# Patient Record
Sex: FEMALE | Race: WHITE | NOT HISPANIC OR LATINO | Employment: OTHER | URBAN - NONMETROPOLITAN AREA
[De-identification: names, ages, dates, MRNs, and addresses within clinical notes are randomized per-mention and may not be internally consistent; named-entity substitution may affect disease eponyms.]

---

## 2017-02-22 ENCOUNTER — APPOINTMENT (OUTPATIENT)
Dept: GENERAL RADIOLOGY | Facility: HOSPITAL | Age: 72
End: 2017-02-22

## 2017-02-22 ENCOUNTER — HOSPITAL ENCOUNTER (EMERGENCY)
Facility: HOSPITAL | Age: 72
Discharge: HOME | End: 2017-02-22
Attending: EMERGENCY MEDICINE | Admitting: NURSE PRACTITIONER

## 2017-02-22 VITALS
HEART RATE: 64 BPM | RESPIRATION RATE: 18 BRPM | WEIGHT: 237 LBS | TEMPERATURE: 98.1 F | BODY MASS INDEX: 44.75 KG/M2 | HEIGHT: 61 IN | DIASTOLIC BLOOD PRESSURE: 91 MMHG | SYSTOLIC BLOOD PRESSURE: 150 MMHG | OXYGEN SATURATION: 97 %

## 2017-02-22 DIAGNOSIS — R07.81 RIB PAIN ON RIGHT SIDE: Primary | ICD-10-CM

## 2017-02-22 LAB
ALBUMIN SERPL-MCNC: 4.1 G/DL (ref 3.4–4.8)
ALBUMIN/GLOB SERPL: 1.2 G/DL (ref 1.1–1.8)
ALP SERPL-CCNC: 146 U/L (ref 38–126)
ALT SERPL W P-5'-P-CCNC: 37 U/L (ref 9–52)
ANION GAP SERPL CALCULATED.3IONS-SCNC: 8 MMOL/L (ref 5–15)
APTT PPP: 36 SECONDS (ref 20–40.3)
AST SERPL-CCNC: 43 U/L (ref 14–36)
BASOPHILS # BLD AUTO: 0.02 10*3/MM3 (ref 0–0.2)
BASOPHILS NFR BLD AUTO: 0.4 % (ref 0–2)
BILIRUB SERPL-MCNC: 0.6 MG/DL (ref 0.2–1.3)
BUN BLD-MCNC: 11 MG/DL (ref 7–21)
BUN/CREAT SERPL: 14.5 (ref 7–25)
CALCIUM SPEC-SCNC: 9.8 MG/DL (ref 8.4–10.2)
CHLORIDE SERPL-SCNC: 104 MMOL/L (ref 95–110)
CO2 SERPL-SCNC: 28 MMOL/L (ref 22–31)
CREAT BLD-MCNC: 0.76 MG/DL (ref 0.5–1)
D-DIMER, QUANTITATIVE (MAD,POW, STR): 470 NG/ML (FEU) (ref 0–470)
DEPRECATED RDW RBC AUTO: 47.7 FL (ref 36.4–46.3)
EOSINOPHIL # BLD AUTO: 0.06 10*3/MM3 (ref 0–0.7)
EOSINOPHIL NFR BLD AUTO: 1.3 % (ref 0–7)
ERYTHROCYTE [DISTWIDTH] IN BLOOD BY AUTOMATED COUNT: 14.5 % (ref 11.5–14.5)
GFR SERPL CREATININE-BSD FRML MDRD: 75 ML/MIN/1.73 (ref 60–104)
GLOBULIN UR ELPH-MCNC: 3.4 GM/DL (ref 2.3–3.5)
GLUCOSE BLD-MCNC: 86 MG/DL (ref 60–100)
HCT VFR BLD AUTO: 38.5 % (ref 35–45)
HGB BLD-MCNC: 13.2 G/DL (ref 12–15.5)
HOLD SPECIMEN: NORMAL
HOLD SPECIMEN: NORMAL
IMM GRANULOCYTES # BLD: 0 10*3/MM3 (ref 0–0.02)
IMM GRANULOCYTES NFR BLD: 0 % (ref 0–0.5)
INR PPP: 1.06 (ref 0.8–1.2)
LYMPHOCYTES # BLD AUTO: 1.53 10*3/MM3 (ref 0.6–4.2)
LYMPHOCYTES NFR BLD AUTO: 33 % (ref 10–50)
MCH RBC QN AUTO: 30.7 PG (ref 26.5–34)
MCHC RBC AUTO-ENTMCNC: 34.3 G/DL (ref 31.4–36)
MCV RBC AUTO: 89.5 FL (ref 80–98)
MONOCYTES # BLD AUTO: 0.43 10*3/MM3 (ref 0–0.9)
MONOCYTES NFR BLD AUTO: 9.3 % (ref 0–12)
NEUTROPHILS # BLD AUTO: 2.6 10*3/MM3 (ref 2–8.6)
NEUTROPHILS NFR BLD AUTO: 56 % (ref 37–80)
PLATELET # BLD AUTO: 323 10*3/MM3 (ref 150–450)
PMV BLD AUTO: 9.9 FL (ref 8–12)
POTASSIUM BLD-SCNC: 3.8 MMOL/L (ref 3.5–5.1)
PROT SERPL-MCNC: 7.5 G/DL (ref 6.3–8.6)
PROTHROMBIN TIME: 13.8 SECONDS (ref 11.1–15.3)
RBC # BLD AUTO: 4.3 10*6/MM3 (ref 3.77–5.16)
SODIUM BLD-SCNC: 140 MMOL/L (ref 137–145)
WBC NRBC COR # BLD: 4.64 10*3/MM3 (ref 3.2–9.8)
WHOLE BLOOD HOLD SPECIMEN: NORMAL
WHOLE BLOOD HOLD SPECIMEN: NORMAL

## 2017-02-22 PROCEDURE — 85025 COMPLETE CBC W/AUTO DIFF WBC: CPT | Performed by: NURSE PRACTITIONER

## 2017-02-22 PROCEDURE — 85610 PROTHROMBIN TIME: CPT | Performed by: NURSE PRACTITIONER

## 2017-02-22 PROCEDURE — 85730 THROMBOPLASTIN TIME PARTIAL: CPT | Performed by: NURSE PRACTITIONER

## 2017-02-22 PROCEDURE — 80053 COMPREHEN METABOLIC PANEL: CPT | Performed by: NURSE PRACTITIONER

## 2017-02-22 PROCEDURE — 99283 EMERGENCY DEPT VISIT LOW MDM: CPT

## 2017-02-22 PROCEDURE — 71101 X-RAY EXAM UNILAT RIBS/CHEST: CPT

## 2017-02-22 PROCEDURE — 93010 ELECTROCARDIOGRAM REPORT: CPT | Performed by: INTERNAL MEDICINE

## 2017-02-22 PROCEDURE — 93005 ELECTROCARDIOGRAM TRACING: CPT | Performed by: NURSE PRACTITIONER

## 2017-02-22 PROCEDURE — 85379 FIBRIN DEGRADATION QUANT: CPT | Performed by: NURSE PRACTITIONER

## 2017-02-22 RX ORDER — TRAMADOL HYDROCHLORIDE 50 MG/1
50 TABLET ORAL EVERY 8 HOURS PRN
Qty: 15 TABLET | Refills: 0 | Status: SHIPPED | OUTPATIENT
Start: 2017-02-22 | End: 2017-10-06

## 2017-02-22 RX ORDER — CYCLOBENZAPRINE HCL 10 MG
10 TABLET ORAL 3 TIMES DAILY PRN
Qty: 30 TABLET | Refills: 0 | Status: SHIPPED | OUTPATIENT
Start: 2017-02-22 | End: 2017-03-01

## 2017-02-22 NOTE — ED PROVIDER NOTES
Subjective   HPI Comments: Went to care center earlier today c/o right rib pain, but remembered she had similar pain last year and had PE in lungs.  recently had surgery and she has been having to pull on him a lot.    Patient is a 71 y.o. female presenting with chest pain.   History provided by:  Patient  Chest Pain   Chest pain location: right rib pain.  Pain quality: aching    Pain radiates to:  Does not radiate  Pain severity:  Moderate  Progression since onset: worse with palpation, worse with movement.  Ineffective treatments: tylenol.  Associated symptoms: no shortness of breath        Review of Systems   Constitutional: Negative.    Eyes: Negative.    Respiratory: Negative.  Negative for shortness of breath.    Cardiovascular: Positive for chest pain.   Gastrointestinal: Negative.    Genitourinary: Negative.    Musculoskeletal: Negative.    Skin: Negative.    Neurological: Negative.    Psychiatric/Behavioral: Negative.            Past Medical History   Diagnosis Date   • Chest pain      History of noncardiac chest pain   • Chronic depression    • Depressive disorder    • Diastolic dysfunction    • Dyspnea    • Dyspnea on exertion    • Edema    • Essential hypertension    • Exercise intolerance    • Fatigue    • GERD (gastroesophageal reflux disease)    • History of bone density study 2011     DEXA BONE DENSITY APPENDICULAR 42446 (1) - normal   • History of bone density study 06/24/2015     DXA BONE DENSITY AXIAL 39366 [facility] (1) - Ordered By: TOLU RAMIREZ ([facility])    • History of echocardiogram      Echocardiogram W/ color flow 39695 (2)   • History of mammogram 2013     Mammogram screen (1)   • History of mammogram 2015     MAMMOGRAM SCREENING 50840 - [facility] (1)   • History of screening mammography 06/25/2015     SCREENING MAMMOGRAPHY DIGITAL  (Medicare) (1) - Ordered By: ANDRADE BECERRIL ([facility])    • Hypercalcemia    • Hyperlipidemia    • Influenza vaccine administered 02/25/2016  "    INFLUENZA IMMUN ADMIN OR PREV RECV'D  (2) - Ordered By: ANDRADE BECERRIL ([facility])    • Long-term (current) use of anticoagulants      coumadin therapy      • Lower extremity edema      Intermittent lower extremity edema   • Obesity, Class III, BMI 40-49.9 (morbid obesity)      \"Class III obesity\"   • BEVERLY (obstructive sleep apnea)      Mild BEVERLY   • PE (pulmonary embolism) 10/2015     Bilateral PE diagnosed after a car ride to [place]   • Pneumococcal vaccination given 02/25/2016     PNEUMOC VAC/ADMIN/RCVD 4040F (2) - Ordered By: ANDRADE BECERRIL ([facility])    • Right basilic vein fibrosis 2015   • Sedentary lifestyle    • Shortness of breath    • Venous thrombosis 2015     right basilic venous thrombosis; This was noted in May 2015.   • Weight gain        Allergies   Allergen Reactions   • Sulfa Antibiotics Rash     Sulfa (Sulfonamide Antibiotics)       Past Surgical History   Procedure Laterality Date   • Cholecystectomy       Cholecystectomy (1)   • Endoscopy  09/15/2011     Colon endoscopy 83901 (2) - Hemorrhoids found.   • Hemorrhoidectomy     • Hysteroscopy       Hysteroscopy; ablation (1)   • Injection of medication  09/27/2012     Kenalog (1) - Ordered By: ANDRADE BECERRIL ([facility])    • Gastric sleeve laparoscopic         Family History   Problem Relation Age of Onset   • Ovarian cancer Mother    • Bleeding Disorder Father    • Other Father      Hematologic disorder   • Lung cancer Father    • Pancreatic cancer Sister    • Cancer Brother    • Lung cancer Brother    • Bleeding Disorder Other    • Hyperlipidemia Other    • Hypertension Other    • Osteoarthritis Other    • Other Other      Gastritis       Social History     Social History   • Marital status:      Spouse name: N/A   • Number of children: N/A   • Years of education: N/A     Social History Main Topics   • Smoking status: Never Smoker   • Smokeless tobacco: None   • Alcohol use No   • Drug use: None   • Sexual activity: Not Asked      " "Comment: Marital status:      Other Topics Concern   • None     Social History Narrative   • None           Objective   Physical Exam   Constitutional: She is oriented to person, place, and time. She appears well-developed and well-nourished.   HENT:   Head: Normocephalic.   Eyes: EOM are normal. Pupils are equal, round, and reactive to light.   Neck: Normal range of motion. Neck supple.   Cardiovascular: Normal rate and regular rhythm.    Pulmonary/Chest: Effort normal and breath sounds normal.   Tender with palpation on lower right ribs, skin is clear   Abdominal: Soft. Bowel sounds are normal.   Neurological: She is alert and oriented to person, place, and time.   Skin: Skin is warm and dry.   Nursing note and vitals reviewed.    Visit Vitals   • /91 (BP Location: Left arm, Patient Position: Lying)   • Pulse 64   • Temp 98.1 °F (36.7 °C) (Oral)   • Resp 18   • Ht 61\" (154.9 cm)   • Wt 237 lb (108 kg)   • SpO2 97%   • BMI 44.78 kg/m2         ECG 12 Lead    Date/Time: 2/22/2017 3:38 PM  Performed by: LUKE VILLASENOR  Authorized by: LUKE VILLASENOR   Interpreted by physician  Comparison: compared with previous ECG from 6/30/2016  Rhythm: sinus bradycardia  BPM: 59  Conduction: 1st degree               ED Course  ED Course      Results for orders placed or performed during the hospital encounter of 02/22/17   Comprehensive Metabolic Panel   Result Value Ref Range    Glucose 86 60 - 100 mg/dL    BUN 11 7 - 21 mg/dL    Creatinine 0.76 0.50 - 1.00 mg/dL    Sodium 140 137 - 145 mmol/L    Potassium 3.8 3.5 - 5.1 mmol/L    Chloride 104 95 - 110 mmol/L    CO2 28.0 22.0 - 31.0 mmol/L    Calcium 9.8 8.4 - 10.2 mg/dL    Total Protein 7.5 6.3 - 8.6 g/dL    Albumin 4.10 3.40 - 4.80 g/dL    ALT (SGPT) 37 9 - 52 U/L    AST (SGOT) 43 (H) 14 - 36 U/L    Alkaline Phosphatase 146 (H) 38 - 126 U/L    Total Bilirubin 0.6 0.2 - 1.3 mg/dL    eGFR Non African Amer 75 >60 mL/min/1.73    Globulin 3.4 2.3 - 3.5 gm/dL    A/G Ratio " 1.2 1.1 - 1.8 g/dL    BUN/Creatinine Ratio 14.5 7.0 - 25.0    Anion Gap 8.0 5.0 - 15.0 mmol/L   aPTT   Result Value Ref Range    PTT 36.0 20.0 - 40.3 seconds   D-dimer, Quantitative   Result Value Ref Range    D-Dimer, Quantitative 470 0 - 470 ng/mL (FEU)   Protime-INR   Result Value Ref Range    Protime 13.8 11.1 - 15.3 Seconds    INR 1.06 0.80 - 1.20   CBC Auto Differential   Result Value Ref Range    WBC 4.64 3.20 - 9.80 10*3/mm3    RBC 4.30 3.77 - 5.16 10*6/mm3    Hemoglobin 13.2 12.0 - 15.5 g/dL    Hematocrit 38.5 35.0 - 45.0 %    MCV 89.5 80.0 - 98.0 fL    MCH 30.7 26.5 - 34.0 pg    MCHC 34.3 31.4 - 36.0 g/dL    RDW 14.5 11.5 - 14.5 %    RDW-SD 47.7 (H) 36.4 - 46.3 fl    MPV 9.9 8.0 - 12.0 fL    Platelets 323 150 - 450 10*3/mm3    Neutrophil % 56.0 37.0 - 80.0 %    Lymphocyte % 33.0 10.0 - 50.0 %    Monocyte % 9.3 0.0 - 12.0 %    Eosinophil % 1.3 0.0 - 7.0 %    Basophil % 0.4 0.0 - 2.0 %    Immature Grans % 0.0 0.0 - 0.5 %    Neutrophils, Absolute 2.60 2.00 - 8.60 10*3/mm3    Lymphocytes, Absolute 1.53 0.60 - 4.20 10*3/mm3    Monocytes, Absolute 0.43 0.00 - 0.90 10*3/mm3    Eosinophils, Absolute 0.06 0.00 - 0.70 10*3/mm3    Basophils, Absolute 0.02 0.00 - 0.20 10*3/mm3    Immature Grans, Absolute 0.00 0.00 - 0.02 10*3/mm3     XR Ribs Right With PA Chest   Final Result   Impression: No radiographic evidence of fracture.      Electronically signed by:  Johnny Lester MD  2/22/2017 3:41 PM CST   Workstation: [id number]                    MDM  Number of Diagnoses or Management Options  Rib pain on right side:   Diagnosis management comments: ARLETH Request # : [id number]  Labs are negative, CXR neg. Will treat for pain, most likely muscle strain.      Final diagnoses:   Rib pain on right side            Dyana Oliveros, APRN  02/22/17 [id number]

## 2017-03-01 ENCOUNTER — OFFICE VISIT (OUTPATIENT)
Dept: INTERNAL MEDICINE | Facility: CLINIC | Age: 72
End: 2017-03-01

## 2017-03-01 VITALS
HEIGHT: 61 IN | BODY MASS INDEX: 44.22 KG/M2 | WEIGHT: 234.2 LBS | SYSTOLIC BLOOD PRESSURE: 120 MMHG | DIASTOLIC BLOOD PRESSURE: 80 MMHG

## 2017-03-01 DIAGNOSIS — I10 ESSENTIAL HYPERTENSION: Primary | ICD-10-CM

## 2017-03-01 DIAGNOSIS — E78.2 MIXED HYPERLIPIDEMIA: ICD-10-CM

## 2017-03-01 DIAGNOSIS — Z23 IMMUNIZATION DUE: ICD-10-CM

## 2017-03-01 DIAGNOSIS — F32.A DEPRESSIVE DISORDER: ICD-10-CM

## 2017-03-01 PROCEDURE — 90471 IMMUNIZATION ADMIN: CPT | Performed by: INTERNAL MEDICINE

## 2017-03-01 PROCEDURE — 99213 OFFICE O/P EST LOW 20 MIN: CPT | Performed by: INTERNAL MEDICINE

## 2017-03-01 PROCEDURE — 90715 TDAP VACCINE 7 YRS/> IM: CPT | Performed by: INTERNAL MEDICINE

## 2017-03-01 RX ORDER — SIMVASTATIN 40 MG
40 TABLET ORAL DAILY
Qty: 90 TABLET | Refills: 1 | Status: SHIPPED | OUTPATIENT
Start: 2017-03-01 | End: 2017-06-21

## 2017-03-01 RX ORDER — CITALOPRAM 20 MG/1
20 TABLET ORAL DAILY
Qty: 90 TABLET | Refills: 1 | Status: SHIPPED | OUTPATIENT
Start: 2017-03-01 | End: 2017-03-08 | Stop reason: SDUPTHER

## 2017-03-01 RX ORDER — LOSARTAN POTASSIUM 100 MG/1
100 TABLET ORAL DAILY
Qty: 90 TABLET | Refills: 1 | Status: SHIPPED | OUTPATIENT
Start: 2017-03-01 | End: 2017-06-21 | Stop reason: SDUPTHER

## 2017-03-01 RX ORDER — TRAZODONE HYDROCHLORIDE 50 MG/1
50 TABLET ORAL NIGHTLY
Qty: 30 TABLET | Refills: 2 | Status: SHIPPED | OUTPATIENT
Start: 2017-03-01 | End: 2017-06-17 | Stop reason: SDUPTHER

## 2017-03-01 NOTE — PROGRESS NOTES
Subjective   Veronica Wilkinson is a 71 y.o. female       Patient is seen for recheck on HTN, hyperlipidemia and depression.    Her BP is well controlled on Losartan. She denies any cardiovascular complaints.No chest pain, dyspnea, orthopnea or edema in lower extremities.  She underwent bariatric surgery in [place] in October 2016 and has lost about 60lbs.  She is not physically active and does not exercise on regular basis.    On Zocor for hyperlipidemia.  She tolerates Zocor well and denies any side effects to the medication.  No myalgia, myositis or fatigue.  No history of elevated LFTs.  Lipids. , TG 89, LDL 94, HDL 96. /02/25/2016/.    Depression is better on Celexa but still having problems with insomnia. Denies anxiety or panic attacks.    Past Medical History   Diagnosis Date   • Chest pain      History of noncardiac chest pain   • Chronic depression    • Depressive disorder    • Diastolic dysfunction    • Dyspnea    • Dyspnea on exertion    • Edema    • Essential hypertension    • Exercise intolerance    • Fatigue    • GERD (gastroesophageal reflux disease)    • History of bone density study 2011     DEXA BONE DENSITY APPENDICULAR 64198 (1) - normal   • History of bone density study 06/24/2015     DXA BONE DENSITY AXIAL 11562 [facility] (1) - Ordered By: TOLU RAMIREZ ([facility])    • History of echocardiogram      Echocardiogram W/ color flow 90971 (2)   • History of mammogram 2013     Mammogram screen (1)   • History of mammogram 2015     MAMMOGRAM SCREENING 06867 - [facility] (1)   • History of screening mammography 06/25/2015     SCREENING MAMMOGRAPHY DIGITAL  (Medicare) (1) - Ordered By: ANDRADE BECERRIL ([facility])    • Hypercalcemia    • Hyperlipidemia    • Influenza vaccine administered 02/25/2016     INFLUENZA IMMUN ADMIN OR PREV RECV'D  (2) - Ordered By: ANDRADE BECERRIL ([facility])    • Long-term (current) use of anticoagulants      coumadin therapy      • Lower extremity edema       "Intermittent lower extremity edema   • Obesity, Class III, BMI 40-49.9 (morbid obesity)      \"Class III obesity\"   • BEVERLY (obstructive sleep apnea)      Mild BEVERLY   • PE (pulmonary embolism) 10/2015     Bilateral PE diagnosed after a car ride to [place]   • Pneumococcal vaccination given 02/25/2016     PNEUMOC VAC/ADMIN/RCVD 4040F (2) - Ordered By: ANDRADE BECERRIL ([facility])    • Right basilic vein fibrosis 2015   • Sedentary lifestyle    • Shortness of breath    • Venous thrombosis 2015     right basilic venous thrombosis; This was noted in May 2015.   • Weight gain      Past Surgical History   Procedure Laterality Date   • Cholecystectomy       Cholecystectomy (1)   • Endoscopy  09/15/2011     Colon endoscopy 27475 (2) - Hemorrhoids found.   • Hemorrhoidectomy     • Hysteroscopy       Hysteroscopy; ablation (1)   • Injection of medication  09/27/2012     Kenalog (1) - Ordered By: ANDRADE BECERRIL ([facility])    • Gastric sleeve laparoscopic         Social History   Substance Use Topics   • Smoking status: Never Smoker   • Smokeless tobacco: Not on file   • Alcohol use No     Family History   Problem Relation Age of Onset   • Ovarian cancer Mother    • Bleeding Disorder Father    • Other Father      Hematologic disorder   • Lung cancer Father    • Pancreatic cancer Sister    • Cancer Brother    • Lung cancer Brother    • Bleeding Disorder Other    • Hyperlipidemia Other    • Hypertension Other    • Osteoarthritis Other    • Other Other      Gastritis     Allergies   Allergen Reactions   • Sulfa Antibiotics Rash     Sulfa (Sulfonamide Antibiotics)     Current Outpatient Prescriptions on File Prior to Visit   Medication Sig Dispense Refill   • citalopram (CeleXA) 20 MG tablet Take 1 tablet by mouth daily. 90 tablet 1   • Cobalamine Combinations (VITAMIN B12-FOLIC ACID PO) Take  by mouth. vit B12-intrinsic factor-folic acid cmb#2 oral     • diphenhydrAMINE-acetaminophen (TYLENOL PM)  MG tablet per tablet Take 1 tablet " by mouth at night as needed for sleep.     • losartan (COZAAR) 100 MG tablet Take 1 tablet by mouth daily. 90 tablet 1   • omeprazole OTC (PRILOSEC OTC) 20 MG EC tablet      • simvastatin (ZOCOR) 40 MG tablet Take 1 tablet by mouth daily. AT BEDTIME 90 tablet 1   • traMADol (ULTRAM) 50 MG tablet Take 1 tablet by mouth Every 8 (Eight) Hours As Needed for moderate pain (4-6). 15 tablet 0   • [DISCONTINUED] cyclobenzaprine (FLEXERIL) 10 MG tablet Take 1 tablet by mouth 3 (Three) Times a Day As Needed for muscle spasms. 30 tablet 0   • [DISCONTINUED] furosemide (LASIX) 20 MG tablet Take 1 tablet by mouth daily as needed (edema). 90 tablet 1   • [DISCONTINUED] warfarin (COUMADIN) 5 MG tablet Take  by mouth daily. TAKE 1.5 TABLETS (7.5MG) ON MONDAYS, WEDNESDAYS, AND SATURDAYS, AND 1 TABLET (5MG) ON ALL THE OTHER DAYS       No current facility-administered medications on file prior to visit.      Review of Systems   Constitutional: Negative.    HENT: Negative.    Eyes: Negative.    Respiratory: Negative for shortness of breath and wheezing.    Cardiovascular: Negative for chest pain, palpitations and leg swelling.   Gastrointestinal: Negative for abdominal pain, constipation and diarrhea.   Endocrine: Negative for cold intolerance, heat intolerance and polyuria.   Neurological: Negative for dizziness, light-headedness and headaches.   Psychiatric/Behavioral: Positive for sleep disturbance. The patient is not nervous/anxious.        Objective   Physical Exam   Constitutional: She appears well-developed and well-nourished.   HENT:   Head: Normocephalic and atraumatic.   Eyes: Conjunctivae are normal. Pupils are equal, round, and reactive to light. No scleral icterus.   Neck: Neck supple. No JVD present. No thyromegaly present.   Cardiovascular: Normal rate and regular rhythm.  Exam reveals no friction rub.    No murmur heard.  Pulmonary/Chest: Effort normal and breath sounds normal.   Abdominal: Soft. Bowel sounds are  normal.   Skin: Skin is warm and dry. No rash noted.   Psychiatric: She has a normal mood and affect. Her behavior is normal.   Nursing note and vitals reviewed.          Patient Active Problem List   Diagnosis   • Essential hypertension   • Hyperlipidemia   • History of pulmonary embolism   • Hyperparathyroidism   • Pulmonary embolism   • Long-term (current) use of anticoagulants           Results for orders placed or performed during the hospital encounter of 02/22/17   Comprehensive Metabolic Panel   Result Value Ref Range    Glucose 86 60 - 100 mg/dL    BUN 11 7 - 21 mg/dL    Creatinine 0.76 0.50 - 1.00 mg/dL    Sodium 140 137 - 145 mmol/L    Potassium 3.8 3.5 - 5.1 mmol/L    Chloride 104 95 - 110 mmol/L    CO2 28.0 22.0 - 31.0 mmol/L    Calcium 9.8 8.4 - 10.2 mg/dL    Total Protein 7.5 6.3 - 8.6 g/dL    Albumin 4.10 3.40 - 4.80 g/dL    ALT (SGPT) 37 9 - 52 U/L    AST (SGOT) 43 (H) 14 - 36 U/L    Alkaline Phosphatase 146 (H) 38 - 126 U/L    Total Bilirubin 0.6 0.2 - 1.3 mg/dL    eGFR Non African Amer 75 >60 mL/min/1.73    Globulin 3.4 2.3 - 3.5 gm/dL    A/G Ratio 1.2 1.1 - 1.8 g/dL    BUN/Creatinine Ratio 14.5 7.0 - 25.0    Anion Gap 8.0 5.0 - 15.0 mmol/L   aPTT   Result Value Ref Range    PTT 36.0 20.0 - 40.3 seconds   D-dimer, Quantitative   Result Value Ref Range    D-Dimer, Quantitative 470 0 - 470 ng/mL (FEU)   Protime-INR   Result Value Ref Range    Protime 13.8 11.1 - 15.3 Seconds    INR 1.06 0.80 - 1.20   CBC Auto Differential   Result Value Ref Range    WBC 4.64 3.20 - 9.80 10*3/mm3    RBC 4.30 3.77 - 5.16 10*6/mm3    Hemoglobin 13.2 12.0 - 15.5 g/dL    Hematocrit 38.5 35.0 - 45.0 %    MCV 89.5 80.0 - 98.0 fL    MCH 30.7 26.5 - 34.0 pg    MCHC 34.3 31.4 - 36.0 g/dL    RDW 14.5 11.5 - 14.5 %    RDW-SD 47.7 (H) 36.4 - 46.3 fl    MPV 9.9 8.0 - 12.0 fL    Platelets 323 150 - 450 10*3/mm3    Neutrophil % 56.0 37.0 - 80.0 %    Lymphocyte % 33.0 10.0 - 50.0 %    Monocyte % 9.3 0.0 - 12.0 %    Eosinophil % 1.3  0.0 - 7.0 %    Basophil % 0.4 0.0 - 2.0 %    Immature Grans % 0.0 0.0 - 0.5 %    Neutrophils, Absolute 2.60 2.00 - 8.60 10*3/mm3    Lymphocytes, Absolute 1.53 0.60 - 4.20 10*3/mm3    Monocytes, Absolute 0.43 0.00 - 0.90 10*3/mm3    Eosinophils, Absolute 0.06 0.00 - 0.70 10*3/mm3    Basophils, Absolute 0.02 0.00 - 0.20 10*3/mm3    Immature Grans, Absolute 0.00 0.00 - 0.02 10*3/mm3   Light Blue Top   Result Value Ref Range    Extra Tube hold for add-on    Green Top (Gel)   Result Value Ref Range    Extra Tube Hold for add-ons.    Lavender Top   Result Value Ref Range    Extra Tube hold for add-on    Gold Top - SST   Result Value Ref Range    Extra Tube Hold for add-ons.          Assessment    Diagnosis Plan   1. Essential hypertension     2. Mixed hyperlipidemia     3. Depressive disorder           Plan        1. Patient will continue Losartan.      DASH.      1.5 gr Sodium restriction.       Advised to increase physical activity and walk 30 minutes daily.  2. Lipids will be rechecked.      May consider stopping statin.  3. Patient will continue Celexa.      Will add Trazodone 50 mg qhs for insomnia.      Side effects of the medication discussed with the patient.      Follow up in 3 months.

## 2017-03-08 RX ORDER — CITALOPRAM 20 MG/1
20 TABLET ORAL DAILY
Qty: 90 TABLET | Refills: 1 | Status: SHIPPED | OUTPATIENT
Start: 2017-03-08 | End: 2017-06-21 | Stop reason: SDUPTHER

## 2017-06-19 RX ORDER — TRAZODONE HYDROCHLORIDE 50 MG/1
TABLET ORAL
Qty: 30 TABLET | Refills: 0 | Status: SHIPPED | OUTPATIENT
Start: 2017-06-19 | End: 2017-06-21 | Stop reason: SDUPTHER

## 2017-06-21 ENCOUNTER — OFFICE VISIT (OUTPATIENT)
Dept: INTERNAL MEDICINE | Facility: CLINIC | Age: 72
End: 2017-06-21

## 2017-06-21 ENCOUNTER — TELEPHONE (OUTPATIENT)
Dept: INTERNAL MEDICINE | Facility: CLINIC | Age: 72
End: 2017-06-21

## 2017-06-21 ENCOUNTER — APPOINTMENT (OUTPATIENT)
Dept: LAB | Facility: HOSPITAL | Age: 72
End: 2017-06-21

## 2017-06-21 VITALS
HEIGHT: 61 IN | SYSTOLIC BLOOD PRESSURE: 130 MMHG | DIASTOLIC BLOOD PRESSURE: 80 MMHG | BODY MASS INDEX: 40.4 KG/M2 | WEIGHT: 214 LBS

## 2017-06-21 DIAGNOSIS — F32.A DEPRESSIVE DISORDER: ICD-10-CM

## 2017-06-21 DIAGNOSIS — Z11.59 NEED FOR HEPATITIS C SCREENING TEST: ICD-10-CM

## 2017-06-21 DIAGNOSIS — E78.2 MIXED HYPERLIPIDEMIA: ICD-10-CM

## 2017-06-21 DIAGNOSIS — I10 ESSENTIAL HYPERTENSION: Primary | ICD-10-CM

## 2017-06-21 LAB
ARTICHOKE IGE QN: 136 MG/DL (ref 1–129)
CHOLEST SERPL-MCNC: 237 MG/DL (ref 0–199)
HCV AB SER DONR QL: NEGATIVE
HDLC SERPL-MCNC: 68 MG/DL (ref 60–200)
LDLC/HDLC SERPL: 2.23 {RATIO} (ref 0–3.22)
TRIGL SERPL-MCNC: 87 MG/DL (ref 20–199)

## 2017-06-21 PROCEDURE — 80061 LIPID PANEL: CPT | Performed by: INTERNAL MEDICINE

## 2017-06-21 PROCEDURE — 86803 HEPATITIS C AB TEST: CPT | Performed by: INTERNAL MEDICINE

## 2017-06-21 PROCEDURE — 36415 COLL VENOUS BLD VENIPUNCTURE: CPT | Performed by: INTERNAL MEDICINE

## 2017-06-21 PROCEDURE — 99213 OFFICE O/P EST LOW 20 MIN: CPT | Performed by: INTERNAL MEDICINE

## 2017-06-21 RX ORDER — TRAZODONE HYDROCHLORIDE 50 MG/1
50 TABLET ORAL NIGHTLY
Qty: 30 TABLET | Refills: 0 | Status: SHIPPED | OUTPATIENT
Start: 2017-06-21 | End: 2017-06-21 | Stop reason: SDUPTHER

## 2017-06-21 RX ORDER — TRAZODONE HYDROCHLORIDE 50 MG/1
50 TABLET ORAL NIGHTLY
Qty: 90 TABLET | Refills: 1 | Status: SHIPPED | OUTPATIENT
Start: 2017-06-21 | End: 2017-10-06

## 2017-06-21 RX ORDER — LOSARTAN POTASSIUM 100 MG/1
100 TABLET ORAL DAILY
Qty: 90 TABLET | Refills: 1 | Status: SHIPPED | OUTPATIENT
Start: 2017-06-21 | End: 2017-10-06 | Stop reason: SDUPTHER

## 2017-06-21 RX ORDER — CITALOPRAM 20 MG/1
20 TABLET ORAL DAILY
Qty: 90 TABLET | Refills: 1 | Status: SHIPPED | OUTPATIENT
Start: 2017-06-21 | End: 2017-10-06 | Stop reason: SDUPTHER

## 2017-06-21 RX ORDER — SCOLOPAMINE TRANSDERMAL SYSTEM 1 MG/1
1 PATCH, EXTENDED RELEASE TRANSDERMAL
Qty: 4 PATCH | Refills: 1 | Status: SHIPPED | OUTPATIENT
Start: 2017-06-21 | End: 2017-08-22 | Stop reason: SDUPTHER

## 2017-06-21 NOTE — TELEPHONE ENCOUNTER
Spoke with pt let her know lab shows cholesterol was elevated DR BECERRIL  Recommends she restart the  zocor and do it 3 times a week

## 2017-06-21 NOTE — PROGRESS NOTES
Subjective   Veronica Wilkinson is a 71 y.o. female       Patient is in for recheck on HTN, hyperlipidemia and depressive disorder.      Her BP is well controlled on Losartan. She denies any cardiovascular complaints.  Takes medication as prescribed and tolerates it well.  Patient underwent bariatric surgery in [place] in November 2016 and was able to lose 80lbs since then.    Hyperlipidemia.  Patient was on Simvastatin but discontinued medication several months ago.  She is abiding by low fat diet and has been eating healthy.    Depression is controlled on Celexa, and she is sleeping better since being started on Trazodone. She denies any problems with anxiety and denies panic attacks.  Denies any side effects related to the medications.    Past Medical History:   Diagnosis Date   • Chest pain     History of noncardiac chest pain   • Chronic depression    • Depressive disorder    • Diastolic dysfunction    • Dyspnea    • Dyspnea on exertion    • Edema    • Essential hypertension    • Exercise intolerance    • Fatigue    • GERD (gastroesophageal reflux disease)    • History of bone density study 2011    DEXA BONE DENSITY APPENDICULAR 10968 (1) - normal   • History of bone density study 06/24/2015    DXA BONE DENSITY AXIAL 62887 [facility] (1) - Ordered By: TOLU RAMIREZ ([facility])    • History of echocardiogram     Echocardiogram W/ color flow 55879 (2)   • History of mammogram 2013    Mammogram screen (1)   • History of mammogram 2015    MAMMOGRAM SCREENING 80446 - [facility] (1)   • History of screening mammography 06/25/2015    SCREENING MAMMOGRAPHY DIGITAL  (Medicare) (1) - Ordered By: ANDRADE BECERRIL ([facility])    • Hypercalcemia    • Hyperlipidemia    • Influenza vaccine administered 02/25/2016    INFLUENZA IMMUN ADMIN OR PREV RECV'D  (2) - Ordered By: ANDRADE BECERRIL ([facility])    • Long-term (current) use of anticoagulants     coumadin therapy      • Lower extremity edema     Intermittent lower  "extremity edema   • Obesity, Class III, BMI 40-49.9 (morbid obesity)     \"Class III obesity\"   • BEVERLY (obstructive sleep apnea)     Mild BEVERLY   • PE (pulmonary embolism) 10/2015    Bilateral PE diagnosed after a car ride to [place]   • Pneumococcal vaccination given 02/25/2016    PNEUMOC VAC/ADMIN/RCVD 4040F (2) - Ordered By: ANDRADE BECERRIL ([facility])    • Right basilic vein fibrosis 2015   • Sedentary lifestyle    • Shortness of breath    • Venous thrombosis 2015    right basilic venous thrombosis; This was noted in May 2015.   • Weight gain      Past Surgical History:   Procedure Laterality Date   • CHOLECYSTECTOMY      Cholecystectomy (1)   • ENDOSCOPY  09/15/2011    Colon endoscopy 18312 (2) - Hemorrhoids found.   • GASTRIC SLEEVE LAPAROSCOPIC     • HEMORRHOIDECTOMY     • HYSTEROSCOPY      Hysteroscopy; ablation (1)   • INJECTION OF MEDICATION  09/27/2012    Kenalog (1) - Ordered By: ANDRADE BECERRIL ([facility])        Social History   Substance Use Topics   • Smoking status: Never Smoker   • Smokeless tobacco: Not on file   • Alcohol use No     Family History   Problem Relation Age of Onset   • Ovarian cancer Mother    • Bleeding Disorder Father    • Other Father      Hematologic disorder   • Lung cancer Father    • Pancreatic cancer Sister    • Cancer Brother    • Lung cancer Brother    • Bleeding Disorder Other    • Hyperlipidemia Other    • Hypertension Other    • Osteoarthritis Other    • Other Other      Gastritis     Allergies   Allergen Reactions   • Sulfa Antibiotics Rash     Sulfa (Sulfonamide Antibiotics)     Current Outpatient Prescriptions on File Prior to Visit   Medication Sig Dispense Refill   • Cobalamine Combinations (VITAMIN B12-FOLIC ACID PO) Take  by mouth. vit B12-intrinsic factor-folic acid cmb#2 oral     • omeprazole OTC (PRILOSEC OTC) 20 MG EC tablet      • traMADol (ULTRAM) 50 MG tablet Take 1 tablet by mouth Every 8 (Eight) Hours As Needed for moderate pain (4-6). 15 tablet 0   • " [DISCONTINUED] citalopram (CeleXA) 20 MG tablet Take 1 tablet by mouth Daily. 90 tablet 1   • [DISCONTINUED] diphenhydrAMINE-acetaminophen (TYLENOL PM)  MG tablet per tablet Take 1 tablet by mouth at night as needed for sleep.     • [DISCONTINUED] losartan (COZAAR) 100 MG tablet Take 1 tablet by mouth Daily. 90 tablet 1   • [DISCONTINUED] simvastatin (ZOCOR) 40 MG tablet Take 1 tablet by mouth Daily. AT BEDTIME 90 tablet 1   • [DISCONTINUED] traZODone (DESYREL) 50 MG tablet TAKE ONE TABLET BY MOUTH EVERY NIGHT 30 tablet 0     No current facility-administered medications on file prior to visit.      Review of Systems   Constitutional: Negative for activity change and fatigue.   HENT: Negative for facial swelling and nosebleeds.    Respiratory: Negative for chest tightness and shortness of breath.    Cardiovascular: Negative for chest pain, palpitations and leg swelling.   Gastrointestinal: Negative for abdominal pain, constipation and diarrhea.   Endocrine: Negative for cold intolerance, heat intolerance, polydipsia and polyuria.   Musculoskeletal: Negative for back pain and myalgias.   Neurological: Negative for dizziness and facial asymmetry.   Psychiatric/Behavioral: Positive for sleep disturbance. The patient is not nervous/anxious.        Objective   Physical Exam   Constitutional: She appears well-developed and well-nourished.   HENT:   Head: Normocephalic and atraumatic.   Nose: Nose normal.   Mouth/Throat: Oropharynx is clear and moist.   Eyes: Conjunctivae are normal. No scleral icterus.   Neck: Neck supple. No JVD present. No thyromegaly present.   Cardiovascular: Normal rate and regular rhythm.    No murmur heard.  Pulmonary/Chest: Effort normal and breath sounds normal.   Abdominal: Soft. Bowel sounds are normal. She exhibits no mass.   Musculoskeletal: Normal range of motion. She exhibits no tenderness or deformity.   Neurological: She is alert. She has normal reflexes. No cranial nerve deficit.    Skin: Skin is warm and dry. No rash noted.   Psychiatric: She has a normal mood and affect. Her behavior is normal. Judgment and thought content normal.   Nursing note and vitals reviewed.          Patient Active Problem List   Diagnosis   • Essential hypertension   • Hyperlipidemia   • History of pulmonary embolism   • Hyperparathyroidism   • Pulmonary embolism   • Long-term (current) use of anticoagulants               Assessment    Diagnosis Plan   1. Essential hypertension     2. Mixed hyperlipidemia  Lipid Panel   3. Depressive disorder     4. Need for hepatitis C screening test  Hepatitis C Antibody         Plan      1. Patient will continue Losartan.      DASH.      1.5 gr Sodium restriction.  2. Lipids will be rechecked.      Counseled on nutrition and physical activity.  3. Patient will continue Celexa and Trazodone.      Prescriptions risks and benefits discussed with the patient.        Follow up in 6 months.

## 2017-08-18 ENCOUNTER — TELEPHONE (OUTPATIENT)
Dept: INTERNAL MEDICINE | Facility: CLINIC | Age: 72
End: 2017-08-18

## 2017-08-18 NOTE — TELEPHONE ENCOUNTER
Patient called to request a prescription for motion sickness patches that go behind the ear. She is going on a cruise and wanted to see if she could get them before. She uses [facility] in [place]

## 2017-08-22 RX ORDER — SCOLOPAMINE TRANSDERMAL SYSTEM 1 MG/1
1 PATCH, EXTENDED RELEASE TRANSDERMAL
Qty: 4 PATCH | Refills: 1 | Status: SHIPPED | OUTPATIENT
Start: 2017-08-22 | End: 2017-10-06

## 2017-10-03 ENCOUNTER — TRANSCRIBE ORDERS (OUTPATIENT)
Dept: LAB | Facility: HOSPITAL | Age: 72
End: 2017-10-03

## 2017-10-03 DIAGNOSIS — Z00.00 ROUTINE GENERAL MEDICAL EXAMINATION AT A HEALTH CARE FACILITY: ICD-10-CM

## 2017-10-03 DIAGNOSIS — Z98.84 BARIATRIC SURGERY STATUS: Primary | ICD-10-CM

## 2017-10-03 DIAGNOSIS — I10 ESSENTIAL HYPERTENSION, MALIGNANT: ICD-10-CM

## 2017-10-03 DIAGNOSIS — E67.3 HYPERVITAMINOSIS D: ICD-10-CM

## 2017-10-03 DIAGNOSIS — E78.5 HYPERLIPIDEMIA, UNSPECIFIED HYPERLIPIDEMIA TYPE: ICD-10-CM

## 2017-10-03 DIAGNOSIS — R79.9 ABNORMAL FINDING OF BLOOD CHEMISTRY: ICD-10-CM

## 2017-10-06 ENCOUNTER — OFFICE VISIT (OUTPATIENT)
Dept: INTERNAL MEDICINE | Facility: CLINIC | Age: 72
End: 2017-10-06

## 2017-10-06 VITALS
BODY MASS INDEX: 38.72 KG/M2 | DIASTOLIC BLOOD PRESSURE: 70 MMHG | WEIGHT: 205.1 LBS | HEIGHT: 61 IN | SYSTOLIC BLOOD PRESSURE: 120 MMHG

## 2017-10-06 DIAGNOSIS — M54.42 CHRONIC MIDLINE LOW BACK PAIN WITH LEFT-SIDED SCIATICA: ICD-10-CM

## 2017-10-06 DIAGNOSIS — Z23 IMMUNIZATION DUE: ICD-10-CM

## 2017-10-06 DIAGNOSIS — I10 ESSENTIAL HYPERTENSION: Primary | ICD-10-CM

## 2017-10-06 DIAGNOSIS — F32.A DEPRESSIVE DISORDER: ICD-10-CM

## 2017-10-06 DIAGNOSIS — G89.29 CHRONIC MIDLINE LOW BACK PAIN WITH LEFT-SIDED SCIATICA: ICD-10-CM

## 2017-10-06 DIAGNOSIS — E78.2 MIXED HYPERLIPIDEMIA: ICD-10-CM

## 2017-10-06 PROCEDURE — 99213 OFFICE O/P EST LOW 20 MIN: CPT | Performed by: INTERNAL MEDICINE

## 2017-10-06 PROCEDURE — G0008 ADMIN INFLUENZA VIRUS VAC: HCPCS | Performed by: INTERNAL MEDICINE

## 2017-10-06 PROCEDURE — 90662 IIV NO PRSV INCREASED AG IM: CPT | Performed by: INTERNAL MEDICINE

## 2017-10-06 RX ORDER — TRAMADOL HYDROCHLORIDE 50 MG/1
50 TABLET ORAL EVERY 8 HOURS PRN
Qty: 15 TABLET | Refills: 0 | Status: SHIPPED | OUTPATIENT
Start: 2017-10-06 | End: 2018-04-27

## 2017-10-06 RX ORDER — SIMVASTATIN 20 MG
20 TABLET ORAL EVERY OTHER DAY
Qty: 90 TABLET | Refills: 1 | Status: SHIPPED | OUTPATIENT
Start: 2017-10-06 | End: 2018-05-15 | Stop reason: SDUPTHER

## 2017-10-06 RX ORDER — LOSARTAN POTASSIUM 100 MG/1
100 TABLET ORAL DAILY
Qty: 90 TABLET | Refills: 1 | Status: SHIPPED | OUTPATIENT
Start: 2017-10-06 | End: 2018-04-04 | Stop reason: SDUPTHER

## 2017-10-06 RX ORDER — TRAZODONE HYDROCHLORIDE 100 MG/1
100 TABLET ORAL NIGHTLY
Qty: 90 TABLET | Refills: 1 | Status: SHIPPED | OUTPATIENT
Start: 2017-10-06 | End: 2018-05-15 | Stop reason: SDUPTHER

## 2017-10-06 RX ORDER — SIMVASTATIN 20 MG
20 TABLET ORAL NIGHTLY
COMMUNITY
End: 2017-10-06 | Stop reason: SDUPTHER

## 2017-10-06 RX ORDER — CITALOPRAM 20 MG/1
20 TABLET ORAL DAILY
Qty: 90 TABLET | Refills: 1 | Status: SHIPPED | OUTPATIENT
Start: 2017-10-06 | End: 2018-05-15

## 2017-10-06 NOTE — PROGRESS NOTES
Subjective   Veronica Wilkinson is a 72 y.o. female       Patient is in for recheck on HTN, depressive disorder and also complaining of low back pain.    Her BP is well controlled on Losartan. Patient denies any cardiovascular complaints.  She lost 80lbs since she underwent bariatric surgery about a year ago.  Patient is taking Simvastatin qod  for hyperlipidemia and tolerates it well.    Depression improved on Celexa. She is still having problems with insomnia and anxiety.  She denies any side effects related to the medications.      Complains of low back pain. Pain is in lower lumbar area and radiates down her left leg.  She had X-Ray in chiropractor's office which showed degenerative changes in L spine.  Not able to take NSAIDs and asking for pain medications for PRN use.    Past Medical History:   Diagnosis Date   • Chest pain     History of noncardiac chest pain   • Chronic depression    • Depressive disorder    • Diastolic dysfunction    • Dyspnea    • Dyspnea on exertion    • Edema    • Essential hypertension    • Exercise intolerance    • Fatigue    • GERD (gastroesophageal reflux disease)    • History of bone density study 2011    DEXA BONE DENSITY APPENDICULAR 40157 (1) - normal   • History of bone density study 06/24/2015    DXA BONE DENSITY AXIAL 18290 [facility] (1) - Ordered By: TOLU RAMIREZ ([facility])    • History of echocardiogram     Echocardiogram W/ color flow 80236 (2)   • History of mammogram 2013    Mammogram screen (1)   • History of mammogram 2015    MAMMOGRAM SCREENING 93423 - [facility] (1)   • History of screening mammography 06/25/2015    SCREENING MAMMOGRAPHY DIGITAL  (Medicare) (1) - Ordered By: ANDRADE BECERRIL ([facility])    • Hypercalcemia    • Hyperlipidemia    • Influenza vaccine administered 02/25/2016    INFLUENZA IMMUN ADMIN OR PREV RECV'D  (2) - Ordered By: ANDRADE BECERRIL ([facility])    • Long-term (current) use of anticoagulants     coumadin therapy      • Lower extremity  "edema     Intermittent lower extremity edema   • Obesity, Class III, BMI 40-49.9 (morbid obesity)     \"Class III obesity\"   • BEVERLY (obstructive sleep apnea)     Mild BEVERLY   • PE (pulmonary embolism) 10/2015    Bilateral PE diagnosed after a car ride to [place]   • Pneumococcal vaccination given 02/25/2016    PNEUMOC VAC/ADMIN/RCVD 4040F (2) - Ordered By: ANDRADE BECERRIL ([facility])    • Right basilic vein fibrosis 2015   • Sedentary lifestyle    • Shortness of breath    • Venous thrombosis 2015    right basilic venous thrombosis; This was noted in May 2015.   • Weight gain      Past Surgical History:   Procedure Laterality Date   • CHOLECYSTECTOMY      Cholecystectomy (1)   • ENDOSCOPY  09/15/2011    Colon endoscopy 05148 (2) - Hemorrhoids found.   • GASTRIC SLEEVE LAPAROSCOPIC     • HEMORRHOIDECTOMY     • HYSTEROSCOPY      Hysteroscopy; ablation (1)   • INJECTION OF MEDICATION  09/27/2012    Kenalog (1) - Ordered By: ANDRADE BECERRIL ([facility])        Social History   Substance Use Topics   • Smoking status: Never Smoker   • Smokeless tobacco: Never Used   • Alcohol use No     Family History   Problem Relation Age of Onset   • Ovarian cancer Mother    • Bleeding Disorder Father    • Other Father      Hematologic disorder   • Lung cancer Father    • Pancreatic cancer Sister    • Cancer Brother    • Lung cancer Brother    • Bleeding Disorder Other    • Hyperlipidemia Other    • Hypertension Other    • Osteoarthritis Other    • Other Other      Gastritis     Allergies   Allergen Reactions   • Sulfa Antibiotics Rash     Sulfa (Sulfonamide Antibiotics)     Current Outpatient Prescriptions on File Prior to Visit   Medication Sig Dispense Refill   • citalopram (CeleXA) 20 MG tablet Take 1 tablet by mouth Daily. 90 tablet 1   • Cobalamine Combinations (VITAMIN B12-FOLIC ACID PO) Take  by mouth. vit B12-intrinsic factor-folic acid cmb#2 oral     • losartan (COZAAR) 100 MG tablet Take 1 tablet by mouth Daily. 90 tablet 1   • " omeprazole OTC (PRILOSEC OTC) 20 MG EC tablet      • traZODone (DESYREL) 50 MG tablet Take 1 tablet by mouth Every Night. 90 tablet 1   • [DISCONTINUED] traMADol (ULTRAM) 50 MG tablet Take 1 tablet by mouth Every 8 (Eight) Hours As Needed for moderate pain (4-6). 15 tablet 0   • [DISCONTINUED] Scopolamine (TRANSDERM-SCOP, 1.5 MG,) 1.5 MG/3DAYS patch Place 1 patch on the skin Every 72 (Seventy-Two) Hours. 4 patch 1     No current facility-administered medications on file prior to visit.      Review of Systems   Constitutional: Negative for activity change and fatigue.   HENT: Negative.    Respiratory: Negative for chest tightness and shortness of breath.    Cardiovascular: Negative for chest pain, palpitations and leg swelling.   Gastrointestinal: Negative for abdominal pain, constipation and diarrhea.   Genitourinary: Negative for flank pain and frequency.   Musculoskeletal: Positive for back pain. Negative for gait problem and neck pain.   Neurological: Negative for dizziness.   Psychiatric/Behavioral: Positive for sleep disturbance. The patient is not nervous/anxious.        Objective   Physical Exam   Constitutional: She is oriented to person, place, and time. She appears well-developed and well-nourished.   HENT:   Head: Normocephalic and atraumatic.   Eyes: Conjunctivae are normal. No scleral icterus.   Neck: Neck supple. No JVD present.   Cardiovascular: Normal rate and regular rhythm.    No murmur heard.  Pulmonary/Chest: Effort normal and breath sounds normal.   Abdominal: Soft. Bowel sounds are normal. She exhibits no mass.   Musculoskeletal:   Tenderness on palpation in lower lumbar area   Neurological: She is alert and oriented to person, place, and time. She has normal reflexes.   Skin: Skin is warm and dry.   Psychiatric: She has a normal mood and affect. Her behavior is normal.   Nursing note and vitals reviewed.    Office Visit on 06/21/2017   Component Date Value Ref Range Status   • Total  Cholesterol 06/21/2017 237* 0 - 199 mg/dL Final   • Triglycerides 06/21/2017 87  20 - 199 mg/dL Final   • HDL Cholesterol 06/21/2017 68  60 - 200 mg/dL Final   • LDL Cholesterol  06/21/2017 136* 1 - 129 mg/dL Final   • LDL/HDL Ratio 06/21/2017 2.23  0.00 - 3.22 Final   • Hepatitis C Ab 06/21/2017 Negative  Negative Final   Admission on 02/22/2017, Discharged on 02/22/2017   Component Date Value Ref Range Status   • Glucose 02/22/2017 86  60 - 100 mg/dL Final   • BUN 02/22/2017 11  7 - 21 mg/dL Final   • Creatinine 02/22/2017 0.76  0.50 - 1.00 mg/dL Final   • Sodium 02/22/2017 140  137 - 145 mmol/L Final   • Potassium 02/22/2017 3.8  3.5 - 5.1 mmol/L Final   • Chloride 02/22/2017 104  95 - 110 mmol/L Final   • CO2 02/22/2017 28.0  22.0 - 31.0 mmol/L Final   • Calcium 02/22/2017 9.8  8.4 - 10.2 mg/dL Final   • Total Protein 02/22/2017 7.5  6.3 - 8.6 g/dL Final   • Albumin 02/22/2017 4.10  3.40 - 4.80 g/dL Final   • ALT (SGPT) 02/22/2017 37  9 - 52 U/L Final   • AST (SGOT) 02/22/2017 43* 14 - 36 U/L Final   • Alkaline Phosphatase 02/22/2017 146* 38 - 126 U/L Final   • Total Bilirubin 02/22/2017 0.6  0.2 - 1.3 mg/dL Final   • eGFR Non African Amer 02/22/2017 75  >60 mL/min/1.73 Final   • Globulin 02/22/2017 3.4  2.3 - 3.5 gm/dL Final   • A/G Ratio 02/22/2017 1.2  1.1 - 1.8 g/dL Final   • BUN/Creatinine Ratio 02/22/2017 14.5  7.0 - 25.0 Final   • Anion Gap 02/22/2017 8.0  5.0 - 15.0 mmol/L Final   • PTT 02/22/2017 36.0  20.0 - 40.3 seconds Final   • D-Dimer, Quantitative 02/22/2017 470  0 - 470 ng/mL (FEU) Final   • Protime 02/22/2017 13.8  11.1 - 15.3 Seconds Final   • INR 02/22/2017 1.06  0.80 - 1.20 Final   • WBC 02/22/2017 4.64  3.20 - 9.80 10*3/mm3 Final   • RBC 02/22/2017 4.30  3.77 - 5.16 10*6/mm3 Final   • Hemoglobin 02/22/2017 13.2  12.0 - 15.5 g/dL Final   • Hematocrit 02/22/2017 38.5  35.0 - 45.0 % Final   • MCV 02/22/2017 89.5  80.0 - 98.0 fL Final   • MCH 02/22/2017 30.7  26.5 - 34.0 pg Final   • MCHC  02/22/2017 34.3  31.4 - 36.0 g/dL Final   • RDW 02/22/2017 14.5  11.5 - 14.5 % Final   • RDW-SD 02/22/2017 47.7* 36.4 - 46.3 fl Final   • MPV 02/22/2017 9.9  8.0 - 12.0 fL Final   • Platelets 02/22/2017 323  150 - 450 10*3/mm3 Final   • Neutrophil % 02/22/2017 56.0  37.0 - 80.0 % Final   • Lymphocyte % 02/22/2017 33.0  10.0 - 50.0 % Final   • Monocyte % 02/22/2017 9.3  0.0 - 12.0 % Final   • Eosinophil % 02/22/2017 1.3  0.0 - 7.0 % Final   • Basophil % 02/22/2017 0.4  0.0 - 2.0 % Final   • Immature Grans % 02/22/2017 0.0  0.0 - 0.5 % Final   • Neutrophils, Absolute 02/22/2017 2.60  2.00 - 8.60 10*3/mm3 Final   • Lymphocytes, Absolute 02/22/2017 1.53  0.60 - 4.20 10*3/mm3 Final   • Monocytes, Absolute 02/22/2017 0.43  0.00 - 0.90 10*3/mm3 Final   • Eosinophils, Absolute 02/22/2017 0.06  0.00 - 0.70 10*3/mm3 Final   • Basophils, Absolute 02/22/2017 0.02  0.00 - 0.20 10*3/mm3 Final   • Immature Grans, Absolute 02/22/2017 0.00  0.00 - 0.02 10*3/mm3 Final   • Extra Tube 02/22/2017 hold for add-on   Final   • Extra Tube 02/22/2017 Hold for add-ons.   Final   • Extra Tube 02/22/2017 hold for add-on   Final   • Extra Tube 02/22/2017 Hold for add-ons.   Final   Anticoagulation Visit on 12/02/2016   Component Date Value Ref Range Status   • INR 12/02/2016 2.90* 0.9 - 1.1 Final   Anticoagulation Visit on 12/01/2016   Component Date Value Ref Range Status   • INR 12/01/2016 4.50* 0.9 - 1.1 Final   Anticoagulation Visit on 11/30/2016   Component Date Value Ref Range Status   • INR 11/30/2016 7.50* 0.9 - 1.1 Final   Anticoagulation Visit on 11/22/2016   Component Date Value Ref Range Status   • INR 11/22/2016 2.80* 0.9 - 1.1 Final   Anticoagulation Visit on 11/15/2016   Component Date Value Ref Range Status   • INR 11/15/2016 1.80* 0.9 - 1.1 Final   Anticoagulation Visit on 11/04/2016   Component Date Value Ref Range Status   • INR 11/04/2016 1.20* 0.9 - 1.1 Final   Anticoagulation Visit on 10/31/2016   Component Date Value Ref  Range Status   • INR 10/31/2016 1.20* 0.9 - 1.1 Final   Anticoagulation Visit on 10/14/2016   Component Date Value Ref Range Status   • INR 10/14/2016 2.90* 0.9 - 1.1 Final   There may be more visits with results that are not included.          Patient Active Problem List   Diagnosis   • Essential hypertension   • Hyperlipidemia   • History of pulmonary embolism   • Hyperparathyroidism   • Pulmonary embolism   • Long-term (current) use of anticoagulants             Assessment   Diagnosis Plan   1. Essential hypertension     2. Mixed hyperlipidemia     3. Depressive disorder     4. Chronic midline low back pain with left-sided sciatica         Plan        1. Patient will continue Losartan.      DASH.      1.5 gr Sodium restriction.  2. Simvastatin will be continued.      Lipids will be rechecked.      Counseled on nutrition.  3. Patient will continue Celexa.      Trazodone will be increased to 100 mg daily.  4. She is advised about PT but is not interested.      Patient is planning to continue with chiropractor's treatments.      Tramadol 50 mg q 8hrs PRN.       If symptoms persist, she may need MRI of L spine.          Follow up in 3 months.          This document has been electronically signed by Josephine Kauffman MD on October 6, 2017 11:55 AM

## 2017-10-17 ENCOUNTER — TRANSCRIBE ORDERS (OUTPATIENT)
Dept: LAB | Facility: HOSPITAL | Age: 72
End: 2017-10-17

## 2017-10-17 ENCOUNTER — LAB (OUTPATIENT)
Dept: LAB | Facility: HOSPITAL | Age: 72
End: 2017-10-17

## 2017-10-17 DIAGNOSIS — I10 ESSENTIAL HYPERTENSION, MALIGNANT: ICD-10-CM

## 2017-10-17 DIAGNOSIS — E67.3 HYPERVITAMINOSIS D: ICD-10-CM

## 2017-10-17 DIAGNOSIS — E78.5 HYPERLIPIDEMIA, UNSPECIFIED HYPERLIPIDEMIA TYPE: ICD-10-CM

## 2017-10-17 DIAGNOSIS — Z98.84 BARIATRIC SURGERY STATUS: Primary | ICD-10-CM

## 2017-10-17 DIAGNOSIS — Z98.84 BARIATRIC SURGERY STATUS: ICD-10-CM

## 2017-10-17 DIAGNOSIS — Z00.00 ROUTINE GENERAL MEDICAL EXAMINATION AT A HEALTH CARE FACILITY: ICD-10-CM

## 2017-10-17 DIAGNOSIS — I10 HYPERTENSION, UNSPECIFIED TYPE: ICD-10-CM

## 2017-10-17 DIAGNOSIS — R79.9 ABNORMAL FINDING OF BLOOD CHEMISTRY: ICD-10-CM

## 2017-10-17 LAB
25(OH)D3 SERPL-MCNC: 37.8 NG/ML (ref 30–100)
ALBUMIN SERPL-MCNC: 3.8 G/DL (ref 3.4–4.8)
ALBUMIN/GLOB SERPL: 1.2 G/DL (ref 1.1–1.8)
ALP SERPL-CCNC: 113 U/L (ref 38–126)
ALT SERPL W P-5'-P-CCNC: 61 U/L (ref 9–52)
ANION GAP SERPL CALCULATED.3IONS-SCNC: 10 MMOL/L (ref 5–15)
ARTICHOKE IGE QN: 74 MG/DL (ref 1–129)
AST SERPL-CCNC: 59 U/L (ref 14–36)
BASOPHILS # BLD AUTO: 0.02 10*3/MM3 (ref 0–0.2)
BASOPHILS NFR BLD AUTO: 0.5 % (ref 0–2)
BILIRUB SERPL-MCNC: 0.7 MG/DL (ref 0.2–1.3)
BUN BLD-MCNC: 13 MG/DL (ref 7–21)
BUN/CREAT SERPL: 15.3 (ref 7–25)
CALCIUM SPEC-SCNC: 9.8 MG/DL (ref 8.4–10.2)
CHLORIDE SERPL-SCNC: 104 MMOL/L (ref 95–110)
CHOLEST SERPL-MCNC: 178 MG/DL (ref 0–199)
CO2 SERPL-SCNC: 28 MMOL/L (ref 22–31)
CREAT BLD-MCNC: 0.85 MG/DL (ref 0.5–1)
DEPRECATED RDW RBC AUTO: 43.6 FL (ref 36.4–46.3)
EOSINOPHIL # BLD AUTO: 0.08 10*3/MM3 (ref 0–0.7)
EOSINOPHIL NFR BLD AUTO: 2 % (ref 0–7)
ERYTHROCYTE [DISTWIDTH] IN BLOOD BY AUTOMATED COUNT: 13 % (ref 11.5–14.5)
FERRITIN SERPL-MCNC: 145 NG/ML (ref 11.1–264)
GFR SERPL CREATININE-BSD FRML MDRD: 66 ML/MIN/1.73 (ref 39–90)
GLOBULIN UR ELPH-MCNC: 3.1 GM/DL (ref 2.3–3.5)
GLUCOSE BLD-MCNC: 77 MG/DL (ref 60–100)
HCT VFR BLD AUTO: 36 % (ref 35–45)
HDLC SERPL-MCNC: 73 MG/DL (ref 60–200)
HGB BLD-MCNC: 12.3 G/DL (ref 12–15.5)
IMM GRANULOCYTES # BLD: 0 10*3/MM3 (ref 0–0.02)
IMM GRANULOCYTES NFR BLD: 0 % (ref 0–0.5)
IRON 24H UR-MRATE: 87 MCG/DL (ref 37–170)
IRON SATN MFR SERPL: 32 % (ref 15–50)
LDLC/HDLC SERPL: 1.19 {RATIO} (ref 0–3.22)
LYMPHOCYTES # BLD AUTO: 1.39 10*3/MM3 (ref 0.6–4.2)
LYMPHOCYTES NFR BLD AUTO: 34 % (ref 10–50)
MAGNESIUM SERPL-MCNC: 1.8 MG/DL (ref 1.6–2.3)
MCH RBC QN AUTO: 31.5 PG (ref 26.5–34)
MCHC RBC AUTO-ENTMCNC: 34.2 G/DL (ref 31.4–36)
MCV RBC AUTO: 92.1 FL (ref 80–98)
MONOCYTES # BLD AUTO: 0.41 10*3/MM3 (ref 0–0.9)
MONOCYTES NFR BLD AUTO: 10 % (ref 0–12)
NEUTROPHILS # BLD AUTO: 2.19 10*3/MM3 (ref 2–8.6)
NEUTROPHILS NFR BLD AUTO: 53.5 % (ref 37–80)
PHOSPHATE SERPL-MCNC: 3.7 MG/DL (ref 2.4–4.4)
PLATELET # BLD AUTO: 236 10*3/MM3 (ref 150–450)
PMV BLD AUTO: 9.5 FL (ref 8–12)
POTASSIUM BLD-SCNC: 4 MMOL/L (ref 3.5–5.1)
PROT SERPL-MCNC: 6.9 G/DL (ref 6.3–8.6)
PTH-INTACT SERPL-MCNC: 83.3 PG/ML (ref 10–65)
RBC # BLD AUTO: 3.91 10*6/MM3 (ref 3.77–5.16)
SODIUM BLD-SCNC: 142 MMOL/L (ref 137–145)
TIBC SERPL-MCNC: 268 MCG/DL (ref 265–497)
TRIGL SERPL-MCNC: 89 MG/DL (ref 20–199)
URATE SERPL-MCNC: 6.4 MG/DL (ref 2.5–8.5)
VIT B12 BLD-MCNC: 843 PG/ML (ref 239–931)
WBC NRBC COR # BLD: 4.09 10*3/MM3 (ref 3.2–9.8)

## 2017-10-17 PROCEDURE — 83735 ASSAY OF MAGNESIUM: CPT | Performed by: INTERNAL MEDICINE

## 2017-10-17 PROCEDURE — 36415 COLL VENOUS BLD VENIPUNCTURE: CPT | Performed by: FAMILY MEDICINE

## 2017-10-17 PROCEDURE — 84550 ASSAY OF BLOOD/URIC ACID: CPT | Performed by: INTERNAL MEDICINE

## 2017-10-17 PROCEDURE — 82747 ASSAY OF FOLIC ACID RBC: CPT | Performed by: FAMILY MEDICINE

## 2017-10-17 PROCEDURE — 82306 VITAMIN D 25 HYDROXY: CPT | Performed by: FAMILY MEDICINE

## 2017-10-17 PROCEDURE — 82728 ASSAY OF FERRITIN: CPT | Performed by: FAMILY MEDICINE

## 2017-10-17 PROCEDURE — 84100 ASSAY OF PHOSPHORUS: CPT | Performed by: INTERNAL MEDICINE

## 2017-10-17 PROCEDURE — 83540 ASSAY OF IRON: CPT | Performed by: FAMILY MEDICINE

## 2017-10-17 PROCEDURE — 80061 LIPID PANEL: CPT | Performed by: INTERNAL MEDICINE

## 2017-10-17 PROCEDURE — 83550 IRON BINDING TEST: CPT | Performed by: FAMILY MEDICINE

## 2017-10-17 PROCEDURE — 80053 COMPREHEN METABOLIC PANEL: CPT | Performed by: INTERNAL MEDICINE

## 2017-10-17 PROCEDURE — 85014 HEMATOCRIT: CPT | Performed by: FAMILY MEDICINE

## 2017-10-17 PROCEDURE — 83970 ASSAY OF PARATHORMONE: CPT | Performed by: FAMILY MEDICINE

## 2017-10-17 PROCEDURE — 84425 ASSAY OF VITAMIN B-1: CPT | Performed by: FAMILY MEDICINE

## 2017-10-17 PROCEDURE — 85025 COMPLETE CBC W/AUTO DIFF WBC: CPT | Performed by: FAMILY MEDICINE

## 2017-10-17 PROCEDURE — 82607 VITAMIN B-12: CPT | Performed by: FAMILY MEDICINE

## 2017-10-18 LAB
FOLATE BLD-MCNC: 484.4 NG/ML
FOLATE RBC-MCNC: 1400 NG/ML
HCT VFR BLD AUTO: 34.6 % (ref 34–46.6)

## 2017-10-20 LAB — VIT B1 BLD-SCNC: 151.6 NMOL/L (ref 66.5–200)

## 2017-12-03 ENCOUNTER — APPOINTMENT (OUTPATIENT)
Dept: LAB | Facility: HOSPITAL | Age: 72
End: 2017-12-03

## 2017-12-03 PROCEDURE — 85379 FIBRIN DEGRADATION QUANT: CPT | Performed by: NURSE PRACTITIONER

## 2018-04-04 ENCOUNTER — TELEPHONE (OUTPATIENT)
Dept: INTERNAL MEDICINE | Facility: CLINIC | Age: 73
End: 2018-04-04

## 2018-04-04 RX ORDER — LOSARTAN POTASSIUM 100 MG/1
100 TABLET ORAL DAILY
Qty: 30 TABLET | Refills: 0 | Status: SHIPPED | OUTPATIENT
Start: 2018-04-04 | End: 2018-05-15 | Stop reason: SDUPTHER

## 2018-04-27 ENCOUNTER — OFFICE VISIT (OUTPATIENT)
Dept: INTERNAL MEDICINE | Facility: CLINIC | Age: 73
End: 2018-04-27

## 2018-04-27 ENCOUNTER — LAB (OUTPATIENT)
Dept: LAB | Facility: HOSPITAL | Age: 73
End: 2018-04-27

## 2018-04-27 VITALS
BODY MASS INDEX: 35.3 KG/M2 | SYSTOLIC BLOOD PRESSURE: 140 MMHG | DIASTOLIC BLOOD PRESSURE: 80 MMHG | HEIGHT: 61 IN | WEIGHT: 187 LBS

## 2018-04-27 DIAGNOSIS — E21.3 HYPERPARATHYROIDISM (HCC): Primary | ICD-10-CM

## 2018-04-27 DIAGNOSIS — I10 ESSENTIAL HYPERTENSION: Primary | ICD-10-CM

## 2018-04-27 DIAGNOSIS — F32.A ANXIETY AND DEPRESSION: ICD-10-CM

## 2018-04-27 DIAGNOSIS — F41.9 ANXIETY AND DEPRESSION: ICD-10-CM

## 2018-04-27 DIAGNOSIS — E21.3 HYPERPARATHYROIDISM (HCC): ICD-10-CM

## 2018-04-27 LAB
ALBUMIN SERPL-MCNC: 3.7 G/DL (ref 3.4–4.8)
ANION GAP SERPL CALCULATED.3IONS-SCNC: 10 MMOL/L (ref 5–15)
BUN BLD-MCNC: 13 MG/DL (ref 7–21)
BUN/CREAT SERPL: 15.9 (ref 7–25)
CALCIUM SPEC-SCNC: 10.1 MG/DL (ref 8.4–10.2)
CALCIUM SPEC-SCNC: 9.4 MG/DL (ref 8.4–10.2)
CHLORIDE SERPL-SCNC: 102 MMOL/L (ref 95–110)
CO2 SERPL-SCNC: 29 MMOL/L (ref 22–31)
CREAT BLD-MCNC: 0.82 MG/DL (ref 0.5–1)
GFR SERPL CREATININE-BSD FRML MDRD: 69 ML/MIN/1.73 (ref 60–90)
GLUCOSE BLD-MCNC: 82 MG/DL (ref 60–100)
PHOSPHATE SERPL-MCNC: 3 MG/DL (ref 2.4–4.4)
POTASSIUM BLD-SCNC: 4.1 MMOL/L (ref 3.5–5.1)
PTH-INTACT SERPL-MCNC: 83.3 PG/ML (ref 10–65)
SODIUM BLD-SCNC: 141 MMOL/L (ref 137–145)
TSH SERPL DL<=0.05 MIU/L-ACNC: 1.15 MIU/ML (ref 0.46–4.68)

## 2018-04-27 PROCEDURE — 84443 ASSAY THYROID STIM HORMONE: CPT

## 2018-04-27 PROCEDURE — 99214 OFFICE O/P EST MOD 30 MIN: CPT | Performed by: INTERNAL MEDICINE

## 2018-04-27 PROCEDURE — 36415 COLL VENOUS BLD VENIPUNCTURE: CPT

## 2018-04-27 PROCEDURE — 83970 ASSAY OF PARATHORMONE: CPT

## 2018-04-27 PROCEDURE — 80069 RENAL FUNCTION PANEL: CPT

## 2018-04-27 PROCEDURE — 82310 ASSAY OF CALCIUM: CPT

## 2018-05-15 ENCOUNTER — TELEPHONE (OUTPATIENT)
Dept: INTERNAL MEDICINE | Facility: CLINIC | Age: 73
End: 2018-05-15

## 2018-05-15 RX ORDER — LOSARTAN POTASSIUM 100 MG/1
100 TABLET ORAL DAILY
Qty: 90 TABLET | Refills: 1 | Status: SHIPPED | OUTPATIENT
Start: 2018-05-15 | End: 2018-08-09

## 2018-05-15 RX ORDER — TRAZODONE HYDROCHLORIDE 100 MG/1
TABLET ORAL
Qty: 90 TABLET | Refills: 1 | Status: CANCELLED | OUTPATIENT
Start: 2018-05-15

## 2018-05-15 RX ORDER — TRAZODONE HYDROCHLORIDE 100 MG/1
100 TABLET ORAL NIGHTLY
Qty: 90 TABLET | Refills: 1 | Status: SHIPPED | OUTPATIENT
Start: 2018-05-15 | End: 2018-09-24 | Stop reason: SDUPTHER

## 2018-05-15 RX ORDER — SIMVASTATIN 20 MG
20 TABLET ORAL EVERY OTHER DAY
Qty: 90 TABLET | Refills: 1 | Status: SHIPPED | OUTPATIENT
Start: 2018-05-15 | End: 2018-10-25 | Stop reason: SDUPTHER

## 2018-05-15 RX ORDER — ESCITALOPRAM OXALATE 10 MG/1
10 TABLET ORAL DAILY
Qty: 90 TABLET | Refills: 1 | Status: SHIPPED | OUTPATIENT
Start: 2018-05-15 | End: 2018-08-21 | Stop reason: SDUPTHER

## 2018-05-15 NOTE — TELEPHONE ENCOUNTER
Pt states that all her meds were supposed to be refilled at her last visit but they were not.  Can you send in a year's supply on losartan simvastatin, citalopram?  She also needs trazodone.  Send them to [facility].

## 2018-05-25 ENCOUNTER — TELEPHONE (OUTPATIENT)
Dept: INTERNAL MEDICINE | Facility: CLINIC | Age: 73
End: 2018-05-25

## 2018-05-25 NOTE — TELEPHONE ENCOUNTER
Spoke with pt let her know DR BECERRIL  Changed her celexa to lexapro due to interaction with one of her other meds

## 2018-08-08 ENCOUNTER — TELEPHONE (OUTPATIENT)
Dept: INTERNAL MEDICINE | Facility: CLINIC | Age: 73
End: 2018-08-08

## 2018-08-09 ENCOUNTER — LAB (OUTPATIENT)
Dept: LAB | Facility: HOSPITAL | Age: 73
End: 2018-08-09

## 2018-08-09 ENCOUNTER — OFFICE VISIT (OUTPATIENT)
Dept: INTERNAL MEDICINE | Facility: CLINIC | Age: 73
End: 2018-08-09

## 2018-08-09 VITALS
BODY MASS INDEX: 34.83 KG/M2 | DIASTOLIC BLOOD PRESSURE: 78 MMHG | WEIGHT: 184.5 LBS | HEIGHT: 61 IN | SYSTOLIC BLOOD PRESSURE: 120 MMHG

## 2018-08-09 DIAGNOSIS — Z98.84 HX OF BARIATRIC SURGERY: ICD-10-CM

## 2018-08-09 DIAGNOSIS — F32.A DEPRESSIVE DISORDER: ICD-10-CM

## 2018-08-09 DIAGNOSIS — T14.8XXA BRUISING: ICD-10-CM

## 2018-08-09 DIAGNOSIS — E21.3 HYPERPARATHYROIDISM (HCC): ICD-10-CM

## 2018-08-09 DIAGNOSIS — Z98.84 HX OF BARIATRIC SURGERY: Primary | ICD-10-CM

## 2018-08-09 DIAGNOSIS — S80.12XD TRAUMATIC HEMATOMA OF LEFT LOWER LEG, SUBSEQUENT ENCOUNTER: Primary | ICD-10-CM

## 2018-08-09 DIAGNOSIS — I10 ESSENTIAL HYPERTENSION: ICD-10-CM

## 2018-08-09 DIAGNOSIS — Z12.31 ENCOUNTER FOR SCREENING MAMMOGRAM FOR MALIGNANT NEOPLASM OF BREAST: ICD-10-CM

## 2018-08-09 LAB
25(OH)D3 SERPL-MCNC: 44.1 NG/ML (ref 30–100)
ALBUMIN SERPL-MCNC: 4.1 G/DL (ref 3.4–4.8)
ALBUMIN/GLOB SERPL: 1.3 G/DL (ref 1.1–1.8)
ALP SERPL-CCNC: 95 U/L (ref 38–126)
ALT SERPL W P-5'-P-CCNC: 38 U/L (ref 9–52)
ANION GAP SERPL CALCULATED.3IONS-SCNC: 8 MMOL/L (ref 5–15)
APTT PPP: 41.7 SECONDS (ref 20–40.3)
AST SERPL-CCNC: 55 U/L (ref 14–36)
BASOPHILS # BLD AUTO: 0.03 10*3/MM3 (ref 0–0.2)
BASOPHILS NFR BLD AUTO: 0.8 % (ref 0–2)
BILIRUB SERPL-MCNC: 0.5 MG/DL (ref 0.2–1.3)
BUN BLD-MCNC: 10 MG/DL (ref 7–21)
BUN/CREAT SERPL: 13.7 (ref 7–25)
CALCIUM SPEC-SCNC: 8.9 MG/DL (ref 8.4–10.2)
CHLORIDE SERPL-SCNC: 101 MMOL/L (ref 95–110)
CO2 SERPL-SCNC: 28 MMOL/L (ref 22–31)
CREAT BLD-MCNC: 0.73 MG/DL (ref 0.5–1)
DEPRECATED RDW RBC AUTO: 43.1 FL (ref 36.4–46.3)
EOSINOPHIL # BLD AUTO: 0.05 10*3/MM3 (ref 0–0.7)
EOSINOPHIL NFR BLD AUTO: 1.3 % (ref 0–7)
ERYTHROCYTE [DISTWIDTH] IN BLOOD BY AUTOMATED COUNT: 12.8 % (ref 11.5–14.5)
ERYTHROCYTE [SEDIMENTATION RATE] IN BLOOD: 28 MM/HR (ref 0–20)
FOLATE SERPL-MCNC: >20 NG/ML (ref 2.76–21)
GFR SERPL CREATININE-BSD FRML MDRD: 79 ML/MIN/1.73 (ref 39–90)
GLOBULIN UR ELPH-MCNC: 3.2 GM/DL (ref 2.3–3.5)
GLUCOSE BLD-MCNC: 77 MG/DL (ref 60–100)
HCT VFR BLD AUTO: 34.9 % (ref 35–45)
HGB BLD-MCNC: 11.9 G/DL (ref 12–15.5)
IMM GRANULOCYTES # BLD: 0 10*3/MM3 (ref 0–0.02)
IMM GRANULOCYTES NFR BLD: 0 % (ref 0–0.5)
INR PPP: 1.09 (ref 0.8–1.2)
IRON 24H UR-MRATE: 104 MCG/DL (ref 37–170)
IRON SATN MFR SERPL: 37 % (ref 15–50)
LYMPHOCYTES # BLD AUTO: 1.72 10*3/MM3 (ref 0.6–4.2)
LYMPHOCYTES NFR BLD AUTO: 43.8 % (ref 10–50)
MCH RBC QN AUTO: 31.2 PG (ref 26.5–34)
MCHC RBC AUTO-ENTMCNC: 34.1 G/DL (ref 31.4–36)
MCV RBC AUTO: 91.6 FL (ref 80–98)
MONOCYTES # BLD AUTO: 0.32 10*3/MM3 (ref 0–0.9)
MONOCYTES NFR BLD AUTO: 8.1 % (ref 0–12)
NEUTROPHILS # BLD AUTO: 1.81 10*3/MM3 (ref 2–8.6)
NEUTROPHILS NFR BLD AUTO: 46 % (ref 37–80)
PLATELET # BLD AUTO: 277 10*3/MM3 (ref 150–450)
PMV BLD AUTO: 9.9 FL (ref 8–12)
POTASSIUM BLD-SCNC: 4.2 MMOL/L (ref 3.5–5.1)
PROT SERPL-MCNC: 7.3 G/DL (ref 6.3–8.6)
PROTHROMBIN TIME: 13.9 SECONDS (ref 11.1–15.3)
RBC # BLD AUTO: 3.81 10*6/MM3 (ref 3.77–5.16)
SODIUM BLD-SCNC: 137 MMOL/L (ref 137–145)
TIBC SERPL-MCNC: 281 MCG/DL (ref 265–497)
TSH SERPL DL<=0.05 MIU/L-ACNC: 1.58 MIU/ML (ref 0.46–4.68)
VIT B12 BLD-MCNC: 934 PG/ML (ref 239–931)
WBC NRBC COR # BLD: 3.93 10*3/MM3 (ref 3.2–9.8)

## 2018-08-09 PROCEDURE — 83550 IRON BINDING TEST: CPT

## 2018-08-09 PROCEDURE — 99214 OFFICE O/P EST MOD 30 MIN: CPT | Performed by: INTERNAL MEDICINE

## 2018-08-09 PROCEDURE — 82607 VITAMIN B-12: CPT | Performed by: INTERNAL MEDICINE

## 2018-08-09 PROCEDURE — 86038 ANTINUCLEAR ANTIBODIES: CPT | Performed by: INTERNAL MEDICINE

## 2018-08-09 PROCEDURE — 80053 COMPREHEN METABOLIC PANEL: CPT | Performed by: INTERNAL MEDICINE

## 2018-08-09 PROCEDURE — 83540 ASSAY OF IRON: CPT

## 2018-08-09 PROCEDURE — 85651 RBC SED RATE NONAUTOMATED: CPT | Performed by: INTERNAL MEDICINE

## 2018-08-09 PROCEDURE — 85610 PROTHROMBIN TIME: CPT | Performed by: INTERNAL MEDICINE

## 2018-08-09 PROCEDURE — 85730 THROMBOPLASTIN TIME PARTIAL: CPT | Performed by: INTERNAL MEDICINE

## 2018-08-09 PROCEDURE — 82746 ASSAY OF FOLIC ACID SERUM: CPT | Performed by: INTERNAL MEDICINE

## 2018-08-09 PROCEDURE — 86430 RHEUMATOID FACTOR TEST QUAL: CPT | Performed by: INTERNAL MEDICINE

## 2018-08-09 PROCEDURE — 82306 VITAMIN D 25 HYDROXY: CPT | Performed by: INTERNAL MEDICINE

## 2018-08-09 PROCEDURE — 84443 ASSAY THYROID STIM HORMONE: CPT | Performed by: INTERNAL MEDICINE

## 2018-08-09 PROCEDURE — 36415 COLL VENOUS BLD VENIPUNCTURE: CPT | Performed by: INTERNAL MEDICINE

## 2018-08-09 PROCEDURE — 85025 COMPLETE CBC W/AUTO DIFF WBC: CPT | Performed by: INTERNAL MEDICINE

## 2018-08-09 NOTE — PROGRESS NOTES
Subjective   Veronica Wilkinson is a 73 y.o. female with HTN,       Patient is in for recheck on left leg hematoma. He accidentally hit her leg while doing some house chores and developed large calf hematoma. [facility], was reassured and advised to follow up with PCP.  She is doing some better, swelling has decreased , and her leg  is also less painful.    Patient is concerned because she has been having more problems with bruising which has worsened over the last few months.  She denies any jose d bleeding, no gum bleeding or rectal bleeding either.  She just gets bruises without any obvious injuries.    Regarding her BP, it is controlled. She denies any chest pain or difficulty breathing.  Her ankles have been more swollen lately.    She is status post bariatric surgery in 2016, having lost 80 lbs since an operation.  [facility].    She has not had bone density repeated yet.  Patient has history of hyperparathyroidism with indication for surgical treatment - kidney stones and osteopenia/osteoporosis.  Imaging studies were non-localizing, and she deferred  any further eval or treatment at that time.  Her Calcium levels were in normal range.    Past Medical History:   Diagnosis Date   • Chest pain     History of noncardiac chest pain   • Chronic depression    • Depressive disorder    • Diastolic dysfunction    • Dyspnea    • Dyspnea on exertion    • Edema    • Essential hypertension    • Exercise intolerance    • Fatigue    • GERD (gastroesophageal reflux disease)    • History of bone density study 2011    DEXA BONE DENSITY APPENDICULAR 89369 (1) - normal   • History of bone density study 06/24/2015    DXA BONE DENSITY AXIAL 67504 [facility] (1) - Ordered By: TOLU RAMIREZ ([facility])    • History of echocardiogram     Echocardiogram W/ color flow 41833 (2)   • History of mammogram 2013    Mammogram screen (1)   • History of mammogram 2015    MAMMOGRAM SCREENING 65087 - WOMEN  "CTR (1)   • History of screening mammography 06/25/2015    SCREENING MAMMOGRAPHY DIGITAL  (Medicare) (1) - Ordered By: ANDRADE BECERRIL ([facility])    • Hypercalcemia    • Hyperlipidemia    • Influenza vaccine administered 02/25/2016    INFLUENZA IMMUN ADMIN OR PREV RECV'D  (2) - Ordered By: ANDRADE BECERRIL ([facility])    • Long-term (current) use of anticoagulants     coumadin therapy      • Lower extremity edema     Intermittent lower extremity edema   • Obesity, Class III, BMI 40-49.9 (morbid obesity) (CMS/[facility])     \"Class III obesity\"   • BEVERLY (obstructive sleep apnea)     Mild BEVERLY   • PE (pulmonary embolism) 10/2015    Bilateral PE diagnosed after a car ride to [place]   • Pneumococcal vaccination given 02/25/2016    PNEUMOC VAC/ADMIN/RCVD 4040F (2) - Ordered By: ANDRADE BECERRIL ([facility])    • Right basilic vein fibrosis 2015   • Sedentary lifestyle    • Shortness of breath    • Venous thrombosis 2015    right basilic venous thrombosis; This was noted in May 2015.   • Weight gain      Past Surgical History:   Procedure Laterality Date   • CHOLECYSTECTOMY      Cholecystectomy (1)   • ENDOSCOPY  09/15/2011    Colon endoscopy 10156 (2) - Hemorrhoids found.   • GASTRIC SLEEVE LAPAROSCOPIC     • HEMORRHOIDECTOMY     • HYSTEROSCOPY      Hysteroscopy; ablation (1)   • INJECTION OF MEDICATION  09/27/2012    Kenalog (1) - Ordered By: ANDRADE BECERRIL ([facility])        Social History   Substance Use Topics   • Smoking status: Never Smoker   • Smokeless tobacco: Never Used   • Alcohol use No     Family History   Problem Relation Age of Onset   • Ovarian cancer Mother    • Bleeding Disorder Father    • Other Father         Hematologic disorder   • Lung cancer Father    • Pancreatic cancer Sister    • Cancer Brother    • Lung cancer Brother    • Bleeding Disorder Other    • Hyperlipidemia Other    • Hypertension Other    • Osteoarthritis Other    • Other Other         Gastritis     Allergies   Allergen Reactions   • Sulfa " Antibiotics Rash and Hives     Sulfa (Sulfonamide Antibiotics)     Current Outpatient Prescriptions on File Prior to Visit   Medication Sig Dispense Refill   • Cyanocobalamin (B-12 PO) Take  by mouth.     • cyclobenzaprine (FLEXERIL) 10 MG tablet Half or one tablet every 8 hours as needed for pain 30 tablet 0   • escitalopram (LEXAPRO) 10 MG tablet Take 1 tablet by mouth Daily. 90 tablet 1   • omeprazole OTC (PRILOSEC OTC) 20 MG EC tablet      • simvastatin (ZOCOR) 20 MG tablet Take 1 tablet by mouth Every Other Day. 90 tablet 1   • traZODone (DESYREL) 100 MG tablet Take 1 tablet by mouth Every Night. 90 tablet 1     No current facility-administered medications on file prior to visit.      Review of Systems   Constitutional: Negative for fatigue.   HENT: Negative.    Eyes: Negative.    Respiratory: Negative for chest tightness and shortness of breath.    Cardiovascular: Negative for chest pain, palpitations and leg swelling.   Gastrointestinal: Negative for abdominal pain, blood in stool, constipation and diarrhea.   Endocrine: Negative for cold intolerance and heat intolerance.   Genitourinary: Negative for difficulty urinating, dysuria and hematuria.   Musculoskeletal: Negative for arthralgias and gait problem.   Skin: Negative for color change and rash.   Neurological: Negative for dizziness, syncope and light-headedness.   Hematological: Negative for adenopathy. Bruises/bleeds easily.   Psychiatric/Behavioral: Negative for sleep disturbance. The patient is not nervous/anxious.        Objective   Physical Exam   Constitutional: She is oriented to person, place, and time. She appears well-developed and well-nourished.   HENT:   Head: Normocephalic and atraumatic.   Nose: Nose normal.   Mouth/Throat: Oropharynx is clear and moist.   Eyes: Conjunctivae are normal. No scleral icterus.   Neck: No JVD present. No thyromegaly present.   Cardiovascular: Normal rate and regular rhythm.    Pulmonary/Chest: Effort normal  and breath sounds normal.   Abdominal: Soft. Bowel sounds are normal. She exhibits no mass.   Musculoskeletal: Normal range of motion. She exhibits no deformity.   Neurological: She is alert and oriented to person, place, and time. No cranial nerve deficit.   Skin: Skin is warm and dry.   Hematoma of left lower extremity  Bruises on her upper and lower extrenities   Psychiatric: She has a normal mood and affect. Her behavior is normal. Judgment and thought content normal.   Nursing note and vitals reviewed.          Patient Active Problem List   Diagnosis   • Essential hypertension   • Hyperlipidemia   • History of pulmonary embolism   • Hyperparathyroidism (CMS/HCC)   • Pulmonary embolism (CMS/HCC)   • Long-term (current) use of anticoagulants         NM parathyroid scan [[id number]] (Order [id number])   Order   Status: Final result   Appointment Information     PACS Images     Radiology Images   Study Result     Patient Name-  CHAVEZ MCLEOD  Patient ID-  [id number]   Ordering-  ARABELLA STAHL MD  Attending-  ARABELLA STAHL MD  Referring-  ARABELLA STAHL MD  ------------------------------------------------     Nuclear medicine parathyroid imaging study.     History- Secondary hyperparathyroidism. Hypercalcemia.     Findings, conclusion- Following the intravenous infusion of 21.2 mCi  of technetium 99 M sestamibi, images of the neck and upper chest are  obtained both immediately and following delay.      There is normal physiological uptake in the thyroid bed and salivary  glands. There is normal washout on delayed images. No scintigraphic  findings suggesting parathyroid adenoma.     Electronically Signed By- Tank Mclaughlin MD On: 2015-11-19 16:33:39   Imaging     NM parathyroid scan (Order #[id number]) on 11/19/2015 - Imaging Information     US head neck soft tissue [[id number]] (Order [id number])   Order   Status: Final result   Appointment Information     PACS Images     Radiology Images   Study Result         [facility]     Patient Name- CHAVEZ MCLEOD     ORDERING- ARABELLA STAHL     ATTENDING- ARABELLA STAHL MD      REFERRING- ARABELLA STAHL  -----------------------     PROCEDURE- Ultrasound thyroid on 11/19/2015     CLINICAL INDICATION-  Secondary hyperparathyroidism     COMPARISON- None      FINDINGS- Multiple sonographic images are obtained throughout the  thyroid, both transverse and sagittal images are obtained. The right  thyroid lobe measures approximately 4.1 x 2.5 x 1.6 cm. The left  thyroid lobe measures approximately 3.3 x 2.4 x 1.2 cm. Thyroid  isthmus measures 4 mm. Thyroid is homogeneous without thyroid nodule  or mass. No definite parathyroid adenoma is visualized.     CONCLUSION- Unremarkable exam.     Electronically Signed By- Tico Zhou MD On: 2015-11-20 00:26:11     Appointment Information     PACS Images     Radiology Images   Study Result     [facility]     Patient Name- CHAVEZ MCLEOD     ORDERING- MARCELLE RAMIREZ     ATTENDING- MARCELLE RAMIREZ      REFERRING- MARCELLE RAMIREZ     EXAM- DEXA scan     HISTORY- Osteoporosis screening     Scans were obtained at the L1-L4 levels and of each hip.     Average bone mineral density for the lumbar spine is   1.002 g/sq cm.  The T score of -0.4  corresponds to a WHO of classification of normal  bone density. Improved bone density since T score of -0.7 on study of  January 13, 2011.      The bone mineral density for the left femoral neck is  0.620 g/sq cm.  The T score of  -2.1  corresponds to a WHO classification of  osteopenia.     The bone mineral density for the right femoral neck is 0.583 g/sq cm.  The T score of  -2.4  corresponds to a WHO classification of  osteopenia.     Mean total hip bone mineral density 1.025 grams per square centimeter  . T score 0.7. Improvement from T score of      -0.2 on study of  January 13, 2001.     Ten-year probability of fracture  based on femoral neck bone mineral  density is as follows-   Major osteoporotic fracture 10%. Hip fracture 1.9%.     CONCLUSION-  Lumbar spine normal bone density.  Femoral necks osteopenia. Fracture risk increased.     52204  Electronically Signed By- Zora Cadena: 2015-06-24 20:13:27       Lab Results   Component Value Date    CHOL 178 10/17/2017    CHOL 237 (H) 06/21/2017     Lab Results   Component Value Date    TRIG 89 10/17/2017    TRIG 87 06/21/2017    TRIG 89 02/25/2016     Lab Results   Component Value Date    HDL 73 10/17/2017    HDL 68 06/21/2017    HDL 73 06/08/2015     No components found for: LDLDIRECTC  Lab Results   Component Value Date    LDL 74 10/17/2017     (H) 06/21/2017    LDL 94 02/25/2016     Lab Results   Component Value Date    LDLHDL 1.19 10/17/2017    LDLHDL 2.23 06/21/2017    LDLHDL 0.98 02/25/2016         Assessment/Plan   Veronica was seen today for med refill.    Diagnoses and all orders for this visit:    Traumatic hematoma of left lower leg, subsequent encounter    Bruising  -     CBC & Differential  -     Cancel: Iron & TIBC (Direct)  -     Protime-INR  -     APTT  -     TSH  -     KIERA  -     Rheumatoid Factor, Quant  -     Sedimentation rate, automated  -     CBC Auto Differential  -     Ambulatory Referral to Hematology / Oncology    Hx of bariatric surgery  -     Comprehensive metabolic panel  -     Vitamin B12  -     Folate  -     Cancel: Iron & TIBC (Direct)  -     Zinc    BMI 34.0-34.9,adult    Essential hypertension  -     TSH    Depressive disorder    Hyperparathyroidism (CMS/HCC)  -     DEXA Bone Density Axial  -     DEXA Bone Density Appendicular  -     TSH  -     Vitamin D 25 Hydroxy    Encounter for screening mammogram for malignant neoplasm of breast  -     Mammo Screening Digital Tomosynthesis Bilateral With CAD           Plan      1. Patient is improving.  Conservative care.  Discussed nature of condition with the patient.  Will monitor.  2.Labs as  above.  Hematology referral.  Advised to avoid NSAIDs.  3.Patient is following with [facility].  Weight loss discussed.  Activity as tolerated.  Diet: Heart healthy foods - more fresh fruits and vegetables and whole grains; less red meat; more fish and poultry that is baked or grilled - not fried; less salt not to exceed 2000 mg daily - less processed food; No trans or saturated fat  Blood pressure management  Weight management: Patient's Body mass index is 34.3 kg/m². BMI is above normal parameters. Recommendations include: educational material, exercise counseling and nutrition counseling.  4.Losartan/HCTZ.  DASH.  5.She has an indication for parathyroidectomy but previous imaging was nonlocalizing.  Will repeat bone density.  Patient would like to discuss alternatives with .  Referral placed.  6.Patient is doing well on Lexapro and Trazodone.  Medication benefits and side effects discussed in detail.      Follow up in 1 month or earlier if clinically indicated.                  This document has been electronically signed by Josephine Kauffman MD on August 11, 2018 8:35 PM

## 2018-08-10 LAB — ANA SER QL: NEGATIVE

## 2018-08-11 LAB — RHEUMATOID FACT SERPL-ACNC: NEGATIVE [IU]/ML

## 2018-08-11 RX ORDER — LOSARTAN POTASSIUM AND HYDROCHLOROTHIAZIDE 12.5; 5 MG/1; MG/1
1 TABLET ORAL DAILY
Qty: 90 TABLET | Refills: 1 | Status: SHIPPED | OUTPATIENT
Start: 2018-08-11 | End: 2019-04-24 | Stop reason: SDUPTHER

## 2018-08-13 ENCOUNTER — TELEPHONE (OUTPATIENT)
Dept: FAMILY MEDICINE CLINIC | Facility: CLINIC | Age: 73
End: 2018-08-13

## 2018-08-13 NOTE — PROGRESS NOTES
Per Dr. Kauffman, Ms. Wilkinson has been called with her recent lab results & recommendations.  Continue her current medications and follow-up as planned or sooner if any problems.

## 2018-08-13 NOTE — TELEPHONE ENCOUNTER
Per Dr. Kauffman, Ms. Wilkinson has been called with her recent lab results & recommendations.  Continue her current medications and follow-up as planned or sooner if any problems.        ----- Message from Josephine Kauffman MD sent at 8/10/2018  8:34 PM CDT -----  Mild anemia, iron is ok  Lupus test is negative  RF is not back  I referred to hematology  for bruising  ----- Message -----  From: Lab, Background User  Sent: 8/9/2018   2:07 PM  To: Josephine Kauffman MD

## 2018-08-15 ENCOUNTER — TELEPHONE (OUTPATIENT)
Dept: FAMILY MEDICINE CLINIC | Facility: CLINIC | Age: 73
End: 2018-08-15

## 2018-08-15 DIAGNOSIS — E21.3 HYPERPARATHYROIDISM (HCC): Primary | ICD-10-CM

## 2018-08-15 NOTE — PROGRESS NOTES
Per Dr. Kauffman, Ms. Wilkinson has been called with her recent DEXA Scan  results & recommendations.  Continue her current medications and follow-up as planned or sooner if any problems.    I have told her to call us back in a week if she hasn't heard anything from the Referral to [facility].

## 2018-08-15 NOTE — TELEPHONE ENCOUNTER
Per Dr. Kauffman, Ms. Wilkinson has been called with her recent DEXA Scan  results & recommendations.  Continue her current medications and follow-up as planned or sooner if any problems.    I have told her to call us back in a week if she hasn't heard anything from the Referral to [facility].       ----- Message from Josephine Kauffman MD sent at 8/14/2018  5:39 PM CDT -----  Please call the patient regarding her abnormal result.  Osteoporosis in hips  Re: refer to  in [facility] for hyperparathyroidism, referral placed, since her bones are more osteoporotic and they could not localize parathyroid adenoma on imaging study or endocrinology follow up to discuss option /this was already done/

## 2018-08-20 ENCOUNTER — APPOINTMENT (OUTPATIENT)
Dept: ONCOLOGY | Facility: CLINIC | Age: 73
End: 2018-08-20

## 2018-08-20 ENCOUNTER — APPOINTMENT (OUTPATIENT)
Dept: ONCOLOGY | Facility: HOSPITAL | Age: 73
End: 2018-08-20

## 2018-08-20 RX ORDER — TRAZODONE HYDROCHLORIDE 100 MG/1
TABLET ORAL
Qty: 90 TABLET | Refills: 1 | OUTPATIENT
Start: 2018-08-20

## 2018-08-21 ENCOUNTER — TELEPHONE (OUTPATIENT)
Dept: INTERNAL MEDICINE | Facility: CLINIC | Age: 73
End: 2018-08-21

## 2018-08-21 RX ORDER — ESCITALOPRAM OXALATE 10 MG/1
10 TABLET ORAL DAILY
Qty: 90 TABLET | Refills: 0 | Status: SHIPPED | OUTPATIENT
Start: 2018-08-21 | End: 2018-10-25 | Stop reason: SDUPTHER

## 2018-08-23 ENCOUNTER — APPOINTMENT (OUTPATIENT)
Dept: ONCOLOGY | Facility: HOSPITAL | Age: 73
End: 2018-08-23

## 2018-08-23 ENCOUNTER — CONSULT (OUTPATIENT)
Dept: ONCOLOGY | Facility: CLINIC | Age: 73
End: 2018-08-23

## 2018-08-23 VITALS
HEIGHT: 61 IN | RESPIRATION RATE: 18 BRPM | BODY MASS INDEX: 34.06 KG/M2 | OXYGEN SATURATION: 98 % | TEMPERATURE: 97.2 F | WEIGHT: 180.4 LBS | HEART RATE: 55 BPM | SYSTOLIC BLOOD PRESSURE: 104 MMHG | DIASTOLIC BLOOD PRESSURE: 52 MMHG

## 2018-08-23 DIAGNOSIS — D64.9 ANEMIA, UNSPECIFIED TYPE: ICD-10-CM

## 2018-08-23 DIAGNOSIS — R94.5 ABNORMAL RESULTS OF LIVER FUNCTION STUDIES: ICD-10-CM

## 2018-08-23 DIAGNOSIS — R23.3 EASY BRUISING: Primary | ICD-10-CM

## 2018-08-23 DIAGNOSIS — R79.1 ABNORMAL COAGULATION PROFILE: ICD-10-CM

## 2018-08-23 DIAGNOSIS — I26.99 OTHER PULMONARY EMBOLISM WITHOUT ACUTE COR PULMONALE, UNSPECIFIED CHRONICITY (HCC): ICD-10-CM

## 2018-08-23 DIAGNOSIS — R23.3 EASY BRUISING: ICD-10-CM

## 2018-08-23 LAB
APTT PPP: 38.9 SECONDS (ref 20–40.3)
BASOPHILS # BLD AUTO: 0.01 10*3/MM3 (ref 0–0.2)
BASOPHILS NFR BLD AUTO: 0.2 % (ref 0–2)
DEPRECATED RDW RBC AUTO: 41 FL (ref 36.4–46.3)
EOSINOPHIL # BLD AUTO: 0.05 10*3/MM3 (ref 0–0.7)
EOSINOPHIL NFR BLD AUTO: 1.2 % (ref 0–7)
ERYTHROCYTE [DISTWIDTH] IN BLOOD BY AUTOMATED COUNT: 12.5 % (ref 11.5–14.5)
HCT VFR BLD AUTO: 33.5 % (ref 35–45)
HGB BLD-MCNC: 11.6 G/DL (ref 12–15.5)
IMM GRANULOCYTES # BLD: 0.01 10*3/MM3 (ref 0–0.02)
IMM GRANULOCYTES NFR BLD: 0.2 % (ref 0–0.5)
INR PPP: 1.06 (ref 0.8–1.2)
LYMPHOCYTES # BLD AUTO: 1.7 10*3/MM3 (ref 0.6–4.2)
LYMPHOCYTES NFR BLD AUTO: 39.9 % (ref 10–50)
MCH RBC QN AUTO: 31.1 PG (ref 26.5–34)
MCHC RBC AUTO-ENTMCNC: 34.6 G/DL (ref 31.4–36)
MCV RBC AUTO: 89.8 FL (ref 80–98)
MONOCYTES # BLD AUTO: 0.44 10*3/MM3 (ref 0–0.9)
MONOCYTES NFR BLD AUTO: 10.3 % (ref 0–12)
NEUTROPHILS # BLD AUTO: 2.05 10*3/MM3 (ref 2–8.6)
NEUTROPHILS NFR BLD AUTO: 48.2 % (ref 37–80)
PLATELET # BLD AUTO: 226 10*3/MM3 (ref 150–450)
PMV BLD AUTO: 9.3 FL (ref 8–12)
PROTHROMBIN TIME: 13.6 SECONDS (ref 11.1–15.3)
RBC # BLD AUTO: 3.73 10*6/MM3 (ref 3.77–5.16)
WBC NRBC COR # BLD: 4.26 10*3/MM3 (ref 3.2–9.8)

## 2018-08-23 PROCEDURE — 85025 COMPLETE CBC W/AUTO DIFF WBC: CPT | Performed by: INTERNAL MEDICINE

## 2018-08-23 PROCEDURE — 85730 THROMBOPLASTIN TIME PARTIAL: CPT | Performed by: INTERNAL MEDICINE

## 2018-08-23 PROCEDURE — 99204 OFFICE O/P NEW MOD 45 MIN: CPT | Performed by: INTERNAL MEDICINE

## 2018-08-23 PROCEDURE — 85240 CLOT FACTOR VIII AHG 1 STAGE: CPT | Performed by: INTERNAL MEDICINE

## 2018-08-23 PROCEDURE — G0463 HOSPITAL OUTPT CLINIC VISIT: HCPCS | Performed by: INTERNAL MEDICINE

## 2018-08-23 PROCEDURE — 85610 PROTHROMBIN TIME: CPT | Performed by: INTERNAL MEDICINE

## 2018-08-23 PROCEDURE — 85245 CLOT FACTOR VIII VW RISTOCTN: CPT | Performed by: INTERNAL MEDICINE

## 2018-08-23 PROCEDURE — 85246 CLOT FACTOR VIII VW ANTIGEN: CPT | Performed by: INTERNAL MEDICINE

## 2018-08-23 NOTE — PROGRESS NOTES
DATE OF CONSULT: 8/23/2018    REQUESTING SOURCE:  Dr Becerril      REASON FOR CONSULTATION:  Prolong PTT ,anemia    HISTORY OF PRESENT ILLNESS:    73-year-old female with a past medical history consisting of history of pulmonary embolism diagnosed in 2016 status post anticoagulation with Coumadin for one year, history of osteoporosis, history of depression, history of gastric sleeve surgery done in 2016 after that she has lost 120 pounds has been referred to [facility] for further evaluation and recommendation regarding easy bruising.Patient denies any abnormal bleeding after her surgery or teeth extraction.  States his father used to bleed excessively and easily.  Denies any recent use of nonsteroidal anti-inflammatory drug.  Denies any recent use of any aspirin or Plavix or any blood thinners.  Denies any new chest pain or any new shortness of breath.      PAST MEDICAL HISTORY:    Past Medical History:   Diagnosis Date   • Chest pain     History of noncardiac chest pain   • Chronic depression    • Depressive disorder    • Diastolic dysfunction    • Dyspnea    • Dyspnea on exertion    • Edema    • Essential hypertension    • Exercise intolerance    • Fatigue    • GERD (gastroesophageal reflux disease)    • History of bone density study 2011    DEXA BONE DENSITY APPENDICULAR 63135 (1) - normal   • History of bone density study 06/24/2015    DXA BONE DENSITY AXIAL 63967 [facility] (1) - Ordered By: TOLU RAMIREZ ([facility])    • History of echocardiogram     Echocardiogram W/ color flow 37089 (2)   • History of mammogram 2013    Mammogram screen (1)   • History of mammogram 2015    MAMMOGRAM SCREENING 94772 - [facility] (1)   • History of screening mammography 06/25/2015    SCREENING MAMMOGRAPHY DIGITAL  (Medicare) (1) - Ordered By: ANDRADE BECERRIL ([facility])    • Hypercalcemia    • Hyperlipidemia    • Influenza vaccine administered 02/25/2016    INFLUENZA IMMUN ADMIN OR PREV RECV'D  (2) - Ordered By:  "ANDRADE BECERRIL ([facility] [address] [facility])    • Long-term (current) use of anticoagulants     coumadin therapy      • Lower extremity edema     Intermittent lower extremity edema   • Obesity, Class III, BMI 40-49.9 (morbid obesity) (CMS/[facility])     \"Class III obesity\"   • BEVERLY (obstructive sleep apnea)     Mild BEVERLY   • PE (pulmonary embolism) 10/2015    Bilateral PE diagnosed after a car ride to [place]   • Pneumococcal vaccination given 02/25/2016    PNEUMOC VAC/ADMIN/RCVD 4040F (2) - Ordered By: ANDRADE BECERRIL ([facility] [address] [facility])    • Right basilic vein fibrosis 2015   • Sedentary lifestyle    • Shortness of breath    • Venous thrombosis 2015    right basilic venous thrombosis; This was noted in May 2015.   • Weight gain        PAST SURGICAL HISTORY:  Past Surgical History:   Procedure Laterality Date   • CHOLECYSTECTOMY      Cholecystectomy (1)   • ENDOSCOPY  09/15/2011    Colon endoscopy 54246 (2) - Hemorrhoids found.   • GASTRIC SLEEVE LAPAROSCOPIC     • HEMORRHOIDECTOMY     • HYSTEROSCOPY      Hysteroscopy; ablation (1)   • INJECTION OF MEDICATION  09/27/2012    Kenalog (1) - Ordered By: ANDRADE BECERRIL ([facility])        ALLERGIES:    Allergies   Allergen Reactions   • Sulfa Antibiotics Rash and Hives     Sulfa (Sulfonamide Antibiotics)       SOCIAL HISTORY:   Social History   Substance Use Topics   • Smoking status: Never Smoker   • Smokeless tobacco: Never Used   • Alcohol use No       CURRENT MEDICATIONS:    Current Outpatient Prescriptions   Medication Sig Dispense Refill   • Cyanocobalamin (B-12 PO) Take  by mouth.     • cyclobenzaprine (FLEXERIL) 10 MG tablet Half or one tablet every 8 hours as needed for pain 30 tablet 0   • escitalopram (LEXAPRO) 10 MG tablet Take 1 tablet by mouth Daily. 90 tablet 0   • losartan-hydrochlorothiazide (HYZAAR) 50-12.5 MG per tablet Take 1 tablet by mouth Daily. 90 tablet 1   • omeprazole OTC (PRILOSEC OTC) 20 MG EC tablet      • simvastatin (ZOCOR) 20 MG tablet Take 1 tablet by mouth Every Other Day. " 90 tablet 1   • traZODone (DESYREL) 100 MG tablet Take 1 tablet by mouth Every Night. 90 tablet 1     No current facility-administered medications for this visit.         HOME MEDICATIONS:   Current Outpatient Prescriptions on File Prior to Visit   Medication Sig Dispense Refill   • Cyanocobalamin (B-12 PO) Take  by mouth.     • cyclobenzaprine (FLEXERIL) 10 MG tablet Half or one tablet every 8 hours as needed for pain 30 tablet 0   • escitalopram (LEXAPRO) 10 MG tablet Take 1 tablet by mouth Daily. 90 tablet 0   • losartan-hydrochlorothiazide (HYZAAR) 50-12.5 MG per tablet Take 1 tablet by mouth Daily. 90 tablet 1   • omeprazole OTC (PRILOSEC OTC) 20 MG EC tablet      • simvastatin (ZOCOR) 20 MG tablet Take 1 tablet by mouth Every Other Day. 90 tablet 1   • traZODone (DESYREL) 100 MG tablet Take 1 tablet by mouth Every Night. 90 tablet 1     No current facility-administered medications on file prior to visit.        FAMILY HISTORY:    Family History   Problem Relation Age of Onset   • Ovarian cancer Mother    • Bleeding Disorder Father    • Other Father         Hematologic disorder   • Lung cancer Father    • Pancreatic cancer Sister    • Cancer Brother    • Lung cancer Brother    • Bleeding Disorder Other    • Hyperlipidemia Other    • Hypertension Other    • Osteoarthritis Other    • Other Other         Gastritis       REVIEW OF SYSTEMS:      CONSTITUTIONAL:  Complains of fatigue.  Lost close to 100 pound a plastic sleeve surgery done in 2016.  Denies any fever, chills .     EYES: No visual disturbances. No discharge. No new lesions    ENMT:  No epistaxis, mouth sores or difficulty swallowing.    RESPIRATORY:  No new shortness of breath. No new cough or hemoptysis.    CARDIOVASCULAR:  No chest pain or palpitations.    GASTROINTESTINAL:  No abdominal pain nausea, vomiting or blood in the stool.    GENITOURINARY: No Dysuria or Hematuria.    MUSCULOSKELETAL:  Positive for joint pains.    LYMPHATICS:  Denies any  "abnormal swollen glands anywhere in the body.    NEUROLOGICAL : Complains of intermittent tingling and numbness affecting bilateral hands. No headache or dizziness. No seizures or balance problems.    SKIN: Complains of easy bruising.            PHYSICAL EXAMINATION:      VITAL SIGNS:  /52   Pulse 55   Temp 97.2 °F (36.2 °C) (Temporal Artery )   Resp 18   Ht 156.2 cm (61.5\")   Wt 81.8 kg (180 lb 6.4 oz)   LMP  (LMP Unknown)   SpO2 98%   BMI 33.54 kg/m²   1    08/23/18  1313   Weight: 81.8 kg (180 lb 6.4 oz)       ECOG performance status: 2    CONSTITUTIONAL:  Not in any distress.    EYES: Mild conjunctival Pallor. No Icterus. No Pterygium. Extraocular Movements intact.No ptosis.    ENMT:  Normocephalic, Atraumatic.No Facial Asymmetry noted.    NECK:  No adenopathy.Trachea midline. NO JVD.    RESPIRATORY:  Fair air entry bilateral. No rhonchi or wheezing.Fair respiratory effort.    CARDIOVASCULAR:  S1, S2. Regular rate and rhythm. No murmur or gallop appreciated.    ABDOMEN:  Soft, obese, nontender. Bowel sounds present in all four quadrants.  No Hepatosplenomegaly appreciated.    MUSCULOSKELETAL:  No edema.No Calf Tenderness.Decreased range of motion.    NEUROLOGIC:    No  Motor  deficit appreciated. Cranial Nerves 2-12 grossly intact.    SKIN : Bruising present on bilateral forearms, bilateral lower extremity and right side of face.    LYMPHATICS: No new enlarged lymph nodes in neck or supraclavicular area.    PSYCHIATRY: Alert, awake and oriented ×3.          DIAGNOSTIC DATA:    WBC   Date Value Ref Range Status   08/09/2018 3.93 3.20 - 9.80 10*3/mm3 Final     RBC   Date Value Ref Range Status   08/09/2018 3.81 3.77 - 5.16 10*6/mm3 Final     Hemoglobin   Date Value Ref Range Status   08/09/2018 11.9 (L) 12.0 - 15.5 g/dL Final     Hematocrit   Date Value Ref Range Status   08/09/2018 34.9 (L) 35.0 - 45.0 % Final     MCV   Date Value Ref Range Status   08/09/2018 91.6 80.0 - 98.0 fL Final     MCH "   Date Value Ref Range Status   08/09/2018 31.2 26.5 - 34.0 pg Final     MCHC   Date Value Ref Range Status   08/09/2018 34.1 31.4 - 36.0 g/dL Final     RDW   Date Value Ref Range Status   08/09/2018 12.8 11.5 - 14.5 % Final     RDW-SD   Date Value Ref Range Status   08/09/2018 43.1 36.4 - 46.3 fl Final     MPV   Date Value Ref Range Status   08/09/2018 9.9 8.0 - 12.0 fL Final     Platelets   Date Value Ref Range Status   08/09/2018 277 150 - 450 10*3/mm3 Final     Neutrophil %   Date Value Ref Range Status   08/09/2018 46.0 37.0 - 80.0 % Final     Lymphocyte %   Date Value Ref Range Status   08/09/2018 43.8 10.0 - 50.0 % Final     Monocyte %   Date Value Ref Range Status   08/09/2018 8.1 0.0 - 12.0 % Final     Eosinophil %   Date Value Ref Range Status   08/09/2018 1.3 0.0 - 7.0 % Final     Basophil %   Date Value Ref Range Status   08/09/2018 0.8 0.0 - 2.0 % Final     Immature Grans %   Date Value Ref Range Status   08/09/2018 0.0 0.0 - 0.5 % Final     Neutrophils, Absolute   Date Value Ref Range Status   08/09/2018 1.81 (L) 2.00 - 8.60 10*3/mm3 Final     Lymphocytes, Absolute   Date Value Ref Range Status   08/09/2018 1.72 0.60 - 4.20 10*3/mm3 Final     Monocytes, Absolute   Date Value Ref Range Status   08/09/2018 0.32 0.00 - 0.90 10*3/mm3 Final     Eosinophils, Absolute   Date Value Ref Range Status   08/09/2018 0.05 0.00 - 0.70 10*3/mm3 Final     Basophils, Absolute   Date Value Ref Range Status   08/09/2018 0.03 0.00 - 0.20 10*3/mm3 Final     Immature Grans, Absolute   Date Value Ref Range Status   08/09/2018 0.00 0.00 - 0.02 10*3/mm3 Final     nRBC   Date Value Ref Range Status   06/08/2015 0.0 0.0 - 0.2 % Final   06/08/2015 0.000 x1000/uL Final     Glucose   Date Value Ref Range Status   08/09/2018 77 60 - 100 mg/dL Final     Sodium   Date Value Ref Range Status   08/09/2018 137 137 - 145 mmol/L Final     Potassium   Date Value Ref Range Status   08/09/2018 4.2 3.5 - 5.1 mmol/L Final     CO2   Date Value  Ref Range Status   08/09/2018 28.0 22.0 - 31.0 mmol/L Final     Chloride   Date Value Ref Range Status   08/09/2018 101 95 - 110 mmol/L Final     Anion Gap   Date Value Ref Range Status   08/09/2018 8.0 5.0 - 15.0 mmol/L Final     Creatinine   Date Value Ref Range Status   08/09/2018 0.73 0.50 - 1.00 mg/dL Final     BUN   Date Value Ref Range Status   08/09/2018 10 7 - 21 mg/dL Final     BUN/Creatinine Ratio   Date Value Ref Range Status   08/09/2018 13.7 7.0 - 25.0 Final     Calcium   Date Value Ref Range Status   08/09/2018 8.9 8.4 - 10.2 mg/dL Final     eGFR Non  Amer   Date Value Ref Range Status   08/09/2018 79 39 - 90 mL/min/1.73 Final     Alkaline Phosphatase   Date Value Ref Range Status   08/09/2018 95 38 - 126 U/L Final     Total Protein   Date Value Ref Range Status   08/09/2018 7.3 6.3 - 8.6 g/dL Final     ALT (SGPT)   Date Value Ref Range Status   08/09/2018 38 9 - 52 U/L Final     AST (SGOT)   Date Value Ref Range Status   08/09/2018 55 (H) 14 - 36 U/L Final     Total Bilirubin   Date Value Ref Range Status   08/09/2018 0.5 0.2 - 1.3 mg/dL Final     Albumin   Date Value Ref Range Status   08/09/2018 4.10 3.40 - 4.80 g/dL Final     Globulin   Date Value Ref Range Status   08/09/2018 3.2 2.3 - 3.5 gm/dL Final     Lab Results   Component Value Date    IRON 104 08/09/2018    TIBC 281 08/09/2018    LABIRON 37 08/09/2018    FERRITIN 145.00 10/17/2017    FGHWHVUH24 934 (H) 08/09/2018    FOLATE >20.00 08/09/2018     No results found for: , LABCA2, AFPTM, HCGQUANT, , CHROMGRNA, [id number], CEA, REFLABREPO]      Component aPTT   Latest Ref Rng & Units 20.0 - 40.3 seconds   6/8/2015 34.3   2/22/2017 36.0   8/9/2018 41.7 (H)       Component      Latest Ref Rng & Units 12/2/2016 2/22/2017 8/9/2018   Protime      11.1 - 15.3 Seconds  13.8 13.9   INR      0.80 - 1.20 2.90 (A) 1.06 1.09           ASSESSMENT AND PLAN:      1. Prolong PTT:  -Patient was found to have mild prolongation of PTT with value  being 41.7 on August 9, 2018.  -Patient denies any recent use of any blood thinner on any hospitalization with heparin or Lovenox injection.  -We will repeat blood work today with PT, PTT, INR.  If PTT still prolonged we will ask patient to return to clinic and get PTT mixing study, factor 8,9 level as well as lupus anticoagulant.  - We will also do testing for Von willebrand disease panel.  - we will ask patient to return to clinic in 1 week to go over results of blood work    2.  Mild anemia: Most likely anemia of chronic disease.  -Most recent hemoglobin is 11.8.  Vitamin B12 and folate level drawn recently is normal.      3.  Mild neutropenia: Patient is found to have absolute neutrophil count of 1850 with normal white blood cell count.  -We will repeat CBC with differential today.    4. H/o Pulmonary embolism  -Patient had unprovoked pulmonary embolism around 2015 for which patient took Coumadin for one year.  Denies any new shortness of breath or chest pain.    5.Health  maintainence:  -Patient does not smoke.  Had a colonoscopy around 2011.  She is up-to-date with mammogram.        Thank you for this consultation.          Mihai Hunt MD  8/23/2018  1:50 PM          EMR Dragon/Transcription disclaimer:   Much of this encounter note is an electronic transcription/translation of spoken language to printed text. The electronic translation of spoken language may permit erroneous, or at times, nonsensical words or phrases to be inadvertently transcribed; Although I have reviewed the note for such errors, some may still exist.

## 2018-08-24 LAB
FACT VIII ACT/NOR PPP: 231 % (ref 57–163)
VW INTERPRETATION: NORMAL
VWF AG ACT/NOR PPP IA: 244 % (ref 50–200)
VWF CP PPP QL: 195 % (ref 50–200)

## 2018-08-27 ENCOUNTER — TELEPHONE (OUTPATIENT)
Dept: INTERNAL MEDICINE | Facility: CLINIC | Age: 73
End: 2018-08-27

## 2018-08-31 ENCOUNTER — OFFICE VISIT (OUTPATIENT)
Dept: ONCOLOGY | Facility: CLINIC | Age: 73
End: 2018-08-31

## 2018-08-31 VITALS
RESPIRATION RATE: 18 BRPM | HEIGHT: 62 IN | HEART RATE: 57 BPM | DIASTOLIC BLOOD PRESSURE: 68 MMHG | TEMPERATURE: 96.5 F | BODY MASS INDEX: 32.87 KG/M2 | SYSTOLIC BLOOD PRESSURE: 131 MMHG | WEIGHT: 178.6 LBS

## 2018-08-31 DIAGNOSIS — R23.3 EASY BRUISING: ICD-10-CM

## 2018-08-31 DIAGNOSIS — Z86.711 HISTORY OF PULMONARY EMBOLISM: Primary | ICD-10-CM

## 2018-08-31 DIAGNOSIS — D64.9 ANEMIA, UNSPECIFIED TYPE: ICD-10-CM

## 2018-08-31 DIAGNOSIS — I26.99 OTHER PULMONARY EMBOLISM WITHOUT ACUTE COR PULMONALE, UNSPECIFIED CHRONICITY (HCC): ICD-10-CM

## 2018-08-31 PROCEDURE — 99214 OFFICE O/P EST MOD 30 MIN: CPT | Performed by: INTERNAL MEDICINE

## 2018-08-31 PROCEDURE — G0463 HOSPITAL OUTPT CLINIC VISIT: HCPCS | Performed by: INTERNAL MEDICINE

## 2018-09-06 ENCOUNTER — APPOINTMENT (OUTPATIENT)
Dept: GENERAL RADIOLOGY | Facility: HOSPITAL | Age: 73
End: 2018-09-06

## 2018-09-06 ENCOUNTER — HOSPITAL ENCOUNTER (EMERGENCY)
Facility: HOSPITAL | Age: 73
Discharge: HOME | End: 2018-09-06
Attending: EMERGENCY MEDICINE | Admitting: EMERGENCY MEDICINE

## 2018-09-06 VITALS
RESPIRATION RATE: 18 BRPM | SYSTOLIC BLOOD PRESSURE: 143 MMHG | BODY MASS INDEX: 32.66 KG/M2 | DIASTOLIC BLOOD PRESSURE: 70 MMHG | HEART RATE: 64 BPM | OXYGEN SATURATION: 100 % | TEMPERATURE: 97.3 F | WEIGHT: 173 LBS | HEIGHT: 61 IN

## 2018-09-06 DIAGNOSIS — A49.9 UTI (URINARY TRACT INFECTION), BACTERIAL: Primary | ICD-10-CM

## 2018-09-06 DIAGNOSIS — N39.0 UTI (URINARY TRACT INFECTION), BACTERIAL: Primary | ICD-10-CM

## 2018-09-06 LAB
ALBUMIN SERPL-MCNC: 3.7 G/DL (ref 3.4–4.8)
ALBUMIN/GLOB SERPL: 1.2 G/DL (ref 1.1–1.8)
ALP SERPL-CCNC: 109 U/L (ref 38–126)
ALT SERPL W P-5'-P-CCNC: 43 U/L (ref 9–52)
ANION GAP SERPL CALCULATED.3IONS-SCNC: 6 MMOL/L (ref 5–15)
APTT PPP: 34.5 SECONDS (ref 20–40.3)
AST SERPL-CCNC: 47 U/L (ref 14–36)
BACTERIA UR QL AUTO: ABNORMAL /HPF
BASOPHILS # BLD AUTO: 0.01 10*3/MM3 (ref 0–0.2)
BASOPHILS NFR BLD AUTO: 0.3 % (ref 0–2)
BILIRUB SERPL-MCNC: 0.4 MG/DL (ref 0.2–1.3)
BILIRUB UR QL STRIP: ABNORMAL
BUN BLD-MCNC: 13 MG/DL (ref 7–21)
BUN/CREAT SERPL: 15.9 (ref 7–25)
CALCIUM SPEC-SCNC: 9.5 MG/DL (ref 8.4–10.2)
CHLORIDE SERPL-SCNC: 102 MMOL/L (ref 95–110)
CK MB SERPL-CCNC: 1.35 NG/ML (ref 0–5)
CK SERPL-CCNC: 63 U/L (ref 30–135)
CLARITY UR: ABNORMAL
CO2 SERPL-SCNC: 30 MMOL/L (ref 22–31)
COLOR UR: YELLOW
CREAT BLD-MCNC: 0.82 MG/DL (ref 0.5–1)
D-DIMER, QUANTITATIVE (MAD,POW, STR): 644 NG/ML (FEU) (ref 0–470)
DEPRECATED RDW RBC AUTO: 41.2 FL (ref 36.4–46.3)
EOSINOPHIL # BLD AUTO: 0.02 10*3/MM3 (ref 0–0.7)
EOSINOPHIL NFR BLD AUTO: 0.5 % (ref 0–7)
ERYTHROCYTE [DISTWIDTH] IN BLOOD BY AUTOMATED COUNT: 12.7 % (ref 11.5–14.5)
GFR SERPL CREATININE-BSD FRML MDRD: 69 ML/MIN/1.73 (ref 39–90)
GLOBULIN UR ELPH-MCNC: 3.2 GM/DL (ref 2.3–3.5)
GLUCOSE BLD-MCNC: 87 MG/DL (ref 60–100)
GLUCOSE UR STRIP-MCNC: NEGATIVE MG/DL
HCT VFR BLD AUTO: 34.9 % (ref 35–45)
HGB BLD-MCNC: 12.1 G/DL (ref 12–15.5)
HGB UR QL STRIP.AUTO: NEGATIVE
HOLD SPECIMEN: NORMAL
HYALINE CASTS UR QL AUTO: ABNORMAL /LPF
IMM GRANULOCYTES # BLD: 0 10*3/MM3 (ref 0–0.02)
IMM GRANULOCYTES NFR BLD: 0 % (ref 0–0.5)
INR PPP: 1.14 (ref 0.8–1.2)
KETONES UR QL STRIP: NEGATIVE
LEUKOCYTE ESTERASE UR QL STRIP.AUTO: ABNORMAL
LYMPHOCYTES # BLD AUTO: 1.71 10*3/MM3 (ref 0.6–4.2)
LYMPHOCYTES NFR BLD AUTO: 42.8 % (ref 10–50)
MCH RBC QN AUTO: 31 PG (ref 26.5–34)
MCHC RBC AUTO-ENTMCNC: 34.7 G/DL (ref 31.4–36)
MCV RBC AUTO: 89.5 FL (ref 80–98)
MONOCYTES # BLD AUTO: 0.48 10*3/MM3 (ref 0–0.9)
MONOCYTES NFR BLD AUTO: 12 % (ref 0–12)
NEUTROPHILS # BLD AUTO: 1.78 10*3/MM3 (ref 2–8.6)
NEUTROPHILS NFR BLD AUTO: 44.4 % (ref 37–80)
NITRITE UR QL STRIP: NEGATIVE
PH UR STRIP.AUTO: <=5 [PH] (ref 5–9)
PLATELET # BLD AUTO: 265 10*3/MM3 (ref 150–450)
PMV BLD AUTO: 9.2 FL (ref 8–12)
POTASSIUM BLD-SCNC: 3.9 MMOL/L (ref 3.5–5.1)
PROT SERPL-MCNC: 6.9 G/DL (ref 6.3–8.6)
PROT UR QL STRIP: NEGATIVE
PROTHROMBIN TIME: 14.3 SECONDS (ref 11.1–15.3)
RBC # BLD AUTO: 3.9 10*6/MM3 (ref 3.77–5.16)
RBC # UR: ABNORMAL /HPF
REF LAB TEST METHOD: ABNORMAL
SODIUM BLD-SCNC: 138 MMOL/L (ref 137–145)
SP GR UR STRIP: 1.02 (ref 1–1.03)
SQUAMOUS #/AREA URNS HPF: ABNORMAL /HPF
TROPONIN I SERPL-MCNC: <0.012 NG/ML
UROBILINOGEN UR QL STRIP: ABNORMAL
WBC NRBC COR # BLD: 4 10*3/MM3 (ref 3.2–9.8)
WBC UR QL AUTO: ABNORMAL /HPF

## 2018-09-06 PROCEDURE — 81001 URINALYSIS AUTO W/SCOPE: CPT | Performed by: EMERGENCY MEDICINE

## 2018-09-06 PROCEDURE — 99284 EMERGENCY DEPT VISIT MOD MDM: CPT

## 2018-09-06 PROCEDURE — 85610 PROTHROMBIN TIME: CPT | Performed by: EMERGENCY MEDICINE

## 2018-09-06 PROCEDURE — 82553 CREATINE MB FRACTION: CPT | Performed by: EMERGENCY MEDICINE

## 2018-09-06 PROCEDURE — 93005 ELECTROCARDIOGRAM TRACING: CPT | Performed by: EMERGENCY MEDICINE

## 2018-09-06 PROCEDURE — 85730 THROMBOPLASTIN TIME PARTIAL: CPT | Performed by: EMERGENCY MEDICINE

## 2018-09-06 PROCEDURE — 84484 ASSAY OF TROPONIN QUANT: CPT | Performed by: EMERGENCY MEDICINE

## 2018-09-06 PROCEDURE — 71046 X-RAY EXAM CHEST 2 VIEWS: CPT

## 2018-09-06 PROCEDURE — 85025 COMPLETE CBC W/AUTO DIFF WBC: CPT | Performed by: EMERGENCY MEDICINE

## 2018-09-06 PROCEDURE — 93010 ELECTROCARDIOGRAM REPORT: CPT | Performed by: INTERNAL MEDICINE

## 2018-09-06 PROCEDURE — 80053 COMPREHEN METABOLIC PANEL: CPT | Performed by: EMERGENCY MEDICINE

## 2018-09-06 PROCEDURE — 85379 FIBRIN DEGRADATION QUANT: CPT | Performed by: EMERGENCY MEDICINE

## 2018-09-06 PROCEDURE — 82550 ASSAY OF CK (CPK): CPT | Performed by: EMERGENCY MEDICINE

## 2018-09-06 RX ORDER — PHENAZOPYRIDINE HYDROCHLORIDE 100 MG/1
100 TABLET, FILM COATED ORAL 3 TIMES DAILY PRN
Qty: 6 TABLET | Refills: 0 | Status: SHIPPED | OUTPATIENT
Start: 2018-09-06 | End: 2018-10-25

## 2018-09-06 RX ORDER — DIPHENHYDRAMINE HCL 50 MG
50 CAPSULE ORAL NIGHTLY
COMMUNITY
End: 2019-01-24

## 2018-09-06 RX ORDER — MAGNESIUM OXIDE 400 MG/1
400 TABLET ORAL DAILY
COMMUNITY
End: 2022-06-30

## 2018-09-06 RX ORDER — ACETAMINOPHEN 325 MG/1
650 TABLET ORAL NIGHTLY
COMMUNITY
End: 2022-06-19

## 2018-09-06 RX ORDER — SODIUM CHLORIDE 0.9 % (FLUSH) 0.9 %
10 SYRINGE (ML) INJECTION AS NEEDED
Status: DISCONTINUED | OUTPATIENT
Start: 2018-09-06 | End: 2018-09-06 | Stop reason: HOSPADM

## 2018-09-06 RX ORDER — LEVOFLOXACIN 500 MG/1
500 TABLET, FILM COATED ORAL DAILY
Qty: 3 TABLET | Refills: 0 | Status: SHIPPED | OUTPATIENT
Start: 2018-09-06 | End: 2018-09-09

## 2018-09-06 RX ADMIN — Medication 10 ML: at 15:29

## 2018-09-06 NOTE — ED TRIAGE NOTES
Patient presents to the ED with complaints of generalized weakness. Patient states that yesterday she was eating out around noon and all of sudden a cold sweat came over her. Patient states that she felt like she was going to faint, she leaned over to try to get some blood to her head, this did not help. Patient also states that she started to have some numbness in both hands as well. Patient states she has not had an episode like this since then and has just felt weak.

## 2018-09-06 NOTE — ED PROVIDER NOTES
Subjective   72yo female pmh significant htn/hyperlipidemia/mdd/pe presents ED c/o 2d hx generalized fatigue with additional 30 minute episode yesterday of diaphoresis/BUE paresethesias, resolved.  ROS neg headache/chest pain/soa/hemoptysis/cough/ syncope/dizziness/abd pain/motor weakness/n/v/d/dysuria.        History provided by:  Patient  Illness   Severity:  Moderate  Onset quality:  Sudden  Duration:  2 days  Timing:  Constant  Chronicity:  New  Associated symptoms: fatigue    Associated symptoms: no headaches        Review of Systems   Constitutional: Positive for diaphoresis and fatigue.   HENT: Negative.    Eyes: Negative.    Respiratory: Negative.    Cardiovascular: Negative.    Gastrointestinal: Negative.    Genitourinary: Negative.    Musculoskeletal: Negative.    Skin: Negative.    Neurological: Positive for numbness. Negative for dizziness, syncope and headaches.       Past Medical History:   Diagnosis Date   • Chest pain     History of noncardiac chest pain   • Chronic depression    • Depressive disorder    • Diastolic dysfunction    • Dyspnea    • Dyspnea on exertion    • Edema    • Essential hypertension    • Exercise intolerance    • Fatigue    • GERD (gastroesophageal reflux disease)    • History of bone density study 2011    DEXA BONE DENSITY APPENDICULAR 17939 (1) - normal   • History of bone density study 06/24/2015    DXA BONE DENSITY AXIAL 57525 [facility] (1) - Ordered By: TOLU RAMIREZ ([facility])    • History of echocardiogram     Echocardiogram W/ color flow 24526 (2)   • History of mammogram 2013    Mammogram screen (1)   • History of mammogram 2015    MAMMOGRAM SCREENING 07453 - [facility] (1)   • History of screening mammography 06/25/2015    SCREENING MAMMOGRAPHY DIGITAL  (Medicare) (1) - Ordered By: ANDRADE BECERRIL ([facility])    • Hypercalcemia    • Hyperlipidemia    • Influenza vaccine administered 02/25/2016    INFLUENZA IMMUN ADMIN OR PREV RECV'D  (2) - Ordered By: ANDRADE  "JERRICA ([facility])    • Long-term (current) use of anticoagulants     coumadin therapy      • Lower extremity edema     Intermittent lower extremity edema   • Obesity, Class III, BMI 40-49.9 (morbid obesity) (CMS/[facility])     \"Class III obesity\"   • BEVERLY (obstructive sleep apnea)     Mild BEVERLY   • PE (pulmonary embolism) 10/2015    Bilateral PE diagnosed after a car ride to [place]   • Pneumococcal vaccination given 02/25/2016    PNEUMOC VAC/ADMIN/RCVD 4040F (2) - Ordered By: ANDRADE BECERRIL ([facility])    • Right basilic vein fibrosis 2015   • Sedentary lifestyle    • Shortness of breath    • Venous thrombosis 2015    right basilic venous thrombosis; This was noted in May 2015.   • Weight gain        Allergies   Allergen Reactions   • Sulfa Antibiotics Rash and Hives     Sulfa (Sulfonamide Antibiotics)       Past Surgical History:   Procedure Laterality Date   • CHOLECYSTECTOMY      Cholecystectomy (1)   • ENDOSCOPY  09/15/2011    Colon endoscopy 13265 (2) - Hemorrhoids found.   • GASTRIC SLEEVE LAPAROSCOPIC     • GASTRIC SLEEVE LAPAROSCOPIC     • HEMORRHOIDECTOMY     • HYSTEROSCOPY      Hysteroscopy; ablation (1)   • INJECTION OF MEDICATION  09/27/2012    Kenalog (1) - Ordered By: ANDRADE BECERRIL ([facility])        Family History   Problem Relation Age of Onset   • Ovarian cancer Mother    • Bleeding Disorder Father    • Other Father         Hematologic disorder   • Lung cancer Father    • Pancreatic cancer Sister    • Cancer Brother    • Lung cancer Brother    • Bleeding Disorder Other    • Hyperlipidemia Other    • Hypertension Other    • Osteoarthritis Other    • Other Other         Gastritis       Social History     Social History   • Marital status:      Social History Main Topics   • Smoking status: Never Smoker   • Smokeless tobacco: Never Used   • Alcohol use No   • Drug use: No     Other Topics Concern   • Not on file           Objective   Physical Exam   Constitutional: She is oriented to person, place, " and time. She appears well-developed and well-nourished.   HENT:   Head: Normocephalic and atraumatic.   Mouth/Throat: Oropharynx is clear and moist.   Eyes: Pupils are equal, round, and reactive to light.   Neck: Normal range of motion. Neck supple. No JVD present. No tracheal deviation present.   Cardiovascular: Normal rate, regular rhythm, normal heart sounds and intact distal pulses.  Exam reveals no gallop and no friction rub.    No murmur heard.  Pulmonary/Chest: Effort normal and breath sounds normal. She has no wheezes. She has no rales.   Abdominal: Soft. Bowel sounds are normal. She exhibits no mass. There is no tenderness. There is no rebound and no guarding.   Musculoskeletal: Normal range of motion.   Lymphadenopathy:     She has no cervical adenopathy.   Neurological: She is alert and oriented to person, place, and time. She has normal strength. No sensory deficit. Coordination normal. GCS eye subscore is 4. GCS verbal subscore is 5. GCS motor subscore is 6.   Skin: Skin is warm and dry.   Nursing note and vitals reviewed.      ECG 12 Lead    Date/Time: 9/6/2018 3:14 PM  Performed by: ALESIA MASON  Authorized by: ALESIA MASON   Interpreted by physician  Rhythm: sinus bradycardia  Rate: bradycardic  BPM: 59  QRS axis: normal  Conduction: conduction normal  ST Segments: ST segments normal  T Waves: T waves normal  Other findings: PRWP  Clinical impression: non-specific ECG                 ED Course  ED Course as of Sep 06 1753   Thu Sep 06, 2018   1751 Pt resting comfortably w/o physical complaints. Stable discharge.  [SD]      ED Course User Index  [SD] Alesia Mason MD      Labs Reviewed   COMPREHENSIVE METABOLIC PANEL - Abnormal; Notable for the following:        Result Value    AST (SGOT) 47 (*)     All other components within normal limits    Narrative:     The MDRD GFR formula is only valid for adults with stable renal function between ages 18 and 70.   URINALYSIS W/ MICROSCOPIC IF INDICATED  (NO CULTURE) - Abnormal; Notable for the following:     Appearance, UA Cloudy (*)     Bilirubin, UA Small (1+) (*)     Leuk Esterase, UA Moderate (2+) (*)     All other components within normal limits   CBC WITH AUTO DIFFERENTIAL - Abnormal; Notable for the following:     Hematocrit 34.9 (*)     Neutrophils, Absolute 1.78 (*)     All other components within normal limits   URINALYSIS, MICROSCOPIC ONLY - Abnormal; Notable for the following:     RBC, UA 0-2 (*)     WBC, UA 13-20 (*)     Squamous Epithelial Cells, UA 3-5 (*)     All other components within normal limits   D-DIMER, QUANTITATIVE - Abnormal; Notable for the following:     D-Dimer, Quantitative 644 (*)     All other components within normal limits    Narrative:     Dimer values <500 ng/ml FEU are FDA approved as aid in diagnosis of deep venous thrombosis and pulmonary embolism.  This test should not be used in an exclusion strategy with pretest probability alone.    A recent guideline regarding diagnosis for pulmonary thromboembolism recommends an adjusted exclusion criterion of age x 10 ng/ml FEU for patients >50 years of age ([place] Intern Med 2015; 163: 701-711).   TROPONIN (IN-HOUSE) - Normal   CK - Normal   CK MB - Normal   PROTIME-INR - Normal    Narrative:     Therapeutic range for most indications is 2.0-3.0 INR,  or 2.5-3.5 for mechanical heart valves.   APTT - Normal    Narrative:     The recommended Heparin therapeutic range is 68-97 seconds.   TROPONIN (IN-HOUSE)   CBC AND DIFFERENTIAL    Narrative:     The following orders were created for panel order CBC & Differential.  Procedure                               Abnormality         Status                     ---------                               -----------         ------                     CBC Auto Differential[[id number]]        Abnormal            Final result                 Please view results for these tests on the individual orders.   EXTRA TUBES    Narrative:     The following orders  were created for panel order Extra Tubes.  Procedure                               Abnormality         Status                     ---------                               -----------         ------                     [facility] - [facility][[id number]]                                   Final result                 Please view results for these tests on the individual orders.   GOLD \[facility] [place] [facility]\"" - [facility]     Xr Chest 2 View    Result Date: 9/6/2018  Narrative: Chest x-ray PA and lateral CLINICAL INDICATION: Shortness of breath. Weakness COMPARISON: December 3 , 2017. FINDINGS: Cardiac silhouette is normal in size. Pulmonary vascularity is unremarkable. No focal infiltrate or consolidation.  No pleural effusion.  No pneumothorax. Focal eventration medial aspect left diaphragm, unchanged since prior exam.     Impression: CONCLUSION: No evidence of active disease. No change since prior exam.  Electronically signed by:  Tank Mclaughlin MD  9/6/2018 2:45 PM CDT Workstation: [id number]    Dexa Bone Density Axial    Result Date: 8/14/2018  Narrative: PROCEDURE: DXA BONE DENSITY EXTREMITY, DXA BONE DENSITY EXTREMITY HISTORY: hyperparathyroidism, E21.3 Hyperparathyroidism, unspecified BMD: Bone mineral density. COMPARISON: 6/24/2015. Please note that an accurate comparison with the prior exam may not be possible due to dissimilar scan types or analysis methods. T-score: Represents the number of standard deviations (SD) that an individual is above or below the reference value for young normals. Z-score: Represents the amount of bone an individual has compared with other people in the same age group and gender. AP SPINE BMD (g/cm2): 1.019 T-score (SD vs young adult): -0.3 Z-score (SD vs age-matched): 1.9 Change versus previous: +1.7% MEAN FEMORAL NECKS BMD (g/cm2): 0.559 T-score (SD vs young adult): -2.6 Z-score (SD vs age-matched): -0.8 Change versus previous: -3.3% 1/3 left forearm BMD (g/cm2): 0.609 T-score (SD vs young adult): -1.4 Z-score (SD vs  age-matched): 0.7  Comment: World Health Organization (WHO) classifications: Normal: T-score at or above -1 SD Osteopenia: T-score between -1 and -2.5 SD Osteoporosis: T-score at or below -2.5 SD     Impression: CONCLUSION: The patient is considered osteoporotic according to World Health Organization criteria. Electronically signed by:  Johnny Lester MD  8/14/2018 4:25 PM CDT Workstation: [id number]    Dexa Bone Density Appendicular    Result Date: 8/14/2018  Narrative: PROCEDURE: DXA BONE DENSITY EXTREMITY, DXA BONE DENSITY EXTREMITY HISTORY: hyperparathyroidism, E21.3 Hyperparathyroidism, unspecified BMD: Bone mineral density. COMPARISON: 6/24/2015. Please note that an accurate comparison with the prior exam may not be possible due to dissimilar scan types or analysis methods. T-score: Represents the number of standard deviations (SD) that an individual is above or below the reference value for young normals. Z-score: Represents the amount of bone an individual has compared with other people in the same age group and gender. AP SPINE BMD (g/cm2): 1.019 T-score (SD vs young adult): -0.3 Z-score (SD vs age-matched): 1.9 Change versus previous: +1.7% MEAN FEMORAL NECKS BMD (g/cm2): 0.559 T-score (SD vs young adult): -2.6 Z-score (SD vs age-matched): -0.8 Change versus previous: -3.3% 1/3 left forearm BMD (g/cm2): 0.609 T-score (SD vs young adult): -1.4 Z-score (SD vs age-matched): 0.7  Comment: World Health Organization (WHO) classifications: Normal: T-score at or above -1 SD Osteopenia: T-score between -1 and -2.5 SD Osteoporosis: T-score at or below -2.5 SD     Impression: CONCLUSION: The patient is considered osteoporotic according to World Health Organization criteria. Electronically signed by:  Johnny Lester MD  8/14/2018 4:25 PM CDT Workstation: [id number]    Mammo Screening Digital Tomosynthesis Bilateral With Cad    Result Date: 8/15/2018  Narrative: MAMMOGRAM: Screening. HISTORY: Screening mammogram.   COMPARISON:   June 24, 2015.   FINDINGS: Bilateral low dose digital mammography was performed.  This study was interpreted with the assistance of CAD (computer aided diagnosis. 3-D Mammotomosynthesis was obtained. Parenchymal pattern: Scattered areas of fibronodular density.   There is no evidence of a newly appearing discrete mass lesion, area of architectural distortion, or abnormal microcalcifications to suspect the presence of malignancy. Stable benign mammographic exam.       Impression: CONCLUSION: 1.  No mammographic evidence of malignancy - no interval change. Follow-up recommendations:  annual mammographic surveillance. BIRADS: 2, Benign finding(s)  Electronically signed by:  Tank Mclaughlin MD  8/15/2018 8:15 AM CDT Workstation: [id number]                [facility]      Final diagnoses:   UTI (urinary tract infection), bacterial            Dewayne Loera MD  09/06/18 1751

## 2018-09-06 NOTE — DISCHARGE INSTRUCTIONS
Return ED fever, vomiting, flank pain, dehydration, chest pain, shortness of air, worse condition, other concerns

## 2018-09-07 ENCOUNTER — EPISODE CHANGES (OUTPATIENT)
Dept: CASE MANAGEMENT | Facility: OTHER | Age: 73
End: 2018-09-07

## 2018-09-10 ENCOUNTER — PATIENT OUTREACH (OUTPATIENT)
Dept: CASE MANAGEMENT | Facility: OTHER | Age: 73
End: 2018-09-10

## 2018-09-10 NOTE — OUTREACH NOTE
Care Management Plan 9/10/2018   Lifestyle Goals Medication management   Lifestyle Goals Patient stated she has finished her anti biotics given at the Emergency Department visit.   Self Management Other (See Comment)   Annual Wellness Visit:  Patient Has Completed   Annual Wellness Visit:  Patient stated she has completed.   Does patient have depression diagnosis? No   Advanced Directives: (No Data)   Advanced Directives: Patient stated she has information to complete   Ed Visits past 12 months: 1   Medication Adherence Medications understood   Goal Progress Goal(s) Met   Readiness Scale 9   Confidence Scale 9   Health Literacy Good     The main concerns and/or symptoms the patient would like to address are: patient voices no complaints at time of call, did state the Levaquin she has completed gave her stomach upset symptoms..    Education/instruction provided by Care Coordinator: Advanced care plan discussion, patient stated she has the information to complete.    Follow Up Outreach Due: none at this time    Zaida Rodgers RN

## 2018-09-24 RX ORDER — TRAZODONE HYDROCHLORIDE 100 MG/1
TABLET ORAL
Qty: 90 TABLET | Refills: 1 | Status: SHIPPED | OUTPATIENT
Start: 2018-09-24 | End: 2019-02-13 | Stop reason: SDUPTHER

## 2018-10-09 ENCOUNTER — EPISODE CHANGES (OUTPATIENT)
Dept: CASE MANAGEMENT | Facility: OTHER | Age: 73
End: 2018-10-09

## 2018-10-25 ENCOUNTER — APPOINTMENT (OUTPATIENT)
Dept: LAB | Facility: HOSPITAL | Age: 73
End: 2018-10-25

## 2018-10-25 ENCOUNTER — OFFICE VISIT (OUTPATIENT)
Dept: FAMILY MEDICINE CLINIC | Facility: CLINIC | Age: 73
End: 2018-10-25

## 2018-10-25 VITALS
BODY MASS INDEX: 33.14 KG/M2 | DIASTOLIC BLOOD PRESSURE: 60 MMHG | TEMPERATURE: 97.4 F | WEIGHT: 175.5 LBS | OXYGEN SATURATION: 99 % | HEIGHT: 61 IN | HEART RATE: 59 BPM | SYSTOLIC BLOOD PRESSURE: 100 MMHG | RESPIRATION RATE: 20 BRPM

## 2018-10-25 DIAGNOSIS — Z76.89 ENCOUNTER TO ESTABLISH CARE: Primary | ICD-10-CM

## 2018-10-25 DIAGNOSIS — Z00.00 ENCOUNTER FOR SCREENING AND PREVENTATIVE CARE: ICD-10-CM

## 2018-10-25 DIAGNOSIS — E53.8 VITAMIN B12 DEFICIENCY: ICD-10-CM

## 2018-10-25 DIAGNOSIS — E21.3 HYPERPARATHYROIDISM (HCC): ICD-10-CM

## 2018-10-25 DIAGNOSIS — F32.A DEPRESSION, UNSPECIFIED DEPRESSION TYPE: ICD-10-CM

## 2018-10-25 DIAGNOSIS — Z23 NEED FOR INFLUENZA VACCINATION: ICD-10-CM

## 2018-10-25 DIAGNOSIS — I10 ESSENTIAL HYPERTENSION: ICD-10-CM

## 2018-10-25 DIAGNOSIS — Z80.0 FAMILY HISTORY OF PANCREATIC CANCER: ICD-10-CM

## 2018-10-25 DIAGNOSIS — E78.5 HYPERLIPIDEMIA, UNSPECIFIED HYPERLIPIDEMIA TYPE: ICD-10-CM

## 2018-10-25 DIAGNOSIS — E55.9 VITAMIN D DEFICIENCY: ICD-10-CM

## 2018-10-25 DIAGNOSIS — Z98.84 HISTORY OF BARIATRIC SURGERY: ICD-10-CM

## 2018-10-25 DIAGNOSIS — E79.0 ELEVATED URIC ACID IN BLOOD: ICD-10-CM

## 2018-10-25 PROBLEM — N20.0 NEPHROLITHIASIS: Status: ACTIVE | Noted: 2018-09-04

## 2018-10-25 PROBLEM — M81.0 OSTEOPOROSIS WITHOUT CURRENT PATHOLOGICAL FRACTURE: Status: ACTIVE | Noted: 2018-09-04

## 2018-10-25 LAB
25(OH)D3 SERPL-MCNC: 48.3 NG/ML (ref 30–100)
ALBUMIN SERPL-MCNC: 3.9 G/DL (ref 3.4–4.8)
ALBUMIN/GLOB SERPL: 1.2 G/DL (ref 1.1–1.8)
ALP SERPL-CCNC: 124 U/L (ref 38–126)
ALT SERPL W P-5'-P-CCNC: 57 U/L (ref 9–52)
AMYLASE SERPL-CCNC: 101 U/L (ref 50–130)
ANION GAP SERPL CALCULATED.3IONS-SCNC: 4 MMOL/L (ref 5–15)
AST SERPL-CCNC: 73 U/L (ref 14–36)
BASOPHILS # BLD AUTO: 0.02 10*3/MM3 (ref 0–0.2)
BASOPHILS NFR BLD AUTO: 0.6 % (ref 0–2)
BILIRUB SERPL-MCNC: 0.4 MG/DL (ref 0.2–1.3)
BUN BLD-MCNC: 14 MG/DL (ref 7–21)
BUN/CREAT SERPL: 16.1 (ref 7–25)
CALCIUM SPEC-SCNC: 9.7 MG/DL (ref 8.4–10.2)
CHLORIDE SERPL-SCNC: 100 MMOL/L (ref 95–110)
CO2 SERPL-SCNC: 31 MMOL/L (ref 22–31)
CREAT BLD-MCNC: 0.87 MG/DL (ref 0.5–1)
DEPRECATED RDW RBC AUTO: 42.4 FL (ref 36.4–46.3)
EOSINOPHIL # BLD AUTO: 0.04 10*3/MM3 (ref 0–0.7)
EOSINOPHIL NFR BLD AUTO: 1.2 % (ref 0–7)
ERYTHROCYTE [DISTWIDTH] IN BLOOD BY AUTOMATED COUNT: 12.7 % (ref 11.5–14.5)
GFR SERPL CREATININE-BSD FRML MDRD: 64 ML/MIN/1.73 (ref 39–90)
GLOBULIN UR ELPH-MCNC: 3.2 GM/DL (ref 2.3–3.5)
GLUCOSE BLD-MCNC: 76 MG/DL (ref 60–100)
HCT VFR BLD AUTO: 35.5 % (ref 35–45)
HGB BLD-MCNC: 11.9 G/DL (ref 12–15.5)
IMM GRANULOCYTES # BLD: 0.01 10*3/MM3 (ref 0–0.02)
IMM GRANULOCYTES NFR BLD: 0.3 % (ref 0–0.5)
LIPASE SERPL-CCNC: 175 U/L (ref 23–300)
LYMPHOCYTES # BLD AUTO: 1.39 10*3/MM3 (ref 0.6–4.2)
LYMPHOCYTES NFR BLD AUTO: 40.6 % (ref 10–50)
MCH RBC QN AUTO: 30.3 PG (ref 26.5–34)
MCHC RBC AUTO-ENTMCNC: 33.5 G/DL (ref 31.4–36)
MCV RBC AUTO: 90.3 FL (ref 80–98)
MONOCYTES # BLD AUTO: 0.36 10*3/MM3 (ref 0–0.9)
MONOCYTES NFR BLD AUTO: 10.5 % (ref 0–12)
NEUTROPHILS # BLD AUTO: 1.6 10*3/MM3 (ref 2–8.6)
NEUTROPHILS NFR BLD AUTO: 46.8 % (ref 37–80)
PLATELET # BLD AUTO: 277 10*3/MM3 (ref 150–450)
PMV BLD AUTO: 9.7 FL (ref 8–12)
POTASSIUM BLD-SCNC: 3.9 MMOL/L (ref 3.5–5.1)
PROT SERPL-MCNC: 7.1 G/DL (ref 6.3–8.6)
RBC # BLD AUTO: 3.93 10*6/MM3 (ref 3.77–5.16)
SODIUM BLD-SCNC: 135 MMOL/L (ref 137–145)
TSH SERPL DL<=0.05 MIU/L-ACNC: 1.94 MIU/ML (ref 0.46–4.68)
URATE SERPL-MCNC: 6.1 MG/DL (ref 2.5–8.5)
VIT B12 BLD-MCNC: 897 PG/ML (ref 239–931)
WBC NRBC COR # BLD: 3.42 10*3/MM3 (ref 3.2–9.8)

## 2018-10-25 PROCEDURE — 80053 COMPREHEN METABOLIC PANEL: CPT | Performed by: NURSE PRACTITIONER

## 2018-10-25 PROCEDURE — 82150 ASSAY OF AMYLASE: CPT | Performed by: NURSE PRACTITIONER

## 2018-10-25 PROCEDURE — G0008 ADMIN INFLUENZA VIRUS VAC: HCPCS | Performed by: NURSE PRACTITIONER

## 2018-10-25 PROCEDURE — 85025 COMPLETE CBC W/AUTO DIFF WBC: CPT | Performed by: NURSE PRACTITIONER

## 2018-10-25 PROCEDURE — 82306 VITAMIN D 25 HYDROXY: CPT | Performed by: NURSE PRACTITIONER

## 2018-10-25 PROCEDURE — 84443 ASSAY THYROID STIM HORMONE: CPT | Performed by: NURSE PRACTITIONER

## 2018-10-25 PROCEDURE — 36415 COLL VENOUS BLD VENIPUNCTURE: CPT | Performed by: NURSE PRACTITIONER

## 2018-10-25 PROCEDURE — 83690 ASSAY OF LIPASE: CPT | Performed by: NURSE PRACTITIONER

## 2018-10-25 PROCEDURE — 99214 OFFICE O/P EST MOD 30 MIN: CPT | Performed by: NURSE PRACTITIONER

## 2018-10-25 PROCEDURE — 90662 IIV NO PRSV INCREASED AG IM: CPT | Performed by: NURSE PRACTITIONER

## 2018-10-25 PROCEDURE — 84550 ASSAY OF BLOOD/URIC ACID: CPT | Performed by: NURSE PRACTITIONER

## 2018-10-25 PROCEDURE — 82607 VITAMIN B-12: CPT | Performed by: NURSE PRACTITIONER

## 2018-10-25 RX ORDER — SIMVASTATIN 20 MG
20 TABLET ORAL EVERY OTHER DAY
Qty: 90 TABLET | Refills: 3 | Status: SHIPPED | OUTPATIENT
Start: 2018-10-25 | End: 2019-01-24 | Stop reason: HOSPADM

## 2018-10-25 RX ORDER — ESCITALOPRAM OXALATE 10 MG/1
10 TABLET ORAL DAILY
Qty: 90 TABLET | Refills: 3 | Status: SHIPPED | OUTPATIENT
Start: 2018-10-25 | End: 2019-01-24 | Stop reason: HOSPADM

## 2018-10-25 NOTE — PROGRESS NOTES
Subjective   Veronica Wilkinson is a 73 y.o. female.     Ms. Wilkinson is a 73 year old female who presents today to establish care for the management of HTN, hyperlipidemia, vitamin d and b12 deficiency, depression, insomnia, GERD. Patient sees endocrinologist for the management of hyperparathyroidism.         The following portions of the patient's history were reviewed and updated as appropriate: allergies, current medications, past family history, past medical history, past social history, past surgical history and problem list.    Review of Systems   Constitutional: Negative for activity change, appetite change, chills, diaphoresis, fatigue, fever, unexpected weight gain and unexpected weight loss.   HENT: Negative for congestion, sore throat, trouble swallowing and voice change.    Eyes: Negative for blurred vision, double vision, photophobia, pain and visual disturbance.   Respiratory: Negative for apnea, cough, shortness of breath and wheezing.    Cardiovascular: Negative for chest pain, palpitations and leg swelling.   Gastrointestinal: Negative for abdominal distention, abdominal pain, anal bleeding, blood in stool, constipation, diarrhea, nausea, vomiting, GERD and indigestion.   Endocrine: Negative for cold intolerance, heat intolerance, polydipsia, polyphagia and polyuria.   Genitourinary: Negative for dysuria, frequency, hematuria and urgency.   Musculoskeletal: Negative for arthralgias and myalgias.   Skin: Negative for rash.   Allergic/Immunologic: Negative.    Neurological: Negative for dizziness, syncope, weakness, light-headedness and headache.   Hematological: Negative.    Psychiatric/Behavioral: The patient is not nervous/anxious.        Objective   Physical Exam   Constitutional: She is oriented to person, place, and time. She appears well-developed and well-nourished. No distress.   HENT:   Head: Normocephalic and atraumatic.   Right Ear: External ear normal.   Left Ear: External ear normal.   Nose:  Nose normal.   Mouth/Throat: Oropharynx is clear and moist.   Eyes: Pupils are equal, round, and reactive to light. Conjunctivae and EOM are normal.   Neck: Normal range of motion. Neck supple. No tracheal deviation present. No thyromegaly present.   Cardiovascular: Normal rate, regular rhythm, normal heart sounds and intact distal pulses.  Exam reveals no gallop and no friction rub.    No murmur heard.  Pulmonary/Chest: Effort normal and breath sounds normal. No stridor. No respiratory distress. She has no wheezes. She has no rales.   Abdominal: Soft. Bowel sounds are normal. She exhibits no distension and no mass. There is no tenderness. There is no rebound and no guarding. No hernia.   Musculoskeletal: Normal range of motion. She exhibits no edema or tenderness.   Lymphadenopathy:     She has no cervical adenopathy.   Neurological: She is alert and oriented to person, place, and time. No cranial nerve deficit. Coordination normal.   Skin: Skin is warm and dry. No rash noted. She is not diaphoretic. No erythema. No pallor.   Psychiatric: She has a normal mood and affect. Her behavior is normal. Judgment and thought content normal.   Nursing note and vitals reviewed.        Assessment/Plan   Veronica was seen today for establish care.    Diagnoses and all orders for this visit:    Encounter to establish care    Encounter for screening and preventative care  -     Vitamin D 25 Hydroxy  -     Vitamin B12  -     TSH  -     CBC & Differential  -     Comprehensive Metabolic Panel  -     Lipase  -     Amylase  -     CBC Auto Differential    Hyperlipidemia, unspecified hyperlipidemia type    Essential hypertension    Vitamin D deficiency    Hyperparathyroidism (CMS/HCC)  -     Vitamin D 25 Hydroxy    Vitamin B12 deficiency    History of bariatric surgery    Hyperparathyroidism (CMS/HCC)   -     Vitamin D 25 Hydroxy    Elevated uric acid in blood  -     Uric Acid    Need for influenza vaccination  -     Flu Vaccine High  Dose PF 65YR+ ([id number])    Depression, unspecified depression type    Family history of pancreatic cancer    Other orders  -     simvastatin (ZOCOR) 20 MG tablet; Take 1 tablet by mouth Every Other Day for 90 days.  -     escitalopram (LEXAPRO) 10 MG tablet; Take 1 tablet by mouth Daily for 90 days.    1. HTN- controlled. No change in therapy at this time.    2. Hyperlipidemia-controlled. Refill zocor 20 mg po daily.     3. Hyperparathyroidism-continue follow up with endocrinology.    4. Vitamin B12 deficiency-vitamin b12 level. Will call with results.     5. Vitamin d deficiency-vitamin d level. Will call with results.     6. Depression-stable. Refill lexapro 10 mg po daily.     7. Elevated uric acid-uric acid level. Will call with results.     8. Family history of pancreatic cancer- lipase and amylase. Will call with results.     9. Follow up in 3 months or sooner if needed.             This document has been electronically signed by TABBY Mejia on October 25, 2018 8:18 PM

## 2018-10-26 ENCOUNTER — TELEPHONE (OUTPATIENT)
Dept: FAMILY MEDICINE CLINIC | Facility: CLINIC | Age: 73
End: 2018-10-26

## 2018-10-26 NOTE — TELEPHONE ENCOUNTER
Per TABBY Aquino, Veronica Wilkinson was called regarding recent lab results. No answer; A message was left to return call.  A result card mailed.          ----- Message from TABBY Mejia sent at 10/25/2018  9:45 PM CDT -----  Liver enzymes elevated. I will refer to GI unless she is already established with a GI.

## 2018-10-29 DIAGNOSIS — R74.8 ELEVATED LIVER ENZYMES: Primary | ICD-10-CM

## 2018-11-16 ENCOUNTER — OFFICE VISIT (OUTPATIENT)
Dept: GASTROENTEROLOGY | Facility: CLINIC | Age: 73
End: 2018-11-16

## 2018-11-16 VITALS
HEIGHT: 62 IN | WEIGHT: 177.2 LBS | HEART RATE: 44 BPM | BODY MASS INDEX: 32.61 KG/M2 | SYSTOLIC BLOOD PRESSURE: 110 MMHG | DIASTOLIC BLOOD PRESSURE: 60 MMHG

## 2018-11-16 DIAGNOSIS — R74.8 ELEVATED LIVER ENZYMES: Primary | ICD-10-CM

## 2018-11-16 DIAGNOSIS — Z11.59 ENCOUNTER FOR SCREENING FOR OTHER VIRAL DISEASES: ICD-10-CM

## 2018-11-16 PROCEDURE — 99203 OFFICE O/P NEW LOW 30 MIN: CPT | Performed by: INTERNAL MEDICINE

## 2018-11-16 RX ORDER — CHOLECALCIFEROL (VITAMIN D3) 125 MCG
1 CAPSULE ORAL DAILY
COMMUNITY
End: 2022-06-30

## 2018-11-27 ENCOUNTER — LAB (OUTPATIENT)
Dept: ONCOLOGY | Facility: HOSPITAL | Age: 73
End: 2018-11-27

## 2018-11-27 ENCOUNTER — OFFICE VISIT (OUTPATIENT)
Dept: ONCOLOGY | Facility: CLINIC | Age: 73
End: 2018-11-27

## 2018-11-27 VITALS
WEIGHT: 178.2 LBS | HEIGHT: 61 IN | OXYGEN SATURATION: 98 % | DIASTOLIC BLOOD PRESSURE: 61 MMHG | BODY MASS INDEX: 33.64 KG/M2 | HEART RATE: 48 BPM | RESPIRATION RATE: 18 BRPM | SYSTOLIC BLOOD PRESSURE: 106 MMHG

## 2018-11-27 DIAGNOSIS — R23.3 EASY BRUISING: ICD-10-CM

## 2018-11-27 DIAGNOSIS — Z86.711 HISTORY OF PULMONARY EMBOLISM: ICD-10-CM

## 2018-11-27 DIAGNOSIS — D64.9 ANEMIA, UNSPECIFIED TYPE: ICD-10-CM

## 2018-11-27 DIAGNOSIS — D64.9 ANEMIA, UNSPECIFIED TYPE: Primary | ICD-10-CM

## 2018-11-27 DIAGNOSIS — D70.8 OTHER NEUTROPENIA (HCC): ICD-10-CM

## 2018-11-27 LAB
BASOPHILS # BLD AUTO: 0.02 10*3/MM3 (ref 0–0.2)
BASOPHILS NFR BLD AUTO: 0.6 % (ref 0–2)
DEPRECATED RDW RBC AUTO: 42 FL (ref 36.4–46.3)
EOSINOPHIL # BLD AUTO: 0.04 10*3/MM3 (ref 0–0.7)
EOSINOPHIL NFR BLD AUTO: 1.1 % (ref 0–7)
ERYTHROCYTE [DISTWIDTH] IN BLOOD BY AUTOMATED COUNT: 12.8 % (ref 11.5–14.5)
FERRITIN SERPL-MCNC: 96 NG/ML (ref 11.1–264)
FOLATE SERPL-MCNC: >20 NG/ML (ref 2.76–21)
HCT VFR BLD AUTO: 32.7 % (ref 35–45)
HGB BLD-MCNC: 11.4 G/DL (ref 12–15.5)
IMM GRANULOCYTES # BLD: 0.01 10*3/MM3 (ref 0–0.02)
IMM GRANULOCYTES NFR BLD: 0.3 % (ref 0–0.5)
IRON 24H UR-MRATE: 93 MCG/DL (ref 37–170)
IRON SATN MFR SERPL: 35 % (ref 15–50)
LYMPHOCYTES # BLD AUTO: 1.34 10*3/MM3 (ref 0.6–4.2)
LYMPHOCYTES NFR BLD AUTO: 38.3 % (ref 10–50)
MCH RBC QN AUTO: 31.3 PG (ref 26.5–34)
MCHC RBC AUTO-ENTMCNC: 34.9 G/DL (ref 31.4–36)
MCV RBC AUTO: 89.8 FL (ref 80–98)
MONOCYTES # BLD AUTO: 0.47 10*3/MM3 (ref 0–0.9)
MONOCYTES NFR BLD AUTO: 13.4 % (ref 0–12)
NEUTROPHILS # BLD AUTO: 1.62 10*3/MM3 (ref 2–8.6)
NEUTROPHILS NFR BLD AUTO: 46.3 % (ref 37–80)
PLATELET # BLD AUTO: 222 10*3/MM3 (ref 150–450)
PMV BLD AUTO: 9.5 FL (ref 8–12)
RBC # BLD AUTO: 3.64 10*6/MM3 (ref 3.77–5.16)
TIBC SERPL-MCNC: 268 MCG/DL (ref 265–497)
VIT B12 BLD-MCNC: 810 PG/ML (ref 239–931)
WBC NRBC COR # BLD: 3.5 10*3/MM3 (ref 3.2–9.8)

## 2018-11-27 PROCEDURE — 82607 VITAMIN B-12: CPT

## 2018-11-27 PROCEDURE — 85025 COMPLETE CBC W/AUTO DIFF WBC: CPT

## 2018-11-27 PROCEDURE — 99214 OFFICE O/P EST MOD 30 MIN: CPT | Performed by: INTERNAL MEDICINE

## 2018-11-27 PROCEDURE — 83540 ASSAY OF IRON: CPT

## 2018-11-27 PROCEDURE — G0463 HOSPITAL OUTPT CLINIC VISIT: HCPCS | Performed by: INTERNAL MEDICINE

## 2018-11-27 PROCEDURE — 83550 IRON BINDING TEST: CPT

## 2018-11-27 PROCEDURE — 82746 ASSAY OF FOLIC ACID SERUM: CPT

## 2018-11-27 PROCEDURE — 82728 ASSAY OF FERRITIN: CPT

## 2018-11-27 NOTE — PROGRESS NOTES
DATE OF VISIT: 11/27/2018      REASON FOR VISIT:   h/o pulmonary embolism ,anemia ,neutropenia      HISTORY OF PRESENT ILLNESS:   73-year-old female with a past medical history consisting of history of pulmonary embolism diagnosed in 2016 status post anticoagulation with Coumadin for one year, history of osteoporosis, history of depression, history of gastric sleeve surgery done in 2016 after that she has lost 120 pounds was initially seen in consultation on August 23, 2018 for evaluation of easy bruising.  Patient is here for 3 month follow-up appointment today.  States she was found to have elevated liver enzymes recently and has been following up with gastroenterology clinic for same.  Denies any bleeding denies any repeated infection.  Denies any new shortness of breath or chest pain.  Denies any new swelling of lower extremity.    PAST MEDICAL HISTORY:    Past Medical History:   Diagnosis Date   • Bleeding tendency (CMS/HCC)    • Chest pain     History of noncardiac chest pain   • Chronic depression    • Depressive disorder    • Diastolic dysfunction    • Dyspnea    • Dyspnea on exertion    • Edema    • Essential hypertension    • Exercise intolerance    • Fatigue    • Gallstones    • GERD (gastroesophageal reflux disease)    • History of bone density study 2011    DEXA BONE DENSITY APPENDICULAR 38480 (1) - normal   • History of bone density study 06/24/2015    DXA BONE DENSITY AXIAL 23063 [facility] (1) - Ordered By: TOLU RAMIREZ ([facility])    • History of echocardiogram     Echocardiogram W/ color flow 58497 (2)   • History of mammogram 2013    Mammogram screen (1)   • History of mammogram 2015    MAMMOGRAM SCREENING 53307 - [facility] (1)   • History of screening mammography 06/25/2015    SCREENING MAMMOGRAPHY DIGITAL  (Medicare) (1) - Ordered By: ANDRADE BECERRIL ([facility])    • Hypercalcemia    • Hyperlipidemia    • Influenza vaccine administered 02/25/2016    INFLUENZA IMMUN ADMIN OR PREV RECV'D  " (2) - Ordered By: ANDRADE BECERRIL ([facility])    • Long-term (current) use of anticoagulants     coumadin therapy      • Lower extremity edema     Intermittent lower extremity edema   • Obesity, Class III, BMI 40-49.9 (morbid obesity) (CMS/[facility])     \"Class III obesity\"   • BEVERLY (obstructive sleep apnea)     Mild BEVERLY   • PE (pulmonary embolism) 10/2015    Bilateral PE diagnosed after a car ride to [place]   • Pneumococcal vaccination given 02/25/2016    PNEUMOC VAC/ADMIN/RCVD 4040F (2) - Ordered By: ANDRADE BECERRIL ([facility])    • Right basilic vein fibrosis 2015   • Sedentary lifestyle    • Shortness of breath    • Venous thrombosis 2015    right basilic venous thrombosis; This was noted in May 2015.   • Weight gain        SOCIAL HISTORY:    Social History     Tobacco Use   • Smoking status: Never Smoker   • Smokeless tobacco: Never Used   Substance Use Topics   • Alcohol use: No   • Drug use: No       Surgical History :  Past Surgical History:   Procedure Laterality Date   • CHOLECYSTECTOMY      Cholecystectomy (1)   • COLONOSCOPY      Approximately 5 years prior as stated per patient   • CRYOABLATION     • ENDOSCOPY  09/15/2011    Colon endoscopy 15066 (2) - Hemorrhoids found.   • GASTRIC SLEEVE LAPAROSCOPIC     • HEMORRHOIDECTOMY     • HYSTEROSCOPY      Hysteroscopy; ablation (1)   • INJECTION OF MEDICATION  09/27/2012    Kenalog (1) - Ordered By: ANDRADE BECERRIL ([facility])        ALLERGIES:    Allergies   Allergen Reactions   • Sulfa Antibiotics Rash and Hives     Sulfa (Sulfonamide Antibiotics)         FAMILY HISTORY:  Family History   Problem Relation Age of Onset   • Ovarian cancer Mother    • Bleeding Disorder Father    • Other Father         Hematologic disorder   • Lung cancer Father    • Pancreatic cancer Sister    • Cancer Brother    • Lung cancer Brother    • Bleeding Disorder Other    • Hyperlipidemia Other    • Hypertension Other    • Osteoarthritis Other    • Other Other         Gastritis   • Adrenal " "disorder Daughter    • Seizures Daughter    • Thyroid disease Daughter    • No Known Problems Daughter    • Diabetes Other    • Heart disease Other    • Other Other         Ulcers; Bleeding Tendencies; Allergy   • Cancer Other    • Cholelithiasis Other    • Hypertension Other    • Allergy (severe) Other            REVIEW OF SYSTEMS:      CONSTITUTIONAL:  Complains of fatigue.  Lost close to 100 pound a plastic sleeve surgery done in 2016.  Denies any fever, chills .      EYES: No visual disturbances. No discharge. No new lesions     ENMT:  No epistaxis, mouth sores or difficulty swallowing.     RESPIRATORY:  No new shortness of breath. No new cough or hemoptysis.     CARDIOVASCULAR:  No chest pain or palpitations.     GASTROINTESTINAL:  No abdominal pain nausea, vomiting or blood in the stool.     GENITOURINARY: No Dysuria or Hematuria.     MUSCULOSKELETAL:  Positive for joint pains due to arthritis.     LYMPHATICS:  Denies any abnormal swollen glands anywhere in the body.     NEUROLOGICAL : Complains of intermittent tingling and numbness affecting bilateral hands. No headache or dizziness. No seizures or balance problems.     SKIN: Complains of easy bruising.          PHYSICAL EXAMINATION:      VITAL SIGNS:  /61   Pulse (!) 48   Resp 18   Ht 156.2 cm (61.5\")   Wt 80.8 kg (178 lb 3.2 oz)   LMP  (LMP Unknown)   SpO2 98%   BMI 33.13 kg/m²       11/27/18  1138   Weight: 80.8 kg (178 lb 3.2 oz)     Manual pulse rate is 60    ECOG performance status: 2     CONSTITUTIONAL:  Not in any distress.     EYES: Mild conjunctival Pallor. No Icterus. No Pterygium. Extraocular Movements intact.No ptosis.     ENMT:  Normocephalic, Atraumatic.No Facial Asymmetry noted.     NECK:  No adenopathy.Trachea midline. NO JVD.     RESPIRATORY:  Fair air entry bilateral. No rhonchi or wheezing.Fair respiratory effort.     CARDIOVASCULAR:  S1, S2. Regular rate and rhythm. No murmur or gallop appreciated.     ABDOMEN:  Soft, obese, " nontender. Bowel sounds present in all four quadrants.  No Hepatosplenomegaly appreciated.     MUSCULOSKELETAL:  No edema.No Calf Tenderness.Decreased range of motion.     NEUROLOGIC:    No  Motor  deficit appreciated. Cranial Nerves 2-12 grossly intact.     SKIN : Bruising present on bilateral forearms, bilateral lower extremity and right side of face.     LYMPHATICS: No new enlarged lymph nodes in neck or supraclavicular area.     PSYCHIATRY: Alert, awake and oriented ×3.          DIAGNOSTIC DATA:    Glucose   Date Value Ref Range Status   10/25/2018 76 60 - 100 mg/dL Final     Sodium   Date Value Ref Range Status   10/25/2018 135 (L) 137 - 145 mmol/L Final     Potassium   Date Value Ref Range Status   10/25/2018 3.9 3.5 - 5.1 mmol/L Final     CO2   Date Value Ref Range Status   10/25/2018 31.0 22.0 - 31.0 mmol/L Final     Chloride   Date Value Ref Range Status   10/25/2018 100 95 - 110 mmol/L Final     Anion Gap   Date Value Ref Range Status   10/25/2018 4.0 (L) 5.0 - 15.0 mmol/L Final     Creatinine   Date Value Ref Range Status   10/25/2018 0.87 0.50 - 1.00 mg/dL Final     BUN   Date Value Ref Range Status   10/25/2018 14 7 - 21 mg/dL Final     BUN/Creatinine Ratio   Date Value Ref Range Status   10/25/2018 16.1 7.0 - 25.0 Final     Calcium   Date Value Ref Range Status   10/25/2018 9.7 8.4 - 10.2 mg/dL Final     eGFR Non  Amer   Date Value Ref Range Status   10/25/2018 64 39 - 90 mL/min/1.73 Final     Alkaline Phosphatase   Date Value Ref Range Status   10/25/2018 124 38 - 126 U/L Final     Total Protein   Date Value Ref Range Status   10/25/2018 7.1 6.3 - 8.6 g/dL Final     ALT (SGPT)   Date Value Ref Range Status   10/25/2018 57 (H) 9 - 52 U/L Final     AST (SGOT)   Date Value Ref Range Status   10/25/2018 73 (H) 14 - 36 U/L Final     Total Bilirubin   Date Value Ref Range Status   10/25/2018 0.4 0.2 - 1.3 mg/dL Final     Albumin   Date Value Ref Range Status   10/25/2018 3.90 3.40 - 4.80 g/dL Final      Globulin   Date Value Ref Range Status   10/25/2018 3.2 2.3 - 3.5 gm/dL Final     Lab Results   Component Value Date    WBC 3.50 11/27/2018    HGB 11.4 (L) 11/27/2018    HCT 32.7 (L) 11/27/2018    MCV 89.8 11/27/2018     11/27/2018     Lab Results   Component Value Date    NEUTROABS 1.62 (L) 11/27/2018    IRON 93 11/27/2018    TIBC 268 11/27/2018    LABIRON 35 11/27/2018    FERRITIN 96.00 11/27/2018    [id number] 810 11/27/2018    FOLATE >20.00 11/27/2018     No results found for: , LABCA2, AFPTM, HCGQUANT, , CHROMGRNA, [id number], CEA, REFLABREPO      Component aPTT   Latest Ref Rng & Units 20.0 - 40.3 seconds   6/8/2015 34.3   2/22/2017 36.0   8/9/2018 41.7 (H)   8/23/2018 38.9       Component      Latest Ref Rng & Units 12/2/2016 2/22/2017 8/9/2018 8/23/2018   Protime      11.1 - 15.3 Seconds  13.8 13.9 13.6   INR      0.80 - 1.20 2.90 (A) 1.06 1.09 1.06           Von Willebrand Panel    Ref Range & Units 8d ago   Factor VIII Activity 57 - 163 % 231     Comment: FVIII activity can increase in a variety of clinical situations   including normal pregnancy, in samples drawn from patients   (particularly children) who are visibly stressed at the time of   phlebotomy, as acute phase reactants, or in response to certain drug   therapies such as DDAVP.  Persistently elevated FVIII activity is a   risk factor for venous thrombosis as well as recurrence of venous   thrombosis.  Risk is graded and increases with the degree of   elevation.  Although elevated FVIII activity has been identified to   cluster within families, a genetic basis for the elevation has not   yet been elucidated (Br J Haematol. 2012; 157:653-663).   Von Willebrand Ag 50 - 200 % 244     Comment: This test was developed and its performance characteristics   determined by [facility]. It has not been cleared or approved   by the Food and Drug Administration.   VWF may elevate in normal pregnancy, in samples drawn from patients    (particularly children) who are visibly stressed at the time of   phlebotomy, as acute phase reactants, or in response to certain drug   therapies such as DDAVP.  These and other situations may increase   levels compared to baseline values.  For these reasons, it may be   necessary to repeat testing to make a diagnosis of VWD.   VWF Activity 50 - 200 % 195    Resulting Agency  [facility]   Narrative     Performed at:  01 - [facility] [address]  [id number]  : Johnny Urbina MD, Phone:  [phone]      Specimen Collected: 08/23/18 14:14 Last Resulted: 08/24/18 16:11                       Coag Studies Interp Report   Order: [id number] - Reflex for Order [id number]   Status:  Final result   Visible to patient:  Yes (MyChart) Dx:  Easy bruising   Component 8d ago   von Willebrand Interp Note    Comment: -------------------------------   COAGULATION:   VON WILLEBRAND FACTOR ASSESSMENT CURRENT RESULTS ASSESSMENT   The VWF:Ag is elevated. The VWF:RCo is normal. The FVIII is   elevated.   VON WILLEBRAND FACTOR ASSESSMENT CURRENT RESULTS   INTERPRETATION   -   These results are not consistent with a diagnosis of VWD   according to the current NHLBI guideline.                 ASSESSMENT AND PLAN:       1.  Mild neutropenia:  -Patient's white blood cell count is normal at 3.4 today with absolute neutrophil count of 1640.  -Patient has chronic intermittent neutropenia with neutrophil count ranging between 1500 and normal since 2014.  -Anemia workup done earlier today does not show any evidence of nutritional deficiency.  -We will ask patient to return to clinic in 3 months, if she has persistent neutropenia she will need a bone marrow biopsy to rule out any underlying bone marrow pathology like myelodysplasia which was discussed with patient.    2.  Easy bruising:  -Patient is complaining of easy bruising.  Workup consisting of PT, PTT, INR, von Willebrand profile done on August 23, 2018  is normal.  -Patient is on medication like Lexapro, trazodone and hydrochlorothiazide that can contribute to qualitative platelet dysfunction and easy bruising.  -Patient was offered to be referred to coagulation clinic at [place] for further evaluation regarding possible qualitative platelet dysfunction.  Patient is not interested in going to [place] clinic for further evaluation at this point.     3.  Mild anemia: Most likely anemia of chronic disease.  -Most recent hemoglobin is 11.4.  Iron studies , Vitamin B12 and folate level drawn today is normal.   -Due to neutropenia and anemia, option of bone marrow biopsy was discussed.  At this point we will continue with clinical monitoring.  Will ask patient to return to clinic in 3 months, if she is any worsening anemia or neutropenia she will need bone marrow biopsy which was discussed with patient.      4. H/o Pulmonary embolism  -Patient had provoked pulmonary embolism around 2015 for which patient took Coumadin for one year.  Denies any new shortness of breath or chest pain.  Patient had car ride to [place] right prior to episode of pulmonary embolism.     5.Health  maintainence:  -Patient does not smoke.  Had a colonoscopy around 2011.           Mihai Hunt MD  11/27/2018  4:18 PM        EMR Dragon/Transcription disclaimer:   Much of this encounter note is an electronic transcription/translation of spoken language to printed text. The electronic translation of spoken language may permit erroneous, or at times, nonsensical words or phrases to be inadvertently transcribed; Although I have reviewed the note for such errors, some may still exist.

## 2018-12-03 NOTE — PROGRESS NOTES
[facility]      Chief Complaint:   Chief Complaint   Patient presents with   • Elevated Hepatic Enzymes       Subjective     HPI:   Elevated liver enzyme.  ALT 57. AST 73  Patient denies past history of liver disease. No family history of liver disease. Patient says the elevated liver enzyme is due to Simvastatin that she takes for hyperlipidemia and that the dose of the Simvastatin has been decreased.    Weight loss.  Weight loss over 2 years from 298 pounds to 177 pounds because of Bariatric surgery for weight loss, S/P Gastric Sleeve surgery 2 years ago by Dr. Finney at [facility].    Anemia.  History of Vitamin b 12 deficiency.  He reports h/o anemia 5 - 6 years ago due to blood loss from hemorrhoids, and also anemia 2 years ago due to blod loss post op Bariatric surgery.    Patient reports last colonoscopy 5 - 6 years ago was normal.    PLAN:  Ultrasound abdomen.  Blood test to evaluate elevated liver enzymes and rule out other possible etiologies.  HAV IgM, HAV Antibody Total, HBV IgM, HBV total antibody, HBV surface Ag, HBV surface Ab, HCV antibody serologies, KIERA, AMA, Anti SMA, A1AT, ceruloplasmin, iron studies, ferritin.    Past History:   Past Medical History:   Diagnosis Date   • Bleeding tendency (CMS/HCC)    • Chest pain     History of noncardiac chest pain   • Chronic depression    • Depressive disorder    • Diastolic dysfunction    • Dyspnea    • Dyspnea on exertion    • Edema    • Essential hypertension    • Exercise intolerance    • Fatigue    • Gallstones    • GERD (gastroesophageal reflux disease)    • History of bone density study 2011    DEXA BONE DENSITY APPENDICULAR 66949 (1) - normal   • History of bone density study 06/24/2015    DXA BONE DENSITY AXIAL 86473 [facility] (1) - Ordered By: TOLU RAMIREZ ([facility])    • History of echocardiogram     Echocardiogram W/ color flow 88837 (2)   • History of mammogram 2013    Mammogram screen (1)   • History of mammogram  "2015    MAMMOGRAM SCREENING 77174 - [facility] (1)   • History of screening mammography 06/25/2015    SCREENING MAMMOGRAPHY DIGITAL  (Medicare) (1) - Ordered By: ANDRADE BECERRIL ([facility])    • Hypercalcemia    • Hyperlipidemia    • Influenza vaccine administered 02/25/2016    INFLUENZA IMMUN ADMIN OR PREV RECV'D  (2) - Ordered By: ANDRADE BECERRIL ([facility])    • Long-term (current) use of anticoagulants     coumadin therapy      • Lower extremity edema     Intermittent lower extremity edema   • Obesity, Class III, BMI 40-49.9 (morbid obesity) (CMS/[facility])     \"Class III obesity\"   • BEVERLY (obstructive sleep apnea)     Mild BEVERLY   • PE (pulmonary embolism) 10/2015    Bilateral PE diagnosed after a car ride to [place]   • Pneumococcal vaccination given 02/25/2016    PNEUMOC VAC/ADMIN/RCVD 4040F (2) - Ordered By: ANDRADE BECERRIL ([facility])    • Right basilic vein fibrosis 2015   • Sedentary lifestyle    • Shortness of breath    • Venous thrombosis 2015    right basilic venous thrombosis; This was noted in May 2015.   • Weight gain      Past Surgical History:   Procedure Laterality Date   • CHOLECYSTECTOMY      Cholecystectomy (1)   • COLONOSCOPY      Approximately 5 years prior as stated per patient   • CRYOABLATION     • ENDOSCOPY  09/15/2011    Colon endoscopy 11628 (2) - Hemorrhoids found.   • GASTRIC SLEEVE LAPAROSCOPIC     • HEMORRHOIDECTOMY     • HYSTEROSCOPY      Hysteroscopy; ablation (1)   • INJECTION OF MEDICATION  09/27/2012    Kenalog (1) - Ordered By: ANDRADE BECERRIL ([facility])        Family History:  Family History   Problem Relation Age of Onset   • Ovarian cancer Mother    • Bleeding Disorder Father    • Other Father         Hematologic disorder   • Lung cancer Father    • Pancreatic cancer Sister    • Cancer Brother    • Lung cancer Brother    • Bleeding Disorder Other    • Hyperlipidemia Other    • Hypertension Other    • Osteoarthritis Other    • Other Other         Gastritis   • Adrenal disorder " Daughter    • Seizures Daughter    • Thyroid disease Daughter    • No Known Problems Daughter    • Diabetes Other    • Heart disease Other    • Other Other         Ulcers; Bleeding Tendencies; Allergy   • Cancer Other    • Cholelithiasis Other    • Hypertension Other    • Allergy (severe) Other        Social History:   reports that  has never smoked. she has never used smokeless tobacco. She reports that she does not drink alcohol or use drugs.    Medications:   Prior to Admission medications    Medication Sig Start Date End Date Taking? Authorizing Provider   acetaminophen (TYLENOL) 325 MG tablet Take 650 mg by mouth Every Night.   Yes Annemarie Vega MD   Cholecalciferol (VITAMIN D3) 2000 units tablet Take 1 tablet by mouth Daily.   Yes Annemarie Vega MD   Cyanocobalamin (B-12 PO) Take  by mouth.   Yes Emergency, Nurse Tiffany, RN   diphenhydrAMINE (BENADRYL) 50 MG capsule Take 50 mg by mouth Every Night.   Yes Annemarie Vega MD   escitalopram (LEXAPRO) 10 MG tablet Take 1 tablet by mouth Daily for 90 days. 10/25/18 1/23/19 Yes Rich Urbina APRN   losartan-hydrochlorothiazide (HYZAAR) 50-12.5 MG per tablet Take 1 tablet by mouth Daily. 8/11/18  Yes Josephine Kauffman MD   magnesium oxide (MAG-OX) 400 MG tablet Take 400 mg by mouth Daily.   Yes Annemarie Vega MD   omeprazole OTC (PRILOSEC OTC) 20 MG EC tablet    Yes Annemarie Vega MD   simvastatin (ZOCOR) 20 MG tablet Take 1 tablet by mouth Every Other Day for 90 days. 10/25/18 1/23/19 Yes Rich Urbina APRN   traZODone (DESYREL) 100 MG tablet TAKE 1 TABLET EVERY NIGHT 9/24/18  Yes Josephine Kauffman MD       Allergies:  Sulfa antibiotics    ROS:    Review of Systems   Constitutional: Negative for chills, diaphoresis and fever.   HENT: Negative for ear discharge, ear pain and tinnitus.    Eyes: Negative for pain and discharge.   Respiratory: Negative for apnea and choking.    Cardiovascular: Negative for chest pain,  "palpitations and leg swelling.   Gastrointestinal: Negative for abdominal distention, abdominal pain, anal bleeding, blood in stool, constipation, diarrhea, nausea, rectal pain and vomiting.   Endocrine: Negative for heat intolerance, polydipsia and polyuria.   Genitourinary: Negative for dysuria and hematuria.   Musculoskeletal: Negative for joint swelling and neck stiffness.   Skin: Negative for color change and pallor.   Neurological: Negative for seizures and headaches.   Hematological: Negative for adenopathy. Does not bruise/bleed easily.   Psychiatric/Behavioral: Negative for hallucinations and suicidal ideas.     Objective     Blood pressure 110/60, pulse (!) 44, height 156.2 cm (61.5\"), weight 80.4 kg (177 lb 3.2 oz).    Physical Exam   Constitutional: She is oriented to person, place, and time. She appears well-developed.   HENT:   Head: Normocephalic.   Right Ear: External ear normal.   Left Ear: External ear normal.   Eyes: Conjunctivae are normal. Right eye exhibits no discharge. Left eye exhibits no discharge. No scleral icterus.   Neck: Neck supple. No thyromegaly present.   Cardiovascular: Normal rate and regular rhythm.   Pulmonary/Chest: Effort normal. She has no wheezes.   Abdominal: Soft. She exhibits no distension and no mass. There is no tenderness. There is no rebound and no guarding.   Musculoskeletal: She exhibits no edema or deformity.   Neurological: She is alert and oriented to person, place, and time.   Skin: Skin is warm.   Psychiatric: Her behavior is normal.        Laboratory Data:   Lab Results - Last 18 Months   Lab Units  10/25/18   1115  09/06/18   1454  08/09/18   1148  04/27/18   1145  10/17/17   1057   GLUCOSE mg/dL  76  87  77  82  77   BUN mg/dL  14  13  10  13  13   CREATININE mg/dL  0.87  0.82  0.73  0.82  0.85   SODIUM mmol/L  135*  138  137  141  142   POTASSIUM mmol/L  3.9  3.9  4.2  4.1  4.0   CHLORIDE mmol/L  100  102  101  102  104   CO2 mmol/L  31.0  30.0  28.0  29.0 "  28.0   CALCIUM mg/dL  9.7  9.5  8.9  9.4  10.1  9.8   TOTAL PROTEIN g/dL  7.1  6.9  7.3   --   6.9   ALBUMIN g/dL  3.90  3.70  4.10  3.70  3.80   ALT (SGPT) U/L  57*  43  38   --   61*   AST (SGOT) U/L  73*  47*  55*   --   59*   ALK PHOS U/L  124  109  95   --   113   BILIRUBIN mg/dL  0.4  0.4  0.5   --   0.7   EGFR IF NONAFRICN AM mL/min/1.73  64  69  79  69  66   GLOBULIN gm/dL  3.2  3.2  3.2   --   3.1   A/G RATIO g/dL  1.2  1.2  1.3   --   1.2   BUN / CREAT RATIO   16.1  15.9  13.7  15.9  15.3   ANION GAP mmol/L  4.0*  6.0  8.0  10.0  10.0     Lab Results - Last 18 Months   Lab Units  11/27/18   1118  10/25/18   1115  09/06/18   1454  08/23/18   1414  08/09/18   1148   WBC 10*3/mm3  3.50  3.42  4.00  4.26  3.93   RBC 10*6/mm3  3.64*  3.93  3.90  3.73*  3.81   HEMOGLOBIN g/dL  11.4*  11.9*  12.1  11.6*  11.9*   HEMATOCRIT %  32.7*  35.5  34.9*  33.5*  34.9*   MCV fL  89.8  90.3  89.5  89.8  91.6   MCH pg  31.3  30.3  31.0  31.1  31.2   MCHC g/dL  34.9  33.5  34.7  34.6  34.1   RDW %  12.8  12.7  12.7  12.5  12.8   RDW-SD fl  42.0  42.4  41.2  41.0  43.1   MPV fL  9.5  9.7  9.2  9.3  9.9   PLATELETS 10*3/mm3  222  277  265  226  277     Lab Results - Last 18 Months   Lab Units  09/06/18   1454  08/23/18   1414  08/09/18   1148   INR   1.14  1.06  1.09     No results for input(s): CRP in the last 68856 hours.  Lab Results - Last 18 Months   Lab Units  10/25/18   1115   LIPASE U/L  175     Lab Results - Last 18 Months   Lab Units  10/25/18   1115   AMYLASE U/L  101       No radiology results for the last 30 days.    Assessment/Plan   Veronica was seen today for elevated hepatic enzymes.    Diagnoses and all orders for this visit:    Elevated liver enzymes  -     US Abdomen Complete; Future  -     KIERA  -     Mitochondrial Antibodies, M2  -     Alpha - 1 - Antitrypsin Phenotype  -     Ceruloplasmin  -     Anti-Smooth Muscle Antibody Titer  -     Hepatitis A Antibody, Total  -     Hepatitis C Antibody  -      Hepatitis B Surface Antigen  -     Hepatitis B Surface Antibody  -     Hepatitis B Core Antibody, IgM  -     Hepatitis B Core Antibody, Total  -     Hepatitis A Core IGM - Miscellaneous Test    Encounter for screening for other viral diseases   -     Hepatitis B Surface Antigen  -     Hepatitis B Surface Antibody  -     Hepatitis B Core Antibody, IgM  -     Hepatitis B Core Antibody, Total  -     Hepatitis A Core IGM - Miscellaneous Test        * Surgery not found *     Diagnosis Plan   1. Elevated liver enzymes  US Abdomen Complete    KIERA    Mitochondrial Antibodies, M2    Alpha - 1 - Antitrypsin Phenotype    Ceruloplasmin    Anti-Smooth Muscle Antibody Titer    Hepatitis A Antibody, Total    Hepatitis C Antibody    Hepatitis B Surface Antigen    Hepatitis B Surface Antibody    Hepatitis B Core Antibody, IgM    Hepatitis B Core Antibody, Total    Hepatitis A Core IGM - Miscellaneous Test   2. Encounter for screening for other viral diseases   Hepatitis B Surface Antigen    Hepatitis B Surface Antibody    Hepatitis B Core Antibody, IgM    Hepatitis B Core Antibody, Total    Hepatitis A Core IGM - Miscellaneous Test       Anticipated Surgical Procedure:  Orders Placed This Encounter   Procedures   • US Abdomen Complete     Standing Status:   Future     Standing Expiration Date:   11/16/2019     Order Specific Question:   Reason for Exam:     Answer:   elevated liver enzymes   • KIERA   • Mitochondrial Antibodies, M2   • Alpha - 1 - Antitrypsin Phenotype   • Ceruloplasmin   • Anti-Smooth Muscle Antibody Titer   • Hepatitis A Antibody, Total   • Hepatitis C Antibody   • Hepatitis B Surface Antigen   • Hepatitis B Surface Antibody   • Hepatitis B Core Antibody, IgM   • Hepatitis B Core Antibody, Total   • Hepatitis A Core IGM - Miscellaneous Test     Order Specific Question:   What is the name of the test you wish to perform?     Answer:   Hepatitis A Core IGM           I discussed the assessment and recommendations with  the patient. She verbalized understanding. All questions were answered. The patient denies any further question.    Aderemi Jaiyeola, MD  12/03/18  3:43 PM    EMR Dragon/Transcription disclaimer:   Some of this note may be an electronic transcription/translation of spoken language to printed text. The electronic translation of spoken language may permit erroneous, or at times, nonsensical words or phrases to be inadvertently transcribed; Although I have reviewed the note for such errors, some may still exist.

## 2018-12-05 ENCOUNTER — HOSPITAL ENCOUNTER (OUTPATIENT)
Dept: ULTRASOUND IMAGING | Facility: HOSPITAL | Age: 73
Discharge: HOME | End: 2018-12-05
Admitting: INTERNAL MEDICINE

## 2018-12-05 ENCOUNTER — APPOINTMENT (OUTPATIENT)
Dept: LAB | Facility: HOSPITAL | Age: 73
End: 2018-12-05

## 2018-12-05 DIAGNOSIS — R74.8 ELEVATED LIVER ENZYMES: ICD-10-CM

## 2018-12-05 LAB
HBV CORE IGM SERPL QL IA: NEGATIVE
HBV SURFACE AB SER QL: <5 (ref 0–4.99)
HBV SURFACE AB SER RIA-ACNC: ABNORMAL
HBV SURFACE AG SERPL QL IA: NEGATIVE
HCV AB SER DONR QL: NEGATIVE

## 2018-12-05 PROCEDURE — 83516 IMMUNOASSAY NONANTIBODY: CPT | Performed by: INTERNAL MEDICINE

## 2018-12-05 PROCEDURE — 86705 HEP B CORE ANTIBODY IGM: CPT | Performed by: INTERNAL MEDICINE

## 2018-12-05 PROCEDURE — 86704 HEP B CORE ANTIBODY TOTAL: CPT | Performed by: INTERNAL MEDICINE

## 2018-12-05 PROCEDURE — 82390 ASSAY OF CERULOPLASMIN: CPT | Performed by: INTERNAL MEDICINE

## 2018-12-05 PROCEDURE — 87340 HEPATITIS B SURFACE AG IA: CPT | Performed by: INTERNAL MEDICINE

## 2018-12-05 PROCEDURE — 86706 HEP B SURFACE ANTIBODY: CPT | Performed by: INTERNAL MEDICINE

## 2018-12-05 PROCEDURE — 76700 US EXAM ABDOM COMPLETE: CPT

## 2018-12-05 PROCEDURE — 82104 ALPHA-1-ANTITRYPSIN PHENO: CPT | Performed by: INTERNAL MEDICINE

## 2018-12-05 PROCEDURE — 86038 ANTINUCLEAR ANTIBODIES: CPT | Performed by: INTERNAL MEDICINE

## 2018-12-05 PROCEDURE — 86803 HEPATITIS C AB TEST: CPT | Performed by: INTERNAL MEDICINE

## 2018-12-05 PROCEDURE — 86708 HEPATITIS A ANTIBODY: CPT | Performed by: INTERNAL MEDICINE

## 2018-12-05 PROCEDURE — 36415 COLL VENOUS BLD VENIPUNCTURE: CPT | Performed by: INTERNAL MEDICINE

## 2018-12-05 PROCEDURE — 82103 ALPHA-1-ANTITRYPSIN TOTAL: CPT | Performed by: INTERNAL MEDICINE

## 2018-12-06 LAB
ACTIN IGG SERPL-ACNC: 7 UNITS (ref 0–19)
ANA SER QL: NEGATIVE
CERULOPLASMIN SERPL-MCNC: 25 MG/DL (ref 19–39)
DEPRECATED MITOCHONDRIA M2 IGG SER-ACNC: <20 UNITS (ref 0–20)
HAV AB SER QL IA: POSITIVE
HBV CORE AB SER DONR QL IA: NEGATIVE

## 2018-12-10 LAB
A1AT SERPL-MCNC: 142 MG/DL (ref 90–200)
PHENOTYPE: NORMAL

## 2018-12-11 ENCOUNTER — TELEPHONE (OUTPATIENT)
Dept: GASTROENTEROLOGY | Facility: CLINIC | Age: 73
End: 2018-12-11

## 2018-12-11 ENCOUNTER — TELEPHONE (OUTPATIENT)
Dept: OTOLARYNGOLOGY | Facility: CLINIC | Age: 73
End: 2018-12-11

## 2018-12-11 NOTE — TELEPHONE ENCOUNTER
12/11/2018, 1416 - Patient telephoned per this staff member [phone] with notification of laboratory results performed 12/05/2018 and recommendations per Dr. Aderemi Jaiyeola, M.D.  HAV Antibody Total positive laboratory result indicates prior vaccination or prior exposure.  HBV Surface Antibody negative laboratory result indicates patient is non-immune to Hepatitis B.  It is recommended patient follow through with clinical appointment scheduled with M.D. Jaiyeola on Friday, December 14, 2018 at 10:00 a.m.  Patient verbalized understanding.

## 2018-12-11 NOTE — TELEPHONE ENCOUNTER
12/11/2018, 1710 - Patient telephone call returned per this staff member [phone].  Patient stated she would like this staff member to review previously discussed laboratory results performed 12/05/2018 once again.  This staff member reiterated HAV Antibody Total positive laboratory result indicates prior vaccination or prior exposure, HBV Surface Antibody negative laboratory result indicates patient is non-immune to Hepatitis B, and it is recommended patient follow through with clinical appointment scheduled with Dr. Aderemi Jaiyeola, M.D. on Friday, December 14, 2018 at 10:00 a.m.  Patient made aware she does not have Hepatitis B, she is simply non-immune and may wish to discuss vaccination of Hepatitis B with M.D. Jaiyeola.  Patient verbalized relief and understanding.

## 2018-12-11 NOTE — TELEPHONE ENCOUNTER
----- Message from Fernanda Gama MA sent at 12/11/2018  9:17 AM CST -----  I would have taken care of this, however, I am not sure how to order labs.       ----- Message -----  From: Deidre Martinez APRN  Sent: 12/11/2018   7:53 AM  To: Fernanda Gama MA    Please call the patient regarding her result and recommendations per Dr. Jaiyeola

## 2018-12-14 ENCOUNTER — OFFICE VISIT (OUTPATIENT)
Dept: GASTROENTEROLOGY | Facility: CLINIC | Age: 73
End: 2018-12-14

## 2018-12-14 VITALS
WEIGHT: 175.8 LBS | HEIGHT: 62 IN | DIASTOLIC BLOOD PRESSURE: 62 MMHG | SYSTOLIC BLOOD PRESSURE: 110 MMHG | OXYGEN SATURATION: 98 % | HEART RATE: 53 BPM | BODY MASS INDEX: 32.35 KG/M2

## 2018-12-14 DIAGNOSIS — R74.8 ELEVATED LIVER ENZYMES: Primary | ICD-10-CM

## 2018-12-14 PROCEDURE — 99213 OFFICE O/P EST LOW 20 MIN: CPT | Performed by: INTERNAL MEDICINE

## 2018-12-14 NOTE — PATIENT INSTRUCTIONS

## 2018-12-19 ENCOUNTER — TELEPHONE (OUTPATIENT)
Dept: GASTROENTEROLOGY | Facility: CLINIC | Age: 73
End: 2018-12-19

## 2018-12-19 NOTE — PROGRESS NOTES
[facility]      Chief Complaint:   Chief Complaint   Patient presents with   • Elevated Hepatic Enzymes       Subjective   Patient here for follow up.  HPI:   Elevated liver enzymes.   Patient denies any complaint.  All the laboratory tests and imaging reviewed and discussed with the patient.    Hepatitis B core Antibody total, , hepatitis B surface antigen, Hepatitis B core IgM, Hepatitis C  Antibody, Anti SMA, ceruloplasmin, KIERA, AMA are negative.   Alpha 1 Antitrypsin phenotype MM.  Iron studies, iron saturation and ferritin  are normal.  Vitamin B 12 and folate are normal.    Hepatitis A Antibody total positive  Suggesting either patient has been in contact with hepatitis A in the past or had vaccination to Hepatitis A.    Hepatitis B surface antibody < 5 suggesting patient is non-immune. ( patient will need vaccination to hepatitis B).      Ultrasound 12/5/2018  IMPRESSION:   Unremarkable ultrasound of the abdomen     PLAN:  All the laboratory tests and imaging results reviewed and discussed with the patient.  Vaccination B vaccination.  Repeat hepatic function in 1 month.    Past History:   Past Medical History:   Diagnosis Date   • Bleeding tendency (CMS/HCC)    • Chest pain     History of noncardiac chest pain   • Chronic depression    • Depressive disorder    • Diastolic dysfunction    • Dyspnea    • Dyspnea on exertion    • Edema    • Essential hypertension    • Exercise intolerance    • Fatigue    • Gallstones    • GERD (gastroesophageal reflux disease)    • History of bone density study 2011    DEXA BONE DENSITY APPENDICULAR 19244 (1) - normal   • History of bone density study 06/24/2015    DXA BONE DENSITY AXIAL 32974 [facility] (1) - Ordered By: TOLU RAMIREZ ([facility])    • History of echocardiogram     Echocardiogram W/ color flow 80374 (2)   • History of mammogram 2013    Mammogram screen (1)   • History of mammogram 2015    MAMMOGRAM SCREENING 16314 - [facility] (1)   •  "History of screening mammography 06/25/2015    SCREENING MAMMOGRAPHY DIGITAL  (Medicare) (1) - Ordered By: ANDRADE BECERRIL ([facility])    • Hypercalcemia    • Hyperlipidemia    • Influenza vaccine administered 02/25/2016    INFLUENZA IMMUN ADMIN OR PREV RECV'D  (2) - Ordered By: ANDRADE BECERRIL ([facility])    • Long-term (current) use of anticoagulants     coumadin therapy      • Lower extremity edema     Intermittent lower extremity edema   • Obesity, Class III, BMI 40-49.9 (morbid obesity) (CMS/HCC)     \"Class III obesity\"   • BEVERLY (obstructive sleep apnea)     Mild BEVERLY   • PE (pulmonary embolism) 10/2015    Bilateral PE diagnosed after a car ride to [place]   • Pneumococcal vaccination given 02/25/2016    PNEUMOC VAC/ADMIN/RCVD 4040F (2) - Ordered By: ANDRADE BECERRIL ([facility])    • Right basilic vein fibrosis 2015   • Sedentary lifestyle    • Shortness of breath    • Venous thrombosis 2015    right basilic venous thrombosis; This was noted in May 2015.   • Weight gain      Past Surgical History:   Procedure Laterality Date   • CHOLECYSTECTOMY      Cholecystectomy (1)   • COLONOSCOPY      Approximately 5 years prior as stated per patient   • CRYOABLATION     • ENDOSCOPY  09/15/2011    Colon endoscopy 39476 (2) - Hemorrhoids found.   • GASTRIC SLEEVE LAPAROSCOPIC     • HEMORRHOIDECTOMY     • HYSTEROSCOPY      Hysteroscopy; ablation (1)   • INJECTION OF MEDICATION  09/27/2012    Kenalog (1) - Ordered By: ANDRADE BECERRIL ([facility])        Family History:  Family History   Problem Relation Age of Onset   • Ovarian cancer Mother    • Bleeding Disorder Father    • Other Father         Hematologic disorder   • Lung cancer Father    • Pancreatic cancer Sister    • Cancer Brother    • Lung cancer Brother    • Bleeding Disorder Other    • Hyperlipidemia Other    • Hypertension Other    • Osteoarthritis Other    • Other Other         Gastritis   • Adrenal disorder Daughter    • Seizures Daughter    • Thyroid disease " Daughter    • No Known Problems Daughter    • Diabetes Other    • Heart disease Other    • Other Other         Ulcers; Bleeding Tendencies; Allergy   • Cancer Other    • Cholelithiasis Other    • Hypertension Other    • Allergy (severe) Other        Social History:   reports that  has never smoked. she has never used smokeless tobacco. She reports that she does not drink alcohol or use drugs.    Medications:   Prior to Admission medications    Medication Sig Start Date End Date Taking? Authorizing Provider   acetaminophen (TYLENOL) 325 MG tablet Take 650 mg by mouth Every Night.   Yes Annemarie Vega MD   Cholecalciferol (VITAMIN D3) 2000 units tablet Take 1 tablet by mouth Daily.   Yes Annemarie Vega MD   Cyanocobalamin (B-12 PO) Take  by mouth.   Yes Emergency, Nurse Tiffany, RN   diphenhydrAMINE (BENADRYL) 50 MG capsule Take 50 mg by mouth Every Night.   Yes Annemarie Vega MD   escitalopram (LEXAPRO) 10 MG tablet Take 1 tablet by mouth Daily for 90 days. 10/25/18 1/23/19 Yes Rich Urbina APRN   losartan-hydrochlorothiazide (HYZAAR) 50-12.5 MG per tablet Take 1 tablet by mouth Daily. 8/11/18  Yes Josephine Kauffman MD   magnesium oxide (MAG-OX) 400 MG tablet Take 400 mg by mouth Daily.   Yes ProviderAnenmarie MD   omeprazole OTC (PRILOSEC OTC) 20 MG EC tablet    Yes Annemarie Vega MD   simvastatin (ZOCOR) 20 MG tablet Take 1 tablet by mouth Every Other Day for 90 days. 10/25/18 1/23/19 Yes Rich Urbina APRN   traZODone (DESYREL) 100 MG tablet TAKE 1 TABLET EVERY NIGHT 9/24/18  Yes Josephine Kauffman MD       Allergies:  Sulfa antibiotics    ROS:    Review of Systems   Constitutional: Negative for chills, diaphoresis and fever.   HENT: Negative for ear discharge, ear pain and tinnitus.    Eyes: Negative for pain and discharge.   Respiratory: Negative for apnea and choking.    Cardiovascular: Negative for chest pain, palpitations and leg swelling.   Gastrointestinal: Negative for  "abdominal distention, abdominal pain, anal bleeding, blood in stool, constipation, diarrhea, nausea, rectal pain and vomiting.   Endocrine: Negative for heat intolerance, polydipsia and polyuria.   Genitourinary: Negative for dysuria and hematuria.   Musculoskeletal: Negative for joint swelling and neck stiffness.   Skin: Negative for color change and pallor.   Neurological: Negative for seizures and headaches.   Hematological: Negative for adenopathy. Does not bruise/bleed easily.   Psychiatric/Behavioral: Negative for hallucinations and suicidal ideas.     Objective     Blood pressure 110/62, pulse 53, height 156.2 cm (61.5\"), weight 79.7 kg (175 lb 12.8 oz), SpO2 98 %.    Physical Exam   Constitutional: She is oriented to person, place, and time. She appears well-developed.   HENT:   Head: Normocephalic.   Right Ear: External ear normal.   Left Ear: External ear normal.   Eyes: Conjunctivae are normal. Right eye exhibits no discharge. Left eye exhibits no discharge. No scleral icterus.   Neck: Neck supple. No thyromegaly present.   Cardiovascular: Normal rate and regular rhythm.   Pulmonary/Chest: Effort normal. She has no wheezes.   Abdominal: Soft. She exhibits no distension and no mass. There is no tenderness. There is no rebound and no guarding.   Musculoskeletal: She exhibits no edema or deformity.   Neurological: She is alert and oriented to person, place, and time.   Skin: Skin is warm.   Psychiatric: Her behavior is normal.        Laboratory Data:   Lab Results - Last 18 Months   Lab Units  10/25/18   1115  09/06/18   1454  08/09/18   1148  04/27/18   1145  10/17/17   1057   GLUCOSE mg/dL  76  87  77  82  77   BUN mg/dL  14  13  10  13  13   CREATININE mg/dL  0.87  0.82  0.73  0.82  0.85   SODIUM mmol/L  135*  138  137  141  142   POTASSIUM mmol/L  3.9  3.9  4.2  4.1  4.0   CHLORIDE mmol/L  100  102  101  102  104   CO2 mmol/L  31.0  30.0  28.0  29.0  28.0   CALCIUM mg/dL  9.7  9.5  8.9  9.4  10.1  9.8 "   TOTAL PROTEIN g/dL  7.1  6.9  7.3   --   6.9   ALBUMIN g/dL  3.90  3.70  4.10  3.70  3.80   ALT (SGPT) U/L  57*  43  38   --   61*   AST (SGOT) U/L  73*  47*  55*   --   59*   ALK PHOS U/L  124  109  95   --   113   BILIRUBIN mg/dL  0.4  0.4  0.5   --   0.7   EGFR IF NONAFRICN AM mL/min/1.73  64  69  79  69  66   GLOBULIN gm/dL  3.2  3.2  3.2   --   3.1   A/G RATIO g/dL  1.2  1.2  1.3   --   1.2   BUN / CREAT RATIO   16.1  15.9  13.7  15.9  15.3   ANION GAP mmol/L  4.0*  6.0  8.0  10.0  10.0     Lab Results - Last 18 Months   Lab Units  11/27/18   1118  10/25/18   1115  09/06/18   1454  08/23/18   1414  08/09/18   1148   WBC 10*3/mm3  3.50  3.42  4.00  4.26  3.93   RBC 10*6/mm3  3.64*  3.93  3.90  3.73*  3.81   HEMOGLOBIN g/dL  11.4*  11.9*  12.1  11.6*  11.9*   HEMATOCRIT %  32.7*  35.5  34.9*  33.5*  34.9*   MCV fL  89.8  90.3  89.5  89.8  91.6   MCH pg  31.3  30.3  31.0  31.1  31.2   MCHC g/dL  34.9  33.5  34.7  34.6  34.1   RDW %  12.8  12.7  12.7  12.5  12.8   RDW-SD fl  42.0  42.4  41.2  41.0  43.1   MPV fL  9.5  9.7  9.2  9.3  9.9   PLATELETS 10*3/mm3  222  277  265  226  277     Lab Results - Last 18 Months   Lab Units  09/06/18   1454  08/23/18   1414  08/09/18   1148   INR   1.14  1.06  1.09     No results for input(s): CRP in the last 56197 hours.  Lab Results - Last 18 Months   Lab Units  10/25/18   1115   LIPASE U/L  175     Lab Results - Last 18 Months   Lab Units  10/25/18   1115   AMYLASE U/L  101       Us Abdomen Complete    Result Date: 12/5/2018  Impression:  Unremarkable ultrasound of the abdomen Location of Interpretation: Teleradiology Electronically signed by:  Yohan Ritter MD  12/5/2018 4:58 PM CST Workstation: [id number]      Assessment/Plan   Veronica was seen today for elevated hepatic enzymes.    Diagnoses and all orders for this visit:    Elevated liver enzymes  -     Hepatic Function Panel        * Surgery not found *     Diagnosis Plan   1. Elevated liver enzymes  Hepatic  Function Panel       Anticipated Surgical Procedure:  Orders Placed This Encounter   Procedures   • Hepatic Function Panel     Collect in January 2019.           I discussed the assessment and recommendations with the the patient. She verbalized understanding. All questions were answered. The patient denies any further question.    Aderemi Jaiyeola, MD  12/18/18  7:20 PM    EMR Dragon/Transcription disclaimer:   Some of this note may be an electronic transcription/translation of spoken language to printed text. The electronic translation of spoken language may permit erroneous, or at times, nonsensical words or phrases to be inadvertently transcribed; Although I have reviewed the note for such errors, some may still exist.

## 2018-12-19 NOTE — TELEPHONE ENCOUNTER
12/19/2018, 1627 - Patient telephoned per this staff member [phone].  Zero answer.  Voice message submitted with date, time, office contact information, and request to contact office of Dr. Aderemi Jaiyeola, M.D. at earliest convenience.    Rationale for speaking with patient - Inquire if patient has began vaccination for Hepatitis B Virus as recommended per Dr. Aderemi Jaiyeola, M.D.

## 2018-12-26 ENCOUNTER — DOCUMENTATION (OUTPATIENT)
Dept: GASTROENTEROLOGY | Facility: CLINIC | Age: 73
End: 2018-12-26

## 2018-12-26 NOTE — PROGRESS NOTES
Pauly Roland, Brianne Shaw, MAJO             Patient called back 12/20/18 at 8:12 and read telephone message in epic and told patient that she would need to get with primary care doctor to get vaccinated for hep b virus. Patient states that she will call primary care and take care of it.

## 2019-01-24 ENCOUNTER — OFFICE VISIT (OUTPATIENT)
Dept: FAMILY MEDICINE CLINIC | Facility: CLINIC | Age: 74
End: 2019-01-24

## 2019-01-24 ENCOUNTER — TELEPHONE (OUTPATIENT)
Dept: FAMILY MEDICINE CLINIC | Facility: CLINIC | Age: 74
End: 2019-01-24

## 2019-01-24 ENCOUNTER — LAB (OUTPATIENT)
Dept: LAB | Facility: HOSPITAL | Age: 74
End: 2019-01-24

## 2019-01-24 VITALS
WEIGHT: 174.7 LBS | DIASTOLIC BLOOD PRESSURE: 72 MMHG | RESPIRATION RATE: 18 BRPM | SYSTOLIC BLOOD PRESSURE: 112 MMHG | BODY MASS INDEX: 32.98 KG/M2 | OXYGEN SATURATION: 99 % | HEART RATE: 52 BPM | HEIGHT: 61 IN

## 2019-01-24 DIAGNOSIS — F32.A DEPRESSION, UNSPECIFIED DEPRESSION TYPE: ICD-10-CM

## 2019-01-24 DIAGNOSIS — J30.9 ALLERGIC RHINITIS, UNSPECIFIED SEASONALITY, UNSPECIFIED TRIGGER: ICD-10-CM

## 2019-01-24 DIAGNOSIS — E78.5 HYPERLIPIDEMIA, UNSPECIFIED HYPERLIPIDEMIA TYPE: ICD-10-CM

## 2019-01-24 DIAGNOSIS — K21.9 GASTROESOPHAGEAL REFLUX DISEASE, ESOPHAGITIS PRESENCE NOT SPECIFIED: Primary | ICD-10-CM

## 2019-01-24 LAB
ARTICHOKE IGE QN: 126 MG/DL (ref 1–129)
CHOLEST SERPL-MCNC: 256 MG/DL (ref 0–199)
HDLC SERPL-MCNC: 83 MG/DL (ref 60–200)
LDLC/HDLC SERPL: 1.92 {RATIO} (ref 0–3.22)
TRIGL SERPL-MCNC: 69 MG/DL (ref 20–199)

## 2019-01-24 PROCEDURE — 99213 OFFICE O/P EST LOW 20 MIN: CPT | Performed by: NURSE PRACTITIONER

## 2019-01-24 PROCEDURE — 80061 LIPID PANEL: CPT

## 2019-01-24 PROCEDURE — 36415 COLL VENOUS BLD VENIPUNCTURE: CPT

## 2019-01-24 RX ORDER — PANTOPRAZOLE SODIUM 40 MG/1
40 TABLET, DELAYED RELEASE ORAL DAILY
Qty: 30 TABLET | Refills: 3 | Status: SHIPPED | OUTPATIENT
Start: 2019-01-24 | End: 2019-12-27

## 2019-01-24 RX ORDER — FLUTICASONE PROPIONATE 50 MCG
2 SPRAY, SUSPENSION (ML) NASAL DAILY
Qty: 18.2 ML | Refills: 1 | Status: SHIPPED | OUTPATIENT
Start: 2019-01-24 | End: 2019-07-11 | Stop reason: SDUPTHER

## 2019-01-24 NOTE — PROGRESS NOTES
"Subjective   Veronica Wilkinson is a 73 y.o. female.     Miss Wilkinson is a 73-year-old female persists today for follow-up related to hyperlipidemia and depression. Since last visit, patient has stopped both her Lexapro and Zocor. She had concerns over her elevated liver enzymes and wanted to decrease any medication that she could. She tapered off her Lexapro with no compensation. Denies any resurgence of anxiety or depression. Her last lipid panel was collected in October 2018. She had a slight elevation in LDL and total cholesterol at that time. Patient states she is focused on diet and exercise since stopping her medication.    Acutely Miss Wilkinson reports clear sinus drainage. She is taking Claritin 10 mg by mouth daily with little improvement. She also reports increased cough with no production. She also notes she feels like her reflux is beginning to \"flareup\" as well. Currently takes Prilosec 20 mg by mouth daily. Denies nausea, vomiting, diarrhea.         The following portions of the patient's history were reviewed and updated as appropriate: allergies, current medications, past family history, past medical history, past social history, past surgical history and problem list.    Review of Systems   Constitutional: Negative for activity change, appetite change, fatigue, unexpected weight gain and unexpected weight loss.   HENT: Positive for congestion and rhinorrhea. Negative for sore throat, trouble swallowing and voice change.    Eyes: Negative.    Respiratory: Positive for cough. Negative for chest tightness, shortness of breath and wheezing.    Cardiovascular: Negative for chest pain, palpitations and leg swelling.   Gastrointestinal: Positive for GERD. Negative for abdominal pain, constipation, diarrhea, nausea and vomiting.   Endocrine: Negative.    Genitourinary: Negative for dysuria.   Musculoskeletal: Negative for arthralgias and myalgias.   Skin: Negative for rash.   Allergic/Immunologic: Negative.  "   Neurological: Negative.    Hematological: Negative.    Psychiatric/Behavioral: Negative.  Negative for suicidal ideas, depressed mood and stress. The patient is not nervous/anxious.        Objective   Physical Exam   Constitutional: She is oriented to person, place, and time. She appears well-developed and well-nourished. No distress.   HENT:   Head: Normocephalic and atraumatic.   Right Ear: External ear normal.   Left Ear: External ear normal.   Nose: Mucosal edema and rhinorrhea present.   Mouth/Throat: Oropharynx is clear and moist.   Eyes: Conjunctivae are normal.   Neck: Normal range of motion.   Cardiovascular: Normal rate, regular rhythm and normal heart sounds.   Pulmonary/Chest: Effort normal and breath sounds normal. No respiratory distress. She has no wheezes. She has no rales.   Musculoskeletal: Normal range of motion. She exhibits no edema or tenderness.   Neurological: She is alert and oriented to person, place, and time.   Skin: Skin is warm and dry. No rash noted. She is not diaphoretic. No erythema. No pallor.   Psychiatric: She has a normal mood and affect. Her behavior is normal. Judgment and thought content normal.   Nursing note and vitals reviewed.        Assessment/Plan   Veronica was seen today for follow-up.    Diagnoses and all orders for this visit:    Gastroesophageal reflux disease, esophagitis presence not specified    Allergic rhinitis, unspecified seasonality, unspecified trigger    Hyperlipidemia, unspecified hyperlipidemia type  -     Lipid Panel; Future    Depression, unspecified depression type    Other orders  -     fluticasone (FLONASE) 50 MCG/ACT nasal spray; 2 sprays into the nostril(s) as directed by provider Daily.  -     pantoprazole (PROTONIX) 40 MG EC tablet; Take 1 tablet by mouth Daily.    1. GERD-stop Prilosec. Protonix 40 mg by mouth daily. Will reevaluate next follow-up.    2. Allergic rhinitis. Continue Claritin 10 mg by mouth daily. Start Flonase 50 MCG/ACT 2  sprays to each nostril daily as needed for congestion.    3. Hyperlipidemia-Lipitor profile. We'll call results.    4. Depression-patient instructed to notify this office this if depression symptoms returned. Patient instructed to present to the ER. She develops suicidal or homicidal ideations. Patient verbalized understanding of instruction.    5. Follow-up in 3 months or sooner if needed.            This document has been electronically signed by TABBY Mejia on January 24, 2019 10:37 AM

## 2019-02-13 RX ORDER — TRAZODONE HYDROCHLORIDE 100 MG/1
100 TABLET ORAL NIGHTLY
Qty: 90 TABLET | Refills: 3 | Status: SHIPPED | OUTPATIENT
Start: 2019-02-13 | End: 2020-03-04 | Stop reason: SDUPTHER

## 2019-02-15 ENCOUNTER — APPOINTMENT (OUTPATIENT)
Dept: LAB | Facility: HOSPITAL | Age: 74
End: 2019-02-15

## 2019-02-15 ENCOUNTER — OFFICE VISIT (OUTPATIENT)
Dept: GASTROENTEROLOGY | Facility: CLINIC | Age: 74
End: 2019-02-15

## 2019-02-15 VITALS
HEIGHT: 62 IN | BODY MASS INDEX: 32.28 KG/M2 | DIASTOLIC BLOOD PRESSURE: 62 MMHG | WEIGHT: 175.4 LBS | SYSTOLIC BLOOD PRESSURE: 132 MMHG | HEART RATE: 54 BPM

## 2019-02-15 DIAGNOSIS — K21.9 GASTROESOPHAGEAL REFLUX DISEASE, ESOPHAGITIS PRESENCE NOT SPECIFIED: ICD-10-CM

## 2019-02-15 DIAGNOSIS — R74.8 ELEVATED LIVER ENZYMES: Primary | ICD-10-CM

## 2019-02-15 LAB
ALBUMIN SERPL-MCNC: 4.3 G/DL (ref 3.4–4.8)
ALP SERPL-CCNC: 115 U/L (ref 38–126)
ALT SERPL W P-5'-P-CCNC: 32 U/L (ref 9–52)
AST SERPL-CCNC: 50 U/L (ref 14–36)
BILIRUB CONJ SERPL-MCNC: 0 MG/DL (ref 0–0.3)
BILIRUB INDIRECT SERPL-MCNC: 0.7 MG/DL (ref 0–1.1)
BILIRUB SERPL-MCNC: 0.5 MG/DL (ref 0.2–1.3)
PROT SERPL-MCNC: 7.4 G/DL (ref 6.3–8.6)

## 2019-02-15 PROCEDURE — 99213 OFFICE O/P EST LOW 20 MIN: CPT | Performed by: NURSE PRACTITIONER

## 2019-02-15 PROCEDURE — 36415 COLL VENOUS BLD VENIPUNCTURE: CPT | Performed by: INTERNAL MEDICINE

## 2019-02-15 PROCEDURE — 80076 HEPATIC FUNCTION PANEL: CPT | Performed by: INTERNAL MEDICINE

## 2019-02-15 NOTE — PROGRESS NOTES
Chief Complaint   Patient presents with   • Elevated Hepatic Enzymes       Subjective    Veronica Wilkinson is a 73 y.o. female. she is here today for follow-up.  73-year-old female presents to discuss elevated liver enzymes and reflux.  She was last seen by Dr. Jaiyeola  12/14/18 iron studies, iron saturation and ferritin are normal.  Vitamin B12 and folate are normal.  Alpha-1 antitrypsin phenotype MM.  Hepatitis B core antibody total, hepatitis B surface antigen, hepatitis B core IgM, hepatitis C antibody, anti-SMA, ceruloplasmin, KIERA, AMA are negative.  She underwent ultrasound 12/5/18 which was unremarkable ultrasound of the abdomen.  She did not complete repeat hepatic function panel as planned.  She also reports she is recently had some worsening reflux.  She had bariatric surgery 2 years ago per Dr. Finney. Reports she was 290 pounds prior to that procedure. Has maintained around 170-180.  She had been taking Prilosec but primary care provider change her Protonix and has not been able to tell a huge difference with that medication.  She stopped her cholesterol medication for 1 month however has recently restarted it due to recent recheck of cholesterol.    Heartburn   She complains of abdominal pain and belching. She reports no chest pain, no choking, no coughing, no dysphagia, no nausea or no sore throat. This is a chronic problem. The problem has been waxing and waning. The symptoms are aggravated by certain foods and lying down. Pertinent negatives include no fatigue. Wakes up with acidic material in back of throat .     Plan; discussed possibility of EGD due to persistent reflux will continue Protonix for 1 month and if symptoms persist or worsen we will plan EGD at that point.  Will have patient reflect hepatic function panel as previously ordered and will contact patient when those results are available.       The following portions of the patient's history were reviewed and updated as appropriate:  "  Past Medical History:   Diagnosis Date   • Bleeding tendency (CMS/HCC)    • Chest pain     History of noncardiac chest pain   • Chronic depression    • Depressive disorder    • Diastolic dysfunction    • Dyspnea    • Dyspnea on exertion    • Edema    • Essential hypertension    • Exercise intolerance    • Fatigue    • Gallstones    • GERD (gastroesophageal reflux disease)    • History of bone density study 2011    DEXA BONE DENSITY APPENDICULAR 55644 (1) - normal   • History of bone density study 06/24/2015    DXA BONE DENSITY AXIAL 58794 [facility] (1) - Ordered By: TOLU RAMIREZ ([facility])    • History of echocardiogram     Echocardiogram W/ color flow 02066 (2)   • History of mammogram 2013    Mammogram screen (1)   • History of mammogram 2015    MAMMOGRAM SCREENING 37672 - [facility] (1)   • History of screening mammography 06/25/2015    SCREENING MAMMOGRAPHY DIGITAL  (Medicare) (1) - Ordered By: ANDRADE BECERRIL ([facility])    • Hypercalcemia    • Hyperlipidemia    • Influenza vaccine administered 02/25/2016    INFLUENZA IMMUN ADMIN OR PREV RECV'D  (2) - Ordered By: ANDRADE BECERRIL ([facility])    • Long-term (current) use of anticoagulants     coumadin therapy      • Lower extremity edema     Intermittent lower extremity edema   • Obesity, Class III, BMI 40-49.9 (morbid obesity) (CMS/HCC)     \"Class III obesity\"   • BEVERLY (obstructive sleep apnea)     Mild BEVERLY   • PE (pulmonary embolism) 10/2015    Bilateral PE diagnosed after a car ride to [place]   • Pneumococcal vaccination given 02/25/2016    PNEUMOC VAC/ADMIN/RCVD 4040F (2) - Ordered By: ANDRADE BECERRIL ([facility])    • Right basilic vein fibrosis 2015   • Sedentary lifestyle    • Shortness of breath    • Venous thrombosis 2015    right basilic venous thrombosis; This was noted in May 2015.   • Weight gain      Past Surgical History:   Procedure Laterality Date   • CHOLECYSTECTOMY      Cholecystectomy (1)   • COLONOSCOPY      Approximately 5 years " prior as stated per patient   • CRYOABLATION     • ENDOSCOPY  09/15/2011    Colon endoscopy 46686 (2) - Hemorrhoids found.   • GASTRIC SLEEVE LAPAROSCOPIC     • HEMORRHOIDECTOMY     • HYSTEROSCOPY      Hysteroscopy; ablation (1)   • INJECTION OF MEDICATION  2012    Kenalog (1) - Ordered By: ANDRADE BECERRIL ([facility])      Family History   Problem Relation Age of Onset   • Ovarian cancer Mother    • Bleeding Disorder Father    • Other Father         Hematologic disorder   • Lung cancer Father    • Pancreatic cancer Sister    • Cancer Brother    • Lung cancer Brother    • Bleeding Disorder Other    • Hyperlipidemia Other    • Hypertension Other    • Osteoarthritis Other    • Other Other         Gastritis   • Adrenal disorder Daughter    • Seizures Daughter    • Thyroid disease Daughter    • No Known Problems Daughter    • Diabetes Other    • Heart disease Other    • Other Other         Ulcers; Bleeding Tendencies; Allergy   • Cancer Other    • Cholelithiasis Other    • Hypertension Other    • Allergy (severe) Other      OB History      Para Term  AB Living    3 3 3          SAB TAB Ectopic Molar Multiple Live Births                       Prior to Admission medications    Medication Sig Start Date End Date Taking? Authorizing Provider   acetaminophen (TYLENOL) 325 MG tablet Take 650 mg by mouth Every Night.   Yes ProviderAnnemarie MD   Cholecalciferol (VITAMIN D3) 2000 units tablet Take 1 tablet by mouth Daily.   Yes ProviderAnnemarie MD   Cyanocobalamin (B-12 PO) Take  by mouth.   Yes Emergency, Nurse Tiffany, RN   fluticasone (FLONASE) 50 MCG/ACT nasal spray 2 sprays into the nostril(s) as directed by provider Daily. 19  Yes Rich Urbina APRN   Loratadine (CLARITIN PO) Take  by mouth.   Yes ProviderAnnemarie MD   losartan-hydrochlorothiazide (HYZAAR) 50-12.5 MG per tablet Take 1 tablet by mouth Daily. 18  Yes Josephine Becerril MD   magnesium oxide (MAG-OX) 400 MG tablet Take  "400 mg by mouth Daily.   Yes Provider, MD Annemarie   Multiple Vitamins-Minerals (MULTIVITAMIN ADULT PO) Take  by mouth.   Yes Provider, MD Annemarie   pantoprazole (PROTONIX) 40 MG EC tablet Take 1 tablet by mouth Daily. 19  Yes Rich Urbina APRN   traZODone (DESYREL) 100 MG tablet Take 1 tablet by mouth Every Night. 19  Yes Rich Urbina APRN     Allergies   Allergen Reactions   • Sulfa Antibiotics Rash and Hives     Sulfa (Sulfonamide Antibiotics)     Social History     Socioeconomic History   • Marital status:      Spouse name: Not on file   • Number of children: 3   • Years of education: Not on file   • Highest education level: Not on file   Occupational History     Comment: Current Marriage Year ; 3 Children Living: Leann 52, Keya 49, Li 43; 1 ;    Tobacco Use   • Smoking status: Never Smoker   • Smokeless tobacco: Never Used   Substance and Sexual Activity   • Alcohol use: No   • Drug use: No   • Sexual activity: Defer     Comment: Marital Status:        Review of Systems  Review of Systems   Constitutional: Negative for activity change, appetite change, chills, diaphoresis, fatigue, fever and unexpected weight change.   HENT: Negative for sore throat and trouble swallowing.    Respiratory: Negative for cough, choking and shortness of breath.    Cardiovascular: Negative for chest pain.   Gastrointestinal: Positive for abdominal distention (belching and gas since sleeve ) and abdominal pain. Negative for anal bleeding, blood in stool, constipation, diarrhea, dysphagia, nausea, rectal pain and vomiting.   Musculoskeletal: Negative for arthralgias.   Skin: Negative for pallor.   Neurological: Negative for light-headedness.        /62 (BP Location: Left arm)   Pulse 54   Ht 156.2 cm (61.5\")   Wt 79.6 kg (175 lb 6.4 oz)   LMP  (LMP Unknown)   BMI 32.60 kg/m²     Objective    Physical Exam   Constitutional: She is oriented to person, place, and time. " She appears well-developed and well-nourished. She is cooperative. No distress.   HENT:   Head: Normocephalic and atraumatic.   Neck: Normal range of motion. Neck supple. No thyromegaly present.   Cardiovascular: Normal rate, regular rhythm and normal heart sounds.   Pulmonary/Chest: Effort normal and breath sounds normal. She has no wheezes. She has no rhonchi. She has no rales.   Abdominal: Soft. Normal appearance and bowel sounds are normal. She exhibits no distension. There is no hepatosplenomegaly. There is tenderness in the epigastric area. There is no rigidity and no guarding. No hernia.   Lymphadenopathy:     She has no cervical adenopathy.   Neurological: She is alert and oriented to person, place, and time.   Skin: Skin is warm, dry and intact. No rash noted. No pallor.   Psychiatric: She has a normal mood and affect. Her speech is normal.     Lab on 01/24/2019   Component Date Value Ref Range Status   • Total Cholesterol 01/24/2019 256* 0 - 199 mg/dL Final   • Triglycerides 01/24/2019 69  20 - 199 mg/dL Final   • HDL Cholesterol 01/24/2019 83  60 - 200 mg/dL Final   • LDL Cholesterol  01/24/2019 126  1 - 129 mg/dL Final   • LDL/HDL Ratio 01/24/2019 1.92  0.00 - 3.22 Final     Assessment/Plan      1. Elevated liver enzymes    2. Gastroesophageal reflux disease, esophagitis presence not specified    .       Orders placed during this encounter include:  No orders of the defined types were placed in this encounter.      * Surgery not found *    Review and/or summary of lab tests, radiology, procedures, medications. Review and summary of old records and obtaining of history. The risks and benefits of my recommendations, as well as other treatment options were discussed with the patient today. Questions were answered.    No orders of the defined types were placed in this encounter.      Follow-up: Return in about 4 weeks (around 3/15/2019).          This document has been electronically signed by Deidre Martinez  TABBY on February 15, 2019 11:26 AM             Results for orders placed or performed in visit on 01/24/19   Lipid Panel   Result Value Ref Range    Total Cholesterol 256 (H) 0 - 199 mg/dL    Triglycerides 69 20 - 199 mg/dL    HDL Cholesterol 83 60 - 200 mg/dL    LDL Cholesterol  126 1 - 129 mg/dL    LDL/HDL Ratio 1.92 0.00 - 3.22   Results for orders placed or performed in visit on 11/27/18   CBC Auto Differential   Result Value Ref Range    WBC 3.50 3.20 - 9.80 10*3/mm3    RBC 3.64 (L) 3.77 - 5.16 10*6/mm3    Hemoglobin 11.4 (L) 12.0 - 15.5 g/dL    Hematocrit 32.7 (L) 35.0 - 45.0 %    MCV 89.8 80.0 - 98.0 fL    MCH 31.3 26.5 - 34.0 pg    MCHC 34.9 31.4 - 36.0 g/dL    RDW 12.8 11.5 - 14.5 %    RDW-SD 42.0 36.4 - 46.3 fl    MPV 9.5 8.0 - 12.0 fL    Platelets 222 150 - 450 10*3/mm3    Neutrophil % 46.3 37.0 - 80.0 %    Lymphocyte % 38.3 10.0 - 50.0 %    Monocyte % 13.4 (H) 0.0 - 12.0 %    Eosinophil % 1.1 0.0 - 7.0 %    Basophil % 0.6 0.0 - 2.0 %    Immature Grans % 0.3 0.0 - 0.5 %    Neutrophils, Absolute 1.62 (L) 2.00 - 8.60 10*3/mm3    Lymphocytes, Absolute 1.34 0.60 - 4.20 10*3/mm3    Monocytes, Absolute 0.47 0.00 - 0.90 10*3/mm3    Eosinophils, Absolute 0.04 0.00 - 0.70 10*3/mm3    Basophils, Absolute 0.02 0.00 - 0.20 10*3/mm3    Immature Grans, Absolute 0.01 0.00 - 0.02 10*3/mm3   Iron and TIBC   Result Value Ref Range    Iron 93 37 - 170 mcg/dL    TIBC 268 265 - 497 mcg/dL    Iron Saturation 35 15 - 50 %   Folate   Result Value Ref Range    Folate >20.00 2.76 - 21.00 ng/mL   Ferritin   Result Value Ref Range    Ferritin 96.00 11.10 - 264.00 ng/mL   Vitamin B12   Result Value Ref Range    Vitamin B-12 810 239 - 931 pg/mL   Results for orders placed or performed in visit on 11/16/18   Alpha - 1 - Antitrypsin Phenotype   Result Value Ref Range    A-1 Antitrypsin 142 90 - 200 mg/dL    Phenotype MM    Hepatitis C Antibody   Result Value Ref Range    Hepatitis C Ab Negative Negative   Hepatitis A Antibody, Total    Result Value Ref Range    Hep A Total Ab Positive (A) Negative   Mitochondrial Antibodies, M2   Result Value Ref Range    Mitochondrial Ab <20.0 0.0 - 20.0 Units   Ceruloplasmin   Result Value Ref Range    Ceruloplasmin 25.0 19.0 - 39.0 mg/dL   Hepatitis B Core Antibody, IgM   Result Value Ref Range    Hep B C IgM Negative Negative   Hepatitis B Core Antibody, Total   Result Value Ref Range    Hep B Core Total Ab Negative Negative   Anti-Smooth Muscle Antibody Titer   Result Value Ref Range    Smooth Muscle Ab 7 0 - 19 Units   Hepatitis B Surface Antibody   Result Value Ref Range    Hepatitis Bs Ab Index <5.00 (H) 0.00 - 4.99    Hep B S Ab Non-Immune (A) Immune, Indeterminate   Hepatitis B Surface Antigen   Result Value Ref Range    Hepatitis B Surface Ag Negative Negative   KIERA   Result Value Ref Range    KIERA Direct Negative Negative   Results for orders placed or performed in visit on 10/25/18   CBC Auto Differential   Result Value Ref Range    WBC 3.42 3.20 - 9.80 10*3/mm3    RBC 3.93 3.77 - 5.16 10*6/mm3    Hemoglobin 11.9 (L) 12.0 - 15.5 g/dL    Hematocrit 35.5 35.0 - 45.0 %    MCV 90.3 80.0 - 98.0 fL    MCH 30.3 26.5 - 34.0 pg    MCHC 33.5 31.4 - 36.0 g/dL    RDW 12.7 11.5 - 14.5 %    RDW-SD 42.4 36.4 - 46.3 fl    MPV 9.7 8.0 - 12.0 fL    Platelets 277 150 - 450 10*3/mm3    Neutrophil % 46.8 37.0 - 80.0 %    Lymphocyte % 40.6 10.0 - 50.0 %    Monocyte % 10.5 0.0 - 12.0 %    Eosinophil % 1.2 0.0 - 7.0 %    Basophil % 0.6 0.0 - 2.0 %    Immature Grans % 0.3 0.0 - 0.5 %    Neutrophils, Absolute 1.60 (L) 2.00 - 8.60 10*3/mm3    Lymphocytes, Absolute 1.39 0.60 - 4.20 10*3/mm3    Monocytes, Absolute 0.36 0.00 - 0.90 10*3/mm3    Eosinophils, Absolute 0.04 0.00 - 0.70 10*3/mm3    Basophils, Absolute 0.02 0.00 - 0.20 10*3/mm3    Immature Grans, Absolute 0.01 0.00 - 0.02 10*3/mm3   Vitamin D 25 Hydroxy   Result Value Ref Range    25 Hydroxy, Vitamin D 48.3 30.0 - 100.0 ng/ml   Uric Acid   Result Value Ref Range     Uric Acid 6.1 2.5 - 8.5 mg/dL   TSH   Result Value Ref Range    TSH 1.940 0.460 - 4.680 mIU/mL   Lipase   Result Value Ref Range    Lipase 175 23 - 300 U/L   Vitamin B12   Result Value Ref Range    Vitamin B-12 897 239 - 931 pg/mL   Amylase   Result Value Ref Range    Amylase 101 50 - 130 U/L   Comprehensive Metabolic Panel   Result Value Ref Range    Glucose 76 60 - 100 mg/dL    BUN 14 7 - 21 mg/dL    Creatinine 0.87 0.50 - 1.00 mg/dL    Sodium 135 (L) 137 - 145 mmol/L    Potassium 3.9 3.5 - 5.1 mmol/L    Chloride 100 95 - 110 mmol/L    CO2 31.0 22.0 - 31.0 mmol/L    Calcium 9.7 8.4 - 10.2 mg/dL    Total Protein 7.1 6.3 - 8.6 g/dL    Albumin 3.90 3.40 - 4.80 g/dL    ALT (SGPT) 57 (H) 9 - 52 U/L    AST (SGOT) 73 (H) 14 - 36 U/L    Alkaline Phosphatase 124 38 - 126 U/L    Total Bilirubin 0.4 0.2 - 1.3 mg/dL    eGFR Non African Amer 64 39 - 90 mL/min/1.73    Globulin 3.2 2.3 - 3.5 gm/dL    A/G Ratio 1.2 1.1 - 1.8 g/dL    BUN/Creatinine Ratio 16.1 7.0 - 25.0    Anion Gap 4.0 (L) 5.0 - 15.0 mmol/L   Results for orders placed or performed during the hospital encounter of 09/06/18   Gold Top - SST   Result Value Ref Range    Extra Tube Hold for add-ons.    Urinalysis, Microscopic Only - Urine, Clean Catch   Result Value Ref Range    RBC, UA 0-2 (A) None Seen /HPF    WBC, UA 13-20 (A) None Seen, 0-2, 3-5 /HPF    Bacteria, UA None Seen None Seen /HPF    Squamous Epithelial Cells, UA 3-5 (A) None Seen, 0-2 /HPF    Hyaline Casts, UA 3-6 None Seen /LPF    Methodology Automated Microscopy      *Note: Due to a large number of results and/or encounters for the requested time period, some results have not been displayed. A complete set of results can be found in Results Review.

## 2019-02-26 ENCOUNTER — OFFICE VISIT (OUTPATIENT)
Dept: ONCOLOGY | Facility: CLINIC | Age: 74
End: 2019-02-26

## 2019-02-26 ENCOUNTER — LAB (OUTPATIENT)
Dept: ONCOLOGY | Facility: HOSPITAL | Age: 74
End: 2019-02-26

## 2019-02-26 VITALS
HEIGHT: 61 IN | SYSTOLIC BLOOD PRESSURE: 122 MMHG | WEIGHT: 175 LBS | TEMPERATURE: 97.4 F | DIASTOLIC BLOOD PRESSURE: 57 MMHG | OXYGEN SATURATION: 100 % | BODY MASS INDEX: 33.04 KG/M2 | HEART RATE: 47 BPM | RESPIRATION RATE: 16 BRPM

## 2019-02-26 DIAGNOSIS — D64.9 ANEMIA, UNSPECIFIED TYPE: ICD-10-CM

## 2019-02-26 DIAGNOSIS — D70.8 OTHER NEUTROPENIA (HCC): ICD-10-CM

## 2019-02-26 DIAGNOSIS — D51.8 OTHER VITAMIN B12 DEFICIENCY ANEMIA: Primary | ICD-10-CM

## 2019-02-26 DIAGNOSIS — Z86.711 HISTORY OF PULMONARY EMBOLISM: ICD-10-CM

## 2019-02-26 LAB
BASOPHILS # BLD AUTO: 0.03 10*3/MM3 (ref 0–0.2)
BASOPHILS NFR BLD AUTO: 0.7 % (ref 0–1.5)
DEPRECATED RDW RBC AUTO: 40 FL (ref 37–54)
EOSINOPHIL # BLD AUTO: 0.04 10*3/MM3 (ref 0–0.4)
EOSINOPHIL NFR BLD AUTO: 1 % (ref 0.3–6.2)
ERYTHROCYTE [DISTWIDTH] IN BLOOD BY AUTOMATED COUNT: 12.2 % (ref 12.3–15.4)
FERRITIN SERPL-MCNC: 82 NG/ML (ref 11.1–264)
FOLATE SERPL-MCNC: >20 NG/ML (ref 2.76–21)
HCT VFR BLD AUTO: 34.2 % (ref 34–46.6)
HGB BLD-MCNC: 11.7 G/DL (ref 12–15.9)
IMM GRANULOCYTES # BLD AUTO: 0 10*3/MM3 (ref 0–0.05)
IMM GRANULOCYTES NFR BLD AUTO: 0 % (ref 0–0.5)
IRON 24H UR-MRATE: 89 MCG/DL (ref 37–170)
IRON SATN MFR SERPL: 35 % (ref 15–50)
LYMPHOCYTES # BLD AUTO: 1.57 10*3/MM3 (ref 0.7–3.1)
LYMPHOCYTES NFR BLD AUTO: 38.9 % (ref 19.6–45.3)
MCH RBC QN AUTO: 31 PG (ref 26.6–33)
MCHC RBC AUTO-ENTMCNC: 34.2 G/DL (ref 31.5–35.7)
MCV RBC AUTO: 90.5 FL (ref 79–97)
MONOCYTES # BLD AUTO: 0.42 10*3/MM3 (ref 0.1–0.9)
MONOCYTES NFR BLD AUTO: 10.4 % (ref 5–12)
NEUTROPHILS # BLD AUTO: 1.98 10*3/MM3 (ref 1.4–7)
NEUTROPHILS NFR BLD AUTO: 49 % (ref 42.7–76)
NRBC BLD AUTO-RTO: 0 /100 WBC (ref 0–0)
PLATELET # BLD AUTO: 288 10*3/MM3 (ref 140–450)
PMV BLD AUTO: 8.8 FL (ref 6–12)
RBC # BLD AUTO: 3.78 10*6/MM3 (ref 3.77–5.28)
TIBC SERPL-MCNC: 252 MCG/DL (ref 265–497)
VIT B12 BLD-MCNC: 919 PG/ML (ref 239–931)
WBC NRBC COR # BLD: 4.04 10*3/MM3 (ref 3.4–10.8)

## 2019-02-26 PROCEDURE — 83550 IRON BINDING TEST: CPT | Performed by: INTERNAL MEDICINE

## 2019-02-26 PROCEDURE — 85025 COMPLETE CBC W/AUTO DIFF WBC: CPT | Performed by: INTERNAL MEDICINE

## 2019-02-26 PROCEDURE — 99214 OFFICE O/P EST MOD 30 MIN: CPT | Performed by: NURSE PRACTITIONER

## 2019-02-26 PROCEDURE — 83540 ASSAY OF IRON: CPT | Performed by: INTERNAL MEDICINE

## 2019-02-26 PROCEDURE — 82728 ASSAY OF FERRITIN: CPT | Performed by: INTERNAL MEDICINE

## 2019-02-26 PROCEDURE — G0463 HOSPITAL OUTPT CLINIC VISIT: HCPCS | Performed by: NURSE PRACTITIONER

## 2019-02-26 PROCEDURE — 82746 ASSAY OF FOLIC ACID SERUM: CPT | Performed by: INTERNAL MEDICINE

## 2019-02-26 PROCEDURE — 82607 VITAMIN B-12: CPT | Performed by: INTERNAL MEDICINE

## 2019-02-26 NOTE — PROGRESS NOTES
DATE OF VISIT: 2/26/2019    REASON FOR VISIT:  pulmonary embolism ,anemia ,neutropenia        HISTORY OF PRESENT ILLNESS:   73-year-old female with a past medical history consisting of history of pulmonary embolism diagnosed in 2016 status post anticoagulation with Coumadin for one year, history of osteoporosis, history of depression, history of gastric sleeve surgery done in 2016 after that she has lost 120 pounds was initially seen in consultation on August 23, 2018 for evaluation of easy bruising.  Patient is here for 3 month follow-up appointment today.  States she was found to have elevated liver enzymes recently and has been following up with gastroenterology clinic for same.  Denies any bleeding denies any repeated infection.  Denies any new shortness of breath or chest pain.  Denies any new swelling of lower extremity.      PAST MEDICAL HISTORY:    Past Medical History:   Diagnosis Date   • Bleeding tendency (CMS/HCC)    • Chest pain     History of noncardiac chest pain   • Chronic depression    • Depressive disorder    • Diastolic dysfunction    • Dyspnea    • Dyspnea on exertion    • Edema    • Essential hypertension    • Exercise intolerance    • Fatigue    • Gallstones    • GERD (gastroesophageal reflux disease)    • History of bone density study 2011    DEXA BONE DENSITY APPENDICULAR 57967 (1) - normal   • History of bone density study 06/24/2015    DXA BONE DENSITY AXIAL 07692 [facility] (1) - Ordered By: TOLU RAMIREZ ([facility])    • History of echocardiogram     Echocardiogram W/ color flow 71829 (2)   • History of mammogram 2013    Mammogram screen (1)   • History of mammogram 2015    MAMMOGRAM SCREENING 19719 - [facility] (1)   • History of screening mammography 06/25/2015    SCREENING MAMMOGRAPHY DIGITAL  (Medicare) (1) - Ordered By: ANDRADE BECERRIL ([facility])    • Hypercalcemia    • Hyperlipidemia    • Influenza vaccine administered 02/25/2016    INFLUENZA IMMUN ADMIN OR PREV RECV'D   "(2) - Ordered By: ANDRADE BECERRIL ([facility])    • Long-term (current) use of anticoagulants     coumadin therapy      • Lower extremity edema     Intermittent lower extremity edema   • Obesity, Class III, BMI 40-49.9 (morbid obesity) (CMS/HCC)     \"Class III obesity\"   • BEVERLY (obstructive sleep apnea)     Mild BEVERLY   • PE (pulmonary embolism) 10/2015    Bilateral PE diagnosed after a car ride to [place]   • Pneumococcal vaccination given 02/25/2016    PNEUMOC VAC/ADMIN/RCVD 4040F (2) - Ordered By: ANDRADE BECERRIL ([facility])    • Right basilic vein fibrosis 2015   • Sedentary lifestyle    • Shortness of breath    • Venous thrombosis 2015    right basilic venous thrombosis; This was noted in May 2015.   • Weight gain        SOCIAL HISTORY:    Social History     Tobacco Use   • Smoking status: Never Smoker   • Smokeless tobacco: Never Used   Substance Use Topics   • Alcohol use: No   • Drug use: No       Surgical History :  Past Surgical History:   Procedure Laterality Date   • CHOLECYSTECTOMY      Cholecystectomy (1)   • COLONOSCOPY      Approximately 5 years prior as stated per patient   • CRYOABLATION     • ENDOSCOPY  09/15/2011    Colon endoscopy 64104 (2) - Hemorrhoids found.   • GASTRIC SLEEVE LAPAROSCOPIC     • HEMORRHOIDECTOMY     • HYSTEROSCOPY      Hysteroscopy; ablation (1)   • INJECTION OF MEDICATION  09/27/2012    Kenalog (1) - Ordered By: ANDRADE BECERRIL ([facility])        ALLERGIES:    Allergies   Allergen Reactions   • Sulfa Antibiotics Rash and Hives     Sulfa (Sulfonamide Antibiotics)       REVIEW OF SYSTEMS:      CONSTITUTIONAL:  No fever, chills, or night sweats.     HEENT:  No epistaxis, mouth sores, or difficulty swallowing.    RESPIRATORY:  No new shortness of breath or cough at present.    CARDIOVASCULAR:  No chest pain or palpitations.    GASTROINTESTINAL:  No abdominal pain, nausea, vomiting, or blood in the stool.    GENITOURINARY:  No dysuria or hematuria.    MUSCULOSKELETAL:  No any new back pain " "or arthralgias.     NEUROLOGICAL:  No tingling or numbness. No new headache or dizziness.     LYMPHATICS:  Denies any abnormal swollen and anywhere in the body.    SKIN:  Denies any new skin rash.    PHYSICAL EXAMINATION:      VITAL SIGNS:  /57   Pulse (!) 47   Temp 97.4 °F (36.3 °C) (Temporal)   Resp 16   Ht 156.2 cm (61.5\")   Wt 79.4 kg (175 lb)   LMP  (LMP Unknown)   SpO2 100%   BMI 32.53 kg/m²     GENERAL:  Not in any distress.    HEENT:  Normocephalic, Atraumatic.Mild Conjunctival pallor. No icterus.  No Facial Asymmetry noted.    NECK:  No adenopathy. No JVD.    RESPIRATORY:  Fair air entry bilateral. No rhonchi or wheezing.    CARDIOVASCULAR:  S1, S2. Regular rate and rhythm. No murmur or gallop appreciated.    ABDOMEN:  Soft, obese, nontender. Bowel sounds present in all four quadrants.  No organomegaly appreciated.    EXTREMITIES:  No edema.No Calf Tenderness.    NEUROLOGIC:  Alert, awake and oriented ×3.   SKIN : No new skin lesion identified  DIAGNOSTIC DATA:    Glucose   Date Value Ref Range Status   10/25/2018 76 60 - 100 mg/dL Final     Sodium   Date Value Ref Range Status   10/25/2018 135 (L) 137 - 145 mmol/L Final     Potassium   Date Value Ref Range Status   10/25/2018 3.9 3.5 - 5.1 mmol/L Final     CO2   Date Value Ref Range Status   10/25/2018 31.0 22.0 - 31.0 mmol/L Final     Chloride   Date Value Ref Range Status   10/25/2018 100 95 - 110 mmol/L Final     Anion Gap   Date Value Ref Range Status   10/25/2018 4.0 (L) 5.0 - 15.0 mmol/L Final     Creatinine   Date Value Ref Range Status   10/25/2018 0.87 0.50 - 1.00 mg/dL Final     BUN   Date Value Ref Range Status   10/25/2018 14 7 - 21 mg/dL Final     BUN/Creatinine Ratio   Date Value Ref Range Status   10/25/2018 16.1 7.0 - 25.0 Final     Calcium   Date Value Ref Range Status   10/25/2018 9.7 8.4 - 10.2 mg/dL Final     eGFR Non  Amer   Date Value Ref Range Status   10/25/2018 64 39 - 90 mL/min/1.73 Final     Alkaline " Phosphatase   Date Value Ref Range Status   02/15/2019 115 38 - 126 U/L Final     Total Protein   Date Value Ref Range Status   02/15/2019 7.4 6.3 - 8.6 g/dL Final     ALT (SGPT)   Date Value Ref Range Status   02/15/2019 32 9 - 52 U/L Final     AST (SGOT)   Date Value Ref Range Status   02/15/2019 50 (H) 14 - 36 U/L Final     Total Bilirubin   Date Value Ref Range Status   02/15/2019 0.5 0.2 - 1.3 mg/dL Final     Albumin   Date Value Ref Range Status   02/15/2019 4.30 3.40 - 4.80 g/dL Final     Globulin   Date Value Ref Range Status   10/25/2018 3.2 2.3 - 3.5 gm/dL Final     Lab Results   Component Value Date    WBC 4.04 02/26/2019    HGB 11.7 (L) 02/26/2019    HCT 34.2 02/26/2019    MCV 90.5 02/26/2019     02/26/2019     Lab Results   Component Value Date    NEUTROABS 1.98 02/26/2019    IRON 89 02/26/2019    TIBC 252 (L) 02/26/2019    LABIRON 35 02/26/2019    FERRITIN 82.00 02/26/2019    UMGBBQTF81 919 02/26/2019    FOLATE >20.00 02/26/2019     Lab Results   Component Value Date    AFPTM 142 12/05/2018   ]  Component aPTT   Latest Ref Rng & Units 20.0 - 40.3 seconds   6/8/2015 34.3   2/22/2017 36.0   8/9/2018 41.7 (H)   8/23/2018 38.9         Component      Latest Ref Rng & Units 12/2/2016 2/22/2017 8/9/2018 8/23/2018   Protime      11.1 - 15.3 Seconds   13.8 13.9 13.6   INR      0.80 - 1.20 2.90 (A) 1.06 1.09 1.06               Von Willebrand Panel     Ref Range & Units 8d ago   Factor VIII Activity 57 - 163 % 231     Comment: FVIII activity can increase in a variety of clinical situations   including normal pregnancy, in samples drawn from patients   (particularly children) who are visibly stressed at the time of   phlebotomy, as acute phase reactants, or in response to certain drug   therapies such as DDAVP.  Persistently elevated FVIII activity is a   risk factor for venous thrombosis as well as recurrence of venous   thrombosis.  Risk is graded and increases with the degree of   elevation.  Although  elevated FVIII activity has been identified to   cluster within families, a genetic basis for the elevation has not   yet been elucidated (Br J Haematol. 2012; 157:653-663).   Von Willebrand Ag 50 - 200 % 244     Comment: This test was developed and its performance characteristics   determined by [facility]. It has not been cleared or approved   by the Food and Drug Administration.   VWF may elevate in normal pregnancy, in samples drawn from patients   (particularly children) who are visibly stressed at the time of   phlebotomy, as acute phase reactants, or in response to certain drug   therapies such as DDAVP.  These and other situations may increase   levels compared to baseline values.  For these reasons, it may be   necessary to repeat testing to make a diagnosis of VWD.   VWF Activity 50 - 200 % 195    Resulting Agency   [facility]   Narrative      Performed at:  [address]  [id number]  : Johnny Urbina MD, Phone:  [phone]      Specimen Collected: 08/23/18 14:14 Last Resulted: 08/24/18 16:11                          Coag Studies Interp Report   Order: [id number] - Reflex for Order [id number]   Status:  Final result   Visible to patient:  Yes (MyChart) Dx:  Easy bruising   Component 8d ago   von Willebrand Interp Note    Comment: -------------------------------   COAGULATION:   VON WILLEBRAND FACTOR ASSESSMENT CURRENT RESULTS ASSESSMENT   The VWF:Ag is elevated. The VWF:RCo is normal. The FVIII is   elevated.   VON WILLEBRAND FACTOR ASSESSMENT CURRENT RESULTS   INTERPRETATION   -   These results are not consistent with a diagnosis of VWD   according to the current NHLBI guideline.                     ASSESSMENT AND PLAN:       1.  Mild neutropenia:  -Patient's white blood cell count is normal at 4.04 with ANC of 1980.  -Patient has chronic intermittent neutropenia with neutrophil count ranging between 1500 and normal since 2014.  -We will ask patient to  return to clinic in 3 months, if she has persistent neutropenia she will need a bone marrow biopsy to rule out any underlying bone marrow pathology like myelodysplasia which was discussed with patient.     2.  Easy bruising:  -Patient is complaining of easy bruising.  Workup consisting of PT, PTT, INR, von Willebrand profile done on August 23, 2018 is normal.  -Patient is on medication like Lexapro, trazodone and hydrochlorothiazide that can contribute to qualitative platelet dysfunction and easy bruising.  -Patient was offered to be referred to coagulation clinic at [facility] for further evaluation regarding possible qualitative platelet dysfunction but she refused in past.      3.  Mild anemia: Most likely anemia of chronic disease.  -Most recent hemoglobin is 11.7.  Iron studies , Vitamin B12 and folate level drawn today is normal.   -Due to neutropenia and anemia, option of bone marrow biopsy was discussed.  At this point we will continue with clinical monitoring.  Will ask patient to return to clinic in 3 months, if she is any worsening anemia or neutropenia she will need bone marrow biopsy which was discussed with patient.      4. H/o Pulmonary embolism  -Patient had provoked pulmonary embolism around 2015 for which patient took Coumadin for one year.  Denies any new shortness of breath or chest pain.  Patient had car ride to [place] right prior to episode of pulmonary embolism.     5.Health  maintainence:  -Patient does not smoke.  Had a colonoscopy around 2011.          This document has been signed by TABBY Ayon on February 26, 2019 1:37 PM

## 2019-04-24 ENCOUNTER — APPOINTMENT (OUTPATIENT)
Dept: LAB | Facility: HOSPITAL | Age: 74
End: 2019-04-24

## 2019-04-24 ENCOUNTER — OFFICE VISIT (OUTPATIENT)
Dept: FAMILY MEDICINE CLINIC | Facility: CLINIC | Age: 74
End: 2019-04-24

## 2019-04-24 VITALS
DIASTOLIC BLOOD PRESSURE: 80 MMHG | WEIGHT: 174.7 LBS | SYSTOLIC BLOOD PRESSURE: 112 MMHG | BODY MASS INDEX: 32.15 KG/M2 | HEIGHT: 62 IN | HEART RATE: 52 BPM | OXYGEN SATURATION: 100 %

## 2019-04-24 DIAGNOSIS — H92.01 ACUTE OTALGIA, RIGHT: ICD-10-CM

## 2019-04-24 DIAGNOSIS — E78.5 HYPERLIPIDEMIA, UNSPECIFIED HYPERLIPIDEMIA TYPE: ICD-10-CM

## 2019-04-24 DIAGNOSIS — Z00.00 PREVENTATIVE HEALTH CARE: ICD-10-CM

## 2019-04-24 DIAGNOSIS — I10 ESSENTIAL HYPERTENSION: Primary | ICD-10-CM

## 2019-04-24 LAB
ALBUMIN SERPL-MCNC: 4.1 G/DL (ref 3.5–5.2)
ALBUMIN/GLOB SERPL: 1.5 G/DL
ALP SERPL-CCNC: 92 U/L (ref 39–117)
ALT SERPL W P-5'-P-CCNC: 21 U/L (ref 1–33)
ANION GAP SERPL CALCULATED.3IONS-SCNC: 9 MMOL/L
AST SERPL-CCNC: 26 U/L (ref 1–32)
BASOPHILS # BLD AUTO: 0.04 10*3/MM3 (ref 0–0.2)
BASOPHILS NFR BLD AUTO: 1.2 % (ref 0–1.5)
BILIRUB SERPL-MCNC: 0.3 MG/DL (ref 0.2–1.2)
BUN BLD-MCNC: 10 MG/DL (ref 8–23)
BUN/CREAT SERPL: 12.2 (ref 7–25)
CALCIUM SPEC-SCNC: 9.9 MG/DL (ref 8.6–10.5)
CHLORIDE SERPL-SCNC: 103 MMOL/L (ref 98–107)
CHOLEST SERPL-MCNC: 215 MG/DL (ref 0–200)
CO2 SERPL-SCNC: 29 MMOL/L (ref 22–29)
CREAT BLD-MCNC: 0.82 MG/DL (ref 0.57–1)
DEPRECATED RDW RBC AUTO: 43.3 FL (ref 37–54)
EOSINOPHIL # BLD AUTO: 0.04 10*3/MM3 (ref 0–0.4)
EOSINOPHIL NFR BLD AUTO: 1.2 % (ref 0.3–6.2)
ERYTHROCYTE [DISTWIDTH] IN BLOOD BY AUTOMATED COUNT: 12.8 % (ref 12.3–15.4)
GFR SERPL CREATININE-BSD FRML MDRD: 69 ML/MIN/1.73
GLOBULIN UR ELPH-MCNC: 2.8 GM/DL
GLUCOSE BLD-MCNC: 76 MG/DL (ref 65–99)
HBA1C MFR BLD: 5.03 % (ref 4.8–5.6)
HCT VFR BLD AUTO: 33.1 % (ref 34–46.6)
HDLC SERPL-MCNC: 98 MG/DL (ref 40–60)
HGB BLD-MCNC: 11.2 G/DL (ref 12–15.9)
IMM GRANULOCYTES # BLD AUTO: 0.01 10*3/MM3 (ref 0–0.05)
IMM GRANULOCYTES NFR BLD AUTO: 0.3 % (ref 0–0.5)
LDLC SERPL CALC-MCNC: 106 MG/DL (ref 0–100)
LDLC/HDLC SERPL: 1.08 {RATIO}
LYMPHOCYTES # BLD AUTO: 1.72 10*3/MM3 (ref 0.7–3.1)
LYMPHOCYTES NFR BLD AUTO: 52 % (ref 19.6–45.3)
MCH RBC QN AUTO: 31.2 PG (ref 26.6–33)
MCHC RBC AUTO-ENTMCNC: 33.8 G/DL (ref 31.5–35.7)
MCV RBC AUTO: 92.2 FL (ref 79–97)
MONOCYTES # BLD AUTO: 0.41 10*3/MM3 (ref 0.1–0.9)
MONOCYTES NFR BLD AUTO: 12.4 % (ref 5–12)
NEUTROPHILS # BLD AUTO: 1.09 10*3/MM3 (ref 1.7–7)
NEUTROPHILS NFR BLD AUTO: 32.9 % (ref 42.7–76)
NRBC BLD AUTO-RTO: 0.3 /100 WBC (ref 0–0.2)
PLATELET # BLD AUTO: 286 10*3/MM3 (ref 140–450)
PMV BLD AUTO: 10.2 FL (ref 6–12)
POTASSIUM BLD-SCNC: 4.6 MMOL/L (ref 3.5–5.2)
PROT SERPL-MCNC: 6.9 G/DL (ref 6–8.5)
RBC # BLD AUTO: 3.59 10*6/MM3 (ref 3.77–5.28)
SODIUM BLD-SCNC: 141 MMOL/L (ref 136–145)
TRIGL SERPL-MCNC: 56 MG/DL (ref 0–150)
VLDLC SERPL-MCNC: 11.2 MG/DL (ref 5–40)
WBC NRBC COR # BLD: 3.31 10*3/MM3 (ref 3.4–10.8)

## 2019-04-24 PROCEDURE — 36415 COLL VENOUS BLD VENIPUNCTURE: CPT | Performed by: NURSE PRACTITIONER

## 2019-04-24 PROCEDURE — 80053 COMPREHEN METABOLIC PANEL: CPT | Performed by: NURSE PRACTITIONER

## 2019-04-24 PROCEDURE — 85025 COMPLETE CBC W/AUTO DIFF WBC: CPT | Performed by: NURSE PRACTITIONER

## 2019-04-24 PROCEDURE — 99213 OFFICE O/P EST LOW 20 MIN: CPT | Performed by: NURSE PRACTITIONER

## 2019-04-24 PROCEDURE — 80061 LIPID PANEL: CPT | Performed by: NURSE PRACTITIONER

## 2019-04-24 PROCEDURE — 83036 HEMOGLOBIN GLYCOSYLATED A1C: CPT | Performed by: NURSE PRACTITIONER

## 2019-04-24 RX ORDER — SIMVASTATIN 20 MG
20 TABLET ORAL 3 TIMES WEEKLY
Qty: 36 TABLET | Refills: 3 | Status: SHIPPED | OUTPATIENT
Start: 2019-04-24 | End: 2019-07-23

## 2019-04-24 RX ORDER — LOSARTAN POTASSIUM AND HYDROCHLOROTHIAZIDE 12.5; 5 MG/1; MG/1
1 TABLET ORAL DAILY
Qty: 90 TABLET | Refills: 2 | Status: SHIPPED | OUTPATIENT
Start: 2019-04-24 | End: 2019-12-27

## 2019-04-24 NOTE — PROGRESS NOTES
Subjective   Veronica Wilkinson is a 73 y.o. female.     Ms. Wilkinson is a 73-year-old female presents today for follow-up related to hypertension and hyperlipidemia.  She denies any episodes of chest pain, shortness of breath or palpitations.  Her blood pressure is controlled and she is 112/80.  She states she is taking her medication every day as prescribed.    Acutely, Ms. Wilkinson reports pressure in her right ear.  She denies drainage from her ear, fever, chills, nausea, vomiting, diarrhea.  She does report having seasonal allergies which are causing some mild sinus congestion.  She is not taking any of her allergy medication yet but has taken over-the-counter Sudafed with no improvement in symptoms.         The following portions of the patient's history were reviewed and updated as appropriate: allergies, current medications, past family history, past medical history, past social history, past surgical history and problem list.    Review of Systems   Constitutional: Negative for activity change, appetite change, fatigue, unexpected weight gain and unexpected weight loss.   HENT: Positive for congestion and ear pain. Negative for ear discharge, sore throat, trouble swallowing and voice change.    Eyes: Negative.    Respiratory: Negative for cough, chest tightness, shortness of breath and wheezing.    Cardiovascular: Negative for chest pain, palpitations and leg swelling.   Gastrointestinal: Negative for abdominal pain, constipation, diarrhea, nausea and vomiting.   Endocrine: Negative.  Negative for cold intolerance, heat intolerance, polydipsia, polyphagia and polyuria.   Genitourinary: Negative for dysuria.   Musculoskeletal: Negative for arthralgias and myalgias.   Skin: Negative for rash.   Allergic/Immunologic: Negative.    Neurological: Negative.    Hematological: Negative.    Psychiatric/Behavioral: Negative.        Objective   Physical Exam   Constitutional: She is oriented to person, place, and time. She  appears well-developed and well-nourished. No distress.   HENT:   Head: Normocephalic and atraumatic.   Right Ear: External ear normal. Tympanic membrane is not perforated and not erythematous. No middle ear effusion.   Left Ear: External ear normal. Tympanic membrane is not perforated.  No middle ear effusion.   Nose: Nose normal.   Mouth/Throat: Oropharynx is clear and moist.   Eyes: Conjunctivae are normal.   Neck: Normal range of motion. No thyromegaly present.   Cardiovascular: Normal rate, regular rhythm, normal heart sounds and intact distal pulses.   Pulmonary/Chest: Effort normal and breath sounds normal. No stridor. No respiratory distress. She has no wheezes. She has no rales.   Musculoskeletal: Normal range of motion. She exhibits no edema or tenderness.   Lymphadenopathy:     She has no cervical adenopathy.   Neurological: She is alert and oriented to person, place, and time.   Skin: Skin is warm and dry. No rash noted. She is not diaphoretic. No erythema. No pallor.   Psychiatric: She has a normal mood and affect. Her behavior is normal. Judgment and thought content normal.   Nursing note and vitals reviewed.        Assessment/Plan   Veronica was seen today for follow-up.    Diagnoses and all orders for this visit:    Essential hypertension    Acute otalgia, right    Hyperlipidemia, unspecified hyperlipidemia type  -     Lipid Panel    Preventative health care  -     Hemoglobin A1c  -     Comprehensive Metabolic Panel  -     CBC & Differential  -     CBC Auto Differential    Other orders  -     simvastatin (ZOCOR) 20 MG tablet; Take 1 tablet by mouth 3 (Three) Times a Week for 90 days. Monday, Wednesday and Friday  -     losartan-hydrochlorothiazide (HYZAAR) 50-12.5 MG per tablet; Take 1 tablet by mouth Daily.    1.  Hypertension-controlled.  Refill Hyzaar 50-12 and half milligrams 1 tablet p.o. daily.    2.  Hyperlipidemia- lipid panel.  Will call with results.  Refill Zocor 20 mg 1 tablet 3 times  weekly on Monday Wednesday and Friday.  Reinforced low-fat diet.    3.  Otalgia right ear- instructed patient to resume Claritin 10 mg p.o. daily and Flonase 50 MCG/ACT 2 sprays each nostril daily as needed for congestion.    4.  Health maintenance- routine labs ordered.  Will call with results.    5.  Follow-up in 3 months or sooner if needed.            This document has been electronically signed by TABBY Mejia on April 24, 2019 11:10 AM

## 2019-04-26 NOTE — PROGRESS NOTES
Cholesterol improved but slightly elevated.  Reinforce low-fat diet.  Other labs stable.  Follow-up as scheduled.

## 2019-05-21 ENCOUNTER — TELEPHONE (OUTPATIENT)
Dept: FAMILY MEDICINE CLINIC | Facility: CLINIC | Age: 74
End: 2019-05-21

## 2019-05-21 NOTE — TELEPHONE ENCOUNTER
She wants to talk to nurse-has questions about reclast shot.She said she saw a Dr at [place] and he wants her to see about getting it--she can be reached at [phone].

## 2019-05-22 NOTE — TELEPHONE ENCOUNTER
Archie,    Pt. Stated that doctor at [facility] was suppose to fax info. To you. Did you receive anything on Tuesday?  I explained to her that we do not do those injections at our office.  I think she will need to go to [facility].     Please advise,  Thanks so much.

## 2019-05-23 NOTE — TELEPHONE ENCOUNTER
I did not receive anything.  She might want to check with the [facility] center to see if they send it there.  She will have to follow-up with them.

## 2019-05-23 NOTE — TELEPHONE ENCOUNTER
Patient was called regarding reclast inj.  Advised patient that our office does not perform the reclast injection and to contact the [facility] to see if any information from [place] was sent to the [facility].

## 2019-06-24 ENCOUNTER — OFFICE VISIT (OUTPATIENT)
Dept: ONCOLOGY | Facility: CLINIC | Age: 74
End: 2019-06-24

## 2019-06-24 ENCOUNTER — LAB (OUTPATIENT)
Dept: ONCOLOGY | Facility: HOSPITAL | Age: 74
End: 2019-06-24

## 2019-06-24 VITALS
BODY MASS INDEX: 32.93 KG/M2 | TEMPERATURE: 97.8 F | SYSTOLIC BLOOD PRESSURE: 110 MMHG | RESPIRATION RATE: 18 BRPM | HEIGHT: 61 IN | DIASTOLIC BLOOD PRESSURE: 56 MMHG | WEIGHT: 174.4 LBS | HEART RATE: 53 BPM

## 2019-06-24 DIAGNOSIS — D51.8 OTHER VITAMIN B12 DEFICIENCY ANEMIA: Primary | ICD-10-CM

## 2019-06-24 DIAGNOSIS — I26.99 OTHER PULMONARY EMBOLISM WITHOUT ACUTE COR PULMONALE, UNSPECIFIED CHRONICITY (HCC): ICD-10-CM

## 2019-06-24 DIAGNOSIS — R23.3 EASY BRUISING: ICD-10-CM

## 2019-06-24 DIAGNOSIS — D70.8 OTHER NEUTROPENIA (HCC): ICD-10-CM

## 2019-06-24 DIAGNOSIS — D51.8 OTHER VITAMIN B12 DEFICIENCY ANEMIA: ICD-10-CM

## 2019-06-24 LAB
ALBUMIN SERPL-MCNC: 3.8 G/DL (ref 3.5–5.2)
ALBUMIN/GLOB SERPL: 1.3 G/DL
ALP SERPL-CCNC: 93 U/L (ref 39–117)
ALT SERPL W P-5'-P-CCNC: 21 U/L (ref 1–33)
ANION GAP SERPL CALCULATED.3IONS-SCNC: 10 MMOL/L
AST SERPL-CCNC: 26 U/L (ref 1–32)
BASOPHILS # BLD AUTO: 0.04 10*3/MM3 (ref 0–0.2)
BASOPHILS NFR BLD AUTO: 0.5 % (ref 0–1.5)
BILIRUB SERPL-MCNC: 0.4 MG/DL (ref 0.2–1.2)
BUN BLD-MCNC: 12 MG/DL (ref 8–23)
BUN/CREAT SERPL: 13 (ref 7–25)
CALCIUM SPEC-SCNC: 9.7 MG/DL (ref 8.6–10.5)
CHLORIDE SERPL-SCNC: 99 MMOL/L (ref 98–107)
CO2 SERPL-SCNC: 29 MMOL/L (ref 22–29)
CREAT BLD-MCNC: 0.92 MG/DL (ref 0.57–1)
DEPRECATED RDW RBC AUTO: 39.9 FL (ref 37–54)
EOSINOPHIL # BLD AUTO: 0.04 10*3/MM3 (ref 0–0.4)
EOSINOPHIL NFR BLD AUTO: 0.5 % (ref 0.3–6.2)
ERYTHROCYTE [DISTWIDTH] IN BLOOD BY AUTOMATED COUNT: 12.2 % (ref 12.3–15.4)
FERRITIN SERPL-MCNC: 116.3 NG/ML (ref 13–150)
FOLATE SERPL-MCNC: >20 NG/ML (ref 4.78–24.2)
GFR SERPL CREATININE-BSD FRML MDRD: 60 ML/MIN/1.73
GLOBULIN UR ELPH-MCNC: 3 GM/DL
GLUCOSE BLD-MCNC: 80 MG/DL (ref 65–99)
HCT VFR BLD AUTO: 33.9 % (ref 34–46.6)
HGB BLD-MCNC: 11.6 G/DL (ref 12–15.9)
IMM GRANULOCYTES # BLD AUTO: 0.02 10*3/MM3 (ref 0–0.05)
IMM GRANULOCYTES NFR BLD AUTO: 0.3 % (ref 0–0.5)
IRON 24H UR-MRATE: 68 MCG/DL (ref 37–145)
IRON SATN MFR SERPL: 21 % (ref 20–50)
LYMPHOCYTES # BLD AUTO: 1.54 10*3/MM3 (ref 0.7–3.1)
LYMPHOCYTES NFR BLD AUTO: 21 % (ref 19.6–45.3)
MCH RBC QN AUTO: 30.9 PG (ref 26.6–33)
MCHC RBC AUTO-ENTMCNC: 34.2 G/DL (ref 31.5–35.7)
MCV RBC AUTO: 90.2 FL (ref 79–97)
MONOCYTES # BLD AUTO: 0.57 10*3/MM3 (ref 0.1–0.9)
MONOCYTES NFR BLD AUTO: 7.8 % (ref 5–12)
NEUTROPHILS # BLD AUTO: 5.14 10*3/MM3 (ref 1.7–7)
NEUTROPHILS NFR BLD AUTO: 69.9 % (ref 42.7–76)
NRBC BLD AUTO-RTO: 0 /100 WBC (ref 0–0.2)
PLATELET # BLD AUTO: 268 10*3/MM3 (ref 140–450)
PMV BLD AUTO: 9.1 FL (ref 6–12)
POTASSIUM BLD-SCNC: 3.8 MMOL/L (ref 3.5–5.2)
PROT SERPL-MCNC: 6.8 G/DL (ref 6–8.5)
RBC # BLD AUTO: 3.76 10*6/MM3 (ref 3.77–5.28)
SODIUM BLD-SCNC: 138 MMOL/L (ref 136–145)
TIBC SERPL-MCNC: 329 MCG/DL (ref 298–536)
TRANSFERRIN SERPL-MCNC: 221 MG/DL (ref 200–360)
VIT B12 BLD-MCNC: 1285 PG/ML (ref 211–946)
WBC NRBC COR # BLD: 7.35 10*3/MM3 (ref 3.4–10.8)

## 2019-06-24 PROCEDURE — 1123F ACP DISCUSS/DSCN MKR DOCD: CPT | Performed by: INTERNAL MEDICINE

## 2019-06-24 PROCEDURE — 83540 ASSAY OF IRON: CPT | Performed by: INTERNAL MEDICINE

## 2019-06-24 PROCEDURE — 82607 VITAMIN B-12: CPT | Performed by: INTERNAL MEDICINE

## 2019-06-24 PROCEDURE — 82728 ASSAY OF FERRITIN: CPT | Performed by: INTERNAL MEDICINE

## 2019-06-24 PROCEDURE — 80053 COMPREHEN METABOLIC PANEL: CPT | Performed by: NURSE PRACTITIONER

## 2019-06-24 PROCEDURE — 36415 COLL VENOUS BLD VENIPUNCTURE: CPT | Performed by: INTERNAL MEDICINE

## 2019-06-24 PROCEDURE — 85025 COMPLETE CBC W/AUTO DIFF WBC: CPT | Performed by: NURSE PRACTITIONER

## 2019-06-24 PROCEDURE — 84466 ASSAY OF TRANSFERRIN: CPT | Performed by: INTERNAL MEDICINE

## 2019-06-24 PROCEDURE — 99214 OFFICE O/P EST MOD 30 MIN: CPT | Performed by: INTERNAL MEDICINE

## 2019-06-24 PROCEDURE — G9903 PT SCRN TBCO ID AS NON USER: HCPCS | Performed by: INTERNAL MEDICINE

## 2019-06-24 PROCEDURE — 82746 ASSAY OF FOLIC ACID SERUM: CPT | Performed by: INTERNAL MEDICINE

## 2019-06-24 NOTE — PROGRESS NOTES
DATE OF VISIT: 6/24/2019      REASON FOR VISIT:   h/o pulmonary embolism ,anemia ,neutropenia      HISTORY OF PRESENT ILLNESS:   73-year-old female with a past medical history consisting of history of pulmonary embolism diagnosed in 2016 status post anticoagulation with Coumadin for one year, history of osteoporosis, history of depression, history of gastric sleeve surgery done in 2016 after that she has lost 120 pounds was initially seen in consultation on August 23, 2018 for evaluation of easy bruising.  Patient is here for 3 month follow-up appointment today.    Denies any bleeding denies any repeated infection.  Denies any new shortness of breath or chest pain.  Denies any new swelling of lower extremity.      PAST MEDICAL HISTORY:    Past Medical History:   Diagnosis Date   • Bleeding tendency (CMS/HCC)    • Chest pain     History of noncardiac chest pain   • Chronic depression    • Depressive disorder    • Diastolic dysfunction    • Dyspnea    • Dyspnea on exertion    • Edema    • Essential hypertension    • Exercise intolerance    • Fatigue    • Gallstones    • GERD (gastroesophageal reflux disease)    • History of bone density study 2011    DEXA BONE DENSITY APPENDICULAR 10592 (1) - normal   • History of bone density study 06/24/2015    DXA BONE DENSITY AXIAL 13959 [facility] (1) - Ordered By: TOLU RAMIREZ ([facility])    • History of echocardiogram     Echocardiogram W/ color flow 38285 (2)   • History of mammogram 2013    Mammogram screen (1)   • History of mammogram 2015    MAMMOGRAM SCREENING 78941 - [facility] (1)   • History of screening mammography 06/25/2015    SCREENING MAMMOGRAPHY DIGITAL  (Medicare) (1) - Ordered By: ANDRADE BECERRIL ([facility])    • Hypercalcemia    • Hyperlipidemia    • Influenza vaccine administered 02/25/2016    INFLUENZA IMMUN ADMIN OR PREV RECV'D  (2) - Ordered By: ANDRADE BECERRIL ([facility])    • Long-term (current) use of anticoagulants     coumadin therapy      •  "Lower extremity edema     Intermittent lower extremity edema   • Obesity, Class III, BMI 40-49.9 (morbid obesity) (CMS/formerly [facility])     \"Class III obesity\"   • BEVERLY (obstructive sleep apnea)     Mild BEVERLY   • PE (pulmonary embolism) 10/2015    Bilateral PE diagnosed after a car ride to [place]   • Pneumococcal vaccination given 02/25/2016    PNEUMOC VAC/ADMIN/RCVD 4040F (2) - Ordered By: ANDRADE BECERRIL ([facility])    • Right basilic vein fibrosis 2015   • Sedentary lifestyle    • Shortness of breath    • Venous thrombosis 2015    right basilic venous thrombosis; This was noted in May 2015.   • Weight gain        SOCIAL HISTORY:    Social History     Tobacco Use   • Smoking status: Never Smoker   • Smokeless tobacco: Never Used   Substance Use Topics   • Alcohol use: No   • Drug use: No       Surgical History :  Past Surgical History:   Procedure Laterality Date   • CHOLECYSTECTOMY      Cholecystectomy (1)   • COLONOSCOPY      Approximately 5 years prior as stated per patient   • CRYOABLATION     • ENDOSCOPY  09/15/2011    Colon endoscopy 40825 (2) - Hemorrhoids found.   • GASTRIC SLEEVE LAPAROSCOPIC     • HEMORRHOIDECTOMY     • HYSTEROSCOPY      Hysteroscopy; ablation (1)   • INJECTION OF MEDICATION  09/27/2012    Kenalog (1) - Ordered By: ANDRADE BECERRIL ([facility])        ALLERGIES:    Allergies   Allergen Reactions   • Sulfa Antibiotics Rash and Hives     Sulfa (Sulfonamide Antibiotics)         FAMILY HISTORY:  Family History   Problem Relation Age of Onset   • Ovarian cancer Mother    • Bleeding Disorder Father    • Other Father         Hematologic disorder   • Lung cancer Father    • Pancreatic cancer Sister    • Cancer Brother    • Lung cancer Brother    • Bleeding Disorder Other    • Hyperlipidemia Other    • Hypertension Other    • Osteoarthritis Other    • Other Other         Gastritis   • Adrenal disorder Daughter    • Seizures Daughter    • Thyroid disease Daughter    • No Known Problems Daughter    • Diabetes Other  " "  • Heart disease Other    • Other Other         Ulcers; Bleeding Tendencies; Allergy   • Cancer Other    • Cholelithiasis Other    • Hypertension Other    • Allergy (severe) Other            REVIEW OF SYSTEMS:      CONSTITUTIONAL:  Complains of fatigue.   Denies any fever, chills .      EYES: No visual disturbances. No discharge. No new lesions     ENMT:  No epistaxis, mouth sores or difficulty swallowing.     RESPIRATORY:  No new shortness of breath. No new cough or hemoptysis.     CARDIOVASCULAR:  No chest pain or palpitations.     GASTROINTESTINAL:  No abdominal pain nausea, vomiting or blood in the stool.     GENITOURINARY: No Dysuria or Hematuria.     MUSCULOSKELETAL:  Positive for joint pains due to arthritis.     LYMPHATICS:  Denies any abnormal swollen glands anywhere in the body.     NEUROLOGICAL : Complains of intermittent tingling and numbness affecting bilateral hands. No headache or dizziness. No seizures or balance problems.     SKIN: Complains of easy bruising.          PHYSICAL EXAMINATION:      VITAL SIGNS:  /56   Pulse 53   Temp 97.8 °F (36.6 °C) (Temporal)   Resp 18   Ht 156.2 cm (61.5\")   Wt 79.1 kg (174 lb 6.4 oz)   LMP  (LMP Unknown)   BMI 32.42 kg/m²       06/24/19  1019   Weight: 79.1 kg (174 lb 6.4 oz)     Manual pulse rate is 60    ECOG performance status: 2     CONSTITUTIONAL:  Not in any distress.     EYES: Mild conjunctival Pallor. No Icterus. No Pterygium. Extraocular Movements intact.No ptosis.     ENMT:  Normocephalic, Atraumatic.No Facial Asymmetry noted.     NECK:  No adenopathy.Trachea midline. NO JVD.     RESPIRATORY:  Fair air entry bilateral. No rhonchi or wheezing.Fair respiratory effort.     CARDIOVASCULAR:  S1, S2. Regular rate and rhythm. No murmur or gallop appreciated.     ABDOMEN:  Soft, obese, nontender. Bowel sounds present in all four quadrants.  No Hepatosplenomegaly appreciated.     MUSCULOSKELETAL:  No edema.No Calf Tenderness.Decreased range of " motion.     NEUROLOGIC:    No  Motor  deficit appreciated. Cranial Nerves 2-12 grossly intact.     SKIN : Bruising present on bilateral forearms, bilateral lower extremity and right side of face.     LYMPHATICS: No new enlarged lymph nodes in neck or supraclavicular area.     PSYCHIATRY: Alert, awake and oriented ×3.          DIAGNOSTIC DATA:    Glucose   Date Value Ref Range Status   06/24/2019 80 65 - 99 mg/dL Final     Sodium   Date Value Ref Range Status   06/24/2019 138 136 - 145 mmol/L Final   10/12/2018 140 134 - 144 mmol/L Final     Potassium   Date Value Ref Range Status   06/24/2019 3.8 3.5 - 5.2 mmol/L Final   10/12/2018 4.6 3.5 - 5.1 mmol/L Final     CO2   Date Value Ref Range Status   06/24/2019 29.0 22.0 - 29.0 mmol/L Final     Chloride   Date Value Ref Range Status   06/24/2019 99 98 - 107 mmol/L Final   10/12/2018 105 98 - 107 mmol/L Final     Anion Gap   Date Value Ref Range Status   06/24/2019 10.0 mmol/L Final     Creatinine   Date Value Ref Range Status   06/24/2019 0.92 0.57 - 1.00 mg/dL Final   10/12/2018 0.9 0.6 - 1.3 mg/dL Final     Comment:     **The MDRD GFR formula is valid only for ages 18-70.    GFR will not be reported for individuals aging <18 or >70.     BUN   Date Value Ref Range Status   06/24/2019 12 8 - 23 mg/dL Final   10/12/2018 13 7 - 18 mg/dL Final     BUN/Creatinine Ratio   Date Value Ref Range Status   06/24/2019 13.0 7.0 - 25.0 Final     Calcium   Date Value Ref Range Status   06/24/2019 9.7 8.6 - 10.5 mg/dL Final   10/12/2018 9.7 8.8 - 10.5 mg/dL Final     eGFR Non  Amer   Date Value Ref Range Status   06/24/2019 60 (L) >60 mL/min/1.73 Final   04/15/2019 >60 ml/min/1.73 m2 Final     Comment:     Estimated glomerular filtration rate (eGFR) and estimated GFR  (eGRFAA) are calculated using the IDMS MDRD equation which is recommended for use in normal healthy individuals 18-70 years of age and has not been validated in other populations.  See eGFR in the  [facility] Test Directory for additional information.     Alkaline Phosphatase   Date Value Ref Range Status   06/24/2019 93 39 - 117 U/L Final   10/12/2018 115 46 - 116 U/L Final     Total Protein   Date Value Ref Range Status   06/24/2019 6.8 6.0 - 8.5 g/dL Final   10/12/2018 6.6 6.4 - 8.2 g/dL Final     ALT (SGPT)   Date Value Ref Range Status   06/24/2019 21 1 - 33 U/L Final   10/12/2018 43 30 - 65 U/L Final     AST (SGOT)   Date Value Ref Range Status   06/24/2019 26 1 - 32 U/L Final   10/12/2018 22 15 - 37 U/L Final     Total Bilirubin   Date Value Ref Range Status   06/24/2019 0.4 0.2 - 1.2 mg/dL Final   10/12/2018 0.3 0 - 1.0 mg/dL Final     Albumin   Date Value Ref Range Status   06/24/2019 3.80 3.50 - 5.20 g/dL Final   10/12/2018 3.2 (L) 3.4 - 5.0 g/dL Final     Globulin   Date Value Ref Range Status   06/24/2019 3.0 gm/dL Final     Lab Results   Component Value Date    WBC 7.35 06/24/2019    HGB 11.6 (L) 06/24/2019    HCT 33.9 (L) 06/24/2019    MCV 90.2 06/24/2019     06/24/2019     Lab Results   Component Value Date    NEUTROABS 5.14 06/24/2019    IRON 68 06/24/2019    TIBC 329 06/24/2019    LABIRON 21 06/24/2019    FERRITIN 116.30 06/24/2019    CUEYBTJD44 919 02/26/2019    FOLATE >20.00 02/26/2019     Lab Results   Component Value Date    AFPTM 142 12/05/2018         Component aPTT   Latest Ref Rng & Units 20.0 - 40.3 seconds   6/8/2015 34.3   2/22/2017 36.0   8/9/2018 41.7 (H)   8/23/2018 38.9       Component      Latest Ref Rng & Units 12/2/2016 2/22/2017 8/9/2018 8/23/2018   Protime      11.1 - 15.3 Seconds  13.8 13.9 13.6   INR      0.80 - 1.20 2.90 (A) 1.06 1.09 1.06           Von Willebrand Panel    Ref Range & Units 8d ago   Factor VIII Activity 57 - 163 % 231     Comment: FVIII activity can increase in a variety of clinical situations   including normal pregnancy, in samples drawn from patients   (particularly children) who are visibly stressed at the time of   phlebotomy, as acute phase  reactants, or in response to certain drug   therapies such as DDAVP.  Persistently elevated FVIII activity is a   risk factor for venous thrombosis as well as recurrence of venous   thrombosis.  Risk is graded and increases with the degree of   elevation.  Although elevated FVIII activity has been identified to   cluster within families, a genetic basis for the elevation has not   yet been elucidated (Br J Haematol. 2012; 157:653-663).   Von Willebrand Ag 50 - 200 % 244     Comment: This test was developed and its performance characteristics   determined by [facility]. It has not been cleared or approved   by the Food and Drug Administration.   VWF may elevate in normal pregnancy, in samples drawn from patients   (particularly children) who are visibly stressed at the time of   phlebotomy, as acute phase reactants, or in response to certain drug   therapies such as DDAVP.  These and other situations may increase   levels compared to baseline values.  For these reasons, it may be   necessary to repeat testing to make a diagnosis of VWD.   VWF Activity 50 - 200 % 195    Resulting Agency  [facility]   Narrative     Performed at:  [address]  [id number]  : Johnny Urbina MD, Phone:  [phone]      Specimen Collected: 08/23/18 14:14 Last Resulted: 08/24/18 16:11                       [facility]   Order: [id number] - Reflex for Order [id number]   Status:  Final result   Visible to patient:  Yes (MyChart) Dx:  Easy bruising   Component 8d ago   von Willebrand Interp Note    Comment: -------------------------------   COAGULATION:   VON WILLEBRAND FACTOR ASSESSMENT CURRENT RESULTS ASSESSMENT   The VWF:Ag is elevated. The VWF:RCo is normal. The FVIII is   elevated.   VON WILLEBRAND FACTOR ASSESSMENT CURRENT RESULTS   INTERPRETATION   -   These results are not consistent with a diagnosis of VWD   according to the current NHLBI guideline.                  ASSESSMENT AND PLAN:       1.  Mild neutropenia:  -Patient's white blood cell count is normal at 7.35 today with absolute neutrophil count of 5140.  -Patient has chronic intermittent neutropenia with neutrophil count ranging between 1500 and normal since 2014.  -Anemia workup done earlier today does not show any evidence of nutritional deficiency.  -We will ask patient to return to clinic in 3 months, if she has persistent neutropenia she will need a bone marrow biopsy to rule out any underlying bone marrow pathology like myelodysplasia which was discussed with patient.    2.  Easy bruising:  -Patient is complaining of easy bruising.  Workup consisting of PT, PTT, INR, von Willebrand profile done on August 23, 2018 is normal.  -Patient is on medication like Lexapro, trazodone and hydrochlorothiazide that can contribute to qualitative platelet dysfunction and easy bruising.  -Patient was offered to be referred to coagulation clinic at [place] for further evaluation regarding possible qualitative platelet dysfunction.  Patient is not interested in going to [place] clinic for further evaluation at this point.     3.  Mild anemia: Most likely anemia of chronic disease.  -Most recent hemoglobin is 11.6.  Iron studies , Vitamin B12 and folate level drawn today is normal.   -Patient is currently taking ferrous sulfate 1 tablet 3 times a week.  -Due to neutropenia and anemia, option of bone marrow biopsy was discussed.  At this point we will continue with clinical monitoring.  Will ask patient to return to clinic in 3 months, if she is any worsening anemia or neutropenia she will need bone marrow biopsy which was discussed with patient.      4. H/o Pulmonary embolism  -Patient had provoked pulmonary embolism around 2015 for which patient took Coumadin for one year.  Denies any new shortness of breath or chest pain.  Patient had car ride to [place] right prior to episode of pulmonary embolism.     5.Health   maintainence:  -Patient does not smoke.  Had a colonoscopy around 2011.      6. Advance Care Planning: For now patient remains full code and is able to make her decisions.  Patient has health care surrogate mentioned on chart.    7. Pain Assessment:  -Patient denies any pain today.           Mihai Hunt MD  6/24/2019  6:25 PM        EMR Dragon/Transcription disclaimer:   Much of this encounter note is an electronic transcription/translation of spoken language to printed text. The electronic translation of spoken language may permit erroneous, or at times, nonsensical words or phrases to be inadvertently transcribed; Although I have reviewed the note for such errors, some may still exist.

## 2019-06-25 ENCOUNTER — TELEPHONE (OUTPATIENT)
Dept: ONCOLOGY | Facility: HOSPITAL | Age: 74
End: 2019-06-25

## 2019-06-25 NOTE — TELEPHONE ENCOUNTER
----- Message from Mihai Hunt MD sent at 6/25/2019  7:47 AM CDT -----  Please let Patient know, she needs to continue taking ferrous sulfate 1 tablet 3 times a week.  Thank you

## 2019-07-11 ENCOUNTER — TELEPHONE (OUTPATIENT)
Dept: FAMILY MEDICINE CLINIC | Facility: CLINIC | Age: 74
End: 2019-07-11

## 2019-07-11 RX ORDER — FLUTICASONE PROPIONATE 50 MCG
2 SPRAY, SUSPENSION (ML) NASAL DAILY
Qty: 18.2 ML | Refills: 1 | Status: SHIPPED | OUTPATIENT
Start: 2019-07-11 | End: 2020-03-04 | Stop reason: SDUPTHER

## 2019-07-26 ENCOUNTER — OFFICE VISIT (OUTPATIENT)
Dept: FAMILY MEDICINE CLINIC | Facility: CLINIC | Age: 74
End: 2019-07-26

## 2019-07-26 VITALS
SYSTOLIC BLOOD PRESSURE: 118 MMHG | OXYGEN SATURATION: 98 % | HEIGHT: 62 IN | HEART RATE: 53 BPM | DIASTOLIC BLOOD PRESSURE: 78 MMHG | BODY MASS INDEX: 31.87 KG/M2 | WEIGHT: 173.2 LBS

## 2019-07-26 DIAGNOSIS — E78.2 MIXED HYPERLIPIDEMIA: ICD-10-CM

## 2019-07-26 DIAGNOSIS — M54.2 NECK PAIN: ICD-10-CM

## 2019-07-26 DIAGNOSIS — I10 ESSENTIAL HYPERTENSION: Primary | ICD-10-CM

## 2019-07-26 PROCEDURE — 99214 OFFICE O/P EST MOD 30 MIN: CPT | Performed by: NURSE PRACTITIONER

## 2019-07-26 RX ORDER — TIZANIDINE HYDROCHLORIDE 2 MG/1
2 CAPSULE, GELATIN COATED ORAL NIGHTLY PRN
Qty: 20 CAPSULE | Refills: 0 | Status: SHIPPED | OUTPATIENT
Start: 2019-07-26 | End: 2020-12-07

## 2019-07-26 RX ORDER — SIMETHICONE 125 MG
1 CAPSULE ORAL NIGHTLY
COMMUNITY
End: 2023-03-24

## 2019-07-26 RX ORDER — NAPROXEN 500 MG/1
500 TABLET ORAL 2 TIMES DAILY PRN
Qty: 20 TABLET | Refills: 0 | Status: SHIPPED | OUTPATIENT
Start: 2019-07-26 | End: 2020-08-26
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
## 2019-07-29 ENCOUNTER — LAB (OUTPATIENT)
Dept: LAB | Facility: HOSPITAL | Age: 74
End: 2019-07-29

## 2019-07-29 DIAGNOSIS — E78.2 MIXED HYPERLIPIDEMIA: ICD-10-CM

## 2019-07-29 LAB
CHOLEST SERPL-MCNC: 198 MG/DL (ref 0–200)
HDLC SERPL-MCNC: 85 MG/DL (ref 40–60)
LDLC SERPL CALC-MCNC: 102 MG/DL (ref 0–100)
LDLC/HDLC SERPL: 1.2 {RATIO}
TRIGL SERPL-MCNC: 56 MG/DL (ref 0–150)
VLDLC SERPL-MCNC: 11.2 MG/DL (ref 5–40)

## 2019-07-29 PROCEDURE — 36415 COLL VENOUS BLD VENIPUNCTURE: CPT

## 2019-07-29 PROCEDURE — 80061 LIPID PANEL: CPT

## 2019-08-13 ENCOUNTER — OFFICE VISIT (OUTPATIENT)
Dept: FAMILY MEDICINE CLINIC | Facility: CLINIC | Age: 74
End: 2019-08-13

## 2019-08-13 ENCOUNTER — APPOINTMENT (OUTPATIENT)
Dept: LAB | Facility: HOSPITAL | Age: 74
End: 2019-08-13

## 2019-08-13 VITALS
TEMPERATURE: 98.1 F | WEIGHT: 172.5 LBS | DIASTOLIC BLOOD PRESSURE: 80 MMHG | BODY MASS INDEX: 31.74 KG/M2 | HEART RATE: 48 BPM | OXYGEN SATURATION: 99 % | HEIGHT: 62 IN | SYSTOLIC BLOOD PRESSURE: 103 MMHG

## 2019-08-13 DIAGNOSIS — N30.00 ACUTE CYSTITIS WITHOUT HEMATURIA: Primary | ICD-10-CM

## 2019-08-13 LAB
BILIRUB BLD-MCNC: NEGATIVE MG/DL
CLARITY, POC: CLEAR
COLOR UR: YELLOW
GLUCOSE UR STRIP-MCNC: NEGATIVE MG/DL
KETONES UR QL: NEGATIVE
LEUKOCYTE EST, POC: NEGATIVE
NITRITE UR-MCNC: POSITIVE MG/ML
PH UR: 5 [PH] (ref 5–8)
PROT UR STRIP-MCNC: ABNORMAL MG/DL
RBC # UR STRIP: NEGATIVE /UL
SP GR UR: 0.01 (ref 1–1.03)
UROBILINOGEN UR QL: NORMAL

## 2019-08-13 PROCEDURE — 81003 URINALYSIS AUTO W/O SCOPE: CPT | Performed by: NURSE PRACTITIONER

## 2019-08-13 PROCEDURE — 99213 OFFICE O/P EST LOW 20 MIN: CPT | Performed by: NURSE PRACTITIONER

## 2019-08-13 PROCEDURE — 87086 URINE CULTURE/COLONY COUNT: CPT | Performed by: NURSE PRACTITIONER

## 2019-08-13 PROCEDURE — 87088 URINE BACTERIA CULTURE: CPT | Performed by: NURSE PRACTITIONER

## 2019-08-13 PROCEDURE — 87186 SC STD MICRODIL/AGAR DIL: CPT | Performed by: NURSE PRACTITIONER

## 2019-08-13 NOTE — PROGRESS NOTES
Subjective   Veronica Wilkinson is a 74 y.o. female.     Ms. Wilkinson is a 74-year-old female presents today for follow-up related to UTI.  She was recently seen at a local urgent care and treated for UTI with Macrobid 100 mg p.o. twice daily for 3 days.  She does report a slight improvement in the burning with urination and suprapubic pressure.  However she reports ongoing mild symptoms.  She denies fever, chills, nausea, vomiting, diarrhea, back pain.          The following portions of the patient's history were reviewed and updated as appropriate: allergies, current medications, past family history, past medical history, past social history, past surgical history and problem list.    Review of Systems   Constitutional: Negative for activity change, appetite change, fatigue, unexpected weight gain and unexpected weight loss.   HENT: Negative for congestion, sore throat, trouble swallowing and voice change.    Eyes: Negative.    Respiratory: Negative for cough, chest tightness, shortness of breath and wheezing.    Cardiovascular: Negative for chest pain, palpitations and leg swelling.   Gastrointestinal: Positive for abdominal pain (suprapubic pressure, improving). Negative for constipation, diarrhea, nausea and vomiting.   Endocrine: Negative.    Genitourinary: Positive for dysuria (burning with urination nearly resolved). Negative for decreased urine volume, difficulty urinating, flank pain, hematuria, urgency and urinary incontinence.   Musculoskeletal: Negative for arthralgias, back pain and myalgias.   Skin: Negative for rash.   Allergic/Immunologic: Negative.    Neurological: Negative.  Negative for confusion.   Hematological: Negative.    Psychiatric/Behavioral: Negative.        Objective   Physical Exam   Constitutional: She is oriented to person, place, and time. She appears well-developed and well-nourished.  Non-toxic appearance. She does not have a sickly appearance. She does not appear ill. No distress.    HENT:   Head: Normocephalic and atraumatic.   Eyes: Conjunctivae are normal.   Neck: Normal range of motion.   Cardiovascular: Normal rate, regular rhythm and normal heart sounds.   Pulmonary/Chest: Effort normal and breath sounds normal. No respiratory distress. She has no wheezes. She has no rales.   Abdominal: Soft. Normal appearance and bowel sounds are normal. She exhibits no distension and no mass. There is tenderness (pressure with palpation) in the suprapubic area. There is no rebound, no guarding and no CVA tenderness. No hernia.   Musculoskeletal: Normal range of motion. She exhibits no edema or tenderness.        Lumbar back: She exhibits no tenderness and no pain.   Neurological: She is alert and oriented to person, place, and time.   Skin: Skin is warm and dry. No rash noted. She is not diaphoretic. No erythema. No pallor.   Psychiatric: She has a normal mood and affect. Her behavior is normal. Judgment and thought content normal.   Nursing note and vitals reviewed.        Assessment/Plan      Veronica was seen today for follow-up.    Diagnoses and all orders for this visit:    Acute cystitis without hematuria  -     POC Urinalysis Dipstick, Automated  -     Urine Culture - Urine, Urine, Clean Catch    1.  Acute cystitis without hematuria- trace evidence of possible infection remaining.  Will culture urine and call with results.  Will base need on further treatment based on urine culture results.  Instructed patient to increase water intake.    2.  Further follow-up and plan of care pending urine culture results.  Patient instructed to call if her symptoms worsened.  Patient verbalized understanding of instruction.            This document has been electronically signed by TABBY Mejia on August 13, 2019 4:01 PM

## 2019-08-15 LAB — BACTERIA SPEC AEROBE CULT: ABNORMAL

## 2019-08-15 RX ORDER — CEFUROXIME AXETIL 500 MG/1
500 TABLET ORAL 2 TIMES DAILY
Qty: 20 TABLET | Refills: 0 | Status: SHIPPED | OUTPATIENT
Start: 2019-08-15 | End: 2019-08-25

## 2019-08-15 NOTE — PROGRESS NOTES
Urine culture results are back.  I sent in Ceftin 500 mg p.o. twice daily for 10 days.  Increase water intake.  If symptoms do not improve schedule follow-up.

## 2019-09-04 ENCOUNTER — APPOINTMENT (OUTPATIENT)
Dept: LAB | Facility: HOSPITAL | Age: 74
End: 2019-09-04

## 2019-09-04 ENCOUNTER — OFFICE VISIT (OUTPATIENT)
Dept: FAMILY MEDICINE CLINIC | Facility: CLINIC | Age: 74
End: 2019-09-04

## 2019-09-04 VITALS
OXYGEN SATURATION: 96 % | DIASTOLIC BLOOD PRESSURE: 70 MMHG | HEIGHT: 61 IN | WEIGHT: 175.5 LBS | SYSTOLIC BLOOD PRESSURE: 110 MMHG | HEART RATE: 50 BPM | RESPIRATION RATE: 18 BRPM | BODY MASS INDEX: 33.14 KG/M2

## 2019-09-04 DIAGNOSIS — N30.00 ACUTE CYSTITIS WITHOUT HEMATURIA: Primary | ICD-10-CM

## 2019-09-04 DIAGNOSIS — Z87.442 HISTORY OF KIDNEY STONES: ICD-10-CM

## 2019-09-04 DIAGNOSIS — R31.9 HEMATURIA, UNSPECIFIED TYPE: ICD-10-CM

## 2019-09-04 DIAGNOSIS — R30.0 DYSURIA: ICD-10-CM

## 2019-09-04 LAB
BILIRUB BLD-MCNC: NEGATIVE MG/DL
CLARITY, POC: CLEAR
COLOR UR: YELLOW
GLUCOSE UR STRIP-MCNC: NEGATIVE MG/DL
KETONES UR QL: NEGATIVE
LEUKOCYTE EST, POC: ABNORMAL
NITRITE UR-MCNC: NEGATIVE MG/ML
PH UR: 6.5 [PH] (ref 5–8)
PROT UR STRIP-MCNC: NEGATIVE MG/DL
RBC # UR STRIP: NEGATIVE /UL
SP GR UR: 1 (ref 1–1.03)
UROBILINOGEN UR QL: NORMAL

## 2019-09-04 PROCEDURE — 87186 SC STD MICRODIL/AGAR DIL: CPT | Performed by: NURSE PRACTITIONER

## 2019-09-04 PROCEDURE — 87086 URINE CULTURE/COLONY COUNT: CPT | Performed by: NURSE PRACTITIONER

## 2019-09-04 PROCEDURE — 81002 URINALYSIS NONAUTO W/O SCOPE: CPT | Performed by: NURSE PRACTITIONER

## 2019-09-04 PROCEDURE — 87077 CULTURE AEROBIC IDENTIFY: CPT | Performed by: NURSE PRACTITIONER

## 2019-09-04 PROCEDURE — 99214 OFFICE O/P EST MOD 30 MIN: CPT | Performed by: NURSE PRACTITIONER

## 2019-09-04 RX ORDER — LEVOFLOXACIN 500 MG/1
500 TABLET, FILM COATED ORAL DAILY
Qty: 7 TABLET | Refills: 0 | Status: SHIPPED | OUTPATIENT
Start: 2019-09-04 | End: 2019-09-11

## 2019-09-04 NOTE — PROGRESS NOTES
"Subjective   Veronica Wilkinson is a 74 y.o. female.     Ms. Wilkinson is a 74-year-old female who presents today for follow-up related to UTI.  Patient has had recurrent UTIs over the last 30 days.  According to last urine culture antibiotic should have been effective against UTI.  However patient is still reports mild burning with urination and suprapubic pressure.  She denies fever, chills, nausea, vomiting, diarrhea.  She states she did pass a \"blood clot\" in her urine but has noticed no other blood in her urine.  She does have a history of kidney stones \"years ago\" but states has no flank pain.  She did take all of her antibiotic last prescribed.         The following portions of the patient's history were reviewed and updated as appropriate: allergies, current medications, past family history, past medical history, past social history, past surgical history and problem list.    Review of Systems   Constitutional: Negative for activity change, appetite change, fatigue, unexpected weight gain and unexpected weight loss.   HENT: Negative for congestion, sore throat, trouble swallowing and voice change.    Eyes: Negative.    Respiratory: Negative for cough, chest tightness, shortness of breath and wheezing.    Cardiovascular: Negative for chest pain, palpitations and leg swelling.   Gastrointestinal: Positive for abdominal pain. Negative for constipation, diarrhea, nausea and vomiting.   Endocrine: Negative.    Genitourinary: Positive for dysuria, frequency and hematuria. Negative for decreased urine volume and flank pain.   Musculoskeletal: Negative for arthralgias and myalgias.   Skin: Negative for rash.   Allergic/Immunologic: Negative.    Neurological: Negative.    Hematological: Negative.    Psychiatric/Behavioral: Negative.        Objective   Physical Exam   Constitutional: She is oriented to person, place, and time. She appears well-developed and well-nourished. No distress.   HENT:   Head: Normocephalic and " atraumatic.   Eyes: Conjunctivae are normal.   Neck: Normal range of motion.   Cardiovascular: Normal rate, regular rhythm and normal heart sounds.   Pulmonary/Chest: Effort normal and breath sounds normal. No respiratory distress. She has no wheezes. She has no rales.   Abdominal: Soft. Bowel sounds are normal. She exhibits no distension and no mass. There is tenderness in the suprapubic area. There is no rebound and no guarding. No hernia.   Musculoskeletal: Normal range of motion. She exhibits no edema or tenderness.   Neurological: She is alert and oriented to person, place, and time.   Skin: Skin is warm and dry. No rash noted. She is not diaphoretic. No erythema. No pallor.   Psychiatric: She has a normal mood and affect. Her behavior is normal. Judgment and thought content normal.   Nursing note and vitals reviewed.        Assessment/Plan   Veronica was seen today for follow-up.    Diagnoses and all orders for this visit:    Acute cystitis without hematuria  -     Ambulatory Referral to Urology  -     Cancel: Urine Culture - Urine, Urine, Clean Catch  -     CT Abdomen Pelvis Stone Protocol  -     Urine Culture - Urine, Urine, Clean Catch    Dysuria  -     POCT urinalysis dipstick, manual    Hematuria, unspecified type  Comments:  reports passing blood clot in urine    Orders:  -     Ambulatory Referral to Urology    History of kidney stones  -     CT Abdomen Pelvis Stone Protocol    Other orders  -     levoFLOXacin (LEVAQUIN) 500 MG tablet; Take 1 tablet by mouth Daily for 7 days.    1.  Acute cystitis without hematuria- urine sent for culture.  Levaquin 500 mg 1 tablet daily for 7 days.  Referral to urology for further evaluation.    2.  Hematuria (reported blood clot urine) disease- referral to urology for further evaluation.    3.  History of kidney stones- CT abdomen pelvis stone protocol.  Will call with results.    4.  Follow-up and plan of care pending lab and CT results.            This document has  been electronically signed by TABBY Mejia on September 4, 2019 12:41 PM

## 2019-09-06 ENCOUNTER — HOSPITAL ENCOUNTER (OUTPATIENT)
Dept: CT IMAGING | Facility: HOSPITAL | Age: 74
Discharge: HOME | End: 2019-09-06
Admitting: NURSE PRACTITIONER

## 2019-09-06 LAB — BACTERIA SPEC AEROBE CULT: ABNORMAL

## 2019-09-06 PROCEDURE — 74176 CT ABD & PELVIS W/O CONTRAST: CPT

## 2019-09-09 ENCOUNTER — TELEPHONE (OUTPATIENT)
Dept: FAMILY MEDICINE CLINIC | Facility: CLINIC | Age: 74
End: 2019-09-09

## 2019-09-09 NOTE — PROGRESS NOTES
Per radiology report normal CT scan.  She does have a right renal cyst which the radiologist reported as no change from her last ultrasound.  Follow-up if symptoms do not improve.

## 2019-10-07 ENCOUNTER — OFFICE VISIT (OUTPATIENT)
Dept: ONCOLOGY | Facility: CLINIC | Age: 74
End: 2019-10-07

## 2019-10-07 ENCOUNTER — LAB (OUTPATIENT)
Dept: ONCOLOGY | Facility: HOSPITAL | Age: 74
End: 2019-10-07

## 2019-10-07 VITALS
DIASTOLIC BLOOD PRESSURE: 58 MMHG | RESPIRATION RATE: 16 BRPM | WEIGHT: 173.4 LBS | BODY MASS INDEX: 32.74 KG/M2 | SYSTOLIC BLOOD PRESSURE: 120 MMHG | TEMPERATURE: 97.2 F | HEIGHT: 61 IN | HEART RATE: 51 BPM

## 2019-10-07 DIAGNOSIS — D51.8 OTHER VITAMIN B12 DEFICIENCY ANEMIA: ICD-10-CM

## 2019-10-07 DIAGNOSIS — I26.99 OTHER PULMONARY EMBOLISM WITHOUT ACUTE COR PULMONALE, UNSPECIFIED CHRONICITY (HCC): ICD-10-CM

## 2019-10-07 DIAGNOSIS — D70.8 OTHER NEUTROPENIA (HCC): Primary | ICD-10-CM

## 2019-10-07 DIAGNOSIS — D70.8 OTHER NEUTROPENIA (HCC): ICD-10-CM

## 2019-10-07 DIAGNOSIS — Z86.711 HISTORY OF PULMONARY EMBOLISM: ICD-10-CM

## 2019-10-07 LAB
ALBUMIN SERPL-MCNC: 3.6 G/DL (ref 3.5–5.2)
ALBUMIN/GLOB SERPL: 1.1 G/DL
ALP SERPL-CCNC: 98 U/L (ref 39–117)
ALT SERPL W P-5'-P-CCNC: 19 U/L (ref 1–33)
ANION GAP SERPL CALCULATED.3IONS-SCNC: 7 MMOL/L (ref 5–15)
AST SERPL-CCNC: 25 U/L (ref 1–32)
BASOPHILS # BLD AUTO: 0.03 10*3/MM3 (ref 0–0.2)
BASOPHILS NFR BLD AUTO: 0.9 % (ref 0–1.5)
BILIRUB SERPL-MCNC: 0.3 MG/DL (ref 0.2–1.2)
BUN BLD-MCNC: 12 MG/DL (ref 8–23)
BUN/CREAT SERPL: 12.6 (ref 7–25)
CALCIUM SPEC-SCNC: 10.1 MG/DL (ref 8.6–10.5)
CHLORIDE SERPL-SCNC: 101 MMOL/L (ref 98–107)
CO2 SERPL-SCNC: 33 MMOL/L (ref 22–29)
CREAT BLD-MCNC: 0.95 MG/DL (ref 0.57–1)
DEPRECATED RDW RBC AUTO: 38.3 FL (ref 37–54)
EOSINOPHIL # BLD AUTO: 0.06 10*3/MM3 (ref 0–0.4)
EOSINOPHIL NFR BLD AUTO: 1.9 % (ref 0.3–6.2)
ERYTHROCYTE [DISTWIDTH] IN BLOOD BY AUTOMATED COUNT: 11.9 % (ref 12.3–15.4)
FERRITIN SERPL-MCNC: 109.5 NG/ML (ref 13–150)
FOLATE SERPL-MCNC: >20 NG/ML (ref 4.78–24.2)
GFR SERPL CREATININE-BSD FRML MDRD: 58 ML/MIN/1.73
GLOBULIN UR ELPH-MCNC: 3.2 GM/DL
GLUCOSE BLD-MCNC: 79 MG/DL (ref 65–99)
HCT VFR BLD AUTO: 33.3 % (ref 34–46.6)
HGB BLD-MCNC: 11.5 G/DL (ref 12–15.9)
IMM GRANULOCYTES # BLD AUTO: 0.01 10*3/MM3 (ref 0–0.05)
IMM GRANULOCYTES NFR BLD AUTO: 0.3 % (ref 0–0.5)
IRON 24H UR-MRATE: 91 MCG/DL (ref 37–145)
IRON SATN MFR SERPL: 27 % (ref 20–50)
LYMPHOCYTES # BLD AUTO: 1.38 10*3/MM3 (ref 0.7–3.1)
LYMPHOCYTES NFR BLD AUTO: 43.1 % (ref 19.6–45.3)
MCH RBC QN AUTO: 30.8 PG (ref 26.6–33)
MCHC RBC AUTO-ENTMCNC: 34.5 G/DL (ref 31.5–35.7)
MCV RBC AUTO: 89.3 FL (ref 79–97)
MONOCYTES # BLD AUTO: 0.41 10*3/MM3 (ref 0.1–0.9)
MONOCYTES NFR BLD AUTO: 12.8 % (ref 5–12)
NEUTROPHILS # BLD AUTO: 1.31 10*3/MM3 (ref 1.7–7)
NEUTROPHILS NFR BLD AUTO: 41 % (ref 42.7–76)
NRBC BLD AUTO-RTO: 0 /100 WBC (ref 0–0.2)
PLATELET # BLD AUTO: 264 10*3/MM3 (ref 140–450)
PMV BLD AUTO: 9.1 FL (ref 6–12)
POTASSIUM BLD-SCNC: 4.6 MMOL/L (ref 3.5–5.2)
PROT SERPL-MCNC: 6.8 G/DL (ref 6–8.5)
RBC # BLD AUTO: 3.73 10*6/MM3 (ref 3.77–5.28)
SODIUM BLD-SCNC: 141 MMOL/L (ref 136–145)
TIBC SERPL-MCNC: 341 MCG/DL (ref 298–536)
TRANSFERRIN SERPL-MCNC: 229 MG/DL (ref 200–360)
VIT B12 BLD-MCNC: 1096 PG/ML (ref 211–946)
WBC NRBC COR # BLD: 3.2 10*3/MM3 (ref 3.4–10.8)

## 2019-10-07 PROCEDURE — 1123F ACP DISCUSS/DSCN MKR DOCD: CPT | Performed by: INTERNAL MEDICINE

## 2019-10-07 PROCEDURE — G8731 PAIN NEG NO PLAN: HCPCS | Performed by: INTERNAL MEDICINE

## 2019-10-07 PROCEDURE — 99214 OFFICE O/P EST MOD 30 MIN: CPT | Performed by: INTERNAL MEDICINE

## 2019-10-07 PROCEDURE — 85025 COMPLETE CBC W/AUTO DIFF WBC: CPT | Performed by: INTERNAL MEDICINE

## 2019-10-07 PROCEDURE — G0463 HOSPITAL OUTPT CLINIC VISIT: HCPCS | Performed by: INTERNAL MEDICINE

## 2019-10-07 PROCEDURE — 83540 ASSAY OF IRON: CPT | Performed by: INTERNAL MEDICINE

## 2019-10-07 PROCEDURE — 82607 VITAMIN B-12: CPT | Performed by: INTERNAL MEDICINE

## 2019-10-07 PROCEDURE — 82746 ASSAY OF FOLIC ACID SERUM: CPT | Performed by: INTERNAL MEDICINE

## 2019-10-07 PROCEDURE — 84466 ASSAY OF TRANSFERRIN: CPT | Performed by: INTERNAL MEDICINE

## 2019-10-07 PROCEDURE — G9903 PT SCRN TBCO ID AS NON USER: HCPCS | Performed by: INTERNAL MEDICINE

## 2019-10-07 PROCEDURE — 80053 COMPREHEN METABOLIC PANEL: CPT | Performed by: INTERNAL MEDICINE

## 2019-10-07 PROCEDURE — 82728 ASSAY OF FERRITIN: CPT | Performed by: INTERNAL MEDICINE

## 2019-10-07 NOTE — PROGRESS NOTES
DATE OF VISIT: 10/7/2019      REASON FOR VISIT:   h/o pulmonary embolism ,anemia ,neutropenia      HISTORY OF PRESENT ILLNESS:   74-year-old female with a past medical history consisting of history of pulmonary embolism diagnosed in 2016 status post anticoagulation with Coumadin for one year, history of osteoporosis, history of depression, history of gastric sleeve surgery done in 2016 after that she has lost 120 pounds was initially seen in consultation on August 23, 2018 for evaluation of easy bruising.  Patient is here for 3 month follow-up appointment today.  States she is having urinary tract infection over last 6 weeks for which she has received 3 course of antibiotics.  She is scheduled to undergo cystoscopy by Dr. Fountain on October 17, 2019.   Denies any bleeding denies any repeated infection.  Denies any new shortness of breath or chest pain.  Denies any new swelling of lower extremity.      PAST MEDICAL HISTORY:    Past Medical History:   Diagnosis Date   • Bleeding tendency (CMS/HCC)    • Chest pain     History of noncardiac chest pain   • Chronic depression    • Depressive disorder    • Diastolic dysfunction    • Dyspnea    • Dyspnea on exertion    • Edema    • Essential hypertension    • Exercise intolerance    • Fatigue    • Gallstones    • GERD (gastroesophageal reflux disease)    • History of bone density study 2011    DEXA BONE DENSITY APPENDICULAR 56754 (1) - normal   • History of bone density study 06/24/2015    DXA BONE DENSITY AXIAL 65264 [facility] (1) - Ordered By: TOLU RAMIREZ ([facility])    • History of echocardiogram     Echocardiogram W/ color flow 65143 (2)   • History of mammogram 2013    Mammogram screen (1)   • History of mammogram 2015    MAMMOGRAM SCREENING 27672 - [facility] (1)   • History of screening mammography 06/25/2015    SCREENING MAMMOGRAPHY DIGITAL  (Medicare) (1) - Ordered By: ANDRADE BECERRIL ([facility])    • Hypercalcemia    • Hyperlipidemia    • Influenza vaccine  "administered 02/25/2016    INFLUENZA IMMUN ADMIN OR PREV RECV'D  (2) - Ordered By: ANDRADE BECERRIL ([facility])    • Long-term (current) use of anticoagulants     coumadin therapy      • Lower extremity edema     Intermittent lower extremity edema   • Obesity, Class III, BMI 40-49.9 (morbid obesity) (CMS/[facility])     \"Class III obesity\"   • BEVERLY (obstructive sleep apnea)     Mild BEVERLY   • PE (pulmonary embolism) 10/2015    Bilateral PE diagnosed after a car ride to [place]   • Pneumococcal vaccination given 02/25/2016    PNEUMOC VAC/ADMIN/RCVD 4040F (2) - Ordered By: ANDRADE BECERRIL ([facility])    • Right basilic vein fibrosis 2015   • Sedentary lifestyle    • Shortness of breath    • Urinary tract infection    • Venous thrombosis 2015    right basilic venous thrombosis; This was noted in May 2015.   • Weight gain        SOCIAL HISTORY:    Social History     Tobacco Use   • Smoking status: Never Smoker   • Smokeless tobacco: Never Used   Substance Use Topics   • Alcohol use: No   • Drug use: No       Surgical History :  Past Surgical History:   Procedure Laterality Date   • CHOLECYSTECTOMY      Cholecystectomy (1)   • COLONOSCOPY      Approximately 5 years prior as stated per patient   • CRYOABLATION     • ENDOSCOPY  09/15/2011    Colon endoscopy 74645 (2) - Hemorrhoids found.   • GASTRIC SLEEVE LAPAROSCOPIC     • HEMORRHOIDECTOMY     • HYSTEROSCOPY      Hysteroscopy; ablation (1)   • INJECTION OF MEDICATION  09/27/2012    Kenalog (1) - Ordered By: ANDRADE BECERRIL ([facility])        ALLERGIES:    Allergies   Allergen Reactions   • Sulfa Antibiotics Rash and Hives     Sulfa (Sulfonamide Antibiotics)         FAMILY HISTORY:  Family History   Problem Relation Age of Onset   • Ovarian cancer Mother    • Bleeding Disorder Father    • Other Father         Hematologic disorder   • Lung cancer Father    • Pancreatic cancer Sister    • Cancer Brother    • Lung cancer Brother    • Bleeding Disorder Other    • Hyperlipidemia Other  " "  • Hypertension Other    • Osteoarthritis Other    • Other Other         Gastritis   • Adrenal disorder Daughter    • Seizures Daughter    • Thyroid disease Daughter    • No Known Problems Daughter    • Diabetes Other    • Heart disease Other    • Other Other         Ulcers; Bleeding Tendencies; Allergy   • Cancer Other    • Cholelithiasis Other    • Hypertension Other    • Allergy (severe) Other            REVIEW OF SYSTEMS:      CONSTITUTIONAL:  Complains of fatigue.   Denies any fever, chills .      EYES: No visual disturbances. No discharge. No new lesions     ENMT:  No epistaxis, mouth sores or difficulty swallowing.     RESPIRATORY:  No new shortness of breath. No new cough or hemoptysis.     CARDIOVASCULAR:  No chest pain or palpitations.     GASTROINTESTINAL:  No abdominal pain nausea, vomiting or blood in the stool.     GENITOURINARY: Complains of ongoing urinary tract infection for last 6 weeks for which she has been taking antibiotics by Dr. Fountain.    MUSCULOSKELETAL:  Positive for joint pains due to arthritis.     LYMPHATICS:  Denies any abnormal swollen glands anywhere in the body.     NEUROLOGICAL : Complains of intermittent tingling and numbness affecting bilateral hands. No headache or dizziness. No seizures or balance problems.     SKIN: Complains of easy bruising.          PHYSICAL EXAMINATION:      VITAL SIGNS:  /58   Pulse 51   Temp 97.2 °F (36.2 °C) (Temporal)   Resp 16   Ht 156.2 cm (61.5\")   Wt 78.7 kg (173 lb 6.4 oz)   BMI 32.24 kg/m²       10/07/19  1136   Weight: 78.7 kg (173 lb 6.4 oz)     Manual pulse rate is 60    ECOG performance status: 2     CONSTITUTIONAL:  Not in any distress.     EYES: Mild conjunctival Pallor. No Icterus. No Pterygium. Extraocular Movements intact.No ptosis.     ENMT:  Normocephalic, Atraumatic.No Facial Asymmetry noted.     NECK:  No adenopathy.Trachea midline. NO JVD.     RESPIRATORY:  Fair air entry bilateral. No rhonchi or wheezing.Fair " respiratory effort.     CARDIOVASCULAR:  S1, S2. Regular rate and rhythm. No murmur or gallop appreciated.     ABDOMEN:  Soft, obese, nontender. Bowel sounds present in all four quadrants.  No Hepatosplenomegaly appreciated.     MUSCULOSKELETAL:  No edema.No Calf Tenderness.Decreased range of motion.     NEUROLOGIC:    No  Motor  deficit appreciated. Cranial Nerves 2-12 grossly intact.     SKIN : Bruising present on bilateral forearms, bilateral lower extremity and right side of face.     LYMPHATICS: No new enlarged lymph nodes in neck or supraclavicular area.     PSYCHIATRY: Alert, awake and oriented ×3.          DIAGNOSTIC DATA:    Glucose   Date Value Ref Range Status   10/07/2019 79 65 - 99 mg/dL Final     Sodium   Date Value Ref Range Status   10/07/2019 141 136 - 145 mmol/L Final   10/12/2018 140 134 - 144 mmol/L Final     Potassium   Date Value Ref Range Status   10/07/2019 4.6 3.5 - 5.2 mmol/L Final   10/12/2018 4.6 3.5 - 5.1 mmol/L Final     CO2   Date Value Ref Range Status   10/07/2019 33.0 (H) 22.0 - 29.0 mmol/L Final     Chloride   Date Value Ref Range Status   10/07/2019 101 98 - 107 mmol/L Final   10/12/2018 105 98 - 107 mmol/L Final     Anion Gap   Date Value Ref Range Status   10/07/2019 7.0 5.0 - 15.0 mmol/L Final     Creatinine   Date Value Ref Range Status   10/07/2019 0.95 0.57 - 1.00 mg/dL Final   10/12/2018 0.9 0.6 - 1.3 mg/dL Final     Comment:     **The MDRD GFR formula is valid only for ages 18-70.    GFR will not be reported for individuals aging <18 or >70.     BUN   Date Value Ref Range Status   10/07/2019 12 8 - 23 mg/dL Final   10/12/2018 13 7 - 18 mg/dL Final     BUN/Creatinine Ratio   Date Value Ref Range Status   10/07/2019 12.6 7.0 - 25.0 Final     Calcium   Date Value Ref Range Status   10/07/2019 10.1 8.6 - 10.5 mg/dL Final   10/12/2018 9.7 8.8 - 10.5 mg/dL Final     eGFR Non  Amer   Date Value Ref Range Status   10/07/2019 58 (L) >60 mL/min/1.73 Final   04/15/2019 >60  ml/min/1.73 m2 Final     Comment:     Estimated glomerular filtration rate (eGFR) and estimated GFR  (eGRFAA) are calculated using the [facility] MDRD equation which is recommended for use in normal healthy individuals 18-70 years of age and has not been validated in other populations.  See eGFR in the [facility] Test Directory for additional information.     Alkaline Phosphatase   Date Value Ref Range Status   10/07/2019 98 39 - 117 U/L Final   10/12/2018 115 46 - 116 U/L Final     Total Protein   Date Value Ref Range Status   10/07/2019 6.8 6.0 - 8.5 g/dL Final   10/12/2018 6.6 6.4 - 8.2 g/dL Final     ALT (SGPT)   Date Value Ref Range Status   10/07/2019 19 1 - 33 U/L Final   10/12/2018 43 30 - 65 U/L Final     AST (SGOT)   Date Value Ref Range Status   10/07/2019 25 1 - 32 U/L Final   10/12/2018 22 15 - 37 U/L Final     Total Bilirubin   Date Value Ref Range Status   10/07/2019 0.3 0.2 - 1.2 mg/dL Final   10/12/2018 0.3 0 - 1.0 mg/dL Final     Albumin   Date Value Ref Range Status   10/07/2019 3.60 3.50 - 5.20 g/dL Final   10/12/2018 3.2 (L) 3.4 - 5.0 g/dL Final     Globulin   Date Value Ref Range Status   10/07/2019 3.2 gm/dL Final     Lab Results   Component Value Date    WBC 3.20 (L) 10/07/2019    HGB 11.5 (L) 10/07/2019    HCT 33.3 (L) 10/07/2019    MCV 89.3 10/07/2019     10/07/2019     Lab Results   Component Value Date    NEUTROABS 1.31 (L) 10/07/2019    IRON 91 10/07/2019    TIBC 341 10/07/2019    LABIRON 27 10/07/2019    FERRITIN 109.50 10/07/2019    [id number] 1,096 (H) 10/07/2019    FOLATE >20.00 10/07/2019     Lab Results   Component Value Date    AFPTM 142 12/05/2018         Component aPTT   Latest Ref Rng & Units 20.0 - 40.3 seconds   6/8/2015 34.3   2/22/2017 36.0   8/9/2018 41.7 (H)   8/23/2018 38.9       Component      Latest Ref Rng & Units 12/2/2016 2/22/2017 8/9/2018 8/23/2018   Protime      11.1 - 15.3 Seconds  13.8 13.9 13.6   INR      0.80 - 1.20 2.90 (A) 1.06 1.09 1.06            Von Willebrand Panel    Ref Range & Units 8d ago   Factor VIII Activity 57 - 163 % 231     Comment: FVIII activity can increase in a variety of clinical situations   including normal pregnancy, in samples drawn from patients   (particularly children) who are visibly stressed at the time of   phlebotomy, as acute phase reactants, or in response to certain drug   therapies such as DDAVP.  Persistently elevated FVIII activity is a   risk factor for venous thrombosis as well as recurrence of venous   thrombosis.  Risk is graded and increases with the degree of   elevation.  Although elevated FVIII activity has been identified to   cluster within families, a genetic basis for the elevation has not   yet been elucidated (Br J Haematol. 2012; 157:653-663).   Von Willebrand Ag 50 - 200 % 244     Comment: This test was developed and its performance characteristics   determined by [employer]. It has not been cleared or approved   by the Food and Drug Administration.   VWF may elevate in normal pregnancy, in samples drawn from patients   (particularly children) who are visibly stressed at the time of   phlebotomy, as acute phase reactants, or in response to certain drug   therapies such as DDAVP.  These and other situations may increase   levels compared to baseline values.  For these reasons, it may be   necessary to repeat testing to make a diagnosis of VWD.   VWF Activity 50 - 200 % 195    Resulting Agency  [facility]   Narrative     Performed at:  [address]  [id number]  : Johnny Urbina MD, Phone:  [phone]      Specimen Collected: 08/23/18 14:14 Last Resulted: 08/24/18 16:11                       Coag Studies Interp Report   Order: [id number] - Reflex for Order [id number]   Status:  Final result   Visible to patient:  Yes (MyChart) Dx:  Easy bruising   Component 8d ago   von Willebrand Interp Note    Comment: -------------------------------   COAGULATION:    VON WILLEBRAND FACTOR ASSESSMENT CURRENT RESULTS ASSESSMENT   The VWF:Ag is elevated. The VWF:RCo is normal. The FVIII is   elevated.   VON WILLEBRAND FACTOR ASSESSMENT CURRENT RESULTS   INTERPRETATION   -   These results are not consistent with a diagnosis of VWD   according to the current NHLBI guideline.                 ASSESSMENT AND PLAN:       1.  Mild neutropenia:  -Patient's white blood cell count is 3.21 with absolute neutrophil count of 1310.  -Patient has chronic intermittent neutropenia with neutrophil count ranging between 1500 and normal since 2014.  -Anemia workup done earlier today does not show any evidence of nutritional deficiency.  -We will ask patient to return to clinic in 1 month.  If she has persistent neutropenia upon next clinic visit, plan is to do bone marrow biopsy which was discussed with patient.    2.  Easy bruising:  -Patient is complaining of easy bruising.  Workup consisting of PT, PTT, INR, von Willebrand profile done on August 23, 2018 is normal.  -Patient is on medication like Lexapro, trazodone and hydrochlorothiazide that can contribute to qualitative platelet dysfunction and easy bruising.  -Patient was offered to be referred to coagulation clinic at [place] for further evaluation regarding possible qualitative platelet dysfunction.  Patient is not interested in going to [facility] for further evaluation at this point.     3.  Mild anemia: Most likely anemia of chronic disease.  -Most recent hemoglobin is 11.5.  Iron studies , Vitamin B12 and folate level drawn today is normal.   -Patient is currently taking ferrous sulfate 1 tablet 3 times a week.  -Due to neutropenia and anemia, option of bone marrow biopsy was discussed.  At this point we will continue with clinical monitoring.  Will ask patient to return to clinic in 3 months, if she is any worsening anemia or neutropenia she will need bone marrow biopsy which was discussed with patient.      4. H/o Pulmonary  embolism  -Patient had provoked pulmonary embolism around 2015 for which patient took Coumadin for one year.  Denies any new shortness of breath or chest pain.  Patient had car ride to [place] right prior to episode of pulmonary embolism.     5.Health  maintainence:  -Patient does not smoke.  Had a colonoscopy around 2011.      6. Advance Care Planning: For now patient remains full code and is able to make her decisions.  Patient has health care surrogate mentioned on chart.    7. Pain Assessment:  -Patient denies any pain today.           Mihai Hunt MD  10/7/2019  7:40 PM        EMR Dragon/Transcription disclaimer:   Much of this encounter note is an electronic transcription/translation of spoken language to printed text. The electronic translation of spoken language may permit erroneous, or at times, nonsensical words or phrases to be inadvertently transcribed; Although I have reviewed the note for such errors, some may still exist.

## 2019-10-09 ENCOUNTER — TRANSCRIBE ORDERS (OUTPATIENT)
Dept: LAB | Facility: HOSPITAL | Age: 74
End: 2019-10-09

## 2019-10-09 ENCOUNTER — LAB (OUTPATIENT)
Dept: LAB | Facility: HOSPITAL | Age: 74
End: 2019-10-09

## 2019-10-09 DIAGNOSIS — K21.9 GASTROESOPHAGEAL REFLUX DISEASE, ESOPHAGITIS PRESENCE NOT SPECIFIED: ICD-10-CM

## 2019-10-09 DIAGNOSIS — E78.5 HYPERLIPIDEMIA, UNSPECIFIED HYPERLIPIDEMIA TYPE: ICD-10-CM

## 2019-10-09 DIAGNOSIS — Z00.00 ROUTINE GENERAL MEDICAL EXAMINATION AT A HEALTH CARE FACILITY: ICD-10-CM

## 2019-10-09 DIAGNOSIS — I10 HYPERTENSION, UNSPECIFIED TYPE: ICD-10-CM

## 2019-10-09 DIAGNOSIS — Z98.84 BARIATRIC SURGERY STATUS: Primary | ICD-10-CM

## 2019-10-09 DIAGNOSIS — Z98.84 BARIATRIC SURGERY STATUS: ICD-10-CM

## 2019-10-09 LAB
CHOLEST SERPL-MCNC: 236 MG/DL (ref 0–200)
HDLC SERPL-MCNC: 93 MG/DL (ref 40–60)
LDLC SERPL CALC-MCNC: 131 MG/DL (ref 0–100)
LDLC/HDLC SERPL: 1.41 {RATIO}
MAGNESIUM SERPL-MCNC: 2.2 MG/DL (ref 1.6–2.4)
PHOSPHATE SERPL-MCNC: 3.2 MG/DL (ref 2.5–4.5)
PTH-INTACT SERPL-MCNC: 46.2 PG/ML (ref 15–65)
TRIGL SERPL-MCNC: 61 MG/DL (ref 0–150)
URATE SERPL-MCNC: 6.1 MG/DL (ref 2.4–5.7)
VLDLC SERPL-MCNC: 12.2 MG/DL (ref 5–40)

## 2019-10-09 PROCEDURE — 84425 ASSAY OF VITAMIN B-1: CPT

## 2019-10-09 PROCEDURE — 36415 COLL VENOUS BLD VENIPUNCTURE: CPT

## 2019-10-09 PROCEDURE — 83970 ASSAY OF PARATHORMONE: CPT

## 2019-10-09 PROCEDURE — 83735 ASSAY OF MAGNESIUM: CPT

## 2019-10-09 PROCEDURE — 84550 ASSAY OF BLOOD/URIC ACID: CPT

## 2019-10-09 PROCEDURE — 84100 ASSAY OF PHOSPHORUS: CPT

## 2019-10-09 PROCEDURE — 80061 LIPID PANEL: CPT

## 2019-10-14 LAB — VIT B1 BLD-SCNC: 121.5 NMOL/L (ref 66.5–200)

## 2019-11-04 ENCOUNTER — APPOINTMENT (OUTPATIENT)
Dept: ONCOLOGY | Facility: CLINIC | Age: 74
End: 2019-11-04

## 2019-11-04 ENCOUNTER — APPOINTMENT (OUTPATIENT)
Dept: ONCOLOGY | Facility: HOSPITAL | Age: 74
End: 2019-11-04

## 2019-11-05 ENCOUNTER — CLINICAL SUPPORT (OUTPATIENT)
Dept: FAMILY MEDICINE CLINIC | Facility: CLINIC | Age: 74
End: 2019-11-05

## 2019-11-05 DIAGNOSIS — Z23 IMMUNIZATION, PNEUMOCOCCUS AND INFLUENZA: ICD-10-CM

## 2019-11-05 PROCEDURE — 90653 IIV ADJUVANT VACCINE IM: CPT | Performed by: NURSE PRACTITIONER

## 2019-11-05 PROCEDURE — G0008 ADMIN INFLUENZA VIRUS VAC: HCPCS | Performed by: NURSE PRACTITIONER

## 2019-12-10 RX ORDER — SIMVASTATIN 20 MG
TABLET ORAL
Qty: 45 TABLET | Refills: 0 | Status: SHIPPED | OUTPATIENT
Start: 2019-12-10 | End: 2020-03-04 | Stop reason: SDUPTHER

## 2019-12-27 RX ORDER — PANTOPRAZOLE SODIUM 40 MG/1
TABLET, DELAYED RELEASE ORAL
Qty: 90 TABLET | Refills: 0 | Status: SHIPPED | OUTPATIENT
Start: 2019-12-27 | End: 2020-03-04 | Stop reason: SDUPTHER

## 2019-12-27 RX ORDER — LOSARTAN POTASSIUM AND HYDROCHLOROTHIAZIDE 12.5; 5 MG/1; MG/1
TABLET ORAL
Qty: 90 TABLET | Refills: 0 | Status: SHIPPED | OUTPATIENT
Start: 2019-12-27 | End: 2020-03-04 | Stop reason: SDUPTHER

## 2020-02-10 ENCOUNTER — TELEPHONE (OUTPATIENT)
Dept: ONCOLOGY | Facility: CLINIC | Age: 75
End: 2020-02-10

## 2020-02-10 NOTE — TELEPHONE ENCOUNTER
Provider: Gilbert   Caller: pt  Relationship to Patient: self  Phone Number: [phone]  Reason for Call: pt is calling to reschedule her missed follow-up appointment on 11/4/19. Please call pt and schedule this appointment as it is out of the [facility]'s time-frame. Thanks.

## 2020-02-19 ENCOUNTER — LAB (OUTPATIENT)
Dept: ONCOLOGY | Facility: HOSPITAL | Age: 75
End: 2020-02-19

## 2020-02-19 ENCOUNTER — OFFICE VISIT (OUTPATIENT)
Dept: ONCOLOGY | Facility: CLINIC | Age: 75
End: 2020-02-19

## 2020-02-19 VITALS
WEIGHT: 176 LBS | SYSTOLIC BLOOD PRESSURE: 120 MMHG | HEIGHT: 61 IN | DIASTOLIC BLOOD PRESSURE: 58 MMHG | HEART RATE: 55 BPM | BODY MASS INDEX: 33.23 KG/M2 | RESPIRATION RATE: 18 BRPM | TEMPERATURE: 97.7 F

## 2020-02-19 DIAGNOSIS — D70.8 OTHER NEUTROPENIA (HCC): ICD-10-CM

## 2020-02-19 DIAGNOSIS — M81.0 AGE-RELATED OSTEOPOROSIS WITHOUT CURRENT PATHOLOGICAL FRACTURE: ICD-10-CM

## 2020-02-19 DIAGNOSIS — Z86.711 HISTORY OF PULMONARY EMBOLISM: ICD-10-CM

## 2020-02-19 DIAGNOSIS — D51.8 OTHER VITAMIN B12 DEFICIENCY ANEMIA: ICD-10-CM

## 2020-02-19 DIAGNOSIS — D51.8 OTHER VITAMIN B12 DEFICIENCY ANEMIA: Primary | ICD-10-CM

## 2020-02-19 LAB
BASOPHILS # BLD AUTO: 0.04 10*3/MM3 (ref 0–0.2)
BASOPHILS NFR BLD AUTO: 1.1 % (ref 0–1.5)
DEPRECATED RDW RBC AUTO: 41.2 FL (ref 37–54)
EOSINOPHIL # BLD AUTO: 0.04 10*3/MM3 (ref 0–0.4)
EOSINOPHIL NFR BLD AUTO: 1.1 % (ref 0.3–6.2)
ERYTHROCYTE [DISTWIDTH] IN BLOOD BY AUTOMATED COUNT: 12.5 % (ref 12.3–15.4)
FERRITIN SERPL-MCNC: 44.79 NG/ML (ref 13–150)
HCT VFR BLD AUTO: 32.3 % (ref 34–46.6)
HGB BLD-MCNC: 11 G/DL (ref 12–15.9)
IMM GRANULOCYTES # BLD AUTO: 0 10*3/MM3 (ref 0–0.05)
IMM GRANULOCYTES NFR BLD AUTO: 0 % (ref 0–0.5)
IRON 24H UR-MRATE: 84 MCG/DL (ref 37–145)
IRON SATN MFR SERPL: 22 % (ref 20–50)
LYMPHOCYTES # BLD AUTO: 1.39 10*3/MM3 (ref 0.7–3.1)
LYMPHOCYTES NFR BLD AUTO: 37.7 % (ref 19.6–45.3)
MCH RBC QN AUTO: 30.6 PG (ref 26.6–33)
MCHC RBC AUTO-ENTMCNC: 34.1 G/DL (ref 31.5–35.7)
MCV RBC AUTO: 90 FL (ref 79–97)
MONOCYTES # BLD AUTO: 0.33 10*3/MM3 (ref 0.1–0.9)
MONOCYTES NFR BLD AUTO: 8.9 % (ref 5–12)
NEUTROPHILS # BLD AUTO: 1.89 10*3/MM3 (ref 1.7–7)
NEUTROPHILS NFR BLD AUTO: 51.2 % (ref 42.7–76)
NRBC BLD AUTO-RTO: 0 /100 WBC (ref 0–0.2)
PLATELET # BLD AUTO: 270 10*3/MM3 (ref 140–450)
PMV BLD AUTO: 9.1 FL (ref 6–12)
RBC # BLD AUTO: 3.59 10*6/MM3 (ref 3.77–5.28)
TIBC SERPL-MCNC: 375 MCG/DL (ref 298–536)
TRANSFERRIN SERPL-MCNC: 252 MG/DL (ref 200–360)
WBC NRBC COR # BLD: 3.69 10*3/MM3 (ref 3.4–10.8)

## 2020-02-19 PROCEDURE — G9903 PT SCRN TBCO ID AS NON USER: HCPCS | Performed by: INTERNAL MEDICINE

## 2020-02-19 PROCEDURE — 82728 ASSAY OF FERRITIN: CPT | Performed by: INTERNAL MEDICINE

## 2020-02-19 PROCEDURE — G8731 PAIN NEG NO PLAN: HCPCS | Performed by: INTERNAL MEDICINE

## 2020-02-19 PROCEDURE — 99214 OFFICE O/P EST MOD 30 MIN: CPT | Performed by: INTERNAL MEDICINE

## 2020-02-19 PROCEDURE — 82607 VITAMIN B-12: CPT | Performed by: INTERNAL MEDICINE

## 2020-02-19 PROCEDURE — 85025 COMPLETE CBC W/AUTO DIFF WBC: CPT | Performed by: INTERNAL MEDICINE

## 2020-02-19 PROCEDURE — 83540 ASSAY OF IRON: CPT | Performed by: INTERNAL MEDICINE

## 2020-02-19 PROCEDURE — 84466 ASSAY OF TRANSFERRIN: CPT | Performed by: INTERNAL MEDICINE

## 2020-02-19 PROCEDURE — 1123F ACP DISCUSS/DSCN MKR DOCD: CPT | Performed by: INTERNAL MEDICINE

## 2020-02-19 PROCEDURE — G0463 HOSPITAL OUTPT CLINIC VISIT: HCPCS | Performed by: INTERNAL MEDICINE

## 2020-02-19 PROCEDURE — 82746 ASSAY OF FOLIC ACID SERUM: CPT | Performed by: INTERNAL MEDICINE

## 2020-02-19 NOTE — PATIENT INSTRUCTIONS
Bone Density Test  The bone density test uses a special type of X-ray to measure the amount of calcium and other minerals in your bones. It can measure bone density in the hip and the spine. The test procedure is similar to having a regular X-ray. This test may also be called:  · Bone densitometry.  · Bone mineral density test.  · Dual-energy X-ray absorptiometry (DEXA).  You may have this test to:  · Diagnose a condition that causes weak or thin bones (osteoporosis).  · Screen you for osteoporosis.  · Predict your risk for a broken bone (fracture).  · Determine how well your osteoporosis treatment is working.  Tell a health care provider about:  · Any allergies you have.  · All medicines you are taking, including vitamins, herbs, eye drops, creams, and over-the-counter medicines.  · Any problems you or family members have had with anesthetic medicines.  · Any blood disorders you have.  · Any surgeries you have had.  · Any medical conditions you have.  · Whether you are pregnant or may be pregnant.  · Any medical tests you have had within the past 14 days that used contrast material.  What are the risks?  Generally, this is a safe procedure. However, it does expose you to a small amount of radiation, which can slightly increase your cancer risk.  What happens before the procedure?  · Do not take any calcium supplements starting 24 hours before your test.  · Remove all metal jewelry, eyeglasses, dental appliances, and any other metal objects.  What happens during the procedure?    · You will lie down on an exam table. There will be an X-ray generator below you and an imaging device above you.  · Other devices, such as boxes or braces, may be used to position your body properly for the scan.  · The machine will slowly scan your body. You will need to keep still.  · The images will show up on a screen in the room. Images will be examined by a specialist after your test is done.  The procedure may vary among health care  providers and hospitals.  What happens after the procedure?  · It is up to you to get your test results. Ask your health care provider, or the department that is doing the test, when your results will be ready.  Summary  · A bone density test is an imaging test that uses a type of X-ray to measure the amount of calcium and other minerals in your bones.  · The test may be used to diagnose or screen you for a condition that causes weak or thin bones (osteoporosis), predict your risk for a broken bone (fracture), or determine how well your osteoporosis treatment is working.  · Do not take any calcium supplements starting 24 hours before your test.  · Ask your health care provider, or the department that is doing the test, when your results will be ready.  This information is not intended to replace advice given to you by your health care provider. Make sure you discuss any questions you have with your health care provider.  Document Released: 01/09/2006 Document Revised: 10/22/2018 Document Reviewed: 10/22/2018  [employer] Patient Education © 2020 [employer]

## 2020-02-19 NOTE — PROGRESS NOTES
DATE OF VISIT: 2/20/2020      REASON FOR VISIT:   h/o pulmonary embolism ,anemia ,neutropenia, osteoporosis, primary hyperparathyroidism      HISTORY OF PRESENT ILLNESS:   74-year-old female with a past medical history consisting of history of pulmonary embolism diagnosed in 2016 status post anticoagulation with Coumadin for one year, history of osteoporosis, history of depression, history of gastric sleeve surgery done in 2016 after that she has lost 120 pounds was initially seen in consultation on August 23, 2018 for evaluation of easy bruising.  Patient is here for 3 month follow-up appointment today.  States due to recurrent urinary tract infection, he has been started on chronic antibiotics by Dr. Fountain.   Denies any bleeding denies any repeated infection.  Denies any new shortness of breath or chest pain.  Denies any new swelling of lower extremity.      PAST MEDICAL HISTORY:    Past Medical History:   Diagnosis Date   • Bleeding tendency (CMS/HCC)    • Chest pain     History of noncardiac chest pain   • Chronic depression    • Depressive disorder    • Diastolic dysfunction    • Dyspnea    • Dyspnea on exertion    • Edema    • Essential hypertension    • Exercise intolerance    • Fatigue    • Gallstones    • GERD (gastroesophageal reflux disease)    • History of bone density study 2011    DEXA BONE DENSITY APPENDICULAR 81557 (1) - normal   • History of bone density study 06/24/2015    DXA BONE DENSITY AXIAL 34046 [facility] (1) - Ordered By: TOLU RAMIREZ ([facility])    • History of echocardiogram     Echocardiogram W/ color flow 32655 (2)   • History of mammogram 2013    Mammogram screen (1)   • History of mammogram 2015    MAMMOGRAM SCREENING 18027 - [facility] (1)   • History of screening mammography 06/25/2015    SCREENING MAMMOGRAPHY DIGITAL  (Medicare) (1) - Ordered By: ANDRAED BECERRIL ([facility])    • Hypercalcemia    • Hyperlipidemia    • Influenza vaccine administered 02/25/2016    INFLUENZA  "IMMUN ADMIN OR PREV RECV'D  (2) - Ordered By: ANDRADE BECERRIL ([facility])    • Long-term (current) use of anticoagulants     coumadin therapy      • Lower extremity edema     Intermittent lower extremity edema   • Obesity, Class III, BMI 40-49.9 (morbid obesity) (CMS/HCC)     \"Class III obesity\"   • BEVERLY (obstructive sleep apnea)     Mild BEVERLY   • PE (pulmonary embolism) 10/2015    Bilateral PE diagnosed after a car ride to [place]   • Pneumococcal vaccination given 02/25/2016    PNEUMOC VAC/ADMIN/RCVD 4040F (2) - Ordered By: ANDRADE BECERRIL ([facility])    • Right basilic vein fibrosis 2015   • Sedentary lifestyle    • Shortness of breath    • Urinary tract infection    • Venous thrombosis 2015    right basilic venous thrombosis; This was noted in May 2015.   • Weight gain        SOCIAL HISTORY:    Social History     Tobacco Use   • Smoking status: Never Smoker   • Smokeless tobacco: Never Used   Substance Use Topics   • Alcohol use: No   • Drug use: No       Surgical History :  Past Surgical History:   Procedure Laterality Date   • CHOLECYSTECTOMY      Cholecystectomy (1)   • COLONOSCOPY      Approximately 5 years prior as stated per patient   • CRYOABLATION     • ENDOSCOPY  09/15/2011    Colon endoscopy 75026 (2) - Hemorrhoids found.   • GASTRIC SLEEVE LAPAROSCOPIC     • HEMORRHOIDECTOMY     • HYSTEROSCOPY      Hysteroscopy; ablation (1)   • INJECTION OF MEDICATION  09/27/2012    Kenalog (1) - Ordered By: ANDRADE BECERRIL ([facility])        ALLERGIES:    Allergies   Allergen Reactions   • Sulfa Antibiotics Rash and Hives     Sulfa (Sulfonamide Antibiotics)         FAMILY HISTORY:  Family History   Problem Relation Age of Onset   • Ovarian cancer Mother    • Bleeding Disorder Father    • Other Father         Hematologic disorder   • Lung cancer Father    • Pancreatic cancer Sister    • Cancer Brother    • Lung cancer Brother    • Bleeding Disorder Other    • Hyperlipidemia Other    • Hypertension Other    • " "Osteoarthritis Other    • Other Other         Gastritis   • Adrenal disorder Daughter    • Seizures Daughter    • Thyroid disease Daughter    • No Known Problems Daughter    • Diabetes Other    • Heart disease Other    • Other Other         Ulcers; Bleeding Tendencies; Allergy   • Cancer Other    • Cholelithiasis Other    • Hypertension Other    • Allergy (severe) Other            REVIEW OF SYSTEMS:      CONSTITUTIONAL:  Complains of fatigue.   Denies any fever, chills .      EYES: No visual disturbances. No discharge. No new lesions     ENMT:  No epistaxis, mouth sores or difficulty swallowing.     RESPIRATORY:  No new shortness of breath. No new cough or hemoptysis.     CARDIOVASCULAR:  No chest pain or palpitations.     GASTROINTESTINAL:  No abdominal pain nausea, vomiting or blood in the stool.     GENITOURINARY: Complains of recurrent urinary tract infection for which she has been started on chronic suppressive antibiotics by Dr. Fountain.    MUSCULOSKELETAL:  Positive for joint pains due to arthritis.     LYMPHATICS:  Denies any abnormal swollen glands anywhere in the body.     NEUROLOGICAL : Complains of intermittent tingling and numbness affecting bilateral hands. No headache or dizziness. No seizures or balance problems.     SKIN: Complains of easy bruising.          PHYSICAL EXAMINATION:      VITAL SIGNS:  /58   Pulse 55   Temp 97.7 °F (36.5 °C) (Temporal)   Resp 18   Ht 156.2 cm (61.5\")   Wt 79.8 kg (176 lb)   BMI 32.72 kg/m²       02/19/20  1448   Weight: 79.8 kg (176 lb)         ECOG performance status: 2     CONSTITUTIONAL:  Not in any distress.     EYES: Mild conjunctival Pallor. No Icterus. No Pterygium. Extraocular Movements intact.No ptosis.     ENMT:  Normocephalic, Atraumatic.No Facial Asymmetry noted.     NECK:  No adenopathy.Trachea midline. NO JVD.     RESPIRATORY:  Fair air entry bilateral. No rhonchi or wheezing.Fair respiratory effort.     CARDIOVASCULAR:  S1, S2. Regular rate " and rhythm. No murmur or gallop appreciated.     ABDOMEN:  Soft, obese, nontender. Bowel sounds present in all four quadrants.  No Hepatosplenomegaly appreciated.     MUSCULOSKELETAL:  No edema.No Calf Tenderness.Decreased range of motion.     NEUROLOGIC:    No  Motor  deficit appreciated. Cranial Nerves 2-12 grossly intact.     SKIN : Bruising present on bilateral forearms, bilateral lower extremity and right side of face.     LYMPHATICS: No new enlarged lymph nodes in neck or supraclavicular area.     PSYCHIATRY: Alert, awake and oriented ×3.          DIAGNOSTIC DATA:    Glucose   Date Value Ref Range Status   10/07/2019 79 65 - 99 mg/dL Final     Sodium   Date Value Ref Range Status   10/07/2019 141 136 - 145 mmol/L Final   10/12/2018 140 134 - 144 mmol/L Final     Potassium   Date Value Ref Range Status   10/07/2019 4.6 3.5 - 5.2 mmol/L Final   10/12/2018 4.6 3.5 - 5.1 mmol/L Final     CO2   Date Value Ref Range Status   10/07/2019 33.0 (H) 22.0 - 29.0 mmol/L Final     Chloride   Date Value Ref Range Status   10/07/2019 101 98 - 107 mmol/L Final   10/12/2018 105 98 - 107 mmol/L Final     Anion Gap   Date Value Ref Range Status   10/07/2019 7.0 5.0 - 15.0 mmol/L Final     Creatinine   Date Value Ref Range Status   10/07/2019 0.95 0.57 - 1.00 mg/dL Final   10/12/2018 0.9 0.6 - 1.3 mg/dL Final     Comment:     **The MDRD GFR formula is valid only for ages 18-70.    GFR will not be reported for individuals aging <18 or >70.     BUN   Date Value Ref Range Status   10/07/2019 12 8 - 23 mg/dL Final   10/12/2018 13 7 - 18 mg/dL Final     BUN/Creatinine Ratio   Date Value Ref Range Status   10/07/2019 12.6 7.0 - 25.0 Final     Calcium   Date Value Ref Range Status   10/07/2019 10.1 8.6 - 10.5 mg/dL Final   10/12/2018 9.7 8.8 - 10.5 mg/dL Final     eGFR Non  Amer   Date Value Ref Range Status   10/07/2019 58 (L) >60 mL/min/1.73 Final     Alkaline Phosphatase   Date Value Ref Range Status   10/07/2019 98 39 - 117  U/L Final   10/12/2018 115 46 - 116 U/L Final     Total Protein   Date Value Ref Range Status   10/07/2019 6.8 6.0 - 8.5 g/dL Final   10/12/2018 6.6 6.4 - 8.2 g/dL Final     ALT (SGPT)   Date Value Ref Range Status   10/07/2019 19 1 - 33 U/L Final   10/12/2018 43 30 - 65 U/L Final     AST (SGOT)   Date Value Ref Range Status   10/07/2019 25 1 - 32 U/L Final   10/12/2018 22 15 - 37 U/L Final     Total Bilirubin   Date Value Ref Range Status   10/07/2019 0.3 0.2 - 1.2 mg/dL Final   10/12/2018 0.3 0 - 1.0 mg/dL Final     Albumin   Date Value Ref Range Status   10/07/2019 3.60 3.50 - 5.20 g/dL Final   10/12/2018 3.2 (L) 3.4 - 5.0 g/dL Final     Globulin   Date Value Ref Range Status   10/07/2019 3.2 gm/dL Final     Lab Results   Component Value Date    WBC 3.69 02/19/2020    HGB 11.0 (L) 02/19/2020    HCT 32.3 (L) 02/19/2020    MCV 90.0 02/19/2020     02/19/2020     Lab Results   Component Value Date    NEUTROABS 1.89 02/19/2020    IRON 84 02/19/2020    TIBC 375 02/19/2020    LABIRON 22 02/19/2020    FERRITIN 44.79 02/19/2020    QTBTPTQZ33 967 (H) 02/19/2020    FOLATE >20.00 02/19/2020     Lab Results   Component Value Date    AFPTM 142 12/05/2018         Component aPTT   Latest Ref Rng & Units 20.0 - 40.3 seconds   6/8/2015 34.3   2/22/2017 36.0   8/9/2018 41.7 (H)   8/23/2018 38.9       Component      Latest Ref Rng & Units 12/2/2016 2/22/2017 8/9/2018 8/23/2018   Protime      11.1 - 15.3 Seconds  13.8 13.9 13.6   INR      0.80 - 1.20 2.90 (A) 1.06 1.09 1.06           Von Willebrand Panel    Ref Range & Units 8d ago   Factor VIII Activity 57 - 163 % 231     Comment: FVIII activity can increase in a variety of clinical situations   including normal pregnancy, in samples drawn from patients   (particularly children) who are visibly stressed at the time of   phlebotomy, as acute phase reactants, or in response to certain drug   therapies such as DDAVP.  Persistently elevated FVIII activity is a   risk factor for  venous thrombosis as well as recurrence of venous   thrombosis.  Risk is graded and increases with the degree of   elevation.  Although elevated FVIII activity has been identified to   cluster within families, a genetic basis for the elevation has not   yet been elucidated (Br J Haematol. 2012; 157:653-663).   Von Willebrand Ag 50 - 200 % 244     Comment: This test was developed and its performance characteristics   determined by [facility]. It has not been cleared or approved   by the Food and Drug Administration.   VWF may elevate in normal pregnancy, in samples drawn from patients   (particularly children) who are visibly stressed at the time of   phlebotomy, as acute phase reactants, or in response to certain drug   therapies such as DDAVP.  These and other situations may increase   levels compared to baseline values.  For these reasons, it may be   necessary to repeat testing to make a diagnosis of VWD.   VWF Activity 50 - 200 % 195    Resulting Agency  [facility]   Narrative     Performed at:  [address] [facility]  [id number]  : Johnny Urbina MD, Phone:  [phone]      Specimen Collected: 08/23/18 14:14 Last Resulted: 08/24/18 16:11                       Coag Studies Interp Report   Order: [id number] - Reflex for Order [id number]   Status:  Final result   Visible to patient:  Yes (MyChart) Dx:  Easy bruising   Component 8d ago   von Willebrand Interp Note    Comment: -------------------------------   COAGULATION:   VON WILLEBRAND FACTOR ASSESSMENT CURRENT RESULTS ASSESSMENT   The VWF:Ag is elevated. The VWF:RCo is normal. The FVIII is   elevated.   VON WILLEBRAND FACTOR ASSESSMENT CURRENT RESULTS   INTERPRETATION   -   These results are not consistent with a diagnosis of VWD   according to the current NHLBI guideline.                 ASSESSMENT AND PLAN:       1.  Mild neutropenia:  -Patient's white blood cell count is 3.69 with absolute neutrophil count of  1890.  -Patient has chronic intermittent neutropenia with neutrophil count ranging between 1500 and normal since 2014.  -Anemia workup done earlier today does not show any evidence of nutritional deficiency.  -Due to fluctuating neutrophil count and recurrent urinary tract infection.  I I believe neutropenia is reactive rather than a bone marrow pathology.  -At this point will continue with clinical monitoring.  -We will ask patient to return to clinic in 3 months with repeat CBC and anemia work-up and CMP to be done on that day.    2.  Easy bruising:  -Patient is complaining of easy bruising.  Workup consisting of PT, PTT, INR, von Willebrand profile done on August 23, 2018 is normal.  -Patient is on medication like Lexapro, trazodone and hydrochlorothiazide that can contribute to qualitative platelet dysfunction and easy bruising.  -Patient was offered to be referred to coagulation clinic at [place] for further evaluation regarding possible qualitative platelet dysfunction.  Patient is not interested in going to [facility] for further evaluation at this point.     3.  Mild anemia: Most likely anemia of chronic disease.  -Most recent hemoglobin is 11.0.  Iron studies , Vitamin B12 and folate level drawn today is normal.   -Patient remains on vitamin B12 500 mcg p.o. daily.  -We will repeat anemia work-up upon next clinic visit in 3 months.      4. H/o Pulmonary embolism  -Patient had provoked pulmonary embolism around 2015 for which patient took Coumadin for one year.  Denies any new shortness of breath or chest pain.  Patient had car ride to [place] right prior to episode of pulmonary embolism.    5.  Osteoporosis:  -Patient was found to have osteoporosis with T score of -2.6 at femoral neck and DEXA scan done in August 2018.  - Recently I was contacted by Dr. Rowell at [facility] with endocrinology department.  Her recommendation was start to patient on once a year Reclast.  - Side effect of  Reclast including but not limited to hypocalcemia, osteonecrosis of jaw were discussed with patient today.  - Recommend getting dental evaluation and clearance for Reclast infusion.  - We will order DEXA scan prior to next clinic visit in 3 months.  If DEXA scan shows osteoporosis and if patient has a dental clearance obtained by that time, will start patient on Reclast which was discussed with patient today.     6.Health  maintainence:  -Patient does not smoke.  Had a colonoscopy around 2011.      7. Advance Care Planning: For now patient remains full code and is able to make her decisions.  Patient has health care surrogate mentioned on chart.    8. Veronica Wilkinson reports a pain score of 0.  Given her pain assessment as noted, treatment options were discussed and the following options were decided upon as a follow-up plan to address the patient's pain: No intervention required.    9. PHQ-9 Total Score: 9   -Her high PHQ 9 score is related to her chronic medical problem and symptoms from it.  Patient is not homicidal or suicidal.  No intervention required.           Mihai Hunt MD  2/20/2020  6:06 PM        EMR Dragon/Transcription disclaimer:   Much of this encounter note is an electronic transcription/translation of spoken language to printed text. The electronic translation of spoken language may permit erroneous, or at times, nonsensical words or phrases to be inadvertently transcribed; Although I have reviewed the note for such errors, some may still exist.

## 2020-02-20 LAB
FOLATE SERPL-MCNC: >20 NG/ML (ref 4.78–24.2)
VIT B12 BLD-MCNC: 967 PG/ML (ref 211–946)

## 2020-03-04 ENCOUNTER — OFFICE VISIT (OUTPATIENT)
Dept: FAMILY MEDICINE CLINIC | Facility: CLINIC | Age: 75
End: 2020-03-04

## 2020-03-04 VITALS
RESPIRATION RATE: 18 BRPM | HEART RATE: 52 BPM | DIASTOLIC BLOOD PRESSURE: 72 MMHG | BODY MASS INDEX: 32.7 KG/M2 | HEIGHT: 61 IN | OXYGEN SATURATION: 99 % | WEIGHT: 173.2 LBS | SYSTOLIC BLOOD PRESSURE: 120 MMHG

## 2020-03-04 DIAGNOSIS — N39.0 CHRONIC UTI: ICD-10-CM

## 2020-03-04 DIAGNOSIS — E78.2 MIXED HYPERLIPIDEMIA: ICD-10-CM

## 2020-03-04 DIAGNOSIS — I10 ESSENTIAL HYPERTENSION: Primary | ICD-10-CM

## 2020-03-04 DIAGNOSIS — E55.9 VITAMIN D DEFICIENCY: ICD-10-CM

## 2020-03-04 PROCEDURE — 99214 OFFICE O/P EST MOD 30 MIN: CPT | Performed by: NURSE PRACTITIONER

## 2020-03-04 RX ORDER — NITROFURANTOIN 25; 75 MG/1; MG/1
100 CAPSULE ORAL DAILY
Qty: 30 CAPSULE | Refills: 3 | Status: SHIPPED | OUTPATIENT
Start: 2020-03-04 | End: 2020-09-01

## 2020-03-04 RX ORDER — LOSARTAN POTASSIUM AND HYDROCHLOROTHIAZIDE 12.5; 5 MG/1; MG/1
1 TABLET ORAL DAILY
Qty: 90 TABLET | Refills: 0 | Status: SHIPPED | OUTPATIENT
Start: 2020-03-04 | End: 2020-03-06 | Stop reason: SDUPTHER

## 2020-03-04 RX ORDER — PANTOPRAZOLE SODIUM 40 MG/1
40 TABLET, DELAYED RELEASE ORAL DAILY
Qty: 90 TABLET | Refills: 0 | Status: SHIPPED | OUTPATIENT
Start: 2020-03-04 | End: 2020-05-21

## 2020-03-04 RX ORDER — SIMVASTATIN 20 MG
20 TABLET ORAL EVERY OTHER DAY
Qty: 45 TABLET | Refills: 0 | Status: SHIPPED | OUTPATIENT
Start: 2020-03-04 | End: 2021-01-08 | Stop reason: SDUPTHER

## 2020-03-04 RX ORDER — FLUTICASONE PROPIONATE 50 MCG
2 SPRAY, SUSPENSION (ML) NASAL DAILY
Qty: 18.2 ML | Refills: 1 | Status: SHIPPED | OUTPATIENT
Start: 2020-03-04 | End: 2020-12-07

## 2020-03-04 RX ORDER — TRAZODONE HYDROCHLORIDE 100 MG/1
100 TABLET ORAL NIGHTLY
Qty: 90 TABLET | Refills: 3 | Status: SHIPPED | OUTPATIENT
Start: 2020-03-04 | End: 2021-01-11 | Stop reason: SDUPTHER

## 2020-03-04 NOTE — PROGRESS NOTES
Subjective   Veronica Wilkinson is a 74 y.o. female.     Ms. Wilkinson is a 74-year-old female who presents today for follow-up related to hypertension, hyperlipidemia, vitamin D deficiency and chronic UTI.  Blood pressure today is 120/72, heart rate 52.  She denies chest pain, shortness of breath, palpitations or increased  edema.  She continues to follow a low-fat diet and take simvastatin for the management of hyperlipidemia.  Patient was also placed on Macrobid 100 mg p.o. daily by her urologist for the management of chronic UTI.  She is requesting a refill today.   Dr. Fountain office was contacted and dosage verified.       The following portions of the patient's history were reviewed and updated as appropriate: allergies, current medications, past family history, past medical history, past social history, past surgical history and problem list.    Review of Systems   Constitutional: Negative for activity change, appetite change, fatigue, unexpected weight gain and unexpected weight loss.   HENT: Negative for congestion, sore throat, trouble swallowing and voice change.    Eyes: Negative.    Respiratory: Negative for cough, chest tightness, shortness of breath and wheezing.    Cardiovascular: Negative for chest pain, palpitations and leg swelling.   Gastrointestinal: Negative for abdominal pain, constipation, diarrhea, nausea and vomiting.   Endocrine: Negative.    Genitourinary: Negative for dysuria.   Musculoskeletal: Negative for arthralgias and myalgias.   Skin: Negative for rash.   Allergic/Immunologic: Negative.    Neurological: Negative.    Hematological: Negative.    Psychiatric/Behavioral: Negative.        Objective   Physical Exam   Constitutional: She is oriented to person, place, and time. She appears well-developed and well-nourished. No distress.   HENT:   Head: Normocephalic and atraumatic.   Eyes: Conjunctivae are normal.   Neck: Normal range of motion.   Cardiovascular: Normal rate, regular rhythm and  normal heart sounds.   Pulmonary/Chest: Effort normal and breath sounds normal. No respiratory distress. She has no wheezes. She has no rales.   Abdominal: Soft. Bowel sounds are normal. She exhibits no distension and no mass. There is no tenderness. There is no rebound and no guarding. No hernia.   Musculoskeletal: Normal range of motion. She exhibits no edema or tenderness.   Neurological: She is alert and oriented to person, place, and time.   Skin: Skin is warm and dry. No rash noted. She is not diaphoretic. No erythema. No pallor.   Psychiatric: She has a normal mood and affect. Her behavior is normal. Judgment and thought content normal.   Nursing note and vitals reviewed.        Assessment/Plan   Veronica was seen today for follow-up.    Diagnoses and all orders for this visit:    Essential hypertension    Mixed hyperlipidemia  -     Lipid Panel; Future    Vitamin D deficiency  -     Vitamin D 25 Hydroxy; Future    Chronic UTI    Other orders  -     simvastatin (ZOCOR) 20 MG tablet; Take 1 tablet by mouth Every Other Day.  -     losartan-hydrochlorothiazide (HYZAAR) 50-12.5 MG per tablet; Take 1 tablet by mouth Daily.  -     traZODone (DESYREL) 100 MG tablet; Take 1 tablet by mouth Every Night.  -     pantoprazole (PROTONIX) 40 MG EC tablet; Take 1 tablet by mouth Daily.  -     fluticasone (FLONASE) 50 MCG/ACT nasal spray; 2 sprays into the nostril(s) as directed by provider Daily.  -     nitrofurantoin, macrocrystal-monohydrate, (MACROBID) 100 MG capsule; Take 1 capsule by mouth Daily.    1.  Essential hypertension-controlled.  Refill Hyzaar 50-12.5 mg 1 tablet p.o. daily.  We will continue to monitor.    2.  Mixed hyperlipidemia-lipid panel.  Will call with results.  Continue low-fat diet.  Refill Zocor 20 mg 1 tablet p.o. daily.    3.  Vitamin D deficiency-vitamin D level.  Will call with results.    4.  Chronic UTI-refill Macrobid 100 mg 1 capsule p.o. daily.  Continue follow-up with urology.    5.   Follow-up in 6 months or sooner if needed.            This document has been electronically signed by TABBY Mejia on March 4, 2020 12:04 PM

## 2020-03-06 RX ORDER — LOSARTAN POTASSIUM AND HYDROCHLOROTHIAZIDE 12.5; 5 MG/1; MG/1
1 TABLET ORAL DAILY
Qty: 90 TABLET | Refills: 0 | Status: SHIPPED | OUTPATIENT
Start: 2020-03-06 | End: 2020-03-16 | Stop reason: SDUPTHER

## 2020-03-09 ENCOUNTER — TELEPHONE (OUTPATIENT)
Dept: FAMILY MEDICINE CLINIC | Facility: CLINIC | Age: 75
End: 2020-03-09

## 2020-03-10 ENCOUNTER — TELEPHONE (OUTPATIENT)
Dept: FAMILY MEDICINE CLINIC | Facility: CLINIC | Age: 75
End: 2020-03-10

## 2020-03-10 NOTE — TELEPHONE ENCOUNTER
Ivan from [facility] called needing clarification on pt Rx for Hyzarr.

## 2020-03-16 ENCOUNTER — TELEPHONE (OUTPATIENT)
Dept: FAMILY MEDICINE CLINIC | Facility: CLINIC | Age: 75
End: 2020-03-16

## 2020-03-16 RX ORDER — LOSARTAN POTASSIUM AND HYDROCHLOROTHIAZIDE 12.5; 5 MG/1; MG/1
1 TABLET ORAL DAILY
Qty: 90 TABLET | Refills: 3 | Status: SHIPPED | OUTPATIENT
Start: 2020-03-16 | End: 2020-03-18 | Stop reason: RX

## 2020-03-16 NOTE — TELEPHONE ENCOUNTER
PT CALLED AND STATES THAT JAUN CALLED IN THE NAME BRAND losartan-hydrochlorothiazide (HYZAAR) 50-12.5 MG per tablet AND ITS GOING TO COST HER ALMOST $60. SHE STATES SHE NEEDS THE GENERIC CALLED IN SO HER INSURANCE WILL PAY. PLEASE RESEND TO [facility]

## 2020-03-18 ENCOUNTER — TELEPHONE (OUTPATIENT)
Dept: FAMILY MEDICINE CLINIC | Facility: CLINIC | Age: 75
End: 2020-03-18

## 2020-03-18 RX ORDER — OLMESARTAN MEDOXOMIL AND HYDROCHLOROTHIAZIDE 20/12.5 20; 12.5 MG/1; MG/1
1 TABLET ORAL DAILY
Qty: 90 TABLET | Refills: 2 | Status: SHIPPED | OUTPATIENT
Start: 2020-03-18 | End: 2021-01-13 | Stop reason: SDUPTHER

## 2020-03-18 NOTE — TELEPHONE ENCOUNTER
Veronicamarina Wilkinson was called to let her know that her insurance will only cover the Hyzaar for 30 days.  PCP prescribed another medication.  Instructed patient to not start the new prescription until finished with the Hyzaar.  Patient verbalized understanding.

## 2020-04-27 ENCOUNTER — TELEPHONE (OUTPATIENT)
Dept: ONCOLOGY | Facility: CLINIC | Age: 75
End: 2020-04-27

## 2020-04-27 NOTE — TELEPHONE ENCOUNTER
SRAVAN FROM SCHEDULING CALLED FOR CLARIFICATION ON A BONE DENSITY ORDER FROM DR WEI SHE SPOKE WITH THE  PT TO SCHEDULE PT STATED THAT DR WEI WOULD BE THE ONE TO READ THE REPORT     SRAVAN CONTACT # [phone]

## 2020-05-14 ENCOUNTER — LAB (OUTPATIENT)
Dept: ONCOLOGY | Facility: HOSPITAL | Age: 75
End: 2020-05-14

## 2020-05-14 DIAGNOSIS — E78.2 MIXED HYPERLIPIDEMIA: ICD-10-CM

## 2020-05-14 DIAGNOSIS — D70.8 OTHER NEUTROPENIA (HCC): ICD-10-CM

## 2020-05-14 DIAGNOSIS — Z86.711 HISTORY OF PULMONARY EMBOLISM: ICD-10-CM

## 2020-05-14 DIAGNOSIS — D51.8 OTHER VITAMIN B12 DEFICIENCY ANEMIA: ICD-10-CM

## 2020-05-14 DIAGNOSIS — E55.9 VITAMIN D DEFICIENCY: ICD-10-CM

## 2020-05-14 LAB
25(OH)D3 SERPL-MCNC: 52.1 NG/ML (ref 30–100)
ALBUMIN SERPL-MCNC: 3.7 G/DL (ref 3.5–5.2)
ALBUMIN/GLOB SERPL: 1.4 G/DL
ALP SERPL-CCNC: 84 U/L (ref 39–117)
ALT SERPL W P-5'-P-CCNC: 15 U/L (ref 1–33)
ANION GAP SERPL CALCULATED.3IONS-SCNC: 10 MMOL/L (ref 5–15)
AST SERPL-CCNC: 23 U/L (ref 1–32)
BASOPHILS # BLD AUTO: 0.02 10*3/MM3 (ref 0–0.2)
BASOPHILS NFR BLD AUTO: 0.7 % (ref 0–1.5)
BILIRUB SERPL-MCNC: 0.3 MG/DL (ref 0.2–1.2)
BUN BLD-MCNC: 13 MG/DL (ref 8–23)
BUN/CREAT SERPL: 15.7 (ref 7–25)
CALCIUM SPEC-SCNC: 9.5 MG/DL (ref 8.6–10.5)
CHLORIDE SERPL-SCNC: 99 MMOL/L (ref 98–107)
CHOLEST SERPL-MCNC: 201 MG/DL (ref 0–200)
CO2 SERPL-SCNC: 26 MMOL/L (ref 22–29)
CREAT BLD-MCNC: 0.83 MG/DL (ref 0.57–1)
DEPRECATED RDW RBC AUTO: 39.8 FL (ref 37–54)
EOSINOPHIL # BLD AUTO: 0.05 10*3/MM3 (ref 0–0.4)
EOSINOPHIL NFR BLD AUTO: 1.9 % (ref 0.3–6.2)
ERYTHROCYTE [DISTWIDTH] IN BLOOD BY AUTOMATED COUNT: 12.4 % (ref 12.3–15.4)
FERRITIN SERPL-MCNC: 32.22 NG/ML (ref 13–150)
FOLATE SERPL-MCNC: >20 NG/ML (ref 4.78–24.2)
GFR SERPL CREATININE-BSD FRML MDRD: 68 ML/MIN/1.73
GLOBULIN UR ELPH-MCNC: 2.7 GM/DL
GLUCOSE BLD-MCNC: 73 MG/DL (ref 65–99)
HCT VFR BLD AUTO: 30 % (ref 34–46.6)
HDLC SERPL-MCNC: 82 MG/DL (ref 40–60)
HGB BLD-MCNC: 10.3 G/DL (ref 12–15.9)
IMM GRANULOCYTES # BLD AUTO: 0 10*3/MM3 (ref 0–0.05)
IMM GRANULOCYTES NFR BLD AUTO: 0 % (ref 0–0.5)
IRON 24H UR-MRATE: 75 MCG/DL (ref 37–145)
IRON SATN MFR SERPL: 22 % (ref 20–50)
LDLC SERPL CALC-MCNC: 105 MG/DL (ref 0–100)
LDLC/HDLC SERPL: 1.29 {RATIO}
LYMPHOCYTES # BLD AUTO: 1.2 10*3/MM3 (ref 0.7–3.1)
LYMPHOCYTES NFR BLD AUTO: 44.9 % (ref 19.6–45.3)
MCH RBC QN AUTO: 30.3 PG (ref 26.6–33)
MCHC RBC AUTO-ENTMCNC: 34.3 G/DL (ref 31.5–35.7)
MCV RBC AUTO: 88.2 FL (ref 79–97)
MONOCYTES # BLD AUTO: 0.32 10*3/MM3 (ref 0.1–0.9)
MONOCYTES NFR BLD AUTO: 12 % (ref 5–12)
NEUTROPHILS # BLD AUTO: 1.08 10*3/MM3 (ref 1.7–7)
NEUTROPHILS NFR BLD AUTO: 40.5 % (ref 42.7–76)
NRBC BLD AUTO-RTO: 0 /100 WBC (ref 0–0.2)
PLATELET # BLD AUTO: 266 10*3/MM3 (ref 140–450)
PMV BLD AUTO: 9.2 FL (ref 6–12)
POTASSIUM BLD-SCNC: 4.1 MMOL/L (ref 3.5–5.2)
PROT SERPL-MCNC: 6.4 G/DL (ref 6–8.5)
RBC # BLD AUTO: 3.4 10*6/MM3 (ref 3.77–5.28)
SODIUM BLD-SCNC: 135 MMOL/L (ref 136–145)
TIBC SERPL-MCNC: 338 MCG/DL (ref 298–536)
TRANSFERRIN SERPL-MCNC: 227 MG/DL (ref 200–360)
TRIGL SERPL-MCNC: 68 MG/DL (ref 0–150)
VIT B12 BLD-MCNC: 879 PG/ML (ref 211–946)
VLDLC SERPL-MCNC: 13.6 MG/DL (ref 5–40)
WBC NRBC COR # BLD: 2.67 10*3/MM3 (ref 3.4–10.8)

## 2020-05-14 PROCEDURE — 85025 COMPLETE CBC W/AUTO DIFF WBC: CPT | Performed by: INTERNAL MEDICINE

## 2020-05-14 PROCEDURE — 82746 ASSAY OF FOLIC ACID SERUM: CPT | Performed by: INTERNAL MEDICINE

## 2020-05-14 PROCEDURE — 80053 COMPREHEN METABOLIC PANEL: CPT | Performed by: INTERNAL MEDICINE

## 2020-05-14 PROCEDURE — 80061 LIPID PANEL: CPT | Performed by: NURSE PRACTITIONER

## 2020-05-14 PROCEDURE — 82607 VITAMIN B-12: CPT | Performed by: INTERNAL MEDICINE

## 2020-05-14 PROCEDURE — 84466 ASSAY OF TRANSFERRIN: CPT | Performed by: INTERNAL MEDICINE

## 2020-05-14 PROCEDURE — 83540 ASSAY OF IRON: CPT | Performed by: INTERNAL MEDICINE

## 2020-05-14 PROCEDURE — 82306 VITAMIN D 25 HYDROXY: CPT | Performed by: NURSE PRACTITIONER

## 2020-05-14 PROCEDURE — 82728 ASSAY OF FERRITIN: CPT | Performed by: INTERNAL MEDICINE

## 2020-05-18 NOTE — PROGRESS NOTES
Vitamin D within normal limits.  Cholesterol levels continue to improve.  Continue low-fat diet.  Follow-up as scheduled.

## 2020-05-20 ENCOUNTER — APPOINTMENT (OUTPATIENT)
Dept: ONCOLOGY | Facility: HOSPITAL | Age: 75
End: 2020-05-20

## 2020-05-21 RX ORDER — PANTOPRAZOLE SODIUM 40 MG/1
TABLET, DELAYED RELEASE ORAL
Qty: 90 TABLET | Refills: 0 | Status: SHIPPED | OUTPATIENT
Start: 2020-05-21 | End: 2020-08-04

## 2020-05-28 ENCOUNTER — TELEPHONE (OUTPATIENT)
Dept: FAMILY MEDICINE CLINIC | Facility: CLINIC | Age: 75
End: 2020-05-28

## 2020-08-04 RX ORDER — PANTOPRAZOLE SODIUM 40 MG/1
TABLET, DELAYED RELEASE ORAL
Qty: 90 TABLET | Refills: 0 | Status: SHIPPED | OUTPATIENT
Start: 2020-08-04 | End: 2020-12-07

## 2020-08-12 ENCOUNTER — TELEPHONE (OUTPATIENT)
Dept: ONCOLOGY | Facility: CLINIC | Age: 75
End: 2020-08-12

## 2020-08-12 NOTE — TELEPHONE ENCOUNTER
PT WOULD LIKE FOR SOMEONE TO GIVE HER A CALL WITH HER BONE DENSITY TEST RESULTS DONE ON 8/10 AT [facility].    BEST CALL BACK # [phone]

## 2020-08-12 NOTE — TELEPHONE ENCOUNTER
She needs to have a follow-up appointment..  If she does not want to follow-up she can go to her medical provider to go over the result of DEXA scan.  Thank you

## 2020-08-17 ENCOUNTER — TELEPHONE (OUTPATIENT)
Dept: ONCOLOGY | Facility: CLINIC | Age: 75
End: 2020-08-17

## 2020-08-17 NOTE — TELEPHONE ENCOUNTER
Patient wanting to schedule a follow-up but wants to do video. Said fine coming in person to do labs but wants video visit for follow-up.    Callback#: [phone]

## 2020-08-19 ENCOUNTER — LAB (OUTPATIENT)
Dept: LAB | Facility: HOSPITAL | Age: 75
End: 2020-08-19

## 2020-08-19 DIAGNOSIS — D64.9 ANEMIA, UNSPECIFIED TYPE: Primary | ICD-10-CM

## 2020-08-19 DIAGNOSIS — D64.9 ANEMIA, UNSPECIFIED TYPE: ICD-10-CM

## 2020-08-19 LAB
BASOPHILS # BLD AUTO: 0.02 10*3/MM3 (ref 0–0.2)
BASOPHILS NFR BLD AUTO: 0.5 % (ref 0–1.5)
DEPRECATED RDW RBC AUTO: 41.1 FL (ref 37–54)
EOSINOPHIL # BLD AUTO: 0.05 10*3/MM3 (ref 0–0.4)
EOSINOPHIL NFR BLD AUTO: 1.3 % (ref 0.3–6.2)
ERYTHROCYTE [DISTWIDTH] IN BLOOD BY AUTOMATED COUNT: 12.9 % (ref 12.3–15.4)
FERRITIN SERPL-MCNC: 39.5 NG/ML (ref 13–150)
FOLATE SERPL-MCNC: >20 NG/ML (ref 4.78–24.2)
HCT VFR BLD AUTO: 32.4 % (ref 34–46.6)
HGB BLD-MCNC: 11.3 G/DL (ref 12–15.9)
IMM GRANULOCYTES # BLD AUTO: 0.01 10*3/MM3 (ref 0–0.05)
IMM GRANULOCYTES NFR BLD AUTO: 0.3 % (ref 0–0.5)
IRON 24H UR-MRATE: 89 MCG/DL (ref 37–145)
IRON SATN MFR SERPL: 22 % (ref 20–50)
LYMPHOCYTES # BLD AUTO: 1.74 10*3/MM3 (ref 0.7–3.1)
LYMPHOCYTES NFR BLD AUTO: 46.2 % (ref 19.6–45.3)
MCH RBC QN AUTO: 30.6 PG (ref 26.6–33)
MCHC RBC AUTO-ENTMCNC: 34.9 G/DL (ref 31.5–35.7)
MCV RBC AUTO: 87.8 FL (ref 79–97)
MONOCYTES # BLD AUTO: 0.35 10*3/MM3 (ref 0.1–0.9)
MONOCYTES NFR BLD AUTO: 9.3 % (ref 5–12)
NEUTROPHILS NFR BLD AUTO: 1.6 10*3/MM3 (ref 1.7–7)
NEUTROPHILS NFR BLD AUTO: 42.4 % (ref 42.7–76)
NRBC BLD AUTO-RTO: 0 /100 WBC (ref 0–0.2)
PLATELET # BLD AUTO: 307 10*3/MM3 (ref 140–450)
PMV BLD AUTO: 9.3 FL (ref 6–12)
RBC # BLD AUTO: 3.69 10*6/MM3 (ref 3.77–5.28)
TIBC SERPL-MCNC: 401 MCG/DL (ref 298–536)
TRANSFERRIN SERPL-MCNC: 269 MG/DL (ref 200–360)
VIT B12 BLD-MCNC: 1108 PG/ML (ref 211–946)
WBC # BLD AUTO: 3.77 10*3/MM3 (ref 3.4–10.8)

## 2020-08-19 PROCEDURE — 83540 ASSAY OF IRON: CPT

## 2020-08-19 PROCEDURE — 82607 VITAMIN B-12: CPT

## 2020-08-19 PROCEDURE — 85025 COMPLETE CBC W/AUTO DIFF WBC: CPT

## 2020-08-19 PROCEDURE — 82728 ASSAY OF FERRITIN: CPT

## 2020-08-19 PROCEDURE — 84466 ASSAY OF TRANSFERRIN: CPT

## 2020-08-19 PROCEDURE — 82746 ASSAY OF FOLIC ACID SERUM: CPT

## 2020-08-25 ENCOUNTER — OFFICE VISIT (OUTPATIENT)
Dept: ONCOLOGY | Facility: CLINIC | Age: 75
End: 2020-08-25

## 2020-08-25 DIAGNOSIS — D70.8 OTHER NEUTROPENIA (HCC): ICD-10-CM

## 2020-08-25 DIAGNOSIS — D64.9 ANEMIA, UNSPECIFIED TYPE: Primary | ICD-10-CM

## 2020-08-25 DIAGNOSIS — Z86.711 HISTORY OF PULMONARY EMBOLISM: ICD-10-CM

## 2020-08-25 DIAGNOSIS — M81.0 AGE-RELATED OSTEOPOROSIS WITHOUT CURRENT PATHOLOGICAL FRACTURE: ICD-10-CM

## 2020-08-25 PROCEDURE — 99442 PR PHYS/QHP TELEPHONE EVALUATION 11-20 MIN: CPT | Performed by: INTERNAL MEDICINE

## 2020-08-25 PROCEDURE — G8730 PAIN DOC POS AND PLAN: HCPCS | Performed by: INTERNAL MEDICINE

## 2020-08-25 PROCEDURE — G9903 PT SCRN TBCO ID AS NON USER: HCPCS | Performed by: INTERNAL MEDICINE

## 2020-08-25 PROCEDURE — 1123F ACP DISCUSS/DSCN MKR DOCD: CPT | Performed by: INTERNAL MEDICINE

## 2020-08-25 NOTE — PROGRESS NOTES
DATE OF telephone VISIT: 8/25/2020      REASON FOR telephone VISIT: Neutropenia, anemia, pulmonary embolism history, osteoporosis, primary hyperparathyroidism      You have chosen to receive care through a telephone visit. Do you consent to use a telephone visit for your medical care today? Yes       HISTORY OF PRESENT ILLNESS:    75-year-old female with medical problem consisting of history of pulmonary embolism diagnosed in 2016 status post anticoagulation with Coumadin for 1 year, osteoporosis, depression, history of gastric sleeve surgery done in 2016 has been following up with [facility] since August 23, 2018 for easy bruising, neutropenia and anemia.  Patient is here for follow-up appointment today to discuss recently done blood work as well as DEXA scan.  Complains of palpitation.  Denies any recent infection.  Denies any bleeding.  Complains of recent worsening of arthritis pain.  Denies any new swelling of lower extremity or any chest pain.      PAST MEDICAL HISTORY:    Past Medical History:   Diagnosis Date   • Bleeding tendency (CMS/HCC)    • Chest pain     History of noncardiac chest pain   • Chronic depression    • Depressive disorder    • Diastolic dysfunction    • Dyspnea    • Dyspnea on exertion    • Edema    • Essential hypertension    • Exercise intolerance    • Fatigue    • Gallstones    • GERD (gastroesophageal reflux disease)    • History of bone density study 2011    DEXA BONE DENSITY APPENDICULAR 63097 (1) - normal   • History of bone density study 06/24/2015    DXA BONE DENSITY AXIAL 69860 [facility] (1) - Ordered By: TOLU RAMIREZ ([facility])    • History of echocardiogram     Echocardiogram W/ color flow 94068 (2)   • History of mammogram 2013    Mammogram screen (1)   • History of mammogram 2015    MAMMOGRAM SCREENING 48488 - [facility] (1)   • History of screening mammography 06/25/2015    SCREENING MAMMOGRAPHY DIGITAL  (Medicare) (1) - Ordered By: ANDRADE BECERRIL ([facility])   "  • Hypercalcemia    • Hyperlipidemia    • Influenza vaccine administered 02/25/2016    INFLUENZA IMMUN ADMIN OR PREV RECV'D  (2) - Ordered By: ANDRADE BECERRIL ([facility])    • Long-term (current) use of anticoagulants     coumadin therapy      • Lower extremity edema     Intermittent lower extremity edema   • Obesity, Class III, BMI 40-49.9 (morbid obesity) (CMS/HCC)     \"Class III obesity\"   • BEVERLY (obstructive sleep apnea)     Mild BEVERLY   • PE (pulmonary embolism) 10/2015    Bilateral PE diagnosed after a car ride to [place]   • Pneumococcal vaccination given 02/25/2016    PNEUMOC VAC/ADMIN/RCVD 4040F (2) - Ordered By: ANDRADE BECERRIL ([facility])    • Right basilic vein fibrosis 2015   • Sedentary lifestyle    • Shortness of breath    • Urinary tract infection    • Venous thrombosis 2015    right basilic venous thrombosis; This was noted in May 2015.   • Weight gain        SOCIAL HISTORY:    Social History     Tobacco Use   • Smoking status: Never Smoker   • Smokeless tobacco: Never Used   Substance Use Topics   • Alcohol use: No   • Drug use: No       Surgical History :  Past Surgical History:   Procedure Laterality Date   • CHOLECYSTECTOMY      Cholecystectomy (1)   • COLONOSCOPY      Approximately 5 years prior as stated per patient   • CRYOABLATION     • ENDOSCOPY  09/15/2011    Colon endoscopy 71366 (2) - Hemorrhoids found.   • GASTRIC SLEEVE LAPAROSCOPIC     • HEMORRHOIDECTOMY     • HYSTEROSCOPY      Hysteroscopy; ablation (1)   • INJECTION OF MEDICATION  09/27/2012    Kenalog (1) - Ordered By: ANDRADE BECERRLI ([facility])        ALLERGIES:    Allergies   Allergen Reactions   • Sulfa Antibiotics Rash and Hives     Sulfa (Sulfonamide Antibiotics)         FAMILY HISTORY:  Family History   Problem Relation Age of Onset   • Ovarian cancer Mother    • Bleeding Disorder Father    • Other Father         Hematologic disorder   • Lung cancer Father    • Pancreatic cancer Sister    • Cancer Brother    • Lung cancer " Brother    • Bleeding Disorder Other    • Hyperlipidemia Other    • Hypertension Other    • Osteoarthritis Other    • Other Other         Gastritis   • Adrenal disorder Daughter    • Seizures Daughter    • Thyroid disease Daughter    • No Known Problems Daughter    • Diabetes Other    • Heart disease Other    • Other Other         Ulcers; Bleeding Tendencies; Allergy   • Cancer Other    • Cholelithiasis Other    • Hypertension Other    • Allergy (severe) Other            REVIEW OF SYSTEMS:      CONSTITUTIONAL:  Complains of fatigue. Denies any fever, chills or weight loss.     EYES: No visual disturbances. No discharge. No new lesions    ENMT:  No epistaxis, mouth sores or difficulty swallowing.    RESPIRATORY:  No new shortness of breath. No new cough or hemoptysis.    CARDIOVASCULAR: Complains of intermittent palpitations.  No chest pain .    GASTROINTESTINAL:  No abdominal pain nausea, vomiting or blood in the stool.    GENITOURINARY: Positive for history of recurrent urinary tract infection.  No Dysuria or Hematuria.    MUSCULOSKELETAL: Complains of recent worsening of chronic arthritis pain.    LYMPHATICS:  Denies any abnormal swollen glands anywhere in the body.    NEUROLOGICAL : Positive for intermittent tingling and numbness affecting bilateral hands. No headache or dizziness. No seizures or balance problems.    SKIN: No new skin lesions.    ENDOCRINE : No new hot or cold intolerance. No new polyuria . No polydipsia.        PHYSICAL EXAMINATION:      Unable to obtain secondary to being telephone visit      DIAGNOSTIC DATA:    Glucose   Date Value Ref Range Status   05/14/2020 73 65 - 99 mg/dL Final     Sodium   Date Value Ref Range Status   05/14/2020 135 (L) 136 - 145 mmol/L Final   10/12/2018 140 134 - 144 mmol/L Final     Potassium   Date Value Ref Range Status   05/14/2020 4.1 3.5 - 5.2 mmol/L Final   10/12/2018 4.6 3.5 - 5.1 mmol/L Final     CO2   Date Value Ref Range Status   05/14/2020 26.0 22.0 -  29.0 mmol/L Final     Chloride   Date Value Ref Range Status   05/14/2020 99 98 - 107 mmol/L Final   10/12/2018 105 98 - 107 mmol/L Final     Anion Gap   Date Value Ref Range Status   05/14/2020 10.0 5.0 - 15.0 mmol/L Final     Creatinine   Date Value Ref Range Status   05/14/2020 0.83 0.57 - 1.00 mg/dL Final   10/12/2018 0.9 0.6 - 1.3 mg/dL Final     Comment:     **The MDRD GFR formula is valid only for ages 18-70.    GFR will not be reported for individuals aging <18 or >70.     BUN   Date Value Ref Range Status   05/14/2020 13 8 - 23 mg/dL Final   10/12/2018 13 7 - 18 mg/dL Final     BUN/Creatinine Ratio   Date Value Ref Range Status   05/14/2020 15.7 7.0 - 25.0 Final     Calcium   Date Value Ref Range Status   05/14/2020 9.5 8.6 - 10.5 mg/dL Final   10/12/2018 9.7 8.8 - 10.5 mg/dL Final     eGFR Non  Amer   Date Value Ref Range Status   05/14/2020 68 >60 mL/min/1.73 Final     Alkaline Phosphatase   Date Value Ref Range Status   05/14/2020 84 39 - 117 U/L Final   10/12/2018 115 46 - 116 U/L Final     Total Protein   Date Value Ref Range Status   05/14/2020 6.4 6.0 - 8.5 g/dL Final   10/12/2018 6.6 6.4 - 8.2 g/dL Final     ALT (SGPT)   Date Value Ref Range Status   05/14/2020 15 1 - 33 U/L Final   10/12/2018 43 30 - 65 U/L Final     AST (SGOT)   Date Value Ref Range Status   05/14/2020 23 1 - 32 U/L Final   10/12/2018 22 15 - 37 U/L Final     Total Bilirubin   Date Value Ref Range Status   05/14/2020 0.3 0.2 - 1.2 mg/dL Final   10/12/2018 0.3 0 - 1.0 mg/dL Final     Albumin   Date Value Ref Range Status   05/14/2020 3.70 3.50 - 5.20 g/dL Final   10/12/2018 3.2 (L) 3.4 - 5.0 g/dL Final     Globulin   Date Value Ref Range Status   05/14/2020 2.7 gm/dL Final     Lab Results   Component Value Date    WBC 3.77 08/19/2020    HGB 11.3 (L) 08/19/2020    HCT 32.4 (L) 08/19/2020    MCV 87.8 08/19/2020     08/19/2020     Lab Results   Component Value Date    NEUTROABS 1.60 (L) 08/19/2020    IRON 89 08/19/2020     TIBC 401 08/19/2020    LABIRON 22 08/19/2020    FERRITIN 39.50 08/19/2020    EEEPCBWV53 1,108 (H) 08/19/2020    FOLATE >20.00 08/19/2020     Lab Results   Component Value Date    AFPTM 142 12/05/2018             RADIOLOGY DATA :  DEXA scan done on August 10, 2020 showed:    AP SPINE  BMD (g/cm2): 1.018  T-score (SD vs young adult): 0.3  Z-score (SD vs age-matched): 2.0  Change versus previous: -0.1%     MEAN FEMORAL NECKS  BMD (g/cm2): 0.525  T-score (SD vs young adult): -2.9  Z-score (SD vs age-matched): -1.0  Change versus previous: -15.6%           Comment: World Health Organization (WHO) classifications:  Normal: T-score at or above -1 SD  Osteopenia: T-score between -1 and -2.5 SD  Osteoporosis: T-score at or below -2.5 SD     IMPRESSION:  CONCLUSION: Osteoporosis according to World Health Organization  criteria.           No radiology results for the last 7 days          ASSESSMENT AND PLAN:      1.  Neutropenia:  -Recent CBC shows white blood cell count is 3.77 with absolute neutrophil count of 1600.  - Patient does have chronic intermittent neutropenia since 2014.  -Anemia work-up done does not show any evidence of nutritional deficiency.  - At this point recommend continue with clinical monitoring.  -We will ask patient to return to clinic in 4 months with repeat CBC and anemia work-up to be done on that day.    2.  Anemia:  - Work-up is consistent with anemia of chronic disease  -Hemoglobin is 11.3.  -Recommend continue with B12 500 mcg p.o. daily.  -We will repeat anemia work-up upon next clinic visit in 4 months.    3.  History of pulmonary embolism:  -Patient was status post anticoagulation with Coumadin for 1 year.  Recommend continuing with clinical monitoring    4.  Osteoporosis:  -Recent DEXA scan done on August 10, 2020 shows osteoporosis with a T score of -2.9 at femoral neck.  Result of DEXA scan was discussed with patient.  - Patient was seen at [facility] and Reclast has been  recommended.  -Patient has not been seen by dentist to start any treatment with Reclast.  Again it was discussed with patient that she will need dental clearance prior to starting any Reclast.  - Recommend following up with primary medical provider for further management of osteoporosis    5.  Maintenance: Patient does not smoke.  Had a colonoscopy in 2011    6. Advance Care Planning: For now patient remains full code and is able to make decisions.  Patient has health care surrogate mentioned on chart.         PHQ-9 Total Score:       Veronica Wilkinson reports a pain score of 5.  Given her pain assessment as noted, treatment options were discussed and the following options were decided upon as a follow-up plan to address the patient's pain: continuation of current treatment plan for pain.         This visit has been rescheduled as a phone visit to comply with patient safety concerns in accordance with CDC recommendations. Total time of discussion was 12 minutes.       Mihai Hunt MD  8/25/2020  14:08        Part of this note may be an electronic transcription/translation of spoken language to printed text using the Dragon Dictation System.”

## 2020-08-26 ENCOUNTER — LAB (OUTPATIENT)
Dept: LAB | Facility: HOSPITAL | Age: 75
End: 2020-08-26

## 2020-08-26 ENCOUNTER — OFFICE VISIT (OUTPATIENT)
Dept: FAMILY MEDICINE CLINIC | Facility: CLINIC | Age: 75
End: 2020-08-26

## 2020-08-26 VITALS
HEART RATE: 55 BPM | BODY MASS INDEX: 33.62 KG/M2 | WEIGHT: 178.1 LBS | DIASTOLIC BLOOD PRESSURE: 70 MMHG | SYSTOLIC BLOOD PRESSURE: 130 MMHG | RESPIRATION RATE: 18 BRPM | OXYGEN SATURATION: 98 % | HEIGHT: 61 IN

## 2020-08-26 DIAGNOSIS — M81.0 OSTEOPOROSIS WITHOUT CURRENT PATHOLOGICAL FRACTURE, UNSPECIFIED OSTEOPOROSIS TYPE: ICD-10-CM

## 2020-08-26 DIAGNOSIS — I10 ESSENTIAL HYPERTENSION: Primary | ICD-10-CM

## 2020-08-26 DIAGNOSIS — R00.1 SINUS BRADYCARDIA BY ELECTROCARDIOGRAM: ICD-10-CM

## 2020-08-26 DIAGNOSIS — Z12.31 SCREENING MAMMOGRAM, ENCOUNTER FOR: ICD-10-CM

## 2020-08-26 DIAGNOSIS — R00.2 PALPITATIONS: ICD-10-CM

## 2020-08-26 LAB
ANION GAP SERPL CALCULATED.3IONS-SCNC: 11 MMOL/L (ref 5–15)
BUN SERPL-MCNC: 13 MG/DL (ref 8–23)
BUN/CREAT SERPL: 14.8 (ref 7–25)
CALCIUM SPEC-SCNC: 9.7 MG/DL (ref 8.6–10.5)
CHLORIDE SERPL-SCNC: 99 MMOL/L (ref 98–107)
CO2 SERPL-SCNC: 26 MMOL/L (ref 22–29)
CREAT SERPL-MCNC: 0.88 MG/DL (ref 0.57–1)
GFR SERPL CREATININE-BSD FRML MDRD: 63 ML/MIN/1.73
GLUCOSE SERPL-MCNC: 80 MG/DL (ref 65–99)
POTASSIUM SERPL-SCNC: 3.9 MMOL/L (ref 3.5–5.2)
SODIUM SERPL-SCNC: 136 MMOL/L (ref 136–145)

## 2020-08-26 PROCEDURE — 99214 OFFICE O/P EST MOD 30 MIN: CPT | Performed by: NURSE PRACTITIONER

## 2020-08-26 PROCEDURE — 93010 ELECTROCARDIOGRAM REPORT: CPT | Performed by: INTERNAL MEDICINE

## 2020-08-26 PROCEDURE — 80048 BASIC METABOLIC PNL TOTAL CA: CPT | Performed by: NURSE PRACTITIONER

## 2020-08-26 PROCEDURE — 36415 COLL VENOUS BLD VENIPUNCTURE: CPT | Performed by: NURSE PRACTITIONER

## 2020-08-26 PROCEDURE — 93005 ELECTROCARDIOGRAM TRACING: CPT | Performed by: NURSE PRACTITIONER

## 2020-08-26 RX ORDER — DOCUSATE SODIUM 100 MG/1
100 CAPSULE, LIQUID FILLED ORAL NIGHTLY
COMMUNITY
End: 2021-08-21

## 2020-08-26 RX ORDER — VIT C/B6/B5/MAGNESIUM/HERB 173 50-5-6-5MG
500 CAPSULE ORAL DAILY
COMMUNITY
End: 2022-06-30

## 2020-08-26 RX ORDER — DIPHENHYDRAMINE HCL 25 MG
50 TABLET ORAL NIGHTLY
COMMUNITY
End: 2022-06-30

## 2020-08-26 RX ORDER — PHENOL 1.4 %
600 AEROSOL, SPRAY (ML) MUCOUS MEMBRANE 2 TIMES DAILY WITH MEALS
COMMUNITY
End: 2022-06-30

## 2020-08-26 RX ORDER — UREA 10 %
1 LOTION (ML) TOPICAL NIGHTLY
COMMUNITY
End: 2020-12-07

## 2020-08-26 NOTE — PROGRESS NOTES
Subjective   Veronica Wilkinson is a 75 y.o. female.     Ms. Wilkinson is a 75-year-old female who presents today for follow-up related to hypertension, bradycardia, osteoporosis and evaluation of palpitations.  Blood pressure today is 130/70 heart rate 55.  She has a longstanding history of asymptomatic sinus bradycardia.  However over the last 1 to 2 months she reports increasing frequencies of palpitations.  She states palpitations primarily happen in the morning hours.  She has had no medication changes.  She denies chest pain, shortness of breath, palpitations or edema.  Patient also had recent bone scan which showed progressive osteoporosis.  She states her hematologist suggested she discuss treatment options with me.       The following portions of the patient's history were reviewed and updated as appropriate: allergies, current medications, past family history, past medical history, past social history, past surgical history and problem list.    Review of Systems   Constitutional: Negative for activity change, appetite change, fatigue, unexpected weight gain and unexpected weight loss.   HENT: Negative for congestion, sore throat, trouble swallowing and voice change.    Eyes: Negative.    Respiratory: Negative for cough, chest tightness, shortness of breath and wheezing.    Cardiovascular: Positive for palpitations (mainly in the morning hours, progressive over the last 2 months). Negative for chest pain and leg swelling.   Gastrointestinal: Negative for abdominal pain, constipation, diarrhea, nausea and vomiting.   Endocrine: Negative.    Genitourinary: Negative for dysuria.   Musculoskeletal: Negative for arthralgias and myalgias.   Skin: Negative for rash.   Allergic/Immunologic: Negative.    Neurological: Negative.    Hematological: Negative.    Psychiatric/Behavioral: Negative.        Objective   Physical Exam   Constitutional: She is oriented to person, place, and time. She appears well-developed and  well-nourished. No distress.   HENT:   Head: Normocephalic and atraumatic.   Eyes: Conjunctivae are normal.   Neck: Normal range of motion.   Cardiovascular: Regular rhythm, normal heart sounds and intact distal pulses. Bradycardia present. Exam reveals no gallop and no friction rub.   No murmur heard.  Pulmonary/Chest: Effort normal and breath sounds normal. No respiratory distress. She has no wheezes. She has no rales.   Abdominal: Soft. Bowel sounds are normal. She exhibits no distension and no mass. There is no tenderness. There is no rebound and no guarding. No hernia.   Musculoskeletal: Normal range of motion. She exhibits no edema or tenderness.   Neurological: She is alert and oriented to person, place, and time.   Skin: Skin is warm and dry. No rash noted. She is not diaphoretic. No erythema. No pallor.   Psychiatric: She has a normal mood and affect. Her behavior is normal. Judgment and thought content normal.   Nursing note and vitals reviewed.        Assessment/Plan   Veronica was seen today for follow-up and med refill.    Diagnoses and all orders for this visit:    Essential hypertension    Bradycardia  -     Basic Metabolic Panel  -     ECG 12 Lead  -     Ambulatory Referral to Cardiology    Palpitations  -     Basic Metabolic Panel  -     ECG 12 Lead  -     Ambulatory Referral to Cardiology    Osteoporosis without current pathological fracture, unspecified osteoporosis type  -     Ambulatory Referral to [facility]    Screening mammogram, encounter for  -     Mammo Screening Digital Tomosynthesis Bilateral With CAD; Future    1.  Essential hypertension-controlled.  No change in therapy at this time.  We will continue to monitor.    2.  Bradycardia-BMP.  Will call with results.  EKG show sinus bradycardia with first-degree AV block.  And EKG obtained in 2017 also showed sinus bradycardia with first-degree AV block.  Only recently has she developed palpitations.  Urgent referral to  cardiology for further evaluation considering patient has new onset of palpitations.    3.  Palpitations-plan of care stated above in #2.    4.  Osteoporosis without current pathologic fracture, unspecified osteoporosis type-referral to bone clinic for further evaluation and treatment.    5.  Health maintenance- referral for screening mammogram.  Will call with results.    6.  Follow-up in 3 months for follow-up and Medicare wellness visit or sooner for any acute needs.            This document has been electronically signed by TABBY Mejia on August 26, 2020 12:09

## 2020-09-01 ENCOUNTER — OFFICE VISIT (OUTPATIENT)
Dept: CARDIOLOGY | Facility: CLINIC | Age: 75
End: 2020-09-01

## 2020-09-01 VITALS
HEIGHT: 61 IN | BODY MASS INDEX: 33.99 KG/M2 | OXYGEN SATURATION: 98 % | DIASTOLIC BLOOD PRESSURE: 80 MMHG | SYSTOLIC BLOOD PRESSURE: 135 MMHG | HEART RATE: 50 BPM | WEIGHT: 180 LBS

## 2020-09-01 DIAGNOSIS — R00.2 PALPITATIONS: Primary | ICD-10-CM

## 2020-09-01 PROCEDURE — 93000 ELECTROCARDIOGRAM COMPLETE: CPT | Performed by: INTERNAL MEDICINE

## 2020-09-01 PROCEDURE — 99204 OFFICE O/P NEW MOD 45 MIN: CPT | Performed by: INTERNAL MEDICINE

## 2020-09-01 NOTE — PROGRESS NOTES
[facility]  OFFICE NOTE    Cardiovascular Medicine  Linnette Peterson M.D., DAGOVI         Ciara, Rich I, APRN  [address] [place] [address]    Thank you for asking me to see Veronica Wilkinson for palpitations.    History of Present Illness  This is a 75 y.o. female with:    1.  Attention  2.  Hyperlipidemia  3.  PE   4 hyperlipidemia    Veronica Wilkinson is a 75 y.o. female who presents for consultation today.  Patient is here for further evaluation for sinus bradycardia and intermittent palpitations.  Patient has vague symptoms of hiccups followed by shortness of breath lasting for a couple of seconds.  She also has occasional palpitations early in the morning.  Was also last for several seconds.  Palpitations are not associated with chest pain, shortness of breath, lightheadedness.  Syncopal episode almost 10 years ago when she was started on her antihypertensives initially however currently she is denying any syncopal episodes.  She has a prior history of pulmonary embolism.  She denies any chest pain on exertion.  Is been previously screened for sleep apnea  Does have history of gastric sleeve and almost 100 pounds weight loss after that.  He sleeps in recliner because of her arthritis.  Denying any orthopnea PND or lower extremity swelling.  Also significant osteoporosis and worried about falls.    Review of Systems - ROS  Constitution: Negative for weakness, weight gain and weight loss.   HENT: Negative for congestion.    Eyes: Negative for blurred vision.   Cardiovascular: As mentioned above  Respiratory: Negative for cough and hemoptysis.    Endocrine: Negative for polydipsia and polyuria.   Hematologic/Lymphatic: Negative for bleeding problem. Does not bruise/bleed easily.   Skin: Negative for flushing.   Musculoskeletal: Negative for neck pain and stiffness.   Gastrointestinal: Negative for abdominal pain, diarrhea, jaundice, melena, nausea and vomiting.      Genitourinary: Negative for dysuria and hematuria.   Neurological: Negative for dizziness, focal weakness and numbness.   Psychiatric/Behavioral: Negative for altered mental status and depression.          All other systems were reviewed and were negative.    family history includes Adrenal disorder in her daughter; Allergy (severe) in an other family member; Bleeding Disorder in her father and another family member; Cancer in her brother and another family member; Cholelithiasis in an other family member; Diabetes in an other family member; Heart disease in an other family member; Hyperlipidemia in an other family member; Hypertension in some other family members; Lung cancer in her brother and father; No Known Problems in her daughter; Osteoarthritis in an other family member; Other in her father and other family members; Ovarian cancer in her mother; Pancreatic cancer in her sister; Seizures in her daughter; Thyroid disease in her daughter.     reports that she has never smoked. She has never used smokeless tobacco. She reports that she does not drink alcohol or use drugs.    Allergies   Allergen Reactions   • Sulfa Antibiotics Hives and Rash     Sulfa (Sulfonamide Antibiotics)         Current Outpatient Medications:   •  acetaminophen (TYLENOL) 325 MG tablet, Take 650 mg by mouth Every Night., Disp: , Rfl:   •  Bisacodyl (GENTLE LAXATIVE PO), Take 100 mg by mouth Every Night., Disp: , Rfl:   •  calcium carbonate (OS-DARON) 600 MG tablet, Take 600 mg by mouth 2 (Two) Times a Day With Meals., Disp: , Rfl:   •  Cholecalciferol (VITAMIN D3) 2000 units tablet, Take 1 tablet by mouth Daily., Disp: , Rfl:   •  Cyanocobalamin (B-12 PO), Take  by mouth., Disp: , Rfl:   •  diphenhydrAMINE (BENADRYL) 25 MG tablet, Take 25 mg by mouth At Night As Needed for Itching. Take two tablets by mouth at bedtime, Disp: , Rfl:   •  docusate sodium (COLACE) 100 MG capsule, Take 100 mg by mouth 2 (Two) Times a Day. Take two tablets by  "mouth nightly, Disp: , Rfl:   •  fluticasone (FLONASE) 50 MCG/ACT nasal spray, 2 sprays into the nostril(s) as directed by provider Daily., Disp: 18.2 mL, Rfl: 1  •  Garlic 1000 MG capsule, Take 1,000 mg by mouth Daily., Disp: , Rfl:   •  Iron, Ferrous Sulfate, 325 (65 Fe) MG tablet, Take 325 mg by mouth Daily., Disp: , Rfl:   •  LORATADINE ALLERGY RELIEF PO, Take 10 mg by mouth Daily., Disp: , Rfl:   •  magnesium oxide (MAG-OX) 400 MG tablet, Take 400 mg by mouth Daily., Disp: , Rfl:   •  melatonin 1 MG tablet, Take 1 mg by mouth Every Night. Take two tablets by mouth at bedtime, Disp: , Rfl:   •  Multiple Vitamins-Minerals (MULTIVITAMIN ADULT PO), Take  by mouth., Disp: , Rfl:   •  olmesartan-hydrochlorothiazide (BENICAR HCT) 20-12.5 MG per tablet, Take 1 tablet by mouth Daily., Disp: 90 tablet, Rfl: 2  •  pantoprazole (PROTONIX) 40 MG EC tablet, TAKE 1 TABLET DAILY, Disp: 90 tablet, Rfl: 0  •  simethicone (MYLICON,GAS-X) 125 MG capsule capsule, Take 1 capsule by mouth 1 (One) Time., Disp: , Rfl:   •  simvastatin (ZOCOR) 20 MG tablet, Take 1 tablet by mouth Every Other Day., Disp: 45 tablet, Rfl: 0  •  TiZANidine (ZANAFLEX) 2 MG capsule, Take 1 capsule by mouth At Night As Needed for Muscle Spasms. (Patient taking differently: Take 2 mg by mouth At Night As Needed for Muscle Spasms.), Disp: 20 capsule, Rfl: 0  •  traZODone (DESYREL) 100 MG tablet, Take 1 tablet by mouth Every Night., Disp: 90 tablet, Rfl: 3  •  Turmeric (QC Tumeric Complex) 500 MG capsule, Take 500 mg by mouth Daily., Disp: , Rfl:     Physical Exam:  Vitals:    09/01/20 0835   BP: 135/80   BP Location: Right arm   Patient Position: Sitting   Cuff Size: Adult   Pulse: 50   SpO2: 98%   Weight: 81.6 kg (180 lb)   Height: 154.9 cm (61\")     Current Pain Level: none  Pulse Ox: Normal  on room air  General: alert, appears stated age and cooperative     Body Habitus: well-nourished    HEENT: Head: Normocephalic, no lesions, without obvious abnormality. " No arcus senilis, xanthelasma or xanthomas.    Neuro: alert, oriented x3  Pulses: 2+ and symmetric  JVP: Volume/Pulsation: Normal.  Normal waveforms.   Appropriate inspiratory decrease.  No Kussmaul's. No Daniel's.   Carotid Exam: no bruit normal pulsation bilaterally   Carotid Volume: normal.     Respirations: no increased work of breathing   Chest:  Normal    Pulmonary:Normal   Precordium: Normal impulses. P2 is not palpable.  RV Heave: absent  LV Heave: absent  Hixson:  normal size and placement  Palpable S4: absent.  Heart rate: normal    Heart Rhythm: regular     Heart Sounds: S1: normal  S2: normal  S3: absent   S4: absent  Opening Snap: absent    Pericardial Rub:  Absent: .    Abdomen:   Appearance: normal .  Palpation: Soft, non-tender to palpation, bowel sounds positive in all four quadrants; no guarding or rebound tenderness  Extremity: no edema.   LE Skin: no rashes  LE Hair:  normal  LE Pulses: well perfused with normal pulses in the distal extremities  Pallor on elevation: Absent. Rubor on dependency: None      DATA REVIEWED:     EKG. I personally reviewed and interpreted the EKG.  Sinus bradycardia.  Low voltage.  Possible lateral infarct old.    ECG/EMG Results (all)     Procedure Component Value Units Date/Time    ECG 12 Lead [[id number]] Collected:  09/01/20 0746     Updated:  09/01/20 0847    Narrative:       Test Reason : palpitations  Blood Pressure : **/** mmHG  Vent. Rate : 049 BPM     Atrial Rate : 049 BPM     P-R Int : 214 ms          QRS Dur : 098 ms      QT Int : 430 ms       P-R-T Axes : 009 -15 019 degrees     QTc Int : 388 ms    Sinus bradycardia with 1st degree AV block  Low voltage QRS  Possible Lateral infarct (cited on or before 08-JUN-2015)  Abnormal ECG  When compared with ECG of 06-SEP-2018 14:19,  Questionable change in initial forces of Anterior leads    Referred By:             Confirmed By:            ----------------------------------------------------      --------------------------------------------------------------------------------------------------  LABS:     The 10-year CVD risk score (Arina et al., 2008) is: 12.2%    Values used to calculate the score:      Age: 75 years      Sex: Female      Diabetic: No      Tobacco smoker: No      Systolic Blood Pressure: 135 mmHg      Is BP treated: Yes      HDL Cholesterol: 82 mg/dL      Total Cholesterol: 201 mg/dL         Lab Results   Component Value Date    GLUCOSE 80 08/26/2020    BUN 13 08/26/2020    CREATININE 0.88 08/26/2020    EGFRIFNONA 63 08/26/2020    BCR 14.8 08/26/2020    K 3.9 08/26/2020    CO2 26.0 08/26/2020    CALCIUM 9.7 08/26/2020    ALBUMIN 3.70 05/14/2020    AST 23 05/14/2020    ALT 15 05/14/2020     Lab Results   Component Value Date    WBC 3.77 08/19/2020    HGB 11.3 (L) 08/19/2020    HCT 32.4 (L) 08/19/2020    MCV 87.8 08/19/2020     08/19/2020     Lab Results   Component Value Date    CHOL 201 (H) 05/14/2020    CHLPL 205 (H) 02/25/2016    TRIG 68 05/14/2020    HDL 82 (H) 05/14/2020     (H) 05/14/2020     Lab Results   Component Value Date    TSH 1.940 10/25/2018     Lab Results   Component Value Date    CKTOTAL 63 09/06/2018    CKMB 1.35 09/06/2018    TROPONINI <0.012 09/06/2018     Lab Results   Component Value Date    HGBA1C 5.03 04/24/2019     No results found for: DDIMER  Lab Results   Component Value Date    ALT 15 05/14/2020     Lab Results   Component Value Date    HGBA1C 5.03 04/24/2019    HGBA1C 5.4 06/08/2015     Lab Results   Component Value Date    CREATININE 0.88 08/26/2020     Lab Results   Component Value Date    IRON 89 08/19/2020    TIBC 401 08/19/2020    FERRITIN 39.50 08/19/2020     Lab Results   Component Value Date    INR 1.14 09/06/2018    INR 1.06 08/23/2018    INR 1.09 08/09/2018    PROTIME 14.3 09/06/2018    PROTIME 13.6 08/23/2018    PROTIME 13.9 08/09/2018       Assessment/Plan     1.  Palpitations:  EKG in the office with sinus bradycardia with low voltage.  Plan on performing a 2-week monitor to assess for any arrhythmias or AV blocks.  Not in any AV rocky blocking agents  Recent electrolytes and TSH were okay    2.  Hypertension:  She is on olmesartan/hydrochlorothiazide 20/12.5    3.  Hyperlipidemia: On simvastatin per primary care physician.      Prevention:  Patient's Body mass index is 34.01 kg/m². BMI is above normal parameters. Recommendations include: exercise counseling and nutrition counseling.      Veronica Wilkinson  reports that she has never smoked. She has never used smokeless tobacco..   AAA Screening:             This document has been electronically signed by Linnette Peterson MD on September 1, 2020 08:49

## 2020-09-14 ENCOUNTER — OFFICE VISIT (OUTPATIENT)
Dept: ORTHOPEDIC SURGERY | Facility: CLINIC | Age: 75
End: 2020-09-14

## 2020-09-14 VITALS — WEIGHT: 179.6 LBS | HEIGHT: 60 IN | BODY MASS INDEX: 35.26 KG/M2

## 2020-09-14 DIAGNOSIS — Z86.39 HISTORY OF VITAMIN D DEFICIENCY: ICD-10-CM

## 2020-09-14 DIAGNOSIS — Z98.84 HISTORY OF BARIATRIC SURGERY: ICD-10-CM

## 2020-09-14 DIAGNOSIS — Z78.0 POSTMENOPAUSAL: ICD-10-CM

## 2020-09-14 DIAGNOSIS — M81.0 AGE-RELATED OSTEOPOROSIS WITHOUT CURRENT PATHOLOGICAL FRACTURE: Primary | ICD-10-CM

## 2020-09-14 DIAGNOSIS — E21.0 PRIMARY HYPERPARATHYROIDISM (HCC): ICD-10-CM

## 2020-09-14 PROCEDURE — 99214 OFFICE O/P EST MOD 30 MIN: CPT | Performed by: NURSE PRACTITIONER

## 2020-09-14 NOTE — PROGRESS NOTES
"Veronica Wilkinson is a 75 y.o. female returns for     Chief Complaint   Patient presents with   • Osteoporosis     HISTORY OF PRESENT ILLNESS: 75-year-old  female patient [facility] health evaluation and to discuss treatment options for osteoporosis.    Last Bone Density Study: 8/10/2020    Referred by: TABBY Bower (family practice)    History of Osteoporosis or Osteopenia: Yes osteoporosis  Prior Treatments for Osteoporosis: Patient took Fosamax several years ago.  No recent treatment.  No current treatment.    Decrease in Height: Yes    Fracture History: Right ankle fracture in her 20s.  No other fractures since that time.    High Risk Medications:   Current: Protonix, SSRI medications   Previous: Proton pump inhibitors, SSRI medications, heparin    Comorbid Medical Conditions that Increase Risk for Osteoporosis: History of vitamin D deficiency, history of bariatric surgery (gastric sleeve)    Postmenopausal status: Postmenopausal  Age of Menopause/Hysterectomy: Age 54  Hormone Replacement Therapy: No    Family History of Osteoporosis: No, not that she is aware of.    History of Smoking: No, never a smoker.    Taking Calcium supplements: Yes, takes Os-Scott 600 twice daily.  Taking Vitamin D supplements: No.  Vitamin D level was checked on 5/14/2020 = 52.1       CONCURRENT MEDICAL HISTORY:    The following portions of the patient's history were reviewed and updated as appropriate: allergies, current medications, past family history, past medical history, past social history, past surgical history and problem list.     ROS  No fevers or chills.  No chest pain or shortness of air.  No GI or  disturbances.     PHYSICAL EXAMINATION:       Ht 152.4 cm (60\")   Wt 81.5 kg (179 lb 9.6 oz)   BMI 35.08 kg/m²     Physical Exam  Vitals signs reviewed.   Constitutional:       Appearance: She is well-developed. She is not ill-appearing.   HENT:      Head: Normocephalic.   Pulmonary:     "  Effort: Pulmonary effort is normal. No respiratory distress.   Abdominal:      General: There is no distension.      Palpations: Abdomen is soft.   Musculoskeletal:         General: No swelling or tenderness.   Skin:     General: Skin is warm and dry.      Capillary Refill: Capillary refill takes less than 2 seconds.      Findings: No erythema.   Neurological:      Mental Status: She is alert and oriented to person, place, and time.      GCS: GCS eye subscore is 4. GCS verbal subscore is 5. GCS motor subscore is 6.   Psychiatric:         Speech: Speech normal.         Behavior: Behavior normal. Behavior is cooperative.         Thought Content: Thought content normal.         Judgment: Judgment normal.         GAIT:     []  Normal  []  Antalgic    Assistive device: [x]  None  []  Walker     []  Crutches  []  Cane     []  Wheelchair  []  Stretcher    Back Exam     Tenderness   The patient is experiencing no tenderness.     Muscle Strength   The patient has normal back strength.    Tests   Straight leg raise right: negative  Straight leg raise left: negative    Other   Sensation: normal  Gait: normal   Erythema: no back redness    Comments:  No pain or tenderness of the spine.  No kyphosis or deformity noted.  No focal neurological deficits noted.              PROCEDURE: DXA BONE DENSITY EXTREMITY     HISTORY: History of osteoporosis involving femoral neck,  follow-up, M81.0 Age-related osteoporosis without current  pathological fracture     BMD: Bone mineral density.     COMPARISON: 8/14/2018     T-score: Represents the number of standard deviations (SD) that  an individual is above or below the reference value for young  normals.     Z-score: Represents the amount of bone an individual has compared  with other people in the same age group and gender.     AP SPINE  BMD (g/cm2): 1.018  T-score (SD vs young adult): 0.3  Z-score (SD vs age-matched): 2.0  Change versus previous: -0.1%     MEAN FEMORAL NECKS  BMD (g/cm2):  0.525  T-score (SD vs young adult): -2.9  Z-score (SD vs age-matched): -1.0  Change versus previous: -15.6%           Comment: World Health Organization (WHO) classifications:  Normal: T-score at or above -1 SD  Osteopenia: T-score between -1 and -2.5 SD  Osteoporosis: T-score at or below -2.5 SD     IMPRESSION:  CONCLUSION: Osteoporosis according to World Health Organization  criteria.       ASSESSMENT:    Diagnoses and all orders for this visit:    Age-related osteoporosis without current pathological fracture    History of vitamin D deficiency    Primary hyperparathyroidism (CMS/HCC)    History of bariatric surgery    Postmenopausal    PLAN    Bone density study results from 8/10/2020 reviewed and discussed with patient.  We discussed her diagnosis of osteoporosis and that she is high risk for fracture.  She is not currently being treated for osteoporosis.  I have also reviewed her previous bone density study from August 2018, which also indicated osteoporosis at that time.  We discussed that her osteoporosis has worsened in her hips and she has a T score of -2.9 (previously -2.6).  She maintains a normal bone density score in her lumbar spine of 0.3.  We discussed her individualized risk factors for osteoporosis including her age, race, gender, postmenopausal status, long-term use of proton pump inhibitors, history of vitamin D deficiency and history of bariatric surgery.  I have recommended that she start treatment of osteoporosis in an effort to prevent worsening osteoporosis, prevent further bone loss and reduce her risk for fracture.  She previously took Fosamax several years ago.  I would be reluctant to start her on oral biphosphonate therapy due to her history of gastric sleeve and reflux.  I have recommended that she start Prolia for treatment of osteoporosis.  Patient is in agreement with this and wants to proceed.  She understands she will receive a telephone call to schedule her initial injection once  it has been approved by her insurance. We discussed the importance of proper nutrition and adequate intake of calcium and vitamin D for optimal bone health and prevention of worsening osteoporosis. Recommend to continue with Os-Scott twice daily. We also discussed the importance of regular exercise, especially weightbearing exercise such as walking, for optimal bone health and prevention of worsening osteoporosis. An educational packet regarding osteoporosis is provided to the patient today.  Written literature regarding Prolia is also provided to the patient today.  Patient is a non-smoker with no history of smoking so smoking cessation is not addressed or applicable.  Recommend a repeat bone density study in one year and follow-up with [facility].    Return in about 1 year (around 9/14/2021) for Recheck.      This document has been electronically signed by TABBY Moscoso on September 16, 2020 19:46 CDT      TABBY Moscoso

## 2020-09-16 PROBLEM — Z78.0 POSTMENOPAUSAL: Status: ACTIVE | Noted: 2020-09-16

## 2020-09-16 PROBLEM — Z98.84 HISTORY OF BARIATRIC SURGERY: Status: ACTIVE | Noted: 2020-09-16

## 2020-09-21 ENCOUNTER — DOCUMENTATION (OUTPATIENT)
Dept: CARDIOLOGY | Facility: CLINIC | Age: 75
End: 2020-09-21

## 2020-09-21 NOTE — PROGRESS NOTES
Per Dr Bucio  Patient with sinus bradycardia on heart monitor and if patient does not have symptoms or only vague atypical symptoms may wait for Dr Peterson to return to decide plan of care.    I Spoke with patient she does have some tiredness, SOB at times, and dizziness at times, however she would prefer to wait for Dr Peterson to return. I advised patient that if she has any worsening of symptoms she should call us or present to ER.

## 2020-09-25 LAB
BH CV ECHO MEAS - ACS: 1.6 CM
BH CV ECHO MEAS - AI DEC SLOPE: 124 CM/SEC^2
BH CV ECHO MEAS - AI MAX PG: 73.3 MMHG
BH CV ECHO MEAS - AI MAX VEL: 428 CM/SEC
BH CV ECHO MEAS - AI P1/2T: 1011 MSEC
BH CV ECHO MEAS - AO MAX PG (FULL): 7.1 MMHG
BH CV ECHO MEAS - AO MAX PG: 12.5 MMHG
BH CV ECHO MEAS - AO MEAN PG (FULL): 3 MMHG
BH CV ECHO MEAS - AO MEAN PG: 6 MMHG
BH CV ECHO MEAS - AO ROOT AREA (BSA CORRECTED): 2
BH CV ECHO MEAS - AO ROOT AREA: 9.6 CM^2
BH CV ECHO MEAS - AO ROOT DIAM: 3.5 CM
BH CV ECHO MEAS - AO V2 MAX: 177 CM/SEC
BH CV ECHO MEAS - AO V2 MEAN: 111 CM/SEC
BH CV ECHO MEAS - AO V2 VTI: 45.3 CM
BH CV ECHO MEAS - ASC AORTA: 3.4 CM
BH CV ECHO MEAS - AVA(I,A): 2 CM^2
BH CV ECHO MEAS - AVA(I,D): 2 CM^2
BH CV ECHO MEAS - AVA(V,A): 2.1 CM^2
BH CV ECHO MEAS - AVA(V,D): 2.1 CM^2
BH CV ECHO MEAS - BSA(HAYCOCK): 1.9 M^2
BH CV ECHO MEAS - BSA: 1.8 M^2
BH CV ECHO MEAS - BZI_BMI: 35 KILOGRAMS/M^2
BH CV ECHO MEAS - BZI_METRIC_HEIGHT: 152.4 CM
BH CV ECHO MEAS - BZI_METRIC_WEIGHT: 81.2 KG
BH CV ECHO MEAS - EDV(CUBED): 79.5 ML
BH CV ECHO MEAS - EDV(MOD-SP2): 88 ML
BH CV ECHO MEAS - EDV(MOD-SP4): 79 ML
BH CV ECHO MEAS - EDV(TEICH): 83.1 ML
BH CV ECHO MEAS - EF(CUBED): 69.3 %
BH CV ECHO MEAS - EF(MOD-SP2): 69.3 %
BH CV ECHO MEAS - EF(MOD-SP4): 68.4 %
BH CV ECHO MEAS - EF(TEICH): 61.2 %
BH CV ECHO MEAS - ESV(CUBED): 24.4 ML
BH CV ECHO MEAS - ESV(MOD-SP2): 27 ML
BH CV ECHO MEAS - ESV(MOD-SP4): 25 ML
BH CV ECHO MEAS - ESV(TEICH): 32.2 ML
BH CV ECHO MEAS - FS: 32.6 %
BH CV ECHO MEAS - IVS/LVPW: 0.9
BH CV ECHO MEAS - IVSD: 0.9 CM
BH CV ECHO MEAS - LA DIMENSION: 2.9 CM
BH CV ECHO MEAS - LA/AO: 0.83
BH CV ECHO MEAS - LV DIASTOLIC VOL/BSA (35-75): 44.4 ML/M^2
BH CV ECHO MEAS - LV MASS(C)D: 132.7 GRAMS
BH CV ECHO MEAS - LV MASS(C)DI: 74.5 GRAMS/M^2
BH CV ECHO MEAS - LV MAX PG: 5.5 MMHG
BH CV ECHO MEAS - LV MEAN PG: 3 MMHG
BH CV ECHO MEAS - LV SYSTOLIC VOL/BSA (12-30): 14 ML/M^2
BH CV ECHO MEAS - LV V1 MAX: 117 CM/SEC
BH CV ECHO MEAS - LV V1 MEAN: 77.7 CM/SEC
BH CV ECHO MEAS - LV V1 VTI: 29.2 CM
BH CV ECHO MEAS - LVIDD: 4.3 CM
BH CV ECHO MEAS - LVIDS: 2.9 CM
BH CV ECHO MEAS - LVLD AP2: 7.6 CM
BH CV ECHO MEAS - LVLD AP4: 7.6 CM
BH CV ECHO MEAS - LVLS AP2: 5.9 CM
BH CV ECHO MEAS - LVLS AP4: 6 CM
BH CV ECHO MEAS - LVOT AREA (M): 3.1 CM^2
BH CV ECHO MEAS - LVOT AREA: 3.1 CM^2
BH CV ECHO MEAS - LVOT DIAM: 2 CM
BH CV ECHO MEAS - LVPWD: 1 CM
BH CV ECHO MEAS - MV A MAX VEL: 122 CM/SEC
BH CV ECHO MEAS - MV DEC SLOPE: 369 CM/SEC^2
BH CV ECHO MEAS - MV E MAX VEL: 107 CM/SEC
BH CV ECHO MEAS - MV E/A: 0.88
BH CV ECHO MEAS - MV MAX PG: 6 MMHG
BH CV ECHO MEAS - MV MEAN PG: 2 MMHG
BH CV ECHO MEAS - MV P1/2T MAX VEL: 109 CM/SEC
BH CV ECHO MEAS - MV P1/2T: 86.5 MSEC
BH CV ECHO MEAS - MV V2 MAX: 122.3 CM/SEC
BH CV ECHO MEAS - MV V2 MEAN: 65.1 CM/SEC
BH CV ECHO MEAS - MV V2 VTI: 45.5 CM
BH CV ECHO MEAS - MVA P1/2T LCG: 2 CM^2
BH CV ECHO MEAS - MVA(P1/2T): 2.5 CM^2
BH CV ECHO MEAS - MVA(VTI): 2 CM^2
BH CV ECHO MEAS - PA MAX PG (FULL): 2 MMHG
BH CV ECHO MEAS - PA MAX PG: 3.3 MMHG
BH CV ECHO MEAS - PA MEAN PG (FULL): 1 MMHG
BH CV ECHO MEAS - PA MEAN PG: 2 MMHG
BH CV ECHO MEAS - PA V2 MAX: 91 CM/SEC
BH CV ECHO MEAS - PA V2 MEAN: 70.4 CM/SEC
BH CV ECHO MEAS - PA V2 VTI: 24.4 CM
BH CV ECHO MEAS - RAP SYSTOLE: 3 MMHG
BH CV ECHO MEAS - RV MAX PG: 1.3 MMHG
BH CV ECHO MEAS - RV MEAN PG: 1 MMHG
BH CV ECHO MEAS - RV V1 MAX: 56.4 CM/SEC
BH CV ECHO MEAS - RV V1 MEAN: 40.6 CM/SEC
BH CV ECHO MEAS - RV V1 VTI: 16 CM
BH CV ECHO MEAS - RVDD: 3.2 CM
BH CV ECHO MEAS - RVSP: 30 MMHG
BH CV ECHO MEAS - SI(AO): 244.8 ML/M^2
BH CV ECHO MEAS - SI(CUBED): 31 ML/M^2
BH CV ECHO MEAS - SI(LVOT): 51.5 ML/M^2
BH CV ECHO MEAS - SI(MOD-SP2): 34.3 ML/M^2
BH CV ECHO MEAS - SI(MOD-SP4): 30.3 ML/M^2
BH CV ECHO MEAS - SI(TEICH): 28.6 ML/M^2
BH CV ECHO MEAS - SV(AO): 435.8 ML
BH CV ECHO MEAS - SV(CUBED): 55.1 ML
BH CV ECHO MEAS - SV(LVOT): 91.7 ML
BH CV ECHO MEAS - SV(MOD-SP2): 61 ML
BH CV ECHO MEAS - SV(MOD-SP4): 54 ML
BH CV ECHO MEAS - SV(TEICH): 50.9 ML
BH CV ECHO MEAS - TR MAX VEL: 259 CM/SEC
BH CV VAS BP LEFT ARM: NORMAL MMHG

## 2020-10-06 ENCOUNTER — DOCUMENTATION (OUTPATIENT)
Dept: CARDIOLOGY | Facility: CLINIC | Age: 75
End: 2020-10-06

## 2020-10-06 NOTE — PROGRESS NOTES
In reference to heart monitor  Linnette Peterson MD Brown, Karen M, RN             Has some bradycardia but mostly when she is sleeping. No significant arryhtmias to treat.        In reference to echo  Linnette Peterson MD Brown, Karen M, RN             Mostly benign      Echo results and monitor results discussed with patient

## 2020-10-28 ENCOUNTER — CLINICAL SUPPORT (OUTPATIENT)
Dept: FAMILY MEDICINE CLINIC | Facility: CLINIC | Age: 75
End: 2020-10-28

## 2020-10-28 DIAGNOSIS — Z23 NEED FOR INFLUENZA VACCINATION: ICD-10-CM

## 2020-10-28 PROCEDURE — 90694 VACC AIIV4 NO PRSRV 0.5ML IM: CPT | Performed by: FAMILY MEDICINE

## 2020-10-28 PROCEDURE — G0008 ADMIN INFLUENZA VIRUS VAC: HCPCS | Performed by: FAMILY MEDICINE

## 2020-11-02 ENCOUNTER — APPOINTMENT (OUTPATIENT)
Dept: ONCOLOGY | Facility: HOSPITAL | Age: 75
End: 2020-11-02

## 2020-11-03 ENCOUNTER — LAB (OUTPATIENT)
Dept: ONCOLOGY | Facility: HOSPITAL | Age: 75
End: 2020-11-03

## 2020-11-03 DIAGNOSIS — M81.0 AGE-RELATED OSTEOPOROSIS WITHOUT CURRENT PATHOLOGICAL FRACTURE: Primary | ICD-10-CM

## 2020-11-03 LAB
CALCIUM SPEC-SCNC: 9.6 MG/DL (ref 8.6–10.5)
MAGNESIUM SERPL-MCNC: 2 MG/DL (ref 1.6–2.4)
PHOSPHATE SERPL-MCNC: 3.4 MG/DL (ref 2.5–4.5)

## 2020-11-03 PROCEDURE — 83735 ASSAY OF MAGNESIUM: CPT | Performed by: NURSE PRACTITIONER

## 2020-11-03 PROCEDURE — 36415 COLL VENOUS BLD VENIPUNCTURE: CPT | Performed by: NURSE PRACTITIONER

## 2020-11-03 PROCEDURE — 84100 ASSAY OF PHOSPHORUS: CPT | Performed by: NURSE PRACTITIONER

## 2020-11-03 PROCEDURE — 82310 ASSAY OF CALCIUM: CPT | Performed by: NURSE PRACTITIONER

## 2020-11-06 ENCOUNTER — INFUSION (OUTPATIENT)
Dept: ONCOLOGY | Facility: HOSPITAL | Age: 75
End: 2020-11-06

## 2020-11-06 VITALS
SYSTOLIC BLOOD PRESSURE: 136 MMHG | TEMPERATURE: 97.7 F | DIASTOLIC BLOOD PRESSURE: 60 MMHG | RESPIRATION RATE: 18 BRPM | HEART RATE: 58 BPM

## 2020-11-06 DIAGNOSIS — M81.0 AGE-RELATED OSTEOPOROSIS WITHOUT CURRENT PATHOLOGICAL FRACTURE: Primary | ICD-10-CM

## 2020-11-06 PROCEDURE — 96372 THER/PROPH/DIAG INJ SC/IM: CPT | Performed by: NURSE PRACTITIONER

## 2020-11-06 PROCEDURE — 25010000002 DENOSUMAB 60 MG/ML SOLUTION PREFILLED SYRINGE: Performed by: NURSE PRACTITIONER

## 2020-11-06 RX ADMIN — DENOSUMAB 60 MG: 60 INJECTION SUBCUTANEOUS at 14:19

## 2020-11-06 NOTE — PATIENT INSTRUCTIONS
Denosumab injection  What is this medicine?  DENOSUMAB (den oh gt mab) slows bone breakdown. Prolia is used to treat osteoporosis in women after menopause and in men, and in people who are taking corticosteroids for 6 months or more. Xgeva is used to treat a high calcium level due to cancer and to prevent bone fractures and other bone problems caused by multiple myeloma or cancer bone metastases. Xgeva is also used to treat giant cell tumor of the bone.  This medicine may be used for other purposes; ask your health care provider or pharmacist if you have questions.  COMMON BRAND NAME(S): Prolia, XGEVA  What should I tell my health care provider before I take this medicine?  They need to know if you have any of these conditions:  · dental disease  · having surgery or tooth extraction  · infection  · kidney disease  · low levels of calcium or Vitamin D in the blood  · malnutrition  · on hemodialysis  · skin conditions or sensitivity  · thyroid or parathyroid disease  · an unusual reaction to denosumab, other medicines, foods, dyes, or preservatives  · pregnant or trying to get pregnant  · breast-feeding  How should I use this medicine?  This medicine is for injection under the skin. It is given by a health care professional in a hospital or clinic setting.  A special MedGuide will be given to you before each treatment. Be sure to read this information carefully each time.  For Prolia, talk to your pediatrician regarding the use of this medicine in children. Special care may be needed. For Xgeva, talk to your pediatrician regarding the use of this medicine in children. While this drug may be prescribed for children as young as 13 years for selected conditions, precautions do apply.  Overdosage: If you think you have taken too much of this medicine contact a poison control center or emergency room at once.  NOTE: This medicine is only for you. Do not share this medicine with others.  What if I miss a dose?  It is  important not to miss your dose. Call your doctor or health care professional if you are unable to keep an appointment.  What may interact with this medicine?  Do not take this medicine with any of the following medications:  · other medicines containing denosumab  This medicine may also interact with the following medications:  · medicines that lower your chance of fighting infection  · steroid medicines like prednisone or cortisone  This list may not describe all possible interactions. Give your health care provider a list of all the medicines, herbs, non-prescription drugs, or dietary supplements you use. Also tell them if you smoke, drink alcohol, or use illegal drugs. Some items may interact with your medicine.  What should I watch for while using this medicine?  Visit your doctor or health care professional for regular checks on your progress. Your doctor or health care professional may order blood tests and other tests to see how you are doing.  Call your doctor or health care professional for advice if you get a fever, chills or sore throat, or other symptoms of a cold or flu. Do not treat yourself. This drug may decrease your body's ability to fight infection. Try to avoid being around people who are sick.  You should make sure you get enough calcium and vitamin D while you are taking this medicine, unless your doctor tells you not to. Discuss the foods you eat and the vitamins you take with your health care professional.  See your dentist regularly. Brush and floss your teeth as directed. Before you have any dental work done, tell your dentist you are receiving this medicine.  Do not become pregnant while taking this medicine or for 5 months after stopping it. Talk with your doctor or health care professional about your birth control options while taking this medicine. Women should inform their doctor if they wish to become pregnant or think they might be pregnant. There is a potential for serious side  effects to an unborn child. Talk to your health care professional or pharmacist for more information.  What side effects may I notice from receiving this medicine?  Side effects that you should report to your doctor or health care professional as soon as possible:  · allergic reactions like skin rash, itching or hives, swelling of the face, lips, or tongue  · bone pain  · breathing problems  · dizziness  · jaw pain, especially after dental work  · redness, blistering, peeling of the skin  · signs and symptoms of infection like fever or chills; cough; sore throat; pain or trouble passing urine  · signs of low calcium like fast heartbeat, muscle cramps or muscle pain; pain, tingling, numbness in the hands or feet; seizures  · unusual bleeding or bruising  · unusually weak or tired  Side effects that usually do not require medical attention (report to your doctor or health care professional if they continue or are bothersome):  · constipation  · diarrhea  · headache  · joint pain  · loss of appetite  · muscle pain  · runny nose  · tiredness  · upset stomach  This list may not describe all possible side effects. Call your doctor for medical advice about side effects. You may report side effects to FDA at 4-186-FDA-9675.  Where should I keep my medicine?  This medicine is only given in a clinic, doctor's office, or other health care setting and will not be stored at home.  NOTE: This sheet is a summary. It may not cover all possible information. If you have questions about this medicine, talk to your doctor, pharmacist, or health care provider.  © 2020 [employer] (2019-04-26 16:10:44)

## 2020-11-06 NOTE — NURSING NOTE
Patient observed after Prolia injection - status tolerated well.  Bonny Fitzgerald RN  November 6, 2020  14:41 CST

## 2020-11-10 ENCOUNTER — OFFICE VISIT (OUTPATIENT)
Dept: CARDIOLOGY | Facility: CLINIC | Age: 75
End: 2020-11-10

## 2020-11-10 VITALS
WEIGHT: 183.2 LBS | OXYGEN SATURATION: 98 % | HEART RATE: 52 BPM | BODY MASS INDEX: 35.97 KG/M2 | HEIGHT: 60 IN | DIASTOLIC BLOOD PRESSURE: 60 MMHG | SYSTOLIC BLOOD PRESSURE: 124 MMHG

## 2020-11-10 DIAGNOSIS — R00.2 PALPITATIONS: Primary | ICD-10-CM

## 2020-11-10 PROCEDURE — 99214 OFFICE O/P EST MOD 30 MIN: CPT | Performed by: INTERNAL MEDICINE

## 2020-11-10 NOTE — PROGRESS NOTES
[facility]  OFFICE NOTE    Cardiovascular Medicine  Linnette Peterson M.D., RPVI         No referring provider defined for this encounter.    Thank you for asking me to see Veronica Wilkinson for palpitations.    History of Present Illness  This is a 75 y.o. female with:    1.  Attention  2.  Hyperlipidemia  3.  PE   4 hyperlipidemia    Veronica Wilkinson is a 75 y.o. female who presents for consultation today.  Patient is here for further evaluation for sinus bradycardia and intermittent palpitations.  Patient has vague symptoms of hiccups followed by shortness of breath lasting for a couple of seconds.  She also has occasional palpitations early in the morning.  Was also last for several seconds.  Palpitations are not associated with chest pain, shortness of breath, lightheadedness.  Syncopal episode almost 10 years ago when she was started on her antihypertensives initially however currently she is denying any syncopal episodes.  She has a prior history of pulmonary embolism.  She denies any chest pain on exertion.  Is been previously screened for sleep apnea  Does have history of gastric sleeve and almost 100 pounds weight loss after that.  He sleeps in recliner because of her arthritis.  Denying any orthopnea PND or lower extremity swelling.  Also significant osteoporosis and worried about falls.    11/10/2020:  Continues to have intermittent lightheadedness and dizziness and palpitations.  No loss of consciousness.      Review of Systems - ROS  Constitution: Negative for weakness, weight gain and weight loss.   HENT: Negative for congestion.    Eyes: Negative for blurred vision.   Cardiovascular: As mentioned above  Respiratory: Negative for cough and hemoptysis.    Endocrine: Negative for polydipsia and polyuria.   Hematologic/Lymphatic: Negative for bleeding problem. Does not bruise/bleed easily.   Skin: Negative for flushing.   Musculoskeletal: Negative for neck pain and stiffness.      Gastrointestinal: Negative for abdominal pain, diarrhea, jaundice, melena, nausea and vomiting.   Genitourinary: Negative for dysuria and hematuria.   Neurological: Negative for dizziness, focal weakness and numbness.   Psychiatric/Behavioral: Negative for altered mental status and depression.          All other systems were reviewed and were negative.    family history includes Adrenal disorder in her daughter; Allergy (severe) in an other family member; Bleeding Disorder in her father and another family member; Cancer in her brother and another family member; Cholelithiasis in an other family member; Diabetes in an other family member; Heart disease in an other family member; Hyperlipidemia in an other family member; Hypertension in some other family members; Lung cancer in her brother and father; No Known Problems in her daughter; Osteoarthritis in an other family member; Other in her father and other family members; Ovarian cancer in her mother; Pancreatic cancer in her sister; Seizures in her daughter; Thyroid disease in her daughter.     reports that she has never smoked. She has never used smokeless tobacco. She reports that she does not drink alcohol or use drugs.    Allergies   Allergen Reactions   • Sulfa Antibiotics Hives and Rash     Sulfa (Sulfonamide Antibiotics)         Current Outpatient Medications:   •  acetaminophen (TYLENOL) 325 MG tablet, Take 650 mg by mouth Every Night., Disp: , Rfl:   •  Bisacodyl (GENTLE LAXATIVE PO), Take 100 mg by mouth Every Night., Disp: , Rfl:   •  calcium carbonate (OS-DARON) 600 MG tablet, Take 600 mg by mouth 2 (Two) Times a Day With Meals., Disp: , Rfl:   •  Cholecalciferol (VITAMIN D3) 2000 units tablet, Take 1 tablet by mouth Daily., Disp: , Rfl:   •  Cyanocobalamin (B-12 PO), Take  by mouth., Disp: , Rfl:   •  diphenhydrAMINE (BENADRYL) 25 MG tablet, Take 25 mg by mouth At Night As Needed for Itching. Take two tablets by mouth at bedtime, Disp: , Rfl:   •   "docusate sodium (COLACE) 100 MG capsule, Take 100 mg by mouth 2 (Two) Times a Day. Take two tablets by mouth nightly, Disp: , Rfl:   •  fluticasone (FLONASE) 50 MCG/ACT nasal spray, 2 sprays into the nostril(s) as directed by provider Daily., Disp: 18.2 mL, Rfl: 1  •  Garlic 1000 MG capsule, Take 1,000 mg by mouth Daily., Disp: , Rfl:   •  Iron, Ferrous Sulfate, 325 (65 Fe) MG tablet, Take 325 mg by mouth Daily., Disp: , Rfl:   •  LORATADINE ALLERGY RELIEF PO, Take 10 mg by mouth Daily., Disp: , Rfl:   •  magnesium oxide (MAG-OX) 400 MG tablet, Take 400 mg by mouth Daily., Disp: , Rfl:   •  melatonin 1 MG tablet, Take 1 mg by mouth Every Night. Take two tablets by mouth at bedtime, Disp: , Rfl:   •  Multiple Vitamins-Minerals (MULTIVITAMIN ADULT PO), Take  by mouth., Disp: , Rfl:   •  olmesartan-hydrochlorothiazide (BENICAR HCT) 20-12.5 MG per tablet, Take 1 tablet by mouth Daily., Disp: 90 tablet, Rfl: 2  •  pantoprazole (PROTONIX) 40 MG EC tablet, TAKE 1 TABLET DAILY, Disp: 90 tablet, Rfl: 0  •  simethicone (MYLICON,GAS-X) 125 MG capsule capsule, Take 1 capsule by mouth 1 (One) Time., Disp: , Rfl:   •  simvastatin (ZOCOR) 20 MG tablet, Take 1 tablet by mouth Every Other Day., Disp: 45 tablet, Rfl: 0  •  TiZANidine (ZANAFLEX) 2 MG capsule, Take 1 capsule by mouth At Night As Needed for Muscle Spasms. (Patient taking differently: Take 2 mg by mouth At Night As Needed for Muscle Spasms.), Disp: 20 capsule, Rfl: 0  •  traZODone (DESYREL) 100 MG tablet, Take 1 tablet by mouth Every Night., Disp: 90 tablet, Rfl: 3  •  Turmeric (QC Tumeric Complex) 500 MG capsule, Take 500 mg by mouth Daily., Disp: , Rfl:     Physical Exam:  Vitals:    11/10/20 1020   BP: 124/60   Pulse: 52   SpO2: 98%   Weight: 83.1 kg (183 lb 3.2 oz)   Height: 152.4 cm (60\")   PainSc: 0-No pain     Current Pain Level: none  Pulse Ox: Normal  on room air  General: alert, appears stated age and cooperative     Body Habitus: well-nourished    HEENT: Head: " Normocephalic, no lesions, without obvious abnormality. No arcus senilis, xanthelasma or xanthomas.    Neuro: alert, oriented x3  Pulses: 2+ and symmetric  JVP: Volume/Pulsation: Normal.  Normal waveforms.   Appropriate inspiratory decrease.  No Kussmaul's. No Daniel's.   Carotid Exam: no bruit normal pulsation bilaterally   Carotid Volume: normal.     Respirations: no increased work of breathing   Chest:  Normal    Pulmonary:Normal   Precordium: Normal impulses. P2 is not palpable.  RV Heave: absent  LV Heave: absent  [place]:  normal size and placement  Palpable S4: absent.  Heart rate: normal    Heart Rhythm: regular     Heart Sounds: S1: normal  S2: normal  S3: absent   S4: absent  Opening Snap: absent    Pericardial Rub:  Absent: .    Abdomen:   Appearance: normal .  Palpation: Soft, non-tender to palpation, bowel sounds positive in all four quadrants; no guarding or rebound tenderness  Extremity: no edema.   LE Skin: no rashes  LE Hair:  normal  LE Pulses: well perfused with normal pulses in the distal extremities  Pallor on elevation: Absent. Rubor on dependency: None      DATA REVIEWED:     EKG. I personally reviewed and interpreted the EKG.  Sinus bradycardia.  Low voltage.  Possible lateral infarct old.    ECG/EMG Results (all)     Procedure Component Value Units Date/Time    ECG 12 Lead [[id number]] Collected:  09/01/20 0746     Updated:  09/01/20 0847    Narrative:       Test Reason : palpitations  Blood Pressure : **/** mmHG  Vent. Rate : 049 BPM     Atrial Rate : 049 BPM     P-R Int : 214 ms          QRS Dur : 098 ms      QT Int : 430 ms       P-R-T Axes : 009 -15 019 degrees     QTc Int : 388 ms    Sinus bradycardia with 1st degree AV block  Low voltage QRS  Possible Lateral infarct (cited on or before 08-JUN-2015)  Abnormal ECG  When compared with ECG of 06-SEP-2018 14:19,  Questionable change in initial forces of Anterior leads    Referred By:             Confirmed By:                  ----------------------------------------------------      --------------------------------------------------------------------------------------------------  LABS:     The 10-year CVD risk score (Arina et al., 2008) is: 12.2%    Values used to calculate the score:      Age: 75 years      Sex: Female      Diabetic: No      Tobacco smoker: No      Systolic Blood Pressure: 135 mmHg      Is BP treated: Yes      HDL Cholesterol: 82 mg/dL      Total Cholesterol: 201 mg/dL         Lab Results   Component Value Date    GLUCOSE 80 08/26/2020    BUN 13 08/26/2020    CREATININE 0.88 08/26/2020    EGFRIFNONA 63 08/26/2020    BCR 14.8 08/26/2020    K 3.9 08/26/2020    CO2 26.0 08/26/2020    CALCIUM 9.6 11/03/2020    ALBUMIN 3.70 05/14/2020    AST 23 05/14/2020    ALT 15 05/14/2020     Lab Results   Component Value Date    WBC 3.77 08/19/2020    HGB 11.3 (L) 08/19/2020    HCT 32.4 (L) 08/19/2020    MCV 87.8 08/19/2020     08/19/2020     Lab Results   Component Value Date    CHOL 201 (H) 05/14/2020    CHLPL 205 (H) 02/25/2016    TRIG 68 05/14/2020    HDL 82 (H) 05/14/2020     (H) 05/14/2020     Lab Results   Component Value Date    TSH 1.940 10/25/2018     Lab Results   Component Value Date    CKTOTAL 63 09/06/2018    CKMB 1.35 09/06/2018    TROPONINI <0.012 09/06/2018     Lab Results   Component Value Date    HGBA1C 5.03 04/24/2019     No results found for: DDIMER  Lab Results   Component Value Date    ALT 15 05/14/2020     Lab Results   Component Value Date    HGBA1C 5.03 04/24/2019    HGBA1C 5.4 06/08/2015     Lab Results   Component Value Date    CREATININE 0.88 08/26/2020     Lab Results   Component Value Date    IRON 89 08/19/2020    TIBC 401 08/19/2020    FERRITIN 39.50 08/19/2020     Lab Results   Component Value Date    INR 1.14 09/06/2018    INR 1.06 08/23/2018    INR 1.09 08/09/2018    PROTIME 14.3 09/06/2018    PROTIME 13.6 08/23/2018    PROTIME 13.9 08/09/2018       Assessment/Plan     1.  Palpitations:  EKG in the office with sinus bradycardia with low voltage.    Patient had a 2-week monitor which showed predominantly sinus bradycardia, she did have intermittent bradycardia with heart rate in the 30s with which she was symptomatic.  Not in any AV rocky blocking agents  Recent electrolytes and TSH were okay  Since she continues to have symptoms will refer her to EP for consideration for pacemaker.  Echocardiogram is normal LV systolic function grade 1 diastolic dysfunction.    2.  Hypertension:  She is on olmesartan/hydrochlorothiazide 20/12.5    3.  Hyperlipidemia: On simvastatin per primary care physician.      Prevention:  Patient's Body mass index is 35.78 kg/m². BMI is above normal parameters. Recommendations include: exercise counseling and nutrition counseling.      Veronica Wilkinson  reports that she has never smoked. She has never used smokeless tobacco..   AAA Screening:             This document has been electronically signed by Linnette Peterson MD on November 10, 2020 10:40 CST

## 2020-11-11 ENCOUNTER — OFFICE VISIT (OUTPATIENT)
Dept: CARDIOLOGY | Facility: CLINIC | Age: 75
End: 2020-11-11

## 2020-11-11 VITALS
SYSTOLIC BLOOD PRESSURE: 138 MMHG | WEIGHT: 183 LBS | HEART RATE: 55 BPM | BODY MASS INDEX: 35.93 KG/M2 | DIASTOLIC BLOOD PRESSURE: 74 MMHG | HEIGHT: 60 IN | OXYGEN SATURATION: 96 %

## 2020-11-11 DIAGNOSIS — R00.1 SYMPTOMATIC SINUS BRADYCARDIA: ICD-10-CM

## 2020-11-11 DIAGNOSIS — E78.2 MIXED HYPERLIPIDEMIA: ICD-10-CM

## 2020-11-11 DIAGNOSIS — I44.0 1ST DEGREE AV BLOCK: ICD-10-CM

## 2020-11-11 DIAGNOSIS — I10 ESSENTIAL HYPERTENSION: Primary | ICD-10-CM

## 2020-11-11 DIAGNOSIS — Z86.711 HISTORY OF PULMONARY EMBOLISM: ICD-10-CM

## 2020-11-11 PROCEDURE — 99215 OFFICE O/P EST HI 40 MIN: CPT | Performed by: INTERNAL MEDICINE

## 2020-11-11 NOTE — PROGRESS NOTES
Veronica Wilkinson  75 y.o. female    11/11/2020  1. Essential hypertension    2. Mixed hyperlipidemia    3. History of pulmonary embolism    4. Symptomatic sinus bradycardia    5. 1st degree AV block        History of Present Illness:    Body mass index is 35.74 kg/m². BMI is above normal parameters. Recommendations include: exercise counseling, nutrition counseling and referral to primary care.      75 y.o. female who presents for consultation today and to discuss the possibility of pacemaker implantation given the symptomatic bradycardia strong element of chronotropic incompetent with a significant bradyarrhythmia heart rate dropping to mid 30s with associated symptom of fatigue lack of energy and dizziness no chest pain were reported.  No orthopnea PND reported.  She had a symptom of palpitation probably secondary premature ventricular complexes PACs with representing normal burden syncopal episode almost 10 years ago when she was started on her antihypertensives initially however currently she is denying any syncopal episodes. Previous EKG had prolonged MI interval.  She has a prior history of pulmonary embolism.  She denies any chest pain on exertion.  Is been previously screened for sleep apnea  Does have history of gastric sleeve and almost 100 pounds weight loss after that.  He sleeps in recliner because of her arthritis.  Denying any orthopnea PND or lower extremity swelling.  Also significant osteoporosis and worried about falls.    Holter monitor 9/17/2020  · An abnormal monitor study.  · Predominant rhythm sinus bradycardia with an average heart rate of 59 bpm. Heart rate varied from 34 to a maximum of 87 bpm in sinus mechanism.  · Sinus bradycardia occupied 74% of the total beat count.  · Patient submitted 3 events which correlated with sinus mechanism. Two of the patient symptom events correlated with sinus bradycardia with a low ventricular rate of 33.  · Normal burden of PACs. Normal burden of  PVCs.  · No SVT.      Echo 9/25/2020    · Technically difficult study secondary to body habitus. Definity contrast utilized.  · Left ventricular systolic function is normal. LVEF is 61-65%. Grade 1A diastolic function.  · Right ventricle systolic function is normal.  · Saline test is suboptimal, but appears to be negative.  · Mild thickening of the aortic valve.  · Mild to moderate tricuspid regurgitation without evidence of pulmonary hypertension.    Lipid 2020  Total Cholesterol   0 - 200 mg/dL 201High     Triglycerides   0 - 150 mg/dL 68    HDL Cholesterol   40 - 60 mg/dL 82High     LDL Cholesterol    0 - 100 mg/dL 105High     VLDL Cholesterol   5 - 40 mg/dL 13.6    LDL/HDL Ratio  1.29        TSH 10/25/2018    TSH   0.460 - 4.680 mIU/mL 1.940          SUBJECTIVE:    Allergies   Allergen Reactions   • Sulfa Antibiotics Hives and Rash     Sulfa (Sulfonamide Antibiotics)         Past Medical History:   Diagnosis Date   • Arthritis    • Asthma    • Bleeding tendency (CMS/HCC)    • Chest pain     History of noncardiac chest pain   • Chronic depression    • COPD (chronic obstructive pulmonary disease) (CMS/HCC)    • Depressive disorder    • Diastolic dysfunction    • Dyspnea    • Dyspnea on exertion    • Edema    • Essential hypertension    • Exercise intolerance    • Fatigue    • Gallstones    • GERD (gastroesophageal reflux disease)    • History of bone density study 2011    DEXA BONE DENSITY APPENDICULAR 28342 (1) - normal   • History of bone density study 06/24/2015    DXA BONE DENSITY AXIAL 71295 [facility] (1) - Ordered By: TOLU RAMIREZ ([facility])    • History of echocardiogram     Echocardiogram W/ color flow 79834 (2)   • History of mammogram 2013    Mammogram screen (1)   • History of mammogram 2015    MAMMOGRAM SCREENING 76465 - [facility] (1)   • History of screening mammography 06/25/2015    SCREENING MAMMOGRAPHY DIGITAL  (Medicare) (1) - Ordered By: ANDRADE BECERRIL ([facility])    • Hypercalcemia    •  "Hyperlipidemia    • Influenza vaccine administered 02/25/2016    INFLUENZA IMMUN ADMIN OR PREV RECV'D  (2) - Ordered By: ANDRADE BECERRIL ([facility])    • Long-term (current) use of anticoagulants     coumadin therapy      • Lower extremity edema     Intermittent lower extremity edema   • Obesity, Class III, BMI 40-49.9 (morbid obesity) (CMS/HCC)     \"Class III obesity\"   • Osteoporosis    • PE (pulmonary embolism) 10/2015    Bilateral PE diagnosed after a car ride to [place]   • Pneumococcal vaccination given 02/25/2016    PNEUMOC VAC/ADMIN/RCVD 4040F (2) - Ordered By: ANDRADE BECERRIL ([facility])    • Right basilic vein fibrosis 2015   • Sedentary lifestyle    • Shortness of breath    • Skin cancer    • Urinary tract infection    • Venous thrombosis 2015    right basilic venous thrombosis; This was noted in May 2015.   • Weight gain          Past Surgical History:   Procedure Laterality Date   • CARDIAC ELECTROPHYSIOLOGY PROCEDURE N/A 12/10/2020    Procedure: Pacemaker DC new;  Surgeon: Vesta Bucio MD;  Location: [facility] INVASIVE LOCATION;  Service: Cardiology;  Laterality: N/A;  [employer] [facility] per pratima.....juanita from [employer] [facility] aware   • CHOLECYSTECTOMY      Cholecystectomy (1)   • COLONOSCOPY      Approximately 5 years prior as stated per patient   • CRYOABLATION     • ENDOSCOPY  09/15/2011    Colon endoscopy 90193 (2) - Hemorrhoids found.   • GASTRIC SLEEVE LAPAROSCOPIC     • HEMORRHOIDECTOMY     • HYSTEROSCOPY      Hysteroscopy; ablation (1)   • INJECTION OF MEDICATION  09/27/2012    Kenalog (1) - Ordered By: ANDRADE BECERRIL ([facility])    • PACEMAKER IMPLANTATION           Family History   Problem Relation Age of Onset   • Ovarian cancer Mother    • Bleeding Disorder Father    • Other Father         Hematologic disorder   • Lung cancer Father    • Pancreatic cancer Sister    • Cancer Brother    • Lung cancer Brother    • Bleeding Disorder Other    • Hyperlipidemia Other    • Hypertension Other    • " Osteoarthritis Other    • Other Other         Gastritis   • Adrenal disorder Daughter    • Seizures Daughter    • Thyroid disease Daughter    • No Known Problems Daughter    • Diabetes Other    • Heart disease Other    • Other Other         Ulcers; Bleeding Tendencies; Allergy   • Cancer Other    • Cholelithiasis Other    • Hypertension Other    • Allergy (severe) Other          Social History     Socioeconomic History   • Marital status:      Spouse name: Not on file   • Number of children: 3   • Years of education: Not on file   • Highest education level: Not on file   Tobacco Use   • Smoking status: Never Smoker   • Smokeless tobacco: Never Used   Vaping Use   • Vaping Use: Never used   Substance and Sexual Activity   • Alcohol use: No   • Drug use: No   • Sexual activity: Defer         Current Outpatient Medications   Medication Sig Dispense Refill   • acetaminophen (TYLENOL) 325 MG tablet Take 650 mg by mouth Every Night.     • Bisacodyl (GENTLE LAXATIVE PO) Take 100 mg by mouth Every Night.     • calcium carbonate (OS-DARON) 600 MG tablet Take 600 mg by mouth 2 (Two) Times a Day With Meals.     • Cholecalciferol (VITAMIN D3) 2000 units tablet Take 1 tablet by mouth Daily.     • Cyanocobalamin (B-12 PO) Take 1 tablet by mouth Daily.     • diphenhydrAMINE (BENADRYL) 25 MG tablet Take 50 mg by mouth Every Night. Take two tablets by mouth at bedtime      • docusate sodium (COLACE) 100 MG capsule Take 100 mg by mouth Every Night. Take two tablets by mouth nightly      • Garlic 1000 MG capsule Take 1,000 mg by mouth Daily.     • Iron, Ferrous Sulfate, 325 (65 Fe) MG tablet Take 325 mg by mouth Daily.     • LORATADINE ALLERGY RELIEF PO Take 10 mg by mouth Daily.     • magnesium oxide (MAG-OX) 400 MG tablet Take 400 mg by mouth Daily.     • Multiple Vitamins-Minerals (MULTIVITAMIN ADULT PO) Take 1 tablet by mouth Daily.     • simethicone (MYLICON,GAS-X) 125 MG capsule capsule Take 1 capsule by mouth Every Night.      • Turmeric (QC Tumeric Complex) 500 MG capsule Take 500 mg by mouth Daily.     • fluticasone (Flonase) 50 MCG/ACT nasal spray 2 sprays into the nostril(s) as directed by provider Daily As Needed for Rhinitis.     • olmesartan-hydrochlorothiazide (BENICAR HCT) 20-12.5 MG per tablet Take 1 tablet by mouth Daily. 90 tablet 2   • pantoprazole (PROTONIX) 40 MG EC tablet Take 1 tablet by mouth Daily. 90 tablet 1   • simvastatin (ZOCOR) 20 MG tablet Take 1 tablet by mouth Every Other Day. 45 tablet 5   • tiZANidine (ZANAFLEX) 4 MG tablet Take 1 tablet by mouth At Night As Needed for Muscle Spasms. 30 tablet 3   • traZODone (DESYREL) 100 MG tablet Take 1 tablet by mouth Every Night. 90 tablet 3     No current facility-administered medications for this visit.           Review of Systems:     Constitutional: Generalized fatigue     HENT:  Denies any hearing loss, epistaxis, hoarseness, or difficulty speaking.     Eyes: Wears eyeglasses or contact lenses.    Respiratory:  Denies dyspnea with exertion,no cough, wheezing, or hemoptysis.     Cardiovascular: Negative for palpations, chest pain, orthopnea, PND, peripheral edema, syncope, or claudication.     Gastrointestinal:  Denies change in bowel habits, dyspepsia, ulcer disease, hematochezia, or melena.     Endocrine: Negative for cold intolerance, heat intolerance, polydipsia, polyphagia and polyuria. Denies any history of weight change, polydipsia, polyuria.     Genitourinary: Negative.      Musculoskeletal: Denies any history of arthritic symptoms or back problems.     Skin:  Denies any change in hair or nails, rashes, or skin lesions.     Allergic/Immunologic: Negative.  Negative for environmental allergies, food allergies and immunocompromised state.     Neurological:  Denies any history of recurrent headaches, strokes, TIA, or seizure disorder.     Hematological: Denies any food allergies, seasonal allergies, bleeding disorders, or lymphadenopathy.  "    Psychiatric/Behavioral: Denies any history of depression, substance abuse, or change in cognitive function.       OBJECTIVE:    /74 (BP Location: Left arm, Patient Position: Sitting, Cuff Size: Adult)   Pulse 55   Ht 152.4 cm (60\")   Wt 83 kg (183 lb)   SpO2 96%   BMI 35.74 kg/m²       Physical Exam:     Constitutional: Cooperative, alert and oriented, well-developed, well-nourished, in no acute distress.     HENT:   Head: Normocephalic, normal hair patterns, no masses or tenderness.  Ears, Nose, and Throat: No gross abnormalities. No pallor or cyanosis. Dentition good.   Eyes: EOMS intact, PERRL, conjunctivae and lids unremarkable. Fundoscopic exam and visual fields not performed.   Neck: No palpable masses or adenopathy, no thyromegaly, no JVD, carotid pulses are full and equal bilaterally and without  Bruits.     Cardiovascular: Regular rhythm, S1 and S2 normal, no S3 or S4. Apical impulse not displaced. No murmurs, gallops, or rubs detected.     Pulmonary/Chest: Chest: normal symmetry, no tenderness to palpation, normal respiratory excursion, no intercostal retraction, no use of accessory muscles. Pulmonary: Normal breath sounds. No rales or rhonchi.    Abdominal: Abdomen soft, bowel sounds normoactive, no masses, no hepatosplenomegaly, non-tender, no bruits.     Musculoskeletal: No deformities, clubbing, cyanosis, erythema, or edema observed. There are no spinal abnormalities noted. Normal muscle strength and tone. Pulses full and equal in all extremities, no bruits auscultated.     Neurological: No gross motor or sensory deficits noted, affect appropriate, oriented to time, person, place.     Skin: Warm and dry to the touch, no apparent skin lesions or masses noted.     Psychiatric: She has a normal mood and affect. Her behavior is normal. Judgment and thought content normal.         Procedures      Lab Results   Component Value Date    WBC 3.65 04/10/2021    HGB 10.6 (L) 04/10/2021    HCT 33.0 " (L) 04/10/2021    MCV 91.4 04/10/2021     04/10/2021     Lab Results   Component Value Date    GLUCOSE 86 04/10/2021    BUN 16 04/10/2021    CREATININE 0.98 04/10/2021    EGFRIFNONA 56 (L) 04/10/2021    BCR 16.3 04/10/2021    CO2 26.0 04/10/2021    CALCIUM 9.6 04/10/2021    ALBUMIN 3.90 04/10/2021    AST 25 04/10/2021    ALT 18 04/10/2021     Lab Results   Component Value Date    CHOL 201 (H) 05/14/2020    CHOL 236 (H) 10/09/2019    CHOL 198 07/29/2019     Lab Results   Component Value Date    TRIG 68 05/14/2020    TRIG 61 10/09/2019    TRIG 56 07/29/2019     Lab Results   Component Value Date    HDL 82 (H) 05/14/2020    HDL 93 (H) 10/09/2019    HDL 85 (H) 07/29/2019     No components found for: LDLCALC  Lab Results   Component Value Date     (H) 05/14/2020     (H) 10/09/2019     (H) 07/29/2019     No results found for: HDLLDLRATIO  No components found for: CHOLHDL  Lab Results   Component Value Date    HGBA1C 5.03 04/24/2019     Lab Results   Component Value Date    TSH 1.940 10/25/2018           ASSESSMENT AND PLAN:  #1 symptomatic bradycardia with chronotropic incompetence and first degree AV block  #2 palpitations secondary premature atrial ventricular complex     75 years old patient known to Dr. Peterson evaluated for bradyarrhythmia with associated symptom with a previous history of syncope several years ago and history of pulmonary embolism with normal left and systolic function.  The monitor revealed significant bradycardia with a strong element of chronotropic incompetence with a symptom of fatigability lack of energy and mild dizziness.  Agree with Dr. Peterson to recommend and consider pacemaker.  Patient and the family had multiple questions and concerns that were answered to their satisfaction.  Procedure risk pros and cons of pacemaker implantation discussed with the patient.  Understand willing to proceed forward.  Risk included but not limited to infection, bleeding,  pneumothorax, hematoma, lead dislodgment.  Risk range less than 1 to 5%.  She is a Mallampati score 2 and ASA class II.    Diagnoses and all orders for this visit:    1. Essential hypertension (Primary)    2. Mixed hyperlipidemia    3. History of pulmonary embolism    4. Symptomatic sinus bradycardia    5. 1st degree AV block          Vesta Bucio MD  5/3/2021  12:17 CDT

## 2020-11-12 DIAGNOSIS — R00.1 SYMPTOMATIC SINUS BRADYCARDIA: Primary | ICD-10-CM

## 2020-11-12 RX ORDER — BUPIVACAINE HCL/0.9 % NACL/PF 0.1 %
2 PLASTIC BAG, INJECTION (ML) EPIDURAL ONCE
Status: CANCELLED | OUTPATIENT
Start: 2020-12-10 | End: 2020-11-12

## 2020-11-12 RX ORDER — SODIUM CHLORIDE 9 MG/ML
75 INJECTION, SOLUTION INTRAVENOUS CONTINUOUS
Status: CANCELLED | OUTPATIENT
Start: 2020-12-10

## 2020-11-12 NOTE — PROGRESS NOTES
Insertion of PPM scheduled, Patient given date and date for covid test. All of instructions were mailed to patient and she will call me once receives and reviews to confirm. [employer] rep notified per Dr Hooper request.

## 2020-12-07 ENCOUNTER — LAB (OUTPATIENT)
Dept: LAB | Facility: HOSPITAL | Age: 75
End: 2020-12-07

## 2020-12-07 DIAGNOSIS — Z01.818 PREOP TESTING: Primary | ICD-10-CM

## 2020-12-07 PROCEDURE — C9803 HOPD COVID-19 SPEC COLLECT: HCPCS

## 2020-12-07 PROCEDURE — U0003 INFECTIOUS AGENT DETECTION BY NUCLEIC ACID (DNA OR RNA); SEVERE ACUTE RESPIRATORY SYNDROME CORONAVIRUS 2 (SARS-COV-2) (CORONAVIRUS DISEASE [COVID-19]), AMPLIFIED PROBE TECHNIQUE, MAKING USE OF HIGH THROUGHPUT TECHNOLOGIES AS DESCRIBED BY CMS-2020-01-R: HCPCS

## 2020-12-07 RX ORDER — PANTOPRAZOLE SODIUM 40 MG/1
40 TABLET, DELAYED RELEASE ORAL DAILY
COMMUNITY
End: 2021-01-13 | Stop reason: SDUPTHER

## 2020-12-07 RX ORDER — TIZANIDINE 4 MG/1
4 TABLET ORAL NIGHTLY PRN
COMMUNITY
End: 2020-12-08 | Stop reason: SDUPTHER

## 2020-12-07 RX ORDER — FLUTICASONE PROPIONATE 50 MCG
2 SPRAY, SUSPENSION (ML) NASAL DAILY PRN
COMMUNITY
End: 2022-06-30

## 2020-12-08 ENCOUNTER — OFFICE VISIT (OUTPATIENT)
Dept: FAMILY MEDICINE CLINIC | Facility: CLINIC | Age: 75
End: 2020-12-08

## 2020-12-08 VITALS
HEIGHT: 60 IN | TEMPERATURE: 96.6 F | HEART RATE: 97 BPM | WEIGHT: 183.6 LBS | RESPIRATION RATE: 20 BRPM | OXYGEN SATURATION: 99 % | DIASTOLIC BLOOD PRESSURE: 70 MMHG | BODY MASS INDEX: 36.05 KG/M2 | SYSTOLIC BLOOD PRESSURE: 110 MMHG

## 2020-12-08 DIAGNOSIS — D51.8 OTHER VITAMIN B12 DEFICIENCY ANEMIA: ICD-10-CM

## 2020-12-08 DIAGNOSIS — Z00.00 MEDICARE ANNUAL WELLNESS VISIT, INITIAL: Primary | ICD-10-CM

## 2020-12-08 DIAGNOSIS — R00.1 SYMPTOMATIC SINUS BRADYCARDIA: ICD-10-CM

## 2020-12-08 DIAGNOSIS — M19.90 ARTHRITIS: ICD-10-CM

## 2020-12-08 DIAGNOSIS — I10 ESSENTIAL HYPERTENSION: ICD-10-CM

## 2020-12-08 DIAGNOSIS — M54.2 NECK PAIN: ICD-10-CM

## 2020-12-08 LAB
COVID LABCORP PRIORITY: NORMAL
SARS-COV-2 RNA RESP QL NAA+PROBE: NOT DETECTED

## 2020-12-08 PROCEDURE — G0438 PPPS, INITIAL VISIT: HCPCS | Performed by: NURSE PRACTITIONER

## 2020-12-08 PROCEDURE — 99214 OFFICE O/P EST MOD 30 MIN: CPT | Performed by: NURSE PRACTITIONER

## 2020-12-08 RX ORDER — TIZANIDINE 4 MG/1
4 TABLET ORAL NIGHTLY PRN
Qty: 30 TABLET | Refills: 3 | Status: SHIPPED | OUTPATIENT
Start: 2020-12-08 | End: 2022-06-30

## 2020-12-08 RX ORDER — NAPROXEN 500 MG/1
500 TABLET ORAL 2 TIMES DAILY PRN
Qty: 30 TABLET | Refills: 3 | Status: SHIPPED | OUTPATIENT
Start: 2020-12-08 | End: 2021-04-10

## 2020-12-08 NOTE — PATIENT INSTRUCTIONS
Medicare Wellness  Personal Prevention Plan of Service     Date of Office Visit:  2020  Encounter Provider:  TABBY Mejia  Place of Service:  [facility]  Patient Name: Veronica Wilkinson  :  1945    As part of the Medicare Wellness portion of your visit today, we are providing you with this personalized preventive plan of services (PPPS). This plan is based upon recommendations of the [place] Preventive Services Task Force (USPSTF) and the Advisory Committee on Immunization Practices (ACIP).    This lists the preventive care services that should be considered, and provides dates of when you are due. Items listed as completed are up-to-date and do not require any further intervention.    Health Maintenance   Topic Date Due   • ZOSTER VACCINE (1 of 2) 1997   • LIPID PANEL  2021   • COLONOSCOPY  2021   • ANNUAL WELLNESS VISIT  2021   • DXA SCAN  08/10/2022   • MAMMOGRAM  2022   • TDAP/TD VACCINES (2 - Td) 2027   • HEPATITIS C SCREENING  Completed   • INFLUENZA VACCINE  Completed   • Pneumococcal Vaccine 65+  Completed       No orders of the defined types were placed in this encounter.      No follow-ups on file.

## 2020-12-08 NOTE — PROGRESS NOTES
Subjective   Veronica Wilkinson is a 75 y.o. female.     Hypertension  This is a chronic problem. The current episode started more than 1 year ago. The problem is controlled. Associated symptoms include neck pain (chronic stable). Pertinent negatives include no chest pain, headaches, malaise/fatigue, palpitations or shortness of breath. Risk factors for coronary artery disease include family history, dyslipidemia, obesity, post-menopausal state and sedentary lifestyle. Current antihypertension treatment includes angiotensin blockers and diuretics. The current treatment provides significant improvement. Compliance problems include diet and exercise.  There is no history of angina.   Anemia  Presents for follow-up visit. There has been no abdominal pain, leg swelling, light-headedness, malaise/fatigue, pallor, palpitations, paresthesias, pica or weight loss. Signs of blood loss that are not present include hematemesis, hematochezia, melena, menorrhagia and vaginal bleeding. There are no compliance problems.  Compliance with medications is %.   Arthritis  Presents for follow-up visit. She complains of pain and stiffness. She reports no joint swelling or joint warmth. Affected locations include the neck. Her pain is at a severity of 2/10. Pertinent negatives include no diarrhea, dysuria, fatigue, rash or weight loss. Compliance with total regimen is %. Compliance with medications is %.        The following portions of the patient's history were reviewed and updated as appropriate: allergies, current medications, past family history, past medical history, past social history, past surgical history and problem list.    Review of Systems   Constitutional: Negative for activity change, appetite change, fatigue, malaise/fatigue, unexpected weight gain and unexpected weight loss.   HENT: Negative for congestion, sore throat, trouble swallowing and voice change.    Eyes: Negative.    Respiratory: Negative for  cough, chest tightness, shortness of breath and wheezing.    Cardiovascular: Negative for chest pain, palpitations and leg swelling.   Gastrointestinal: Negative for abdominal pain, constipation, diarrhea, hematemesis, hematochezia, melena, nausea and vomiting.   Endocrine: Negative.    Genitourinary: Negative for dysuria, menorrhagia and vaginal bleeding.   Musculoskeletal: Positive for arthralgias, arthritis, neck pain (chronic stable) and stiffness. Negative for joint swelling and myalgias.   Skin: Negative for pallor and rash.   Allergic/Immunologic: Negative.    Neurological: Negative for light-headedness and paresthesias.   Hematological: Negative.        Objective   Physical Exam  Vitals signs and nursing note reviewed.   Constitutional:       General: She is not in acute distress.     Appearance: Normal appearance. She is well-developed and normal weight. She is not ill-appearing, toxic-appearing or diaphoretic.   HENT:      Head: Normocephalic and atraumatic.   Eyes:      Conjunctiva/sclera: Conjunctivae normal.   Neck:      Musculoskeletal: Full passive range of motion without pain and normal range of motion. No spinous process tenderness or muscular tenderness.   Cardiovascular:      Rate and Rhythm: Normal rate and regular rhythm.      Heart sounds: Normal heart sounds.   Pulmonary:      Effort: Pulmonary effort is normal. No respiratory distress.      Breath sounds: Normal breath sounds. No stridor. No wheezing, rhonchi or rales.   Musculoskeletal: Normal range of motion.         General: No tenderness.   Skin:     General: Skin is warm and dry.      Coloration: Skin is not pale.      Findings: No erythema or rash.   Neurological:      Mental Status: She is alert and oriented to person, place, and time.   Psychiatric:         Attention and Perception: Attention and perception normal.         Mood and Affect: Mood normal.         Speech: Speech normal.         Behavior: Behavior normal.         Thought  Content: Thought content normal. Thought content is not paranoid or delusional. Thought content does not include homicidal or suicidal ideation. Thought content does not include homicidal or suicidal plan.         Cognition and Memory: Cognition and memory normal.         Judgment: Judgment normal.           Assessment/Plan   Diagnoses and all orders for this visit:    1. Medicare annual wellness visit, initial (Primary)    2. Essential hypertension   -Controlled.  Continue medication as prescribed.  We will continue to monitor.    3. Symptomatic sinus bradycardia   -Patient scheduled for pacemaker placement later this week.  Continue follow-up with cardiology.  Patient currently asymptomatic.    4. Other vitamin B12 deficiency anemia   -Asymptomatic.  [facility] with [facility].    5. Neck pain  -   Controlled.  Refill, tiZANidine (ZANAFLEX) 4 MG tablet; Take 1 tablet by mouth At Night As Needed for Muscle Spasms.  Dispense: 30 tablet; Refill: 3    6. Arthritis  -Controlled.  Refill,   naproxen (Naprosyn) 500 MG tablet; Take 1 tablet by mouth 2 (Two) Times a Day As Needed for Mild Pain .  Dispense: 30 tablet; Refill: 3    7.  Follow-up in 6 months or sooner for any acute needs.            This document has been electronically signed by TABBY Mejia on December 8, 2020 14:17 CST

## 2020-12-08 NOTE — PROGRESS NOTES
The ABCs of the Annual Wellness Visit  Initial Medicare Wellness Visit    Chief Complaint   Patient presents with   • Medicare Wellness-Initial Visit       Subjective   History of Present Illness:  Veronica Wilkinson is a 75 y.o. female who presents for an Initial Medicare Wellness Visit.    HEALTH RISK ASSESSMENT    Recent Hospitalizations:  No hospitalization(s) within the last year.    Current Medical Providers:  Patient Care Team:  Rich Urbina APRN as PCP - General (Nurse Practitioner)  Mihai Hunt MD as Consulting Physician (Hematology and Oncology)  Velia Kessler MA as Medical Assistant  Liza Padilla APRN as Nurse Practitioner (Oncology)    Smoking Status:  Social History     Tobacco Use   Smoking Status Never Smoker   Smokeless Tobacco Never Used       Alcohol Consumption:  Social History     Substance and Sexual Activity   Alcohol Use No       Depression Screen:   PHQ-2/PHQ-9 Depression Screening 12/8/2020   Little interest or pleasure in doing things -   Feeling down, depressed, or hopeless 0   Trouble falling or staying asleep, or sleeping too much 0   Feeling tired or having little energy 2   Poor appetite or overeating 0   Feeling bad about yourself - or that you are a failure or have let yourself or your family down 0   Trouble concentrating on things, such as reading the newspaper or watching television 0   Moving or speaking so slowly that other people could have noticed. Or the opposite - being so fidgety or restless that you have been moving around a lot more than usual 0   Thoughts that you would be better off dead, or of hurting yourself in some way 0   Total Score 2   If you checked off any problems, how difficult have these problems made it for you to do your work, take care of things at home, or get along with other people? Not difficult at all       Fall Risk Screen:  STEADI Fall Risk Assessment was completed, and patient is at LOW risk for falls.Assessment completed  on:12/8/2020    Health Habits and Functional and Cognitive Screening:  Functional & Cognitive Status 12/8/2020   Do you have difficulty preparing food and eating? No   Do you have difficulty bathing yourself, getting dressed or grooming yourself? No   Do you have difficulty using the toilet? No   Do you have difficulty moving around from place to place? No   Do you have trouble with steps or getting out of a bed or a chair? No   Current Diet Low Carb Diet   Dental Exam Up to date   Eye Exam Up to date   Exercise (times per week) 0 times per week   Current Exercises Include House Cleaning   Do you need help using the phone?  No   Are you deaf or do you have serious difficulty hearing?  No   Do you need help with transportation? No   Do you need help shopping? No   Do you need help preparing meals?  No   Do you need help with housework?  No   Do you need help with laundry? No   Do you need help taking your medications? No   Do you need help managing money? No   Do you ever drive or ride in a car without wearing a seat belt? Yes   Have you felt unusual stress, anger or loneliness in the last month? No   Who do you live with? Spouse   If you need help, do you have trouble finding someone available to you? No   Have you been bothered in the last four weeks by sexual problems? No   Do you have difficulty concentrating, remembering or making decisions? No         Does the patient have evidence of cognitive impairment? No    Asprin use counseling:upcoming procedure    Age-appropriate Screening Schedule:  Refer to the list below for future screening recommendations based on patient's age, sex and/or medical conditions. Orders for these recommended tests are listed in the plan section. The patient has been provided with a written plan.    Health Maintenance   Topic Date Due   • ZOSTER VACCINE (1 of 2) 08/05/1997   • LIPID PANEL  05/14/2021   • COLONOSCOPY  09/05/2021   • DXA SCAN  08/10/2022   • MAMMOGRAM  09/25/2022   •  TDAP/TD VACCINES (2 - Td) 03/01/2027   • INFLUENZA VACCINE  Completed          The following portions of the patient's history were reviewed and updated as appropriate: allergies, current medications, past family history, past medical history, past social history, past surgical history and problem list.    Outpatient Medications Prior to Visit   Medication Sig Dispense Refill   • acetaminophen (TYLENOL) 325 MG tablet Take 650 mg by mouth Every Night.     • Bisacodyl (GENTLE LAXATIVE PO) Take 100 mg by mouth Every Night.     • calcium carbonate (OS-DARON) 600 MG tablet Take 600 mg by mouth 2 (Two) Times a Day With Meals.     • Cholecalciferol (VITAMIN D3) 2000 units tablet Take 1 tablet by mouth Daily.     • Cyanocobalamin (B-12 PO) Take 1 tablet by mouth Daily.     • diphenhydrAMINE (BENADRYL) 25 MG tablet Take 50 mg by mouth Every Night. Take two tablets by mouth at bedtime      • docusate sodium (COLACE) 100 MG capsule Take 100 mg by mouth 2 (Two) Times a Day. Take two tablets by mouth nightly     • fluticasone (Flonase) 50 MCG/ACT nasal spray 2 sprays into the nostril(s) as directed by provider Daily As Needed for Rhinitis.     • Garlic 1000 MG capsule Take 1,000 mg by mouth Daily.     • Iron, Ferrous Sulfate, 325 (65 Fe) MG tablet Take 325 mg by mouth Daily.     • LORATADINE ALLERGY RELIEF PO Take 10 mg by mouth Daily.     • magnesium oxide (MAG-OX) 400 MG tablet Take 400 mg by mouth Daily.     • Multiple Vitamins-Minerals (MULTIVITAMIN ADULT PO) Take 1 tablet by mouth Daily.     • olmesartan-hydrochlorothiazide (BENICAR HCT) 20-12.5 MG per tablet Take 1 tablet by mouth Daily. 90 tablet 2   • pantoprazole (PROTONIX) 40 MG EC tablet Take 40 mg by mouth Daily.     • simethicone (MYLICON,GAS-X) 125 MG capsule capsule Take 1 capsule by mouth Every Night.     • simvastatin (ZOCOR) 20 MG tablet Take 1 tablet by mouth Every Other Day. 45 tablet 0   • tiZANidine (ZANAFLEX) 4 MG tablet Take 4 mg by mouth At Night As Needed  "for Muscle Spasms.     • traZODone (DESYREL) 100 MG tablet Take 1 tablet by mouth Every Night. 90 tablet 3   • Turmeric (QC Tumeric Complex) 500 MG capsule Take 500 mg by mouth Daily.       No facility-administered medications prior to visit.        Patient Active Problem List   Diagnosis   • Essential hypertension   • Hyperlipidemia   • History of pulmonary embolism   • Primary hyperparathyroidism (CMS/HCC)   • Pulmonary embolism (CMS/HCC)   • Long-term (current) use of anticoagulants   • Easy bruising   • Anemia   • Nephrolithiasis   • Osteoporosis without current pathological fracture   • Vitamin D deficiency   • Other neutropenia (CMS/HCC)   • History of bariatric surgery   • Postmenopausal   • Symptomatic sinus bradycardia       Advanced Care Planning:  ACP discussion was held with the patient during this visit. Patient does not have an advance directive, information provided.    Review of Systems    Compared to one year ago, the patient feels her physical health is the same.  Compared to one year ago, the patient feels her mental health is the same.    Reviewed chart for potential of high risk medication in the elderly: yes  Reviewed chart for potential of harmful drug interactions in the elderly:yes    Objective         Vitals:    12/08/20 1328   BP: 110/70   BP Location: Left arm   Patient Position: Sitting   Cuff Size: Adult   Pulse: 97   Resp: 20   Temp: 96.6 °F (35.9 °C)   TempSrc: Tympanic   SpO2: 99%   Weight: 83.3 kg (183 lb 9.6 oz)   Height: 152.4 cm (60\")   PainSc: 0-No pain       Body mass index is 35.86 kg/m².  Discussed the patient's BMI with her. The BMI is above average; BMI management plan is completed.    Physical Exam          Assessment/Plan   Medicare Risks and Personalized Health Plan  CMS Preventative Services Quick Reference  Advance Directive Discussion  Cardiovascular risk  Fall Risk  Osteoprorosis Risk  Polypharmacy    The above risks/problems have been discussed with the " patient.  Pertinent information has been shared with the patient in the After Visit Summary.  Follow up plans and orders are seen below in the Assessment/Plan Section.    Diagnoses and all orders for this visit:    1. Medicare annual wellness visit, initial (Primary)    2. Essential hypertension    3. Symptomatic sinus bradycardia    4. Other vitamin B12 deficiency anemia      Follow Up:  No follow-ups on file.     An After Visit Summary and PPPS were given to the patient.

## 2020-12-10 ENCOUNTER — APPOINTMENT (OUTPATIENT)
Dept: GENERAL RADIOLOGY | Facility: HOSPITAL | Age: 75
End: 2020-12-10

## 2020-12-10 ENCOUNTER — HOSPITAL ENCOUNTER (OUTPATIENT)
Facility: HOSPITAL | Age: 75
Discharge: HOME | End: 2020-12-11
Attending: INTERNAL MEDICINE | Admitting: INTERNAL MEDICINE

## 2020-12-10 DIAGNOSIS — R00.1 SYMPTOMATIC SINUS BRADYCARDIA: ICD-10-CM

## 2020-12-10 LAB
ANION GAP SERPL CALCULATED.3IONS-SCNC: 6 MMOL/L (ref 5–15)
APTT PPP: 37 SECONDS (ref 20–40.3)
BASOPHILS # BLD MANUAL: 0.03 10*3/MM3 (ref 0–0.2)
BASOPHILS NFR BLD AUTO: 1 % (ref 0–1.5)
BUN SERPL-MCNC: 16 MG/DL (ref 8–23)
BUN/CREAT SERPL: 17.2 (ref 7–25)
CALCIUM SPEC-SCNC: 8.8 MG/DL (ref 8.6–10.5)
CHLORIDE SERPL-SCNC: 105 MMOL/L (ref 98–107)
CO2 SERPL-SCNC: 26 MMOL/L (ref 22–29)
CREAT SERPL-MCNC: 0.93 MG/DL (ref 0.57–1)
DEPRECATED RDW RBC AUTO: 41.8 FL (ref 37–54)
EOSINOPHIL # BLD MANUAL: 0.08 10*3/MM3 (ref 0–0.4)
EOSINOPHIL NFR BLD MANUAL: 3 % (ref 0.3–6.2)
ERYTHROCYTE [DISTWIDTH] IN BLOOD BY AUTOMATED COUNT: 12.7 % (ref 12.3–15.4)
GFR SERPL CREATININE-BSD FRML MDRD: 59 ML/MIN/1.73
GLUCOSE SERPL-MCNC: 81 MG/DL (ref 65–99)
HCT VFR BLD AUTO: 29.8 % (ref 34–46.6)
HGB BLD-MCNC: 10.3 G/DL (ref 12–15.9)
INR PPP: 1.05 (ref 0.8–1.2)
LYMPHOCYTES # BLD MANUAL: 1.42 10*3/MM3 (ref 0.7–3.1)
LYMPHOCYTES NFR BLD MANUAL: 51 % (ref 19.6–45.3)
LYMPHOCYTES NFR BLD MANUAL: 7 % (ref 5–12)
MCH RBC QN AUTO: 31 PG (ref 26.6–33)
MCHC RBC AUTO-ENTMCNC: 34.6 G/DL (ref 31.5–35.7)
MCV RBC AUTO: 89.8 FL (ref 79–97)
MONOCYTES # BLD AUTO: 0.2 10*3/MM3 (ref 0.1–0.9)
NEUTROPHILS # BLD AUTO: 1.06 10*3/MM3 (ref 1.7–7)
NEUTROPHILS NFR BLD MANUAL: 38 % (ref 42.7–76)
PLATELET # BLD AUTO: 285 10*3/MM3 (ref 140–450)
PMV BLD AUTO: 9 FL (ref 6–12)
POTASSIUM SERPL-SCNC: 4.2 MMOL/L (ref 3.5–5.2)
PROTHROMBIN TIME: 14.1 SECONDS (ref 11.1–15.3)
RBC # BLD AUTO: 3.32 10*6/MM3 (ref 3.77–5.28)
RBC MORPH BLD: NORMAL
SMALL PLATELETS BLD QL SMEAR: ADEQUATE
SODIUM SERPL-SCNC: 137 MMOL/L (ref 136–145)
WBC # BLD AUTO: 2.79 10*3/MM3 (ref 3.4–10.8)
WBC MORPH BLD: NORMAL

## 2020-12-10 PROCEDURE — A9270 NON-COVERED ITEM OR SERVICE: HCPCS | Performed by: INTERNAL MEDICINE

## 2020-12-10 PROCEDURE — 63710000001 DOCUSATE SODIUM 100 MG CAPSULE: Performed by: INTERNAL MEDICINE

## 2020-12-10 PROCEDURE — C1785 PMKR, DUAL, RATE-RESP: HCPCS | Performed by: INTERNAL MEDICINE

## 2020-12-10 PROCEDURE — 63710000001 TRAZODONE 100 MG TABLET: Performed by: INTERNAL MEDICINE

## 2020-12-10 PROCEDURE — 63710000001 HYDROCHLOROTHIAZIDE 12.5 MG TABLET 100 EACH BOTTLE: Performed by: INTERNAL MEDICINE

## 2020-12-10 PROCEDURE — C1898 LEAD, PMKR, OTHER THAN TRANS: HCPCS | Performed by: INTERNAL MEDICINE

## 2020-12-10 PROCEDURE — 71045 X-RAY EXAM CHEST 1 VIEW: CPT

## 2020-12-10 PROCEDURE — C1894 INTRO/SHEATH, NON-LASER: HCPCS | Performed by: INTERNAL MEDICINE

## 2020-12-10 PROCEDURE — 25010000002 MIDAZOLAM PER 1 MG: Performed by: INTERNAL MEDICINE

## 2020-12-10 PROCEDURE — 25010000002 FENTANYL CITRATE (PF) 100 MCG/2ML SOLUTION: Performed by: INTERNAL MEDICINE

## 2020-12-10 PROCEDURE — C1892 INTRO/SHEATH,FIXED,PEEL-AWAY: HCPCS

## 2020-12-10 PROCEDURE — 0 IOPAMIDOL PER 1 ML: Performed by: INTERNAL MEDICINE

## 2020-12-10 PROCEDURE — 80048 BASIC METABOLIC PNL TOTAL CA: CPT | Performed by: INTERNAL MEDICINE

## 2020-12-10 PROCEDURE — 85610 PROTHROMBIN TIME: CPT | Performed by: INTERNAL MEDICINE

## 2020-12-10 PROCEDURE — 33208 INSRT HEART PM ATRIAL & VENT: CPT | Performed by: INTERNAL MEDICINE

## 2020-12-10 PROCEDURE — 63710000001 MAGNESIUM OXIDE 400 (240 MG) MG TABLET: Performed by: INTERNAL MEDICINE

## 2020-12-10 PROCEDURE — 85730 THROMBOPLASTIN TIME PARTIAL: CPT | Performed by: INTERNAL MEDICINE

## 2020-12-10 PROCEDURE — 63710000001 MULTIVITAMIN WITH MINERALS TABLET: Performed by: INTERNAL MEDICINE

## 2020-12-10 PROCEDURE — 85007 BL SMEAR W/DIFF WBC COUNT: CPT | Performed by: INTERNAL MEDICINE

## 2020-12-10 PROCEDURE — 63710000001 ACETAMINOPHEN 325 MG TABLET: Performed by: INTERNAL MEDICINE

## 2020-12-10 PROCEDURE — 63710000001: Performed by: INTERNAL MEDICINE

## 2020-12-10 PROCEDURE — 63710000001 DIPHENHYDRAMINE PER 50 MG: Performed by: INTERNAL MEDICINE

## 2020-12-10 PROCEDURE — 85025 COMPLETE CBC W/AUTO DIFF WBC: CPT | Performed by: INTERNAL MEDICINE

## 2020-12-10 PROCEDURE — 63710000001 LOSARTAN 50 MG TABLET 1 EACH BLISTER: Performed by: INTERNAL MEDICINE

## 2020-12-10 PROCEDURE — 25010000002 GENTAMICIN PER 80 MG: Performed by: INTERNAL MEDICINE

## 2020-12-10 DEVICE — PACE/SENSE LEAD
Type: IMPLANTABLE DEVICE | Status: FUNCTIONAL
Brand: INGEVITY™+

## 2020-12-10 DEVICE — PACEMAKER
Type: IMPLANTABLE DEVICE | Status: FUNCTIONAL
Brand: ESSENTIO™ MRI EL DR

## 2020-12-10 RX ORDER — ONDANSETRON 2 MG/ML
4 INJECTION INTRAMUSCULAR; INTRAVENOUS EVERY 6 HOURS PRN
Status: DISCONTINUED | OUTPATIENT
Start: 2020-12-10 | End: 2020-12-11 | Stop reason: HOSPADM

## 2020-12-10 RX ORDER — FLUTICASONE PROPIONATE 50 MCG
2 SPRAY, SUSPENSION (ML) NASAL DAILY PRN
Status: DISCONTINUED | OUTPATIENT
Start: 2020-12-10 | End: 2020-12-11 | Stop reason: HOSPADM

## 2020-12-10 RX ORDER — MULTIPLE VITAMINS W/ MINERALS TAB 9MG-400MCG
1 TAB ORAL DAILY
Status: DISCONTINUED | OUTPATIENT
Start: 2020-12-10 | End: 2020-12-11 | Stop reason: HOSPADM

## 2020-12-10 RX ORDER — ATORVASTATIN CALCIUM 10 MG/1
10 TABLET, FILM COATED ORAL DAILY
Status: DISCONTINUED | OUTPATIENT
Start: 2020-12-10 | End: 2020-12-11 | Stop reason: HOSPADM

## 2020-12-10 RX ORDER — BUPIVACAINE HCL/0.9 % NACL/PF 0.1 %
2 PLASTIC BAG, INJECTION (ML) EPIDURAL ONCE
Status: DISCONTINUED | OUTPATIENT
Start: 2020-12-10 | End: 2020-12-10

## 2020-12-10 RX ORDER — PANTOPRAZOLE SODIUM 40 MG/1
40 TABLET, DELAYED RELEASE ORAL DAILY
Status: DISCONTINUED | OUTPATIENT
Start: 2020-12-11 | End: 2020-12-11 | Stop reason: HOSPADM

## 2020-12-10 RX ORDER — DIPHENHYDRAMINE HCL 50 MG
50 CAPSULE ORAL NIGHTLY
Status: DISCONTINUED | OUTPATIENT
Start: 2020-12-10 | End: 2020-12-11 | Stop reason: HOSPADM

## 2020-12-10 RX ORDER — DOCUSATE SODIUM 100 MG/1
200 CAPSULE, LIQUID FILLED ORAL NIGHTLY
Status: DISCONTINUED | OUTPATIENT
Start: 2020-12-10 | End: 2020-12-11 | Stop reason: HOSPADM

## 2020-12-10 RX ORDER — LANOLIN ALCOHOL/MO/W.PET/CERES
400 CREAM (GRAM) TOPICAL DAILY
Status: DISCONTINUED | OUTPATIENT
Start: 2020-12-10 | End: 2020-12-11 | Stop reason: HOSPADM

## 2020-12-10 RX ORDER — ACETAMINOPHEN 325 MG/1
650 TABLET ORAL NIGHTLY
Status: DISCONTINUED | OUTPATIENT
Start: 2020-12-10 | End: 2020-12-11 | Stop reason: HOSPADM

## 2020-12-10 RX ORDER — CETIRIZINE HYDROCHLORIDE 5 MG/1
5 TABLET ORAL DAILY
Status: DISCONTINUED | OUTPATIENT
Start: 2020-12-10 | End: 2020-12-11 | Stop reason: HOSPADM

## 2020-12-10 RX ORDER — HYDROCODONE BITARTRATE AND ACETAMINOPHEN 5; 325 MG/1; MG/1
1 TABLET ORAL EVERY 4 HOURS PRN
Status: DISCONTINUED | OUTPATIENT
Start: 2020-12-10 | End: 2020-12-11 | Stop reason: HOSPADM

## 2020-12-10 RX ORDER — TIZANIDINE 4 MG/1
4 TABLET ORAL NIGHTLY PRN
Status: DISCONTINUED | OUTPATIENT
Start: 2020-12-10 | End: 2020-12-11 | Stop reason: HOSPADM

## 2020-12-10 RX ORDER — LIDOCAINE HYDROCHLORIDE 20 MG/ML
INJECTION, SOLUTION INFILTRATION; PERINEURAL AS NEEDED
Status: DISCONTINUED | OUTPATIENT
Start: 2020-12-10 | End: 2020-12-10 | Stop reason: HOSPADM

## 2020-12-10 RX ORDER — SODIUM CHLORIDE 0.9 % (FLUSH) 0.9 %
3 SYRINGE (ML) INJECTION EVERY 12 HOURS SCHEDULED
Status: DISCONTINUED | OUTPATIENT
Start: 2020-12-10 | End: 2020-12-11 | Stop reason: HOSPADM

## 2020-12-10 RX ORDER — DIPHENHYDRAMINE HCL 25 MG
50 TABLET ORAL NIGHTLY
Status: DISCONTINUED | OUTPATIENT
Start: 2020-12-10 | End: 2020-12-10

## 2020-12-10 RX ORDER — MIDAZOLAM HYDROCHLORIDE 1 MG/ML
INJECTION INTRAMUSCULAR; INTRAVENOUS AS NEEDED
Status: DISCONTINUED | OUTPATIENT
Start: 2020-12-10 | End: 2020-12-10 | Stop reason: HOSPADM

## 2020-12-10 RX ORDER — DOCUSATE SODIUM 100 MG/1
100 CAPSULE, LIQUID FILLED ORAL NIGHTLY
Status: DISCONTINUED | OUTPATIENT
Start: 2020-12-10 | End: 2020-12-10

## 2020-12-10 RX ORDER — TRAZODONE HYDROCHLORIDE 100 MG/1
100 TABLET ORAL NIGHTLY
Status: DISCONTINUED | OUTPATIENT
Start: 2020-12-10 | End: 2020-12-11 | Stop reason: HOSPADM

## 2020-12-10 RX ORDER — SODIUM CHLORIDE 0.9 % (FLUSH) 0.9 %
10 SYRINGE (ML) INJECTION AS NEEDED
Status: DISCONTINUED | OUTPATIENT
Start: 2020-12-10 | End: 2020-12-11 | Stop reason: HOSPADM

## 2020-12-10 RX ORDER — NAPROXEN 500 MG/1
500 TABLET ORAL 2 TIMES DAILY PRN
Status: DISCONTINUED | OUTPATIENT
Start: 2020-12-10 | End: 2020-12-11 | Stop reason: HOSPADM

## 2020-12-10 RX ORDER — FENTANYL CITRATE 50 UG/ML
INJECTION, SOLUTION INTRAMUSCULAR; INTRAVENOUS AS NEEDED
Status: DISCONTINUED | OUTPATIENT
Start: 2020-12-10 | End: 2020-12-10 | Stop reason: HOSPADM

## 2020-12-10 RX ORDER — SIMETHICONE 125 MG
125 CAPSULE ORAL NIGHTLY
Status: DISCONTINUED | OUTPATIENT
Start: 2020-12-10 | End: 2020-12-11 | Stop reason: HOSPADM

## 2020-12-10 RX ORDER — SODIUM CHLORIDE 9 MG/ML
75 INJECTION, SOLUTION INTRAVENOUS CONTINUOUS
Status: DISCONTINUED | OUTPATIENT
Start: 2020-12-10 | End: 2020-12-10

## 2020-12-10 RX ADMIN — CEFAZOLIN 1 G: 1 INJECTION, POWDER, FOR SOLUTION INTRAMUSCULAR; INTRAVENOUS; PARENTERAL at 08:56

## 2020-12-10 RX ADMIN — LOSARTAN POTASSIUM: 50 TABLET, FILM COATED ORAL at 15:17

## 2020-12-10 RX ADMIN — Medication 125 MG: at 20:57

## 2020-12-10 RX ADMIN — DIPHENHYDRAMINE HYDROCHLORIDE 50 MG: 50 CAPSULE ORAL at 20:56

## 2020-12-10 RX ADMIN — GENTAMICIN SULFATE 80 MG: 40 INJECTION, SOLUTION INTRAMUSCULAR; INTRAVENOUS at 08:39

## 2020-12-10 RX ADMIN — SODIUM CHLORIDE 75 ML/HR: 9 INJECTION, SOLUTION INTRAVENOUS at 07:10

## 2020-12-10 RX ADMIN — DOCUSATE SODIUM 200 MG: 100 CAPSULE, LIQUID FILLED ORAL at 20:56

## 2020-12-10 RX ADMIN — Medication 400 MG: at 15:18

## 2020-12-10 RX ADMIN — ACETAMINOPHEN 650 MG: 325 TABLET, FILM COATED ORAL at 20:56

## 2020-12-10 RX ADMIN — SODIUM CHLORIDE, PRESERVATIVE FREE 3 ML: 5 INJECTION INTRAVENOUS at 20:57

## 2020-12-10 RX ADMIN — TRAZODONE HYDROCHLORIDE 100 MG: 100 TABLET ORAL at 20:56

## 2020-12-10 RX ADMIN — Medication 1 TABLET: at 15:18

## 2020-12-11 VITALS
HEIGHT: 61 IN | SYSTOLIC BLOOD PRESSURE: 116 MMHG | WEIGHT: 182.2 LBS | RESPIRATION RATE: 18 BRPM | HEART RATE: 60 BPM | BODY MASS INDEX: 34.4 KG/M2 | OXYGEN SATURATION: 97 % | TEMPERATURE: 97.6 F | DIASTOLIC BLOOD PRESSURE: 57 MMHG

## 2020-12-11 PROCEDURE — 63710000001 PANTOPRAZOLE 40 MG TABLET DELAYED-RELEASE: Performed by: INTERNAL MEDICINE

## 2020-12-11 PROCEDURE — 63710000001 CETIRIZINE 5 MG TABLET: Performed by: INTERNAL MEDICINE

## 2020-12-11 PROCEDURE — A9270 NON-COVERED ITEM OR SERVICE: HCPCS | Performed by: INTERNAL MEDICINE

## 2020-12-11 PROCEDURE — 63710000001 ATORVASTATIN 10 MG TABLET: Performed by: INTERNAL MEDICINE

## 2020-12-11 PROCEDURE — 63710000001 MULTIVITAMIN WITH MINERALS TABLET: Performed by: INTERNAL MEDICINE

## 2020-12-11 PROCEDURE — 99024 POSTOP FOLLOW-UP VISIT: CPT | Performed by: INTERNAL MEDICINE

## 2020-12-11 RX ADMIN — SODIUM CHLORIDE, PRESERVATIVE FREE 3 ML: 5 INJECTION INTRAVENOUS at 08:21

## 2020-12-11 RX ADMIN — PANTOPRAZOLE SODIUM 40 MG: 40 TABLET, DELAYED RELEASE ORAL at 08:20

## 2020-12-11 RX ADMIN — CETIRIZINE HYDROCHLORIDE 5 MG: 5 TABLET ORAL at 08:20

## 2020-12-11 RX ADMIN — Medication 1 TABLET: at 08:20

## 2020-12-17 ENCOUNTER — OFFICE VISIT (OUTPATIENT)
Dept: FAMILY MEDICINE CLINIC | Facility: CLINIC | Age: 75
End: 2020-12-17

## 2020-12-17 VITALS
DIASTOLIC BLOOD PRESSURE: 68 MMHG | RESPIRATION RATE: 18 BRPM | HEART RATE: 67 BPM | WEIGHT: 118.4 LBS | BODY MASS INDEX: 22.36 KG/M2 | TEMPERATURE: 96.7 F | OXYGEN SATURATION: 98 % | SYSTOLIC BLOOD PRESSURE: 118 MMHG | HEIGHT: 61 IN

## 2020-12-17 DIAGNOSIS — D51.8 OTHER VITAMIN B12 DEFICIENCY ANEMIA: ICD-10-CM

## 2020-12-17 DIAGNOSIS — Z09 HOSPITAL DISCHARGE FOLLOW-UP: Primary | ICD-10-CM

## 2020-12-17 DIAGNOSIS — Z95.0 S/P PLACEMENT OF CARDIAC PACEMAKER: ICD-10-CM

## 2020-12-17 DIAGNOSIS — R00.1 SYMPTOMATIC SINUS BRADYCARDIA: ICD-10-CM

## 2020-12-17 PROCEDURE — 99214 OFFICE O/P EST MOD 30 MIN: CPT | Performed by: NURSE PRACTITIONER

## 2020-12-17 NOTE — PROGRESS NOTES
Subjective   Veronica Wilkinson is a 75 y.o. female.     Heart Problem  This is a chronic problem. The current episode started more than 1 year ago. The problem occurs intermittently. The problem has been gradually improving. Associated symptoms include chest pain. Pertinent negatives include no abdominal pain, anorexia, arthralgias, congestion, coughing, fatigue, fever, myalgias, nausea, rash, sore throat or vomiting. Nothing aggravates the symptoms. Treatments tried: pacemaker placed yesterday. The treatment provided moderate relief.   Anemia  Presents for follow-up visit. There has been no abdominal pain, anorexia, bruising/bleeding easily, confusion, fever, leg swelling, light-headedness, malaise/fatigue, pallor, palpitations, paresthesias, pica or weight loss. Signs of blood loss that are not present include hematemesis, hematochezia, melena, menorrhagia and vaginal bleeding. There are no compliance problems.  Compliance with medications is %.        The following portions of the patient's history were reviewed and updated as appropriate: allergies, current medications, past family history, past medical history, past social history, past surgical history and problem list.    Review of Systems   Constitutional: Negative for activity change, appetite change, fatigue, fever, malaise/fatigue, unexpected weight gain and unexpected weight loss.   HENT: Negative for congestion, sore throat, trouble swallowing and voice change.    Eyes: Negative.    Respiratory: Negative for cough, chest tightness, shortness of breath and wheezing.    Cardiovascular: Positive for chest pain. Negative for palpitations and leg swelling.        Left upper chest wall tenderness from pacer placement.  Reports no fever, chills, cough, shortness of breath or chest pain otherwise.   Gastrointestinal: Negative for abdominal pain, anorexia, constipation, diarrhea, hematemesis, hematochezia, melena, nausea and vomiting.   Endocrine: Negative.      Genitourinary: Negative for dysuria, menorrhagia and vaginal bleeding.   Musculoskeletal: Negative for arthralgias and myalgias.   Skin: Negative for pallor and rash.   Allergic/Immunologic: Negative.    Neurological: Negative.  Negative for light-headedness, paresthesias and confusion.   Hematological: Negative.  Does not bruise/bleed easily.   Psychiatric/Behavioral: Negative.        Objective   Physical Exam  Vitals signs and nursing note reviewed.   Constitutional:       General: She is not in acute distress.     Appearance: Normal appearance. She is well-developed. She is not ill-appearing, toxic-appearing or diaphoretic.   HENT:      Head: Normocephalic and atraumatic.   Eyes:      Conjunctiva/sclera: Conjunctivae normal.   Neck:      Musculoskeletal: Normal range of motion.   Cardiovascular:      Rate and Rhythm: Normal rate and regular rhythm.      Heart sounds: Normal heart sounds.   Pulmonary:      Effort: Pulmonary effort is normal. No respiratory distress.      Breath sounds: Normal breath sounds. No stridor. No wheezing, rhonchi or rales.   Chest:       Musculoskeletal: Normal range of motion.         General: No tenderness.   Skin:     General: Skin is warm and dry.      Coloration: Skin is not pale.      Findings: No erythema or rash.   Neurological:      Mental Status: She is alert and oriented to person, place, and time.   Psychiatric:         Attention and Perception: Attention and perception normal.         Mood and Affect: Mood and affect normal.         Speech: Speech normal.         Behavior: Behavior normal. Behavior is cooperative.         Thought Content: Thought content normal. Thought content is not paranoid or delusional. Thought content does not include homicidal or suicidal ideation. Thought content does not include homicidal or suicidal plan.         Cognition and Memory: Cognition and memory normal.         Judgment: Judgment normal.           Assessment/Plan   Diagnoses and all  orders for this visit:    1. Hospital discharge follow-up (Primary)    2. Symptomatic sinus bradycardia   -Currently asymptomatic.  Current heart rate 67.  Continue medications as prescribed.  Continue follow-up with cardiology.    3. S/P placement of cardiac pacemaker   -No local or systemic signs of infection.  No chest pain, shortness of breath, palpitations, cough, fever, chills.  No signs of bruising or hematoma.  Follow-up with cardiology as scheduled.    4. Other vitamin B12 deficiency anemia  -     Iron Profile; Future  -     Vitamin B12; Future  -     Ferritin; Future  -     CBC & Differential; Future, will call with results.    5.  Follow-up as scheduled or sooner for any acute needs.            This document has been electronically signed by TABBY Mejia on December 17, 2020 15:34 CST    Current outpatient and discharge medications have been reconciled for the patient.  Reviewed by: TABBY Mejia

## 2020-12-22 ENCOUNTER — OFFICE VISIT (OUTPATIENT)
Dept: ONCOLOGY | Facility: CLINIC | Age: 75
End: 2020-12-22

## 2020-12-22 ENCOUNTER — LAB (OUTPATIENT)
Dept: ONCOLOGY | Facility: HOSPITAL | Age: 75
End: 2020-12-22

## 2020-12-22 VITALS
HEIGHT: 61 IN | BODY MASS INDEX: 34.17 KG/M2 | SYSTOLIC BLOOD PRESSURE: 142 MMHG | TEMPERATURE: 97.2 F | RESPIRATION RATE: 18 BRPM | HEART RATE: 64 BPM | DIASTOLIC BLOOD PRESSURE: 60 MMHG | WEIGHT: 181 LBS

## 2020-12-22 DIAGNOSIS — D64.9 ANEMIA, UNSPECIFIED TYPE: ICD-10-CM

## 2020-12-22 DIAGNOSIS — Z86.711 HISTORY OF PULMONARY EMBOLISM: ICD-10-CM

## 2020-12-22 DIAGNOSIS — D70.8 OTHER NEUTROPENIA (HCC): ICD-10-CM

## 2020-12-22 DIAGNOSIS — M81.0 AGE-RELATED OSTEOPOROSIS WITHOUT CURRENT PATHOLOGICAL FRACTURE: ICD-10-CM

## 2020-12-22 DIAGNOSIS — D51.8 OTHER VITAMIN B12 DEFICIENCY ANEMIA: ICD-10-CM

## 2020-12-22 DIAGNOSIS — R78.71 ABNORMAL LEAD LEVEL IN BLOOD: ICD-10-CM

## 2020-12-22 DIAGNOSIS — R79.9 ABNORMAL FINDING OF BLOOD CHEMISTRY, UNSPECIFIED: ICD-10-CM

## 2020-12-22 LAB
ALBUMIN SERPL-MCNC: 3.8 G/DL (ref 3.5–5.2)
ALBUMIN/GLOB SERPL: 1.2 G/DL
ALP SERPL-CCNC: 101 U/L (ref 39–117)
ALT SERPL W P-5'-P-CCNC: 21 U/L (ref 1–33)
ANION GAP SERPL CALCULATED.3IONS-SCNC: 6 MMOL/L (ref 5–15)
AST SERPL-CCNC: 27 U/L (ref 1–32)
BASOPHILS # BLD AUTO: 0.04 10*3/MM3 (ref 0–0.2)
BASOPHILS NFR BLD AUTO: 0.9 % (ref 0–1.5)
BILIRUB SERPL-MCNC: 0.2 MG/DL (ref 0–1.2)
BUN SERPL-MCNC: 15 MG/DL (ref 8–23)
BUN/CREAT SERPL: 18.1 (ref 7–25)
CALCIUM SPEC-SCNC: 9.6 MG/DL (ref 8.6–10.5)
CHLORIDE SERPL-SCNC: 105 MMOL/L (ref 98–107)
CO2 SERPL-SCNC: 28 MMOL/L (ref 22–29)
CREAT SERPL-MCNC: 0.83 MG/DL (ref 0.57–1)
DEPRECATED RDW RBC AUTO: 41 FL (ref 37–54)
EOSINOPHIL # BLD AUTO: 0.14 10*3/MM3 (ref 0–0.4)
EOSINOPHIL NFR BLD AUTO: 3.1 % (ref 0.3–6.2)
ERYTHROCYTE [DISTWIDTH] IN BLOOD BY AUTOMATED COUNT: 12.5 % (ref 12.3–15.4)
FERRITIN SERPL-MCNC: 32.39 NG/ML (ref 13–150)
FOLATE SERPL-MCNC: >20 NG/ML (ref 4.78–24.2)
GFR SERPL CREATININE-BSD FRML MDRD: 67 ML/MIN/1.73
GLOBULIN UR ELPH-MCNC: 3.1 GM/DL
GLUCOSE SERPL-MCNC: 89 MG/DL (ref 65–99)
HCT VFR BLD AUTO: 31.6 % (ref 34–46.6)
HGB BLD-MCNC: 10.6 G/DL (ref 12–15.9)
IMM GRANULOCYTES # BLD AUTO: 0.01 10*3/MM3 (ref 0–0.05)
IMM GRANULOCYTES NFR BLD AUTO: 0.2 % (ref 0–0.5)
IRON 24H UR-MRATE: 91 MCG/DL (ref 37–145)
IRON SATN MFR SERPL: 24 % (ref 20–50)
LYMPHOCYTES # BLD AUTO: 1.5 10*3/MM3 (ref 0.7–3.1)
LYMPHOCYTES NFR BLD AUTO: 33 % (ref 19.6–45.3)
MCH RBC QN AUTO: 30.3 PG (ref 26.6–33)
MCHC RBC AUTO-ENTMCNC: 33.5 G/DL (ref 31.5–35.7)
MCV RBC AUTO: 90.3 FL (ref 79–97)
MONOCYTES # BLD AUTO: 0.4 10*3/MM3 (ref 0.1–0.9)
MONOCYTES NFR BLD AUTO: 8.8 % (ref 5–12)
NEUTROPHILS NFR BLD AUTO: 2.45 10*3/MM3 (ref 1.7–7)
NEUTROPHILS NFR BLD AUTO: 54 % (ref 42.7–76)
NRBC BLD AUTO-RTO: 0 /100 WBC (ref 0–0.2)
PLATELET # BLD AUTO: 296 10*3/MM3 (ref 140–450)
PMV BLD AUTO: 8.6 FL (ref 6–12)
POTASSIUM SERPL-SCNC: 4.6 MMOL/L (ref 3.5–5.2)
PROT SERPL-MCNC: 6.9 G/DL (ref 6–8.5)
RBC # BLD AUTO: 3.5 10*6/MM3 (ref 3.77–5.28)
SODIUM SERPL-SCNC: 139 MMOL/L (ref 136–145)
TIBC SERPL-MCNC: 378 MCG/DL (ref 298–536)
TRANSFERRIN SERPL-MCNC: 254 MG/DL (ref 200–360)
VIT B12 BLD-MCNC: 1050 PG/ML (ref 211–946)
WBC # BLD AUTO: 4.54 10*3/MM3 (ref 3.4–10.8)

## 2020-12-22 PROCEDURE — 99213 OFFICE O/P EST LOW 20 MIN: CPT | Performed by: NURSE PRACTITIONER

## 2020-12-22 PROCEDURE — 82746 ASSAY OF FOLIC ACID SERUM: CPT

## 2020-12-22 PROCEDURE — 82607 VITAMIN B-12: CPT

## 2020-12-22 PROCEDURE — 83540 ASSAY OF IRON: CPT

## 2020-12-22 PROCEDURE — 82728 ASSAY OF FERRITIN: CPT

## 2020-12-22 PROCEDURE — G0463 HOSPITAL OUTPT CLINIC VISIT: HCPCS | Performed by: NURSE PRACTITIONER

## 2020-12-22 PROCEDURE — 80053 COMPREHEN METABOLIC PANEL: CPT

## 2020-12-22 PROCEDURE — 84466 ASSAY OF TRANSFERRIN: CPT

## 2020-12-22 PROCEDURE — 85025 COMPLETE CBC W/AUTO DIFF WBC: CPT

## 2020-12-23 NOTE — PROGRESS NOTES
DATE OF VISIT: 12/22/2020    REASON FOR VISIT:  Neutropenia, anemia, pulmonary embolism history, osteoporosis, primary hyperparathyroidism                HISTORY OF PRESENT ILLNESS:    75-year-old female with medical problem consisting of history of pulmonary embolism diagnosed in 2016 status post anticoagulation with Coumadin for 1 year, osteoporosis, depression, history of gastric sleeve surgery done in 2016 has been following up with [facility] since August 23, 2018 for easy bruising, neutropenia and anemia.  Patient is here for follow-up appointment today to discuss recently done blood work as well as DEXA scan.    Denies any recent infection.  Denies any bleeding.  Complains of recent worsening of arthritis pain.  Denies any new swelling of lower extremity or any chest pain.         PAST MEDICAL HISTORY:    Past Medical History:   Diagnosis Date   • Arthritis    • Asthma    • Bleeding tendency (CMS/HCC)    • Chest pain     History of noncardiac chest pain   • Chronic depression    • COPD (chronic obstructive pulmonary disease) (CMS/HCC)    • Depressive disorder    • Diastolic dysfunction    • Dyspnea    • Dyspnea on exertion    • Edema    • Essential hypertension    • Exercise intolerance    • Fatigue    • Gallstones    • GERD (gastroesophageal reflux disease)    • History of bone density study 2011    DEXA BONE DENSITY APPENDICULAR 94699 (1) - normal   • History of bone density study 06/24/2015    DXA BONE DENSITY AXIAL 32035 [facility] (1) - Ordered By: TOLU RAMIREZ ([facility])    • History of echocardiogram     Echocardiogram W/ color flow 80386 (2)   • History of mammogram 2013    Mammogram screen (1)   • History of mammogram 2015    MAMMOGRAM SCREENING 02415 - [facility] (1)   • History of screening mammography 06/25/2015    SCREENING MAMMOGRAPHY DIGITAL  (Medicare) (1) - Ordered By: ANDRADE BECERRIL ([facility])    • Hypercalcemia    • Hyperlipidemia    • Influenza vaccine administered  "02/25/2016    INFLUENZA IMMUN ADMIN OR PREV RECV'D  (2) - Ordered By: ANDRADE BECERRIL ([facility])    • Long-term (current) use of anticoagulants     coumadin therapy      • Lower extremity edema     Intermittent lower extremity edema   • Obesity, Class III, BMI 40-49.9 (morbid obesity) (CMS/[facility])     \"Class III obesity\"   • Osteoporosis    • PE (pulmonary embolism) 10/2015    Bilateral PE diagnosed after a car ride to [place]   • Pneumococcal vaccination given 02/25/2016    PNEUMOC VAC/ADMIN/RCVD 4040F (2) - Ordered By: ANDRADE BECERRIL ([facility])    • Right basilic vein fibrosis 2015   • Sedentary lifestyle    • Shortness of breath    • Skin cancer    • Urinary tract infection    • Venous thrombosis 2015    right basilic venous thrombosis; This was noted in May 2015.   • Weight gain        SOCIAL HISTORY:    Social History     Tobacco Use   • Smoking status: Never Smoker   • Smokeless tobacco: Never Used   Substance Use Topics   • Alcohol use: No   • Drug use: No       Surgical History :  Past Surgical History:   Procedure Laterality Date   • CARDIAC ELECTROPHYSIOLOGY PROCEDURE N/A 12/10/2020    Procedure: Pacemaker DC new;  Surgeon: Vesta Bucio MD;  Location: [facility] INVASIVE LOC[facility];  Service: Cardiology;  Laterality: N/A;  [employer] per pratima.....juanita from [employer]   • CHOLECYSTECTOMY      Cholecystectomy (1)   • COLONOSCOPY      Approximately 5 years prior as stated per patient   • CRYOABLATION     • ENDOSCOPY  09/15/2011    Colon endoscopy 32484 (2) - Hemorrhoids found.   • GASTRIC SLEEVE LAPAROSCOPIC     • HEMORRHOIDECTOMY     • HYSTEROSCOPY      Hysteroscopy; ablation (1)   • INJECTION OF MEDICATION  09/27/2012    Kenalog (1) - Ordered By: ANDRADE BECERRIL ([facility])    • PACEMAKER IMPLANTATION         ALLERGIES:    Allergies   Allergen Reactions   • Sulfa Antibiotics Hives and Rash     Sulfa (Sulfonamide Antibiotics)       REVIEW OF SYSTEMS:      CONSTITUTIONAL:  No fever, chills, or night " "sweats.     HEENT:  No epistaxis, mouth sores, or difficulty swallowing.    RESPIRATORY:  No new shortness of breath or cough at present.    CARDIOVASCULAR:  No chest pain or palpitations.    GASTROINTESTINAL:  No abdominal pain, nausea, vomiting, or blood in the stool.    GENITOURINARY:  No dysuria or hematuria.    MUSCULOSKELETAL:  No any new back pain or arthralgias.     NEUROLOGICAL:  No tingling or numbness. No new headache or dizziness.     LYMPHATICS:  Denies any abnormal swollen and anywhere in the body.    SKIN:  Denies any new skin rash.    PHYSICAL EXAMINATION:      VITAL SIGNS:  /60   Pulse 64   Temp 97.2 °F (36.2 °C)   Resp 18   Ht 154.9 cm (61\")   Wt 82.1 kg (181 lb)   BMI 34.20 kg/m²     GENERAL:  Not in any distress.    HEENT:  Normocephalic, Atraumatic.Mild Conjunctival pallor. No icterus. Extraocular Movements Intact. No Facial Asymmetry noted.    NECK:  No adenopathy. No JVD.    RESPIRATORY:  Fair air entry bilateral. No rhonchi or wheezing.    CARDIOVASCULAR:  S1, S2. Regular rate and rhythm. No murmur or gallop appreciated.    ABDOMEN:  Soft, obese, nontender. Bowel sounds present in all four quadrants.  No organomegaly appreciated.    EXTREMITIES:  No edema.No Calf Tenderness.    NEUROLOGIC:  Alert, awake and oriented ×3.  No  Motor or sensory deficit appreciated. Cranial Nerves 2-12 grossly intact.    SKIN : No new skin lesion identified  DIAGNOSTIC DATA:    Glucose   Date Value Ref Range Status   12/22/2020 89 65 - 99 mg/dL Final     Sodium   Date Value Ref Range Status   12/22/2020 139 136 - 145 mmol/L Final   10/12/2018 140 134 - 144 mmol/L Final     Potassium   Date Value Ref Range Status   12/22/2020 4.6 3.5 - 5.2 mmol/L Final   10/12/2018 4.6 3.5 - 5.1 mmol/L Final     CO2   Date Value Ref Range Status   12/22/2020 28.0 22.0 - 29.0 mmol/L Final     Chloride   Date Value Ref Range Status   12/22/2020 105 98 - 107 mmol/L Final   10/12/2018 105 98 - 107 mmol/L Final     Anion " Gap   Date Value Ref Range Status   12/22/2020 6.0 5.0 - 15.0 mmol/L Final     Creatinine   Date Value Ref Range Status   12/22/2020 0.83 0.57 - 1.00 mg/dL Final   10/12/2018 0.9 0.6 - 1.3 mg/dL Final     Comment:     **The MDRD GFR formula is valid only for ages 18-70.    GFR will not be reported for individuals aging <18 or >70.     BUN   Date Value Ref Range Status   12/22/2020 15 8 - 23 mg/dL Final   10/12/2018 13 7 - 18 mg/dL Final     BUN/Creatinine Ratio   Date Value Ref Range Status   12/22/2020 18.1 7.0 - 25.0 Final     Calcium   Date Value Ref Range Status   12/22/2020 9.6 8.6 - 10.5 mg/dL Final   10/12/2018 9.7 8.8 - 10.5 mg/dL Final     eGFR Non  Amer   Date Value Ref Range Status   12/22/2020 67 >60 mL/min/1.73 Final     Alkaline Phosphatase   Date Value Ref Range Status   12/22/2020 101 39 - 117 U/L Final   10/12/2018 115 46 - 116 U/L Final     Total Protein   Date Value Ref Range Status   12/22/2020 6.9 6.0 - 8.5 g/dL Final   10/12/2018 6.6 6.4 - 8.2 g/dL Final     ALT (SGPT)   Date Value Ref Range Status   12/22/2020 21 1 - 33 U/L Final   10/12/2018 43 30 - 65 U/L Final     AST (SGOT)   Date Value Ref Range Status   12/22/2020 27 1 - 32 U/L Final   10/12/2018 22 15 - 37 U/L Final     Total Bilirubin   Date Value Ref Range Status   12/22/2020 0.2 0.0 - 1.2 mg/dL Final   10/12/2018 0.3 0 - 1.0 mg/dL Final     Albumin   Date Value Ref Range Status   12/22/2020 3.80 3.50 - 5.20 g/dL Final   10/12/2018 3.2 (L) 3.4 - 5.0 g/dL Final     Globulin   Date Value Ref Range Status   12/22/2020 3.1 gm/dL Final     Lab Results   Component Value Date    WBC 4.54 12/22/2020    HGB 10.6 (L) 12/22/2020    HCT 31.6 (L) 12/22/2020    MCV 90.3 12/22/2020     12/22/2020     Lab Results   Component Value Date    NEUTROABS 2.45 12/22/2020    IRON 91 12/22/2020    TIBC 378 12/22/2020    LABIRON 24 12/22/2020    FERRITIN 32.39 12/22/2020    RYGBGOYL17 1,050 (H) 12/22/2020    FOLATE >20.00 12/22/2020     Lab  Results   Component Value Date    [facility] 142 12/05/2018   ]      RADIOLOGY DATA :  No radiology results for the last 7 days      ASSESSMENT AND PLAN:       1.   Neutropenia:  -Recent CBC shows white blood cell count is 4.54 with absolute neutrophil count of 2450.  - Patient does have chronic intermittent neutropenia since 2014.  -Anemia work-up done does not show any evidence of nutritional deficiency.  - At this point recommend continue with clinical monitoring.  -We will ask patient to return to clinic in 4 months with repeat CBC and anemia work-up to be done on that day.     2.  Anemia:  - Work-up is consistent with anemia of chronic disease  -Hemoglobin is 10.6  -Recommend continue with B12 500 mcg p.o. daily.  -We will repeat anemia work-up upon next clinic visit in 4 months.     3.  History of pulmonary embolism:  -Patient was status post anticoagulation with Coumadin for 1 year.  Recommend continuing with clinical monitoring     4.  Osteoporosis:  -Recent DEXA scan done on August 10, 2020 shows osteoporosis with a T score of -2.9 at femoral neck.  Result of DEXA scan was discussed with patient.  - Patient was seen at [facility] and Reclast has been recommended.  -Patient has not been seen by dentist to start any treatment with Reclast.  Again it was discussed with patient that she will need dental clearance prior to starting any Reclast.  - Recommend following up with primary medical provider for further management of osteoporosis       This document has been signed by TABBY Ayon on December 23, 2020 09:15 CST

## 2021-01-08 RX ORDER — SIMVASTATIN 20 MG
20 TABLET ORAL EVERY OTHER DAY
Qty: 45 TABLET | Refills: 5 | Status: SHIPPED | OUTPATIENT
Start: 2021-01-08 | End: 2021-03-26 | Stop reason: SDUPTHER

## 2021-01-08 NOTE — TELEPHONE ENCOUNTER
Caller: Minh Veronica    Relationship: Self    Best call back number: [phone]    Medication needed:   Requested Prescriptions     Pending Prescriptions Disp Refills   • simvastatin (ZOCOR) 20 MG tablet 45 tablet 0     Sig: Take 1 tablet by mouth Every Other Day.       When do you need the refill by: 01/08/21    What details did the patient provide when requesting the medication: PATIENT STATED SHE TAKES MEDICATION EVERY OTHER DAY. PATIENT IS COMPLETELY OUT OF MEDICATION. PATIENT IS ALSO REQUESTING A 90-DAY SUPPLY.    Does the patient have less than a 3 day supply:  [x] Yes  [] No    What is the patient's preferred pharmacy: [facility] [address] [place] [facility] [place] [facility] [phone] [facility] [phone] FX

## 2021-01-11 RX ORDER — TRAZODONE HYDROCHLORIDE 100 MG/1
100 TABLET ORAL NIGHTLY
Qty: 90 TABLET | Refills: 3 | Status: SHIPPED | OUTPATIENT
Start: 2021-01-11 | End: 2022-05-09

## 2021-01-13 RX ORDER — PANTOPRAZOLE SODIUM 40 MG/1
40 TABLET, DELAYED RELEASE ORAL DAILY
Qty: 90 TABLET | Refills: 1 | Status: SHIPPED | OUTPATIENT
Start: 2021-01-13 | End: 2021-11-29

## 2021-01-13 RX ORDER — OLMESARTAN MEDOXOMIL AND HYDROCHLOROTHIAZIDE 20/12.5 20; 12.5 MG/1; MG/1
1 TABLET ORAL DAILY
Qty: 90 TABLET | Refills: 2 | Status: SHIPPED | OUTPATIENT
Start: 2021-01-13 | End: 2021-11-29

## 2021-01-20 ENCOUNTER — OFFICE VISIT (OUTPATIENT)
Dept: CARDIOLOGY | Facility: CLINIC | Age: 76
End: 2021-01-20

## 2021-01-20 VITALS
SYSTOLIC BLOOD PRESSURE: 140 MMHG | DIASTOLIC BLOOD PRESSURE: 78 MMHG | WEIGHT: 189 LBS | HEIGHT: 61 IN | HEART RATE: 67 BPM | OXYGEN SATURATION: 94 % | BODY MASS INDEX: 35.68 KG/M2

## 2021-01-20 DIAGNOSIS — R00.1 SYMPTOMATIC SINUS BRADYCARDIA: ICD-10-CM

## 2021-01-20 DIAGNOSIS — I10 ESSENTIAL HYPERTENSION: Primary | ICD-10-CM

## 2021-01-20 DIAGNOSIS — E78.2 MIXED HYPERLIPIDEMIA: ICD-10-CM

## 2021-01-20 PROCEDURE — 99213 OFFICE O/P EST LOW 20 MIN: CPT | Performed by: INTERNAL MEDICINE

## 2021-01-20 NOTE — PROGRESS NOTES
Veronica Wilkinson  75 y.o. female    1/20/2021     1. Essential hypertension    2. Mixed hyperlipidemia    3. Symptomatic sinus bradycardia        History of Present Illness:    Body mass index is 35.73 kg/m². BMI is above normal parameters. Recommendations include: exercise counseling, nutrition counseling and referral to primary care.    75 years old patient with a background history of pulmonary embolism, hypertension and hyperlipidemia with a significant history of sick sinus syndrome with a heart rate dropping into 30 underwent pacemaker implantation presented post hospital follow-up.  No symptom suggestive of cardiac decompensation.  She has significant provement quality life secondary significant improvement in functional capacity.  She is a pleased with her clinical outcome.  Site healed and recovered very well.  Patient denies orthopnea PND lightheaded dizziness syncope chest pain or intermittent claudications.    She has a prior history of pulmonary embolism.  She denies any chest pain on exertion.  Is been previously screened for sleep apnea  Does have history of gastric sleeve and almost 100 pounds weight loss after that.  He sleeps in recliner because of her arthritis.  Denying any orthopnea PND or lower extremity swelling.  Also significant osteoporosis and worried about falls.    Date of Procedure: 12/10/20     Referring Physician: Dr. Peterson  /ELECTROPHYSIOLOGIST:       Dr. Bucio     PROCEDURE(S) PERFORMED:    1. Implantation of a dual-chamber permanent pacemaker.        INDICATIONS FOR PROCDEDURE:            Symptomatic bradycardia with junctional rhythm and significant element of chronotropic incompetence    Holter monitor 9/17/2020  · An abnormal monitor study.  · Predominant rhythm sinus bradycardia with an average heart rate of 59 bpm. Heart rate varied from 34 to a maximum of 87 bpm in sinus mechanism.  · Sinus bradycardia occupied 74% of the total beat count.  · Patient submitted 3 events  which correlated with sinus mechanism. Two of the patient symptom events correlated with sinus bradycardia with a low ventricular rate of 33.  · Normal burden of PACs. Normal burden of PVCs.  · No SVT.      Echo 9/25/2020    · Technically difficult study secondary to body habitus. Definity contrast utilized.  · Left ventricular systolic function is normal. LVEF is 61-65%. Grade 1A diastolic function.  · Right ventricle systolic function is normal.  · Saline test is suboptimal, but appears to be negative.  · Mild thickening of the aortic valve.  · Mild to moderate tricuspid regurgitation without evidence of pulmonary hypertension.    Lipid 2020  Total Cholesterol   0 - 200 mg/dL 201High     Triglycerides   0 - 150 mg/dL 68    HDL Cholesterol   40 - 60 mg/dL 82High     LDL Cholesterol    0 - 100 mg/dL 105High     VLDL Cholesterol   5 - 40 mg/dL 13.6    LDL/HDL Ratio  1.29        TSH 10/25/2018    TSH   0.460 - 4.680 mIU/mL 1.940          SUBJECTIVE:    Allergies   Allergen Reactions   • Sulfa Antibiotics Hives and Rash     Sulfa (Sulfonamide Antibiotics)         Past Medical History:   Diagnosis Date   • Arthritis    • Asthma    • Bleeding tendency (CMS/HCC)    • Chest pain     History of noncardiac chest pain   • Chronic depression    • COPD (chronic obstructive pulmonary disease) (CMS/HCC)    • Depressive disorder    • Diastolic dysfunction    • Dyspnea    • Dyspnea on exertion    • Edema    • Essential hypertension    • Exercise intolerance    • Fatigue    • Gallstones    • GERD (gastroesophageal reflux disease)    • History of bone density study 2011    DEXA BONE DENSITY APPENDICULAR 92070 (1) - normal   • History of bone density study 06/24/2015    DXA BONE DENSITY AXIAL 61885 [facility] (1) - Ordered By: TOLU RAMIREZ ([facility])    • History of echocardiogram     Echocardiogram W/ color flow 55508 (2)   • History of mammogram 2013    Mammogram screen (1)   • History of mammogram 2015    MAMMOGRAM SCREENING  "66330 - [facility] (1)   • History of screening mammography 06/25/2015    SCREENING MAMMOGRAPHY DIGITAL  (Medicare) (1) - Ordered By: ANDRADE BECERRIL ([facility])    • Hypercalcemia    • Hyperlipidemia    • Influenza vaccine administered 02/25/2016    INFLUENZA IMMUN ADMIN OR PREV RECV'D  (2) - Ordered By: ANDRADE BECERRIL ([facility])    • Long-term (current) use of anticoagulants     coumadin therapy      • Lower extremity edema     Intermittent lower extremity edema   • Obesity, Class III, BMI 40-49.9 (morbid obesity) (CMS/[facility])     \"Class III obesity\"   • Osteoporosis    • PE (pulmonary embolism) 10/2015    Bilateral PE diagnosed after a car ride to [place]   • Pneumococcal vaccination given 02/25/2016    PNEUMOC VAC/ADMIN/RCVD 4040F (2) - Ordered By: ANDRADE BECERRIL ([facility])    • Right basilic vein fibrosis 2015   • Sedentary lifestyle    • Shortness of breath    • Skin cancer    • Urinary tract infection    • Venous thrombosis 2015    right basilic venous thrombosis; This was noted in May 2015.   • Weight gain          Past Surgical History:   Procedure Laterality Date   • CARDIAC ELECTROPHYSIOLOGY PROCEDURE N/A 12/10/2020    Procedure: Pacemaker DC new;  Surgeon: Vesta Bucio MD;  Location: [facility] INVASIVE LOCATION;  Service: Cardiology;  Laterality: N/A;  XODIS per pratima.....juanita from [employer] aware   • CHOLECYSTECTOMY      Cholecystectomy (1)   • COLONOSCOPY      Approximately 5 years prior as stated per patient   • CRYOABLATION     • ENDOSCOPY  09/15/2011    Colon endoscopy 56659 (2) - Hemorrhoids found.   • GASTRIC SLEEVE LAPAROSCOPIC     • HEMORRHOIDECTOMY     • HYSTEROSCOPY      Hysteroscopy; ablation (1)   • INJECTION OF MEDICATION  09/27/2012    Kenalog (1) - Ordered By: ANDRADE BECERRIL ([facility])    • PACEMAKER IMPLANTATION           Family History   Problem Relation Age of Onset   • Ovarian cancer Mother    • Bleeding Disorder Father    • Other Father         Hematologic disorder   • " Lung cancer Father    • Pancreatic cancer Sister    • Cancer Brother    • Lung cancer Brother    • Bleeding Disorder Other    • Hyperlipidemia Other    • Hypertension Other    • Osteoarthritis Other    • Other Other         Gastritis   • Adrenal disorder Daughter    • Seizures Daughter    • Thyroid disease Daughter    • No Known Problems Daughter    • Diabetes Other    • Heart disease Other    • Other Other         Ulcers; Bleeding Tendencies; Allergy   • Cancer Other    • Cholelithiasis Other    • Hypertension Other    • Allergy (severe) Other          Social History     Socioeconomic History   • Marital status: Single     Spouse name: Not on file   • Number of children: 3   • Years of education: Not on file   • Highest education level: Not on file   Occupational History     Comment: Current Marriage Year ; 3 Children Living: Leann 52, Keya 49, Li 43; 1 ;    Tobacco Use   • Smoking status: Never Smoker   • Smokeless tobacco: Never Used   Substance and Sexual Activity   • Alcohol use: No   • Drug use: No   • Sexual activity: Defer         Current Outpatient Medications   Medication Sig Dispense Refill   • acetaminophen (TYLENOL) 325 MG tablet Take 650 mg by mouth Every Night.     • Bisacodyl (GENTLE LAXATIVE PO) Take 100 mg by mouth Every Night.     • calcium carbonate (OS-DARON) 600 MG tablet Take 600 mg by mouth 2 (Two) Times a Day With Meals.     • Cholecalciferol (VITAMIN D3) 2000 units tablet Take 1 tablet by mouth Daily.     • Cyanocobalamin (B-12 PO) Take 1 tablet by mouth Daily.     • diphenhydrAMINE (BENADRYL) 25 MG tablet Take 50 mg by mouth Every Night. Take two tablets by mouth at bedtime      • docusate sodium (COLACE) 100 MG capsule Take 100 mg by mouth Every Night. Take two tablets by mouth nightly      • fluticasone (Flonase) 50 MCG/ACT nasal spray 2 sprays into the nostril(s) as directed by provider Daily As Needed for Rhinitis.     • Garlic 1000 MG capsule Take 1,000 mg by mouth Daily.      • Iron, Ferrous Sulfate, 325 (65 Fe) MG tablet Take 325 mg by mouth Daily.     • LORATADINE ALLERGY RELIEF PO Take 10 mg by mouth Daily.     • magnesium oxide (MAG-OX) 400 MG tablet Take 400 mg by mouth Daily.     • Multiple Vitamins-Minerals (MULTIVITAMIN ADULT PO) Take 1 tablet by mouth Daily.     • naproxen (Naprosyn) 500 MG tablet Take 1 tablet by mouth 2 (Two) Times a Day As Needed for Mild Pain . 30 tablet 3   • olmesartan-hydrochlorothiazide (BENICAR HCT) 20-12.5 MG per tablet Take 1 tablet by mouth Daily. 90 tablet 2   • pantoprazole (PROTONIX) 40 MG EC tablet Take 1 tablet by mouth Daily. 90 tablet 1   • simethicone (MYLICON,GAS-X) 125 MG capsule capsule Take 1 capsule by mouth Every Night.     • simvastatin (ZOCOR) 20 MG tablet Take 1 tablet by mouth Every Other Day. 45 tablet 5   • tiZANidine (ZANAFLEX) 4 MG tablet Take 1 tablet by mouth At Night As Needed for Muscle Spasms. 30 tablet 3   • traZODone (DESYREL) 100 MG tablet Take 1 tablet by mouth Every Night. 90 tablet 3   • Turmeric (QC Tumeric Complex) 500 MG capsule Take 500 mg by mouth Daily.       No current facility-administered medications for this visit.            Review of Systems:     Constitutional: Generalized fatigue improved     HENT:  Denies any hearing loss, epistaxis, hoarseness, or difficulty speaking.     Eyes: No blurred    Respiratory: No dyspnea or fever    Cardiovascular: See H&P    Gastrointestinal: No nauseous vomiting or abdominal pain    Endocrine: Negative for polydipsia polyuria    Genitourinary: Negative.      Musculoskeletal: Denies any history of arthritic symptoms or back problems.     Skin:  Denies any change in hair or nails, rashes, or skin lesions.     Allergic/Immunologic: Negative.  Negative for environmental allergies    Neurological: No headache or stroke    Hematological: Denies any food allergies, seasonal allergies    Psychiatric/Behavioral: Denies any history of depression      OBJECTIVE:    /78 (BP  "Location: Left arm, Patient Position: Sitting, Cuff Size: Adult)   Pulse 67   Ht 154.9 cm (60.98\")   Wt 85.7 kg (189 lb)   SpO2 94%   BMI 35.73 kg/m²       Physical Exam:     Constitutional: Cooperative, alert and oriented, well-developed, well-nourished, in no acute distress.     HENT:   Head: Normocephalic and normocephalic, oral mucosa moist, conjunctive pink, thyroid is nonpalpable    Cardiovascular: Regular rhythm, S1 and S2 normal, no S3 or S4. Apical impulse not displaced. No murmurs, gallops, or rubs detected.     Pulmonary/Chest: Lungs clear to auscultation no rales or wheezing i.    Abdominal: Abdomen soft, nontender, normal active bowel sounds    Musculoskeletal: No peripheral edema positive pulses.     Neurological: No gross motor or sensory deficits noted, affect appropriate, oriented to time, person, place.     Skin: Warm and dry to the touch, no apparent skin lesions or masses noted.     Psychiatric: Good mood and good personality l.         Procedures      Lab Results   Component Value Date    WBC 4.54 12/22/2020    HGB 10.6 (L) 12/22/2020    HCT 31.6 (L) 12/22/2020    MCV 90.3 12/22/2020     12/22/2020     Lab Results   Component Value Date    GLUCOSE 89 12/22/2020    BUN 15 12/22/2020    CREATININE 0.83 12/22/2020    EGFRIFNONA 67 12/22/2020    BCR 18.1 12/22/2020    CO2 28.0 12/22/2020    CALCIUM 9.6 12/22/2020    ALBUMIN 3.80 12/22/2020    AST 27 12/22/2020    ALT 21 12/22/2020     Lab Results   Component Value Date    CHOL 201 (H) 05/14/2020    CHOL 236 (H) 10/09/2019    CHOL 198 07/29/2019     Lab Results   Component Value Date    TRIG 68 05/14/2020    TRIG 61 10/09/2019    TRIG 56 07/29/2019     Lab Results   Component Value Date    HDL 82 (H) 05/14/2020    HDL 93 (H) 10/09/2019    HDL 85 (H) 07/29/2019     No components found for: LDLCALC  Lab Results   Component Value Date     (H) 05/14/2020     (H) 10/09/2019     (H) 07/29/2019     No results found for: " HDLLDLRATIO  No components found for: CHOLHDL  Lab Results   Component Value Date    HGBA1C 5.03 04/24/2019     Lab Results   Component Value Date    TSH 1.940 10/25/2018           ASSESSMENT AND PLAN:  #1 symptomatic bradycardia    Status post pacemaker with a significant provement in quality of life.  Site healed and recovered very well.  Continue follow-up with the pacer clinic as per schedule appointment    #2 chronotropic incompetence  Symptomatic chronotropic incompetence with significant improvement after the pacemaker implantation    Hyperlipidemia continue statin with LDL in decent range    Preventive    Obesity with a BMI of 35.  Significantly low carbohydrate, low-fat, DASH diet graded exercise discussed with the patient.          Diagnoses and all orders for this visit:    1. Essential hypertension (Primary)    2. Mixed hyperlipidemia    3. Symptomatic sinus bradycardia          Vesta Bucio MD  1/20/2021  08:55 CST

## 2021-03-10 ENCOUNTER — CLINICAL SUPPORT (OUTPATIENT)
Dept: CARDIOLOGY | Facility: CLINIC | Age: 76
End: 2021-03-10

## 2021-03-10 DIAGNOSIS — Z95.0 PRESENCE OF CARDIAC PACEMAKER: ICD-10-CM

## 2021-03-10 DIAGNOSIS — R00.1 SYMPTOMATIC SINUS BRADYCARDIA: Primary | ICD-10-CM

## 2021-03-12 PROBLEM — Z95.0 PRESENCE OF CARDIAC PACEMAKER: Status: ACTIVE | Noted: 2021-03-12

## 2021-03-12 PROCEDURE — 93280 PM DEVICE PROGR EVAL DUAL: CPT | Performed by: NURSE PRACTITIONER

## 2021-03-12 NOTE — PROGRESS NOTES
Pacemaker Evaluation Report    March 12, 2021    Primary Cardiologist: Dr. Peterson  Implanting MD: Dr. Bucio  :  Nutritics Model: Essentio MRI L131 Serial Number: [id number]  Implant date: 12/11/2020    Reason for evaluation: routine, PPM, Office  Cardiac device indication(s): symptomatic bradycardia    Battery  ZEENAT: 15 years       Interrogation Results  Atrial sensing: P wave: 4.6 mV  Atrial capture: 0.7 V @ 0.4 ms   Atrial lead impedance: 497 ohms  Ventricular sensing: R wave: 12.2 mV  Ventricular capture: 0.7 V @ 0.4 ms  Ventricular lead impedance: right  781 ohms  Parameters  Mode: DDDR  Base Rate: 60 ppm    Diagnostic Data  Atrial paced: 51 %   Ventricular paced: 16 %  Mode switch: 0%  AT/AF [place]: 0%  AHR: 0  VHR: 0    Intrinsic Rate: 63    Changes made: RA output decreased to 1.5 V and RV output to Auto.    Conclusions: normal device function and Follow up in 6 months    Assessment:  1. Symptomatic sinus bradycardia    2. Presence of cardiac pacemaker                This document has been electronically signed by TABBY Pompa on March 12, 2021 14:25 CST

## 2021-03-15 ENCOUNTER — BULK ORDERING (OUTPATIENT)
Dept: CASE MANAGEMENT | Facility: OTHER | Age: 76
End: 2021-03-15

## 2021-03-15 DIAGNOSIS — Z23 IMMUNIZATION DUE: ICD-10-CM

## 2021-03-26 RX ORDER — SIMVASTATIN 20 MG
20 TABLET ORAL EVERY OTHER DAY
Qty: 45 TABLET | Refills: 5 | Status: SHIPPED | OUTPATIENT
Start: 2021-03-26 | End: 2021-09-09 | Stop reason: SDUPTHER

## 2021-04-10 ENCOUNTER — APPOINTMENT (OUTPATIENT)
Dept: CT IMAGING | Facility: HOSPITAL | Age: 76
End: 2021-04-10

## 2021-04-10 ENCOUNTER — APPOINTMENT (OUTPATIENT)
Dept: GENERAL RADIOLOGY | Facility: HOSPITAL | Age: 76
End: 2021-04-10

## 2021-04-10 ENCOUNTER — HOSPITAL ENCOUNTER (EMERGENCY)
Facility: HOSPITAL | Age: 76
Discharge: HOME | End: 2021-04-10
Attending: FAMILY MEDICINE | Admitting: FAMILY MEDICINE

## 2021-04-10 VITALS
SYSTOLIC BLOOD PRESSURE: 149 MMHG | BODY MASS INDEX: 36.71 KG/M2 | HEART RATE: 62 BPM | TEMPERATURE: 97.8 F | OXYGEN SATURATION: 100 % | RESPIRATION RATE: 20 BRPM | WEIGHT: 187 LBS | DIASTOLIC BLOOD PRESSURE: 66 MMHG | HEIGHT: 60 IN

## 2021-04-10 DIAGNOSIS — R06.00 DYSPNEA, UNSPECIFIED TYPE: ICD-10-CM

## 2021-04-10 DIAGNOSIS — R07.9 CHEST PAIN IN ADULT: Primary | ICD-10-CM

## 2021-04-10 LAB
ALBUMIN SERPL-MCNC: 3.9 G/DL (ref 3.5–5.2)
ALBUMIN/GLOB SERPL: 1.1 G/DL
ALP SERPL-CCNC: 112 U/L (ref 39–117)
ALT SERPL W P-5'-P-CCNC: 18 U/L (ref 1–33)
ANION GAP SERPL CALCULATED.3IONS-SCNC: 5 MMOL/L (ref 5–15)
AST SERPL-CCNC: 25 U/L (ref 1–32)
BASOPHILS # BLD AUTO: 0.03 10*3/MM3 (ref 0–0.2)
BASOPHILS NFR BLD AUTO: 0.8 % (ref 0–1.5)
BILIRUB SERPL-MCNC: 0.3 MG/DL (ref 0–1.2)
BUN SERPL-MCNC: 16 MG/DL (ref 8–23)
BUN/CREAT SERPL: 16.3 (ref 7–25)
CALCIUM SPEC-SCNC: 9.6 MG/DL (ref 8.6–10.5)
CHLORIDE SERPL-SCNC: 105 MMOL/L (ref 98–107)
CO2 SERPL-SCNC: 26 MMOL/L (ref 22–29)
CREAT SERPL-MCNC: 0.98 MG/DL (ref 0.57–1)
D-DIMER, QUANTITATIVE (MAD,POW, STR): 1649 NG/ML (FEU) (ref 0–470)
DEPRECATED RDW RBC AUTO: 44 FL (ref 37–54)
EOSINOPHIL # BLD AUTO: 0.1 10*3/MM3 (ref 0–0.4)
EOSINOPHIL NFR BLD AUTO: 2.7 % (ref 0.3–6.2)
ERYTHROCYTE [DISTWIDTH] IN BLOOD BY AUTOMATED COUNT: 13.2 % (ref 12.3–15.4)
GFR SERPL CREATININE-BSD FRML MDRD: 56 ML/MIN/1.73
GLOBULIN UR ELPH-MCNC: 3.4 GM/DL
GLUCOSE SERPL-MCNC: 86 MG/DL (ref 65–99)
HCT VFR BLD AUTO: 33 % (ref 34–46.6)
HGB BLD-MCNC: 10.6 G/DL (ref 12–15.9)
HOLD SPECIMEN: NORMAL
HOLD SPECIMEN: NORMAL
IMM GRANULOCYTES # BLD AUTO: 0 10*3/MM3 (ref 0–0.05)
IMM GRANULOCYTES NFR BLD AUTO: 0 % (ref 0–0.5)
LYMPHOCYTES # BLD AUTO: 1.11 10*3/MM3 (ref 0.7–3.1)
LYMPHOCYTES NFR BLD AUTO: 30.4 % (ref 19.6–45.3)
MCH RBC QN AUTO: 29.4 PG (ref 26.6–33)
MCHC RBC AUTO-ENTMCNC: 32.1 G/DL (ref 31.5–35.7)
MCV RBC AUTO: 91.4 FL (ref 79–97)
MONOCYTES # BLD AUTO: 0.52 10*3/MM3 (ref 0.1–0.9)
MONOCYTES NFR BLD AUTO: 14.2 % (ref 5–12)
NEUTROPHILS NFR BLD AUTO: 1.89 10*3/MM3 (ref 1.7–7)
NEUTROPHILS NFR BLD AUTO: 51.9 % (ref 42.7–76)
NRBC BLD AUTO-RTO: 0 /100 WBC (ref 0–0.2)
NT-PROBNP SERPL-MCNC: 70.1 PG/ML (ref 0–900)
PLATELET # BLD AUTO: 262 10*3/MM3 (ref 140–450)
PMV BLD AUTO: 8.7 FL (ref 6–12)
POTASSIUM SERPL-SCNC: 4.4 MMOL/L (ref 3.5–5.2)
PROT SERPL-MCNC: 7.3 G/DL (ref 6–8.5)
RBC # BLD AUTO: 3.61 10*6/MM3 (ref 3.77–5.28)
SODIUM SERPL-SCNC: 136 MMOL/L (ref 136–145)
TROPONIN T SERPL-MCNC: <0.01 NG/ML (ref 0–0.03)
TROPONIN T SERPL-MCNC: <0.01 NG/ML (ref 0–0.03)
WBC # BLD AUTO: 3.65 10*3/MM3 (ref 3.4–10.8)
WHOLE BLOOD HOLD SPECIMEN: NORMAL
WHOLE BLOOD HOLD SPECIMEN: NORMAL

## 2021-04-10 PROCEDURE — 85379 FIBRIN DEGRADATION QUANT: CPT | Performed by: FAMILY MEDICINE

## 2021-04-10 PROCEDURE — 93010 ELECTROCARDIOGRAM REPORT: CPT | Performed by: INTERNAL MEDICINE

## 2021-04-10 PROCEDURE — 93005 ELECTROCARDIOGRAM TRACING: CPT | Performed by: FAMILY MEDICINE

## 2021-04-10 PROCEDURE — 0 IOPAMIDOL PER 1 ML: Performed by: FAMILY MEDICINE

## 2021-04-10 PROCEDURE — 83880 ASSAY OF NATRIURETIC PEPTIDE: CPT | Performed by: FAMILY MEDICINE

## 2021-04-10 PROCEDURE — 71275 CT ANGIOGRAPHY CHEST: CPT

## 2021-04-10 PROCEDURE — 71046 X-RAY EXAM CHEST 2 VIEWS: CPT

## 2021-04-10 PROCEDURE — 80053 COMPREHEN METABOLIC PANEL: CPT | Performed by: FAMILY MEDICINE

## 2021-04-10 PROCEDURE — 85025 COMPLETE CBC W/AUTO DIFF WBC: CPT | Performed by: FAMILY MEDICINE

## 2021-04-10 PROCEDURE — 99284 EMERGENCY DEPT VISIT MOD MDM: CPT

## 2021-04-10 PROCEDURE — 84484 ASSAY OF TROPONIN QUANT: CPT | Performed by: FAMILY MEDICINE

## 2021-04-10 RX ORDER — SODIUM CHLORIDE 0.9 % (FLUSH) 0.9 %
10 SYRINGE (ML) INJECTION AS NEEDED
Status: DISCONTINUED | OUTPATIENT
Start: 2021-04-10 | End: 2021-04-10 | Stop reason: HOSPADM

## 2021-04-10 RX ADMIN — IOPAMIDOL 65 ML: 755 INJECTION, SOLUTION INTRAVENOUS at 16:06

## 2021-04-10 NOTE — ED NOTES
Pt presented to the ed to room 19 c/o soa. Pt states she has a hx of pe. Pt complains of pain when she breathes in deeply.      Irma Velez RN  04/10/21 0543

## 2021-04-10 NOTE — ED PROVIDER NOTES
Subjective     History provided by:  Patient   used: No    Patient is a 75 years old female with past medical history of COPD, diastolic dysfunction, and hypertension who presented here today because of shortness of breath and chest discomfort that started early this morning.  She said that the pain radiated to the back.  Denies any fever chills or sweating.  She says she has a history of PE.  She took Coumadin for 1 year.  Was discontinued.    Review of Systems   Respiratory: Positive for shortness of breath.    Cardiovascular: Positive for chest pain.   All other systems reviewed and are negative.      Past Medical History:   Diagnosis Date   • Arthritis    • Asthma    • Bleeding tendency (CMS/HCC)    • Chest pain     History of noncardiac chest pain   • Chronic depression    • COPD (chronic obstructive pulmonary disease) (CMS/HCC)    • Depressive disorder    • Diastolic dysfunction    • Dyspnea    • Dyspnea on exertion    • Edema    • Essential hypertension    • Exercise intolerance    • Fatigue    • Gallstones    • GERD (gastroesophageal reflux disease)    • History of bone density study 2011    DEXA BONE DENSITY APPENDICULAR 77063 (1) - normal   • History of bone density study 06/24/2015    DXA BONE DENSITY AXIAL 78714 [facility] (1) - Ordered By: TOLU RAMIREZ ([facility])    • History of echocardiogram     Echocardiogram W/ color flow 24464 (2)   • History of mammogram 2013    Mammogram screen (1)   • History of mammogram 2015    MAMMOGRAM SCREENING 49877 - [facility] (1)   • History of screening mammography 06/25/2015    SCREENING MAMMOGRAPHY DIGITAL  (Medicare) (1) - Ordered By: ANDRADE BECERRIL ([facility])    • Hypercalcemia    • Hyperlipidemia    • Influenza vaccine administered 02/25/2016    INFLUENZA IMMUN ADMIN OR PREV RECV'D  (2) - Ordered By: ANDRADE BECERRIL ([facility])    • Long-term (current) use of anticoagulants     coumadin therapy      • Lower extremity edema      "Intermittent lower extremity edema   • Obesity, Class III, BMI 40-49.9 (morbid obesity) (CMS/[facility])     \"Class III obesity\"   • Osteoporosis    • PE (pulmonary embolism) 10/2015    Bilateral PE diagnosed after a car ride to [place]   • Pneumococcal vaccination given 02/25/2016    PNEUMOC VAC/ADMIN/RCVD 4040F (2) - Ordered By: ANDRADE BECERRIL ([facility])    • Right basilic vein fibrosis 2015   • Sedentary lifestyle    • Shortness of breath    • Skin cancer    • Urinary tract infection    • Venous thrombosis 2015    right basilic venous thrombosis; This was noted in May 2015.   • Weight gain        Allergies   Allergen Reactions   • Sulfa Antibiotics Hives and Rash     Sulfa (Sulfonamide Antibiotics)       Past Surgical History:   Procedure Laterality Date   • CARDIAC ELECTROPHYSIOLOGY PROCEDURE N/A 12/10/2020    Procedure: Pacemaker DC new;  Surgeon: Vesta Bucio MD;  Location: [facility] INVASIVE LOCATION;  Service: Cardiology;  Laterality: N/A;  [employer] per pratima.....juanita from [employer] sci aware   • CHOLECYSTECTOMY      Cholecystectomy (1)   • COLONOSCOPY      Approximately 5 years prior as stated per patient   • CRYOABLATION     • ENDOSCOPY  09/15/2011    Colon endoscopy 75993 (2) - Hemorrhoids found.   • GASTRIC SLEEVE LAPAROSCOPIC     • HEMORRHOIDECTOMY     • HYSTEROSCOPY      Hysteroscopy; ablation (1)   • INJECTION OF MEDICATION  09/27/2012    Kenalog (1) - Ordered By: ANDRADE BECERRIL ([facility])    • PACEMAKER IMPLANTATION         Family History   Problem Relation Age of Onset   • Ovarian cancer Mother    • Bleeding Disorder Father    • Other Father         Hematologic disorder   • Lung cancer Father    • Pancreatic cancer Sister    • Cancer Brother    • Lung cancer Brother    • Bleeding Disorder Other    • Hyperlipidemia Other    • Hypertension Other    • Osteoarthritis Other    • Other Other         Gastritis   • Adrenal disorder Daughter    • Seizures Daughter    • Thyroid disease Daughter    • No Known " "Problems Daughter    • Diabetes Other    • Heart disease Other    • Other Other         Ulcers; Bleeding Tendencies; Allergy   • Cancer Other    • Cholelithiasis Other    • Hypertension Other    • Allergy (severe) Other        Social History     Socioeconomic History   • Marital status:      Spouse name: Not on file   • Number of children: 3   • Years of education: Not on file   • Highest education level: Not on file   Tobacco Use   • Smoking status: Never Smoker   • Smokeless tobacco: Never Used   Vaping Use   • Vaping Use: Never used   Substance and Sexual Activity   • Alcohol use: No   • Drug use: No   • Sexual activity: Defer       /66 (BP Location: Left arm, Patient Position: Sitting)   Pulse 62   Temp 97.8 °F (36.6 °C) (Oral)   Resp 20   Ht 152.4 cm (60\")   Wt 84.8 kg (187 lb)   SpO2 100%   BMI 36.52 kg/m²     Objective   Physical Exam  Vitals and nursing note reviewed.   Constitutional:       Appearance: She is well-developed. She is obese.   HENT:      Head: Normocephalic and atraumatic.      Mouth/Throat:      Mouth: Mucous membranes are moist.      Pharynx: Oropharynx is clear.   Eyes:      Extraocular Movements: Extraocular movements intact.      Pupils: Pupils are equal, round, and reactive to light.   Cardiovascular:      Rate and Rhythm: Normal rate and regular rhythm.   Pulmonary:      Effort: Pulmonary effort is normal.      Breath sounds: Normal breath sounds.   Abdominal:      General: Bowel sounds are normal.      Palpations: Abdomen is soft.   Musculoskeletal:         General: Normal range of motion.      Cervical back: Normal range of motion and neck supple.   Skin:     General: Skin is warm.      Capillary Refill: Capillary refill takes less than 2 seconds.   Neurological:      General: No focal deficit present.      Mental Status: She is alert and oriented to person, place, and time.   Psychiatric:         Mood and Affect: Mood normal.         Behavior: Behavior normal. "         Procedures           ED Course  ED Course as of Apr 10 1646   Sat Apr 10, 2021   1642 Results were discussed patient.  Told to follow primary care in 3 days.  Come back to ER symptoms get worse.    [MO]      ED Course User Index  [MO] Дмитрий Velarde MD                                         HEART Score (for prediction of 6-week risk of major adverse cardiac event) reviewed and/or performed as part of the patient evaluation and treatment planning process.  The result associated with this review/performance is: 3       MDM    Final diagnoses:   Chest pain in adult   Dyspnea, unspecified type       ED Disposition  ED Disposition     ED Disposition Condition Comment    Discharge Stable           Rich Urbina, APRN  [address] [place] [facility] [address] [phone]    In 3 days           Medication List      Stop    naproxen 500 MG tablet  Commonly known as: Дмитрий Alexander MD  04/10/21 0368

## 2021-04-11 LAB
QT INTERVAL: 402 MS
QTC INTERVAL: 402 MS

## 2021-05-03 PROBLEM — I44.0 1ST DEGREE AV BLOCK: Status: ACTIVE | Noted: 2021-05-03

## 2021-05-04 DIAGNOSIS — M81.0 AGE-RELATED OSTEOPOROSIS WITHOUT CURRENT PATHOLOGICAL FRACTURE: Primary | ICD-10-CM

## 2021-05-07 ENCOUNTER — OFFICE VISIT (OUTPATIENT)
Dept: ONCOLOGY | Facility: CLINIC | Age: 76
End: 2021-05-07

## 2021-05-07 ENCOUNTER — INFUSION (OUTPATIENT)
Dept: ONCOLOGY | Facility: HOSPITAL | Age: 76
End: 2021-05-07

## 2021-05-07 ENCOUNTER — LAB (OUTPATIENT)
Dept: ONCOLOGY | Facility: HOSPITAL | Age: 76
End: 2021-05-07

## 2021-05-07 VITALS
HEART RATE: 59 BPM | TEMPERATURE: 97.2 F | DIASTOLIC BLOOD PRESSURE: 78 MMHG | RESPIRATION RATE: 18 BRPM | BODY MASS INDEX: 37.18 KG/M2 | WEIGHT: 190.4 LBS | SYSTOLIC BLOOD PRESSURE: 122 MMHG

## 2021-05-07 DIAGNOSIS — R79.9 ABNORMAL FINDING OF BLOOD CHEMISTRY, UNSPECIFIED: ICD-10-CM

## 2021-05-07 DIAGNOSIS — D70.8 OTHER NEUTROPENIA (HCC): ICD-10-CM

## 2021-05-07 DIAGNOSIS — R78.71 ABNORMAL LEAD LEVEL IN BLOOD: ICD-10-CM

## 2021-05-07 DIAGNOSIS — Z86.711 HISTORY OF PULMONARY EMBOLISM: ICD-10-CM

## 2021-05-07 DIAGNOSIS — M81.0 AGE-RELATED OSTEOPOROSIS WITHOUT CURRENT PATHOLOGICAL FRACTURE: Primary | ICD-10-CM

## 2021-05-07 DIAGNOSIS — D51.8 OTHER VITAMIN B12 DEFICIENCY ANEMIA: ICD-10-CM

## 2021-05-07 DIAGNOSIS — R79.0 ABNORMAL LEVEL OF BLOOD MINERAL: ICD-10-CM

## 2021-05-07 DIAGNOSIS — M81.0 AGE-RELATED OSTEOPOROSIS WITHOUT CURRENT PATHOLOGICAL FRACTURE: ICD-10-CM

## 2021-05-07 DIAGNOSIS — Z13.9 SCREENING DUE: Primary | ICD-10-CM

## 2021-05-07 LAB
BASOPHILS # BLD AUTO: 0.03 10*3/MM3 (ref 0–0.2)
BASOPHILS NFR BLD AUTO: 0.8 % (ref 0–1.5)
DEPRECATED RDW RBC AUTO: 43 FL (ref 37–54)
EOSINOPHIL # BLD AUTO: 0.1 10*3/MM3 (ref 0–0.4)
EOSINOPHIL NFR BLD AUTO: 2.5 % (ref 0.3–6.2)
ERYTHROCYTE [DISTWIDTH] IN BLOOD BY AUTOMATED COUNT: 13.1 % (ref 12.3–15.4)
FERRITIN SERPL-MCNC: 26.18 NG/ML (ref 13–150)
FOLATE SERPL-MCNC: >20 NG/ML (ref 4.78–24.2)
HCT VFR BLD AUTO: 30.2 % (ref 34–46.6)
HGB BLD-MCNC: 10.1 G/DL (ref 12–15.9)
IMM GRANULOCYTES # BLD AUTO: 0.01 10*3/MM3 (ref 0–0.05)
IMM GRANULOCYTES NFR BLD AUTO: 0.3 % (ref 0–0.5)
IRON 24H UR-MRATE: 89 MCG/DL (ref 37–145)
IRON SATN MFR SERPL: 23 % (ref 20–50)
LYMPHOCYTES # BLD AUTO: 1.51 10*3/MM3 (ref 0.7–3.1)
LYMPHOCYTES NFR BLD AUTO: 38.3 % (ref 19.6–45.3)
MAGNESIUM SERPL-MCNC: 2.4 MG/DL (ref 1.6–2.4)
MCH RBC QN AUTO: 30.3 PG (ref 26.6–33)
MCHC RBC AUTO-ENTMCNC: 33.4 G/DL (ref 31.5–35.7)
MCV RBC AUTO: 90.7 FL (ref 79–97)
MONOCYTES # BLD AUTO: 0.44 10*3/MM3 (ref 0.1–0.9)
MONOCYTES NFR BLD AUTO: 11.2 % (ref 5–12)
NEUTROPHILS NFR BLD AUTO: 1.85 10*3/MM3 (ref 1.7–7)
NEUTROPHILS NFR BLD AUTO: 46.9 % (ref 42.7–76)
NRBC BLD AUTO-RTO: 0 /100 WBC (ref 0–0.2)
PHOSPHATE SERPL-MCNC: 3 MG/DL (ref 2.5–4.5)
PLATELET # BLD AUTO: 259 10*3/MM3 (ref 140–450)
PMV BLD AUTO: 9.1 FL (ref 6–12)
RBC # BLD AUTO: 3.33 10*6/MM3 (ref 3.77–5.28)
TIBC SERPL-MCNC: 392 MCG/DL (ref 298–536)
TRANSFERRIN SERPL-MCNC: 263 MG/DL (ref 200–360)
VIT B12 BLD-MCNC: 1433 PG/ML (ref 211–946)
WBC # BLD AUTO: 3.94 10*3/MM3 (ref 3.4–10.8)

## 2021-05-07 PROCEDURE — 84100 ASSAY OF PHOSPHORUS: CPT

## 2021-05-07 PROCEDURE — 36415 COLL VENOUS BLD VENIPUNCTURE: CPT | Performed by: NURSE PRACTITIONER

## 2021-05-07 PROCEDURE — 25010000002 DENOSUMAB 60 MG/ML SOLUTION PREFILLED SYRINGE: Performed by: NURSE PRACTITIONER

## 2021-05-07 PROCEDURE — 85025 COMPLETE CBC W/AUTO DIFF WBC: CPT

## 2021-05-07 PROCEDURE — 82607 VITAMIN B-12: CPT

## 2021-05-07 PROCEDURE — 82728 ASSAY OF FERRITIN: CPT

## 2021-05-07 PROCEDURE — 83540 ASSAY OF IRON: CPT

## 2021-05-07 PROCEDURE — 99212 OFFICE O/P EST SF 10 MIN: CPT | Performed by: NURSE PRACTITIONER

## 2021-05-07 PROCEDURE — 82746 ASSAY OF FOLIC ACID SERUM: CPT

## 2021-05-07 PROCEDURE — 84466 ASSAY OF TRANSFERRIN: CPT

## 2021-05-07 PROCEDURE — 83735 ASSAY OF MAGNESIUM: CPT

## 2021-05-07 PROCEDURE — 96372 THER/PROPH/DIAG INJ SC/IM: CPT | Performed by: NURSE PRACTITIONER

## 2021-05-07 RX ADMIN — DENOSUMAB 60 MG: 60 INJECTION SUBCUTANEOUS at 15:02

## 2021-05-07 NOTE — PROGRESS NOTES
DATE OF VISIT: 5/7/2021      REASON FOR VISIT:  Neutropenia, anemia, pulmonary embolism history, osteoporosis, primary hyperparathyroidism                 HISTORY OF PRESENT ILLNESS:    75-year-old female with medical problem consisting of history of pulmonary embolism diagnosed in 2016 status post anticoagulation with Coumadin for 1 year, osteoporosis, depression, history of gastric sleeve surgery done in 2016 has been following up with [facility] since August 23, 2018 for easy bruising, neutropenia and anemia.  Patient is here for follow-up appointment today to discuss recently done blood work. She is also due for her second Prolia injection that is ordered by Geri Youngblood NP.  Denies any recent infection.  Denies any bleeding.  Complains of recent worsening of arthritis pain.  Denies any new swelling of lower extremity or any chest pain            Past Medical History, Past Surgical History, Social History, Family History have been reviewed and are without significant changes except as mentioned.    Review of Systems   A comprehensive 14 point review of systems was performed and was negative except as mentioned.    Medications:  The current medication list was reviewed in the EMR    ALLERGIES:    Allergies   Allergen Reactions   • Sulfa Antibiotics Hives and Rash     Sulfa (Sulfonamide Antibiotics)       Objective      Vitals:    05/07/21 1404   BP: 122/78   Pulse: 59   Resp: 18   Temp: 97.2 °F (36.2 °C)   Weight: 86.4 kg (190 lb 6.4 oz)   PainSc: 0-No pain     Current Status 11/6/2020   ECOG score 0       Physical Exam  GENERAL:  Not in any distress.     HEENT:  Normocephalic, Atraumatic.Mild Conjunctival pallor. No icterus.  No Facial Asymmetry noted.     NECK:  No adenopathy. No JVD.     RESPIRATORY:  Fair air entry bilateral. No rhonchi or wheezing.     CARDIOVASCULAR:  S1, S2. Regular rate and rhythm. No murmur or gallop appreciated.     ABDOMEN:  Soft, obese, nontender. Bowel sounds present in all  four quadrants.  No organomegaly appreciated.     EXTREMITIES:  No edema.No Calf Tenderness.     NEUROLOGIC:  Alert, awake and oriented ×3.     SKIN : No new skin lesion identified    RECENT LABS:  Glucose   Date Value Ref Range Status   04/10/2021 86 65 - 99 mg/dL Final     Sodium   Date Value Ref Range Status   04/10/2021 136 136 - 145 mmol/L Final   10/12/2018 140 134 - 144 mmol/L Final     Potassium   Date Value Ref Range Status   04/10/2021 4.4 3.5 - 5.2 mmol/L Final   10/12/2018 4.6 3.5 - 5.1 mmol/L Final     CO2   Date Value Ref Range Status   04/10/2021 26.0 22.0 - 29.0 mmol/L Final     Chloride   Date Value Ref Range Status   04/10/2021 105 98 - 107 mmol/L Final   10/12/2018 105 98 - 107 mmol/L Final     Anion Gap   Date Value Ref Range Status   04/10/2021 5.0 5.0 - 15.0 mmol/L Final     Creatinine   Date Value Ref Range Status   04/10/2021 0.98 0.57 - 1.00 mg/dL Final   10/12/2018 0.9 0.6 - 1.3 mg/dL Final     Comment:     **The MDRD GFR formula is valid only for ages 18-70.    GFR will not be reported for individuals aging <18 or >70.     BUN   Date Value Ref Range Status   04/10/2021 16 8 - 23 mg/dL Final   10/12/2018 13 7 - 18 mg/dL Final     BUN/Creatinine Ratio   Date Value Ref Range Status   04/10/2021 16.3 7.0 - 25.0 Final     Calcium   Date Value Ref Range Status   04/10/2021 9.6 8.6 - 10.5 mg/dL Final   10/12/2018 9.7 8.8 - 10.5 mg/dL Final     eGFR Non  Amer   Date Value Ref Range Status   04/10/2021 56 (L) >60 mL/min/1.73 Final     Alkaline Phosphatase   Date Value Ref Range Status   04/10/2021 112 39 - 117 U/L Final   10/12/2018 115 46 - 116 U/L Final     Total Protein   Date Value Ref Range Status   04/10/2021 7.3 6.0 - 8.5 g/dL Final   10/12/2018 6.6 6.4 - 8.2 g/dL Final     ALT (SGPT)   Date Value Ref Range Status   04/10/2021 18 1 - 33 U/L Final   10/12/2018 43 30 - 65 U/L Final     AST (SGOT)   Date Value Ref Range Status   04/10/2021 25 1 - 32 U/L Final   10/12/2018 22 15 - 37 U/L  Final     Total Bilirubin   Date Value Ref Range Status   04/10/2021 0.3 0.0 - 1.2 mg/dL Final   10/12/2018 0.3 0 - 1.0 mg/dL Final     Albumin   Date Value Ref Range Status   04/10/2021 3.90 3.50 - 5.20 g/dL Final   10/12/2018 3.2 (L) 3.4 - 5.0 g/dL Final     Globulin   Date Value Ref Range Status   04/10/2021 3.4 gm/dL Final     Lab Results   Component Value Date    WBC 3.94 05/07/2021    HGB 10.1 (L) 05/07/2021    HCT 30.2 (L) 05/07/2021    MCV 90.7 05/07/2021     05/07/2021     Lab Results   Component Value Date    NEUTROABS 1.85 05/07/2021    IRON 89 05/07/2021    IRON 91 12/22/2020    IRON 89 08/19/2020    TIBC 392 05/07/2021    TIBC 378 12/22/2020    TIBC 401 08/19/2020    LABIRON 23 05/07/2021    LABIRON 24 12/22/2020    LABIRON 22 08/19/2020    FERRITIN 32.39 12/22/2020    FERRITIN 39.50 08/19/2020    FERRITIN 32.22 05/14/2020    NQUDSRSA72 1,050 (H) 12/22/2020    MJCTUHPT83 1,108 (H) 08/19/2020    SGMTVOZQ18 879 05/14/2020    FOLATE >20.00 12/22/2020    FOLATE >20.00 08/19/2020    FOLATE >20.00 05/14/2020     Lab Results   Component Value Date    AFPTM 142 12/05/2018               RADIOLOGY DATA :  No radiology results for the last 7 days        Assessment/Plan     1.   Neutropenia:  -Recent CBC shows white blood cell count is 3940 with absolute neutrophil count of 1850.  - Patient does have chronic intermittent neutropenia since 2014.  -Anemia work-up done does not show any evidence of nutritional deficiency.  - At this point recommend continue with clinical monitoring.  -We will ask patient to return to clinic in 4 months with repeat CBC and anemia work-up to be done on that day.     2.  Anemia:  - Work-up is consistent with anemia of chronic disease  -Hemoglobin is 10.1  -Recommend continue with B12 500 mcg p.o. daily.  -We will repeat anemia work-up upon next clinic visit in 4 months.  - she will be referred to GI as she states she is past due for colonscopy     3.  History of pulmonary  embolism:  -Patient was status post anticoagulation with Coumadin for 1 year.  Recommend continuing with clinical monitoring     4.  Osteoporosis:  -currently on Prolia injections.               PHQ-9 Total Score:       Veronica Wilkinson reports a pain score of 0.  Given her pain assessment as noted, treatment options were discussed and the following options were decided upon as a follow-up plan to address the patient's pain: reports no pain today         Liza Padilla, APRN  5/7/2021  14:57 CDT        Part of this note may be an electronic transcription/translation of spoken language to printed text using the Dragon Dictation System.          CC:

## 2021-05-11 ENCOUNTER — OFFICE VISIT (OUTPATIENT)
Dept: GASTROENTEROLOGY | Facility: CLINIC | Age: 76
End: 2021-05-11

## 2021-05-11 VITALS
HEART RATE: 70 BPM | WEIGHT: 190 LBS | SYSTOLIC BLOOD PRESSURE: 114 MMHG | DIASTOLIC BLOOD PRESSURE: 60 MMHG | BODY MASS INDEX: 37.3 KG/M2 | HEIGHT: 60 IN

## 2021-05-11 DIAGNOSIS — K21.9 GASTROESOPHAGEAL REFLUX DISEASE, UNSPECIFIED WHETHER ESOPHAGITIS PRESENT: ICD-10-CM

## 2021-05-11 DIAGNOSIS — D50.9 IRON DEFICIENCY ANEMIA, UNSPECIFIED IRON DEFICIENCY ANEMIA TYPE: ICD-10-CM

## 2021-05-11 DIAGNOSIS — R10.9 LEFT LATERAL ABDOMINAL PAIN: Primary | ICD-10-CM

## 2021-05-11 DIAGNOSIS — Z86.010 PERSONAL HISTORY OF COLONIC POLYPS: ICD-10-CM

## 2021-05-11 PROCEDURE — 99213 OFFICE O/P EST LOW 20 MIN: CPT | Performed by: NURSE PRACTITIONER

## 2021-05-11 RX ORDER — PEG-3350, SODIUM SULFATE, SODIUM CHLORIDE, POTASSIUM CHLORIDE, SODIUM ASCORBATE AND ASCORBIC ACID 7.5-2.691G
1000 KIT ORAL EVERY 12 HOURS
Qty: 1000 ML | Refills: 0 | Status: SHIPPED | OUTPATIENT
Start: 2021-05-11 | End: 2021-06-07 | Stop reason: SDUPTHER

## 2021-05-11 RX ORDER — DEXTROSE AND SODIUM CHLORIDE 5; .45 G/100ML; G/100ML
30 INJECTION, SOLUTION INTRAVENOUS CONTINUOUS PRN
Status: CANCELLED | OUTPATIENT
Start: 2021-06-16

## 2021-05-11 NOTE — PROGRESS NOTES
Chief Complaint   Patient presents with   • Anemia       Subjective    Veronica Wilkinson is a 75 y.o. female. she is here today for follow-up.  75-year-old female presents to discuss anemia and history of colon polyps.  Reports she has had some left-sided upper and lower abdominal pain over the last few months denies any hematochezia but reports stool is always very dark as she is on oral iron.  Previous colonoscopy in 2008 note she had a adenomatous colonic polyp of sigmoid which was removed.  She was last seen by Dr. Pineda due to elevated liver enzymes lab work and ultrasound was unremarkable.  She has history of bariatric surgery performed in 2017 per Dr. Finney.     Anemia  Symptoms include abdominal pain (left ). There has been no fever, light-headedness or pallor.     Plan; schedule patient for EGD and colonoscopy due to abdominal pain chronic reflux anemia and personal history of colonic polyps.       The following portions of the patient's history were reviewed and updated as appropriate:   Past Medical History:   Diagnosis Date   • Arthritis    • Asthma    • Bleeding tendency (CMS/HCC)    • Chest pain     History of noncardiac chest pain   • Chronic depression    • COPD (chronic obstructive pulmonary disease) (CMS/HCC)    • Depressive disorder    • Diastolic dysfunction    • Dyspnea    • Dyspnea on exertion    • Edema    • Essential hypertension    • Exercise intolerance    • Fatigue    • Gallstones    • GERD (gastroesophageal reflux disease)    • History of bone density study 2011    DEXA BONE DENSITY APPENDICULAR 35926 (1) - normal   • History of bone density study 06/24/2015    DXA BONE DENSITY AXIAL 45930 [facility] (1) - Ordered By: TOLU RAMIREZ ([facility])    • History of echocardiogram     Echocardiogram W/ color flow 12008 (2)   • History of mammogram 2013    Mammogram screen (1)   • History of mammogram 2015    MAMMOGRAM SCREENING 09019 - [facility] (1)   • History of screening mammography  "06/25/2015    SCREENING MAMMOGRAPHY DIGITAL  (Medicare) (1) - Ordered By: ANDRADE BECERRIL ([facility])    • Hypercalcemia    • Hyperlipidemia    • Influenza vaccine administered 02/25/2016    INFLUENZA IMMUN ADMIN OR PREV RECV'D  (2) - Ordered By: ANDRADE BECERRIL ([facility])    • Long-term (current) use of anticoagulants     coumadin therapy      • Lower extremity edema     Intermittent lower extremity edema   • Obesity, Class III, BMI 40-49.9 (morbid obesity) (CMS/[facility])     \"Class III obesity\"   • Osteoporosis    • PE (pulmonary embolism) 10/2015    Bilateral PE diagnosed after a car ride to [place]   • Pneumococcal vaccination given 02/25/2016    PNEUMOC VAC/ADMIN/RCVD 4040F (2) - Ordered By: ANDRADE BECERRIL ([facility])    • Right basilic vein fibrosis 2015   • Sedentary lifestyle    • Shortness of breath    • Skin cancer    • Urinary tract infection    • Venous thrombosis 2015    right basilic venous thrombosis; This was noted in May 2015.   • Weight gain      Past Surgical History:   Procedure Laterality Date   • CARDIAC ELECTROPHYSIOLOGY PROCEDURE N/A 12/10/2020    Procedure: Pacemaker DC new;  Surgeon: Vesta Bucio MD;  Location: [facility] INVASIVE LOCATION;  Service: Cardiology;  Laterality: N/A;  [employer] per pratima.....juanita from [employer] aware   • CHOLECYSTECTOMY      Cholecystectomy (1)   • COLONOSCOPY      Approximately 5 years prior as stated per patient   • CRYOABLATION     • ENDOSCOPY  09/15/2011    Colon endoscopy 97066 (2) - Hemorrhoids found.   • GASTRIC SLEEVE LAPAROSCOPIC     • HEMORRHOIDECTOMY     • HYSTEROSCOPY      Hysteroscopy; ablation (1)   • INJECTION OF MEDICATION  09/27/2012    Kenalog (1) - Ordered By: ANDRADE BECERRIL ([facility])    • PACEMAKER IMPLANTATION       Family History   Problem Relation Age of Onset   • Ovarian cancer Mother    • Bleeding Disorder Father    • Other Father         Hematologic disorder   • Lung cancer Father    • Pancreatic cancer Sister    • Cancer " Brother    • Lung cancer Brother    • Bleeding Disorder Other    • Hyperlipidemia Other    • Hypertension Other    • Osteoarthritis Other    • Other Other         Gastritis   • Adrenal disorder Daughter    • Seizures Daughter    • Thyroid disease Daughter    • No Known Problems Daughter    • Diabetes Other    • Heart disease Other    • Other Other         Ulcers; Bleeding Tendencies; Allergy   • Cancer Other    • Cholelithiasis Other    • Hypertension Other    • Allergy (severe) Other      OB History        3    Para   3    Term   3            AB        Living           SAB        TAB        Ectopic        Molar        Multiple        Live Births                  Prior to Admission medications    Medication Sig Start Date End Date Taking? Authorizing Provider   acetaminophen (TYLENOL) 325 MG tablet Take 650 mg by mouth Every Night.   Yes Annemarie Vega MD   Bisacodyl (GENTLE LAXATIVE PO) Take 100 mg by mouth Every Night.   Yes Annemarie Vega MD   calcium carbonate (OS-DARON) 600 MG tablet Take 600 mg by mouth 2 (Two) Times a Day With Meals.   Yes Annemarie Vega MD   Cholecalciferol (VITAMIN D3) 2000 units tablet Take 1 tablet by mouth Daily.   Yes Annemarie Vega MD   Cyanocobalamin (B-12 PO) Take 1 tablet by mouth Daily.   Yes Emergency, Nurse Epic, RN   diphenhydrAMINE (BENADRYL) 25 MG tablet Take 50 mg by mouth Every Night. Take two tablets by mouth at bedtime    Yes Annemarie Vega MD   docusate sodium (COLACE) 100 MG capsule Take 100 mg by mouth Every Night. Take two tablets by mouth nightly    Yes Annemarie Vega MD   fluticasone (Flonase) 50 MCG/ACT nasal spray 2 sprays into the nostril(s) as directed by provider Daily As Needed for Rhinitis.   Yes Annemarie Vega MD   Garlic 1000 MG capsule Take 1,000 mg by mouth Daily.   Yes Annemarie Vega MD   Iron, Ferrous Sulfate, 325 (65 Fe) MG tablet Take 325 mg by mouth Daily.   Yes Annemarie Vega  MD   LORATADINE ALLERGY RELIEF PO Take 10 mg by mouth Daily.   Yes Provider, MD Annemarie   magnesium oxide (MAG-OX) 400 MG tablet Take 400 mg by mouth Daily.   Yes Provider, MD Annemarie   Multiple Vitamins-Minerals (MULTIVITAMIN ADULT PO) Take 1 tablet by mouth Daily.   Yes Provider, MD Annemarie   olmesartan-hydrochlorothiazide (BENICAR HCT) 20-12.5 MG per tablet Take 1 tablet by mouth Daily. 1/13/21 1/13/22 Yes Rich Urbina APRN   pantoprazole (PROTONIX) 40 MG EC tablet Take 1 tablet by mouth Daily. 1/13/21  Yes Rich Urbina APRN   simethicone (MYLICON,GAS-X) 125 MG capsule capsule Take 1 capsule by mouth Every Night.   Yes ProviderAnnemarie MD   simvastatin (ZOCOR) 20 MG tablet Take 1 tablet by mouth Every Other Day. 3/26/21  Yes Rich Urbina APRN   tiZANidine (ZANAFLEX) 4 MG tablet Take 1 tablet by mouth At Night As Needed for Muscle Spasms. 12/8/20  Yes Rich Urbina APRN   traZODone (DESYREL) 100 MG tablet Take 1 tablet by mouth Every Night. 1/11/21  Yes Rich Urbina APRN   Turmeric (QC Tumeric Complex) 500 MG capsule Take 500 mg by mouth Daily.   Yes Provider, MD Annemarie     Allergies   Allergen Reactions   • Sulfa Antibiotics Hives and Rash     Sulfa (Sulfonamide Antibiotics)     Social History     Socioeconomic History   • Marital status:      Spouse name: Not on file   • Number of children: 3   • Years of education: Not on file   • Highest education level: Not on file   Tobacco Use   • Smoking status: Never Smoker   • Smokeless tobacco: Never Used   Vaping Use   • Vaping Use: Never used   Substance and Sexual Activity   • Alcohol use: No   • Drug use: No   • Sexual activity: Defer       Review of Systems  Review of Systems   Constitutional: Negative for activity change, appetite change, chills, diaphoresis, fatigue, fever and unexpected weight change.   HENT: Negative for sore throat and trouble swallowing.    Respiratory: Negative for shortness of  "breath.    Gastrointestinal: Positive for abdominal pain (left ) and constipation (goes daily with laxative ). Negative for abdominal distention, anal bleeding, blood in stool, diarrhea, nausea, rectal pain and vomiting.   Musculoskeletal: Negative for arthralgias.   Skin: Negative for pallor.   Neurological: Negative for light-headedness.        /60 (BP Location: Left arm)   Pulse 70   Ht 152.4 cm (60\")   Wt 86.2 kg (190 lb)   BMI 37.11 kg/m²     Objective    Physical Exam  Constitutional:       General: She is not in acute distress.     Appearance: Normal appearance. She is well-developed.   HENT:      Head: Normocephalic and atraumatic.   Neck:      Thyroid: No thyromegaly.   Cardiovascular:      Rate and Rhythm: Normal rate and regular rhythm.      Heart sounds: Normal heart sounds.   Pulmonary:      Effort: Pulmonary effort is normal.      Breath sounds: Normal breath sounds. No wheezing, rhonchi or rales.   Abdominal:      General: Bowel sounds are normal. There is no distension.      Palpations: Abdomen is soft. Abdomen is not rigid.      Tenderness: There is abdominal tenderness in the left upper quadrant and left lower quadrant. There is no guarding.      Hernia: No hernia is present.   Musculoskeletal:      Cervical back: Normal range of motion and neck supple.   Lymphadenopathy:      Cervical: No cervical adenopathy.   Skin:     General: Skin is warm and dry.      Coloration: Skin is not pale.      Findings: No rash.   Neurological:      Mental Status: She is alert and oriented to person, place, and time.   Psychiatric:         Speech: Speech normal.         Behavior: Behavior is cooperative.       Lab on 05/07/2021   Component Date Value Ref Range Status   • Ferritin 05/07/2021 26.18  13.00 - 150.00 ng/mL Final   • Iron 05/07/2021 89  37 - 145 mcg/dL Final   • Iron Saturation 05/07/2021 23  20 - 50 % Final   • Transferrin 05/07/2021 263  200 - 360 mg/dL Final   • TIBC 05/07/2021 392  298 - 536 " mcg/dL Final   • Folate 05/07/2021 >20.00  4.78 - 24.20 ng/mL Final   • Vitamin B-12 05/07/2021 1,433* 211 - 946 pg/mL Final   • Magnesium 05/07/2021 2.4  1.6 - 2.4 mg/dL Final   • Phosphorus 05/07/2021 3.0  2.5 - 4.5 mg/dL Final   • WBC 05/07/2021 3.94  3.40 - 10.80 10*3/mm3 Final   • RBC 05/07/2021 3.33* 3.77 - 5.28 10*6/mm3 Final   • Hemoglobin 05/07/2021 10.1* 12.0 - 15.9 g/dL Final   • Hematocrit 05/07/2021 30.2* 34.0 - 46.6 % Final   • MCV 05/07/2021 90.7  79.0 - 97.0 fL Final   • MCH 05/07/2021 30.3  26.6 - 33.0 pg Final   • MCHC 05/07/2021 33.4  31.5 - 35.7 g/dL Final   • RDW 05/07/2021 13.1  12.3 - 15.4 % Final   • RDW-SD 05/07/2021 43.0  37.0 - 54.0 fl Final   • MPV 05/07/2021 9.1  6.0 - 12.0 fL Final   • Platelets 05/07/2021 259  140 - 450 10*3/mm3 Final   • Neutrophil % 05/07/2021 46.9  42.7 - 76.0 % Final   • Lymphocyte % 05/07/2021 38.3  19.6 - 45.3 % Final   • Monocyte % 05/07/2021 11.2  5.0 - 12.0 % Final   • Eosinophil % 05/07/2021 2.5  0.3 - 6.2 % Final   • Basophil % 05/07/2021 0.8  0.0 - 1.5 % Final   • Immature Grans % 05/07/2021 0.3  0.0 - 0.5 % Final   • Neutrophils, Absolute 05/07/2021 1.85  1.70 - 7.00 10*3/mm3 Final   • Lymphocytes, Absolute 05/07/2021 1.51  0.70 - 3.10 10*3/mm3 Final   • Monocytes, Absolute 05/07/2021 0.44  0.10 - 0.90 10*3/mm3 Final   • Eosinophils, Absolute 05/07/2021 0.10  0.00 - 0.40 10*3/mm3 Final   • Basophils, Absolute 05/07/2021 0.03  0.00 - 0.20 10*3/mm3 Final   • Immature Grans, Absolute 05/07/2021 0.01  0.00 - 0.05 10*3/mm3 Final   • nRBC 05/07/2021 0.0  0.0 - 0.2 /100 WBC Final     Assessment/Plan      1. Left lateral abdominal pain    2. Personal history of colonic polyps    3. Iron deficiency anemia, unspecified iron deficiency anemia type    4. Gastroesophageal reflux disease, unspecified whether esophagitis present    .       Orders placed during this encounter include:  Orders Placed This Encounter   Procedures   • Follow Anesthesia Guidelines / Standing  Orders     Standing Status:   Future   • Obtain Informed Consent     Standing Status:   Future     Order Specific Question:   Informed Consent Given For     Answer:   ESOPHAGOGASTRODUODENOSCOPY and Colonoscopy       ESOPHAGOGASTRODUODENOSCOPY (N/A), COLONOSCOPY (N/A)    Review and/or summary of lab tests, radiology, procedures, medications. Review and summary of old records and obtaining of history. The risks and benefits of my recommendations, as well as other treatment options were discussed with the patient today. Questions were answered.    New Medications Ordered This Visit   Medications   • PEG-KCl-NaCl-NaSulf-Na Asc-C (MoviPrep) 100 g reconstituted solution powder     Sig: Take 1,000 mL by mouth Every 12 (Twelve) Hours.     Dispense:  1000 mL     Refill:  0       Follow-up: Return in about 4 weeks (around 6/8/2021) for Recheck.          This document has been electronically signed by TABBY Garcia on May 11, 2021 13:05 CDT           I spent 16 minutes caring for Veronica on this date of service. This time includes time spent by me in the following activities:preparing for the visit, reviewing tests, obtaining and/or reviewing a separately obtained history, performing a medically appropriate examination and/or evaluation , counseling and educating the patient/family/caregiver, ordering medications, tests, or procedures, referring and communicating with other health care professionals , documenting information in the medical record and care coordination    Results for orders placed or performed in visit on 05/07/21   Vitamin B12 & Folate    Specimen: Blood   Result Value Ref Range    Folate >20.00 4.78 - 24.20 ng/mL    Vitamin B-12 1,433 (H) 211 - 946 pg/mL   CBC Auto Differential    Specimen: Blood   Result Value Ref Range    WBC 3.94 3.40 - 10.80 10*3/mm3    RBC 3.33 (L) 3.77 - 5.28 10*6/mm3    Hemoglobin 10.1 (L) 12.0 - 15.9 g/dL    Hematocrit 30.2 (L) 34.0 - 46.6 %    MCV 90.7 79.0 - 97.0 fL    MCH 30.3  26.6 - 33.0 pg    MCHC 33.4 31.5 - 35.7 g/dL    RDW 13.1 12.3 - 15.4 %    RDW-SD 43.0 37.0 - 54.0 fl    MPV 9.1 6.0 - 12.0 fL    Platelets 259 140 - 450 10*3/mm3    Neutrophil % 46.9 42.7 - 76.0 %    Lymphocyte % 38.3 19.6 - 45.3 %    Monocyte % 11.2 5.0 - 12.0 %    Eosinophil % 2.5 0.3 - 6.2 %    Basophil % 0.8 0.0 - 1.5 %    Immature Grans % 0.3 0.0 - 0.5 %    Neutrophils, Absolute 1.85 1.70 - 7.00 10*3/mm3    Lymphocytes, Absolute 1.51 0.70 - 3.10 10*3/mm3    Monocytes, Absolute 0.44 0.10 - 0.90 10*3/mm3    Eosinophils, Absolute 0.10 0.00 - 0.40 10*3/mm3    Basophils, Absolute 0.03 0.00 - 0.20 10*3/mm3    Immature Grans, Absolute 0.01 0.00 - 0.05 10*3/mm3    nRBC 0.0 0.0 - 0.2 /100 WBC   Iron Profile    Specimen: Blood   Result Value Ref Range    Iron 89 37 - 145 mcg/dL    Iron Saturation 23 20 - 50 %    Transferrin 263 200 - 360 mg/dL    TIBC 392 298 - 536 mcg/dL   Phosphorus    Specimen: Blood   Result Value Ref Range    Phosphorus 3.0 2.5 - 4.5 mg/dL   Magnesium    Specimen: Blood   Result Value Ref Range    Magnesium 2.4 1.6 - 2.4 mg/dL   Ferritin    Specimen: Blood   Result Value Ref Range    Ferritin 26.18 13.00 - 150.00 ng/mL   Results for orders placed or performed during the hospital encounter of 04/10/21   Gold Top - SST   Result Value Ref Range    Extra Tube Hold for add-ons.    Green Top (Gel)   Result Value Ref Range    Extra Tube Hold for add-ons.    CBC Auto Differential    Specimen: Blood   Result Value Ref Range    WBC 3.65 3.40 - 10.80 10*3/mm3    RBC 3.61 (L) 3.77 - 5.28 10*6/mm3    Hemoglobin 10.6 (L) 12.0 - 15.9 g/dL    Hematocrit 33.0 (L) 34.0 - 46.6 %    MCV 91.4 79.0 - 97.0 fL    MCH 29.4 26.6 - 33.0 pg    MCHC 32.1 31.5 - 35.7 g/dL    RDW 13.2 12.3 - 15.4 %    RDW-SD 44.0 37.0 - 54.0 fl    MPV 8.7 6.0 - 12.0 fL    Platelets 262 140 - 450 10*3/mm3    Neutrophil % 51.9 42.7 - 76.0 %    Lymphocyte % 30.4 19.6 - 45.3 %    Monocyte % 14.2 (H) 5.0 - 12.0 %    Eosinophil % 2.7 0.3 - 6.2 %     Basophil % 0.8 0.0 - 1.5 %    Immature Grans % 0.0 0.0 - 0.5 %    Neutrophils, Absolute 1.89 1.70 - 7.00 10*3/mm3    Lymphocytes, Absolute 1.11 0.70 - 3.10 10*3/mm3    Monocytes, Absolute 0.52 0.10 - 0.90 10*3/mm3    Eosinophils, Absolute 0.10 0.00 - 0.40 10*3/mm3    Basophils, Absolute 0.03 0.00 - 0.20 10*3/mm3    Immature Grans, Absolute 0.00 0.00 - 0.05 10*3/mm3    nRBC 0.0 0.0 - 0.2 /100 WBC   Lavender Top   Result Value Ref Range    Extra Tube hold for add-on    Light Blue Top   Result Value Ref Range    Extra Tube hold for add-on    Troponin    Specimen: Blood   Result Value Ref Range    Troponin T <0.010 0.000 - 0.030 ng/mL   Troponin    Specimen: Blood   Result Value Ref Range    Troponin T <0.010 0.000 - 0.030 ng/mL   D-dimer, Quantitative    Specimen: Blood   Result Value Ref Range    D-Dimer, Quantitative 1,649 (H) 0 - 470 ng/mL (FEU)   BNP    Specimen: Blood   Result Value Ref Range    proBNP 70.1 0.0 - 900.0 pg/mL   Comprehensive Metabolic Panel    Specimen: Blood   Result Value Ref Range    Glucose 86 65 - 99 mg/dL    BUN 16 8 - 23 mg/dL    Creatinine 0.98 0.57 - 1.00 mg/dL    Sodium 136 136 - 145 mmol/L    Potassium 4.4 3.5 - 5.2 mmol/L    Chloride 105 98 - 107 mmol/L    CO2 26.0 22.0 - 29.0 mmol/L    Calcium 9.6 8.6 - 10.5 mg/dL    Total Protein 7.3 6.0 - 8.5 g/dL    Albumin 3.90 3.50 - 5.20 g/dL    ALT (SGPT) 18 1 - 33 U/L    AST (SGOT) 25 1 - 32 U/L    Alkaline Phosphatase 112 39 - 117 U/L    Total Bilirubin 0.3 0.0 - 1.2 mg/dL    eGFR Non African Amer 56 (L) >60 mL/min/1.73    Globulin 3.4 gm/dL    A/G Ratio 1.1 g/dL    BUN/Creatinine Ratio 16.3 7.0 - 25.0    Anion Gap 5.0 5.0 - 15.0 mmol/L   ECG 12 Lead   Result Value Ref Range    QT Interval 402 ms    QTC Interval 402 ms   Results for orders placed or performed in visit on 12/22/20   CBC Auto Differential    Specimen: Blood   Result Value Ref Range    WBC 4.54 3.40 - 10.80 10*3/mm3    RBC 3.50 (L) 3.77 - 5.28 10*6/mm3    Hemoglobin 10.6 (L) 12.0  - 15.9 g/dL    Hematocrit 31.6 (L) 34.0 - 46.6 %    MCV 90.3 79.0 - 97.0 fL    MCH 30.3 26.6 - 33.0 pg    MCHC 33.5 31.5 - 35.7 g/dL    RDW 12.5 12.3 - 15.4 %    RDW-SD 41.0 37.0 - 54.0 fl    MPV 8.6 6.0 - 12.0 fL    Platelets 296 140 - 450 10*3/mm3    Neutrophil % 54.0 42.7 - 76.0 %    Lymphocyte % 33.0 19.6 - 45.3 %    Monocyte % 8.8 5.0 - 12.0 %    Eosinophil % 3.1 0.3 - 6.2 %    Basophil % 0.9 0.0 - 1.5 %    Immature Grans % 0.2 0.0 - 0.5 %    Neutrophils, Absolute 2.45 1.70 - 7.00 10*3/mm3    Lymphocytes, Absolute 1.50 0.70 - 3.10 10*3/mm3    Monocytes, Absolute 0.40 0.10 - 0.90 10*3/mm3    Eosinophils, Absolute 0.14 0.00 - 0.40 10*3/mm3    Basophils, Absolute 0.04 0.00 - 0.20 10*3/mm3    Immature Grans, Absolute 0.01 0.00 - 0.05 10*3/mm3    nRBC 0.0 0.0 - 0.2 /100 WBC     *Note: Due to a large number of results and/or encounters for the requested time period, some results have not been displayed. A complete set of results can be found in Results Review.

## 2021-05-11 NOTE — PATIENT INSTRUCTIONS
Colorectal Cancer Screening    Colorectal cancer screening is a group of tests that are used to check for colorectal cancer before symptoms develop. Colorectal refers to the colon and rectum. The colon and rectum are located at the end of the digestive tract and carry bowel movements out of the body.  Who should have screening?  All adults starting at age 50 until age 75 should have screening. Your health care provider may recommend screening at age 45. You will have tests every 1-10 years, depending on your results and the type of screening test.  You may have screening tests starting at an earlier age, or more frequently than other people, if you have any of the following risk factors:  · A personal or family history of colorectal cancer or abnormal growths (polyps).  · Inflammatory bowel disease, such as ulcerative colitis or Crohn's disease.  · A history of having radiation treatment to the abdomen or pelvic area for cancer.  · Colorectal cancer symptoms, such as changes in bowel habits or blood in your stool.  · A type of colon cancer syndrome that is passed from parent to child (hereditary), such as:  ? Duarte syndrome.  ? Familial adenomatous polyposis.  ? Turcot syndrome.  ? Peutz-Jeghers syndrome.  Screening recommendations for adults who are 75-85 years old vary depending on health.  How is screening done?  There are several types of colorectal screening tests. You may have one or more of the following:  · Guaiac-based fecal occult blood testing. For this test, a stool (feces) sample is checked for hidden (occult) blood, which could be a sign of colorectal cancer.  · Fecal immunochemical test (FIT). For this test, a stool sample is checked for blood, which could be a sign of colorectal cancer.  · Stool DNA test. For this test, a stool sample is checked for blood and changes in DNA that could lead to colorectal cancer.  · Sigmoidoscopy. During this test, a thin, flexible tube with a camera on the end  (sigmoidoscope) is used to examine the rectum and the lower colon.  · Colonoscopy. During this test, a long, flexible tube with a camera on the end (colonoscope) is used to examine the entire colon and rectum. With a colonoscopy, it is possible to take a sample of tissue (biopsy) and remove small polyps during the test.  · Virtual colonoscopy. Instead of a colonoscope, this type of colonoscopy uses X-rays (CT scan) and computers to produce images of the colon and rectum.  What are the benefits of screening?  Screening reduces your risk for colorectal cancer and can help identify cancer at an early stage, when the cancer can be removed or treated more easily. It is common for polyps to form in the lining of the colon, especially as you age. These polyps may be cancerous or become cancerous over time. Screening can identify these polyps.  What are the risks of screening?  Each screening test may have different risks.  · Stool sample tests have fewer risks than other types of screening tests. However, you may need more tests to confirm results from a stool sample test.  · Screening tests that involve X-rays expose you to low levels of radiation, which may slightly increase your cancer risk. The benefit of detecting cancer outweighs the slight increase in risk.  · Screening tests such as sigmoidoscopy and colonoscopy may place you at risk for bleeding, intestinal damage, infection, or a reaction to medicines given during the exam.  Talk with your health care provider to understand your risk for colorectal cancer and to make a screening plan that is right for you.  Questions to ask your health care provider  · When should I start colorectal cancer screening?  · What is my risk for colorectal cancer?  · How often do I need screening?  · Which screening tests do I need?  · How do I get my test results?  · What do my results mean?  Where to find more information  Learn more about colorectal cancer screening from:  · The  American Cancer Society: www.cancer.org  · The National Cancer [place]: www.cancer.gov  Summary  · Colorectal cancer screening is a group of tests used to check for colorectal cancer before symptoms develop.  · Screening reduces your risk for colorectal cancer and can help identify cancer at an early stage, when the cancer can be removed or treated more easily.  · All adults starting at age 50 until age 75 should have screening. Your health care provider may recommend screening at age 45.  · You may have screening tests starting at an earlier age, or more frequently than other people, if you have certain risk factors.  · Talk with your health care provider to understand your risk for colorectal cancer and to make a screening plan that is right for you.  This information is not intended to replace advice given to you by your health care provider. Make sure you discuss any questions you have with your health care provider.  Document Revised: 04/08/2020 Document Reviewed: 09/19/2018  Elsevier Patient Education © 2021 [employer]

## 2021-06-04 ENCOUNTER — TELEPHONE (OUTPATIENT)
Dept: GASTROENTEROLOGY | Facility: CLINIC | Age: 76
End: 2021-06-04

## 2021-06-04 NOTE — TELEPHONE ENCOUNTER
Returned patients phone call, informed her bowel prep Moviprep was sent to [facility] mail order. Patient was concerned she would not receive it in time. Patient was asked to call the pharmacy to  see if it was sent out, if it has not been sent yet she is to call the office back so we can send it to a local pharmacy. Patient voiced understanding.         ----- Message from Pauly Roland sent at 6/3/2021 11:13 AM CDT -----  Contact: [phone]  Patient left voicemail stating her bowel prep was not at pharmacy and did not know which pharmacy you had sent prescription to. Patient would like you to call her when prep is called in and to which pharmacy.

## 2021-06-07 ENCOUNTER — TELEPHONE (OUTPATIENT)
Dept: GASTROENTEROLOGY | Facility: CLINIC | Age: 76
End: 2021-06-07

## 2021-06-07 RX ORDER — PEG-3350, SODIUM SULFATE, SODIUM CHLORIDE, POTASSIUM CHLORIDE, SODIUM ASCORBATE AND ASCORBIC ACID 7.5-2.691G
1000 KIT ORAL EVERY 12 HOURS
Qty: 1000 ML | Refills: 0 | Status: SHIPPED | OUTPATIENT
Start: 2021-06-07 | End: 2021-06-09 | Stop reason: RX

## 2021-06-07 NOTE — TELEPHONE ENCOUNTER
Patient called today and left voicemail.    States there was a discrepancy in the prescription that was sent in for her prep.  That pharmacy says she is allergic to sacrin,  Which she is not (per patient).    She is asking for her prep to be sent in to [facility].    Patient's phone: [phone].      GOD BLESS!!!!!!!

## 2021-06-08 ENCOUNTER — TELEPHONE (OUTPATIENT)
Dept: GASTROENTEROLOGY | Facility: CLINIC | Age: 76
End: 2021-06-08

## 2021-06-08 ENCOUNTER — OFFICE VISIT (OUTPATIENT)
Dept: FAMILY MEDICINE CLINIC | Facility: CLINIC | Age: 76
End: 2021-06-08

## 2021-06-08 ENCOUNTER — LAB (OUTPATIENT)
Dept: LAB | Facility: HOSPITAL | Age: 76
End: 2021-06-08

## 2021-06-08 VITALS
TEMPERATURE: 97.8 F | HEART RATE: 59 BPM | RESPIRATION RATE: 20 BRPM | BODY MASS INDEX: 37.44 KG/M2 | DIASTOLIC BLOOD PRESSURE: 80 MMHG | HEIGHT: 60 IN | WEIGHT: 190.7 LBS | SYSTOLIC BLOOD PRESSURE: 122 MMHG | OXYGEN SATURATION: 100 %

## 2021-06-08 DIAGNOSIS — R30.0 DYSURIA: ICD-10-CM

## 2021-06-08 DIAGNOSIS — E78.2 MIXED HYPERLIPIDEMIA: ICD-10-CM

## 2021-06-08 DIAGNOSIS — Z00.00 PREVENTATIVE HEALTH CARE: ICD-10-CM

## 2021-06-08 DIAGNOSIS — Z86.718 PERSONAL HISTORY OF OTHER VENOUS THROMBOSIS AND EMBOLISM: ICD-10-CM

## 2021-06-08 DIAGNOSIS — I10 ESSENTIAL HYPERTENSION: Primary | ICD-10-CM

## 2021-06-08 DIAGNOSIS — R20.2 BILATERAL LEG PARESTHESIA: ICD-10-CM

## 2021-06-08 LAB
ANION GAP SERPL CALCULATED.3IONS-SCNC: 10.7 MMOL/L (ref 5–15)
BILIRUB BLD-MCNC: NEGATIVE MG/DL
BUN SERPL-MCNC: 20 MG/DL (ref 8–23)
BUN/CREAT SERPL: 19.8 (ref 7–25)
CALCIUM SPEC-SCNC: 9.5 MG/DL (ref 8.6–10.5)
CHLORIDE SERPL-SCNC: 103 MMOL/L (ref 98–107)
CHOLEST SERPL-MCNC: 236 MG/DL (ref 0–200)
CLARITY, POC: CLEAR
CO2 SERPL-SCNC: 24.3 MMOL/L (ref 22–29)
COLOR UR: NORMAL
CREAT SERPL-MCNC: 1.01 MG/DL (ref 0.57–1)
GFR SERPL CREATININE-BSD FRML MDRD: 54 ML/MIN/1.73
GLUCOSE SERPL-MCNC: 71 MG/DL (ref 65–99)
GLUCOSE UR STRIP-MCNC: NEGATIVE MG/DL
HBA1C MFR BLD: 4.6 % (ref 4.8–5.6)
HDLC SERPL-MCNC: 90 MG/DL (ref 40–60)
KETONES UR QL: NEGATIVE
LDLC SERPL CALC-MCNC: 132 MG/DL (ref 0–100)
LDLC/HDLC SERPL: 1.44 {RATIO}
LEUKOCYTE EST, POC: NEGATIVE
NITRITE UR-MCNC: NEGATIVE MG/ML
PH UR: 5 [PH] (ref 5–8)
POTASSIUM SERPL-SCNC: 4.4 MMOL/L (ref 3.5–5.2)
PROT UR STRIP-MCNC: NEGATIVE MG/DL
RBC # UR STRIP: NEGATIVE /UL
SODIUM SERPL-SCNC: 138 MMOL/L (ref 136–145)
SP GR UR: 1.01 (ref 1–1.03)
TRIGL SERPL-MCNC: 81 MG/DL (ref 0–150)
UROBILINOGEN UR QL: NORMAL
VLDLC SERPL-MCNC: 14 MG/DL (ref 5–40)

## 2021-06-08 PROCEDURE — 81002 URINALYSIS NONAUTO W/O SCOPE: CPT | Performed by: NURSE PRACTITIONER

## 2021-06-08 PROCEDURE — 36415 COLL VENOUS BLD VENIPUNCTURE: CPT

## 2021-06-08 PROCEDURE — 80061 LIPID PANEL: CPT

## 2021-06-08 PROCEDURE — 80048 BASIC METABOLIC PNL TOTAL CA: CPT

## 2021-06-08 PROCEDURE — 83036 HEMOGLOBIN GLYCOSYLATED A1C: CPT

## 2021-06-08 PROCEDURE — 99214 OFFICE O/P EST MOD 30 MIN: CPT | Performed by: NURSE PRACTITIONER

## 2021-06-08 NOTE — PROGRESS NOTES
Subjective   Veronica Wilkinson is a 75 y.o. female.     CC: Hypertension, hyperlipidemia, dysuria, leg paresthesias    Hypertension  This is a chronic problem. The current episode started more than 1 year ago. The problem is controlled. Pertinent negatives include no chest pain, headaches, palpitations, shortness of breath or sweats. Risk factors for coronary artery disease include family history, dyslipidemia, obesity, sedentary lifestyle and post-menopausal state. Current antihypertension treatment includes angiotensin blockers and diuretics. The current treatment provides significant improvement. Compliance problems include diet and exercise.  There is no history of angina, kidney disease or CAD/MI.   Hyperlipidemia  This is a chronic problem. The current episode started more than 1 year ago. The problem is controlled. Recent lipid tests were reviewed and are variable. Exacerbating diseases include obesity. Factors aggravating her hyperlipidemia include fatty foods. Associated symptoms include leg pain. Pertinent negatives include no chest pain, focal sensory loss, focal weakness, myalgias or shortness of breath. Current antihyperlipidemic treatment includes statins. The current treatment provides significant improvement of lipids. Compliance problems include adherence to exercise and adherence to diet.  Risk factors for coronary artery disease include family history, dyslipidemia, obesity, hypertension, a sedentary lifestyle and post-menopausal.   Difficulty Urinating  This is a recurrent problem. The current episode started 1 to 4 weeks ago. The problem occurs intermittently. The problem has been waxing and waning. Pertinent negatives include no abdominal pain, arthralgias, chest pain, chills, congestion, coughing, fatigue, fever, headaches, myalgias, nausea, numbness, rash, sore throat or vomiting. Associated symptoms comments: Burning with urination  . Nothing aggravates the symptoms. She has tried nothing for  "the symptoms. The treatment provided no relief.   Leg Pain   The incident occurred more than 1 week ago. The pain is present in the right leg and left leg. Quality: \"odd feeling\" \"does not hurt\" The pain has been intermittent since onset. Pertinent negatives include no inability to bear weight, loss of motion, loss of sensation, muscle weakness, numbness or tingling. Nothing aggravates the symptoms. She has tried nothing for the symptoms. The treatment provided no relief.        The following portions of the patient's history were reviewed and updated as appropriate: allergies, current medications, past family history, past medical history, past social history, past surgical history and problem list.    Review of Systems   Constitutional: Negative for activity change, appetite change, chills, fatigue, fever, unexpected weight gain and unexpected weight loss.   HENT: Negative for congestion, sore throat, trouble swallowing and voice change.    Eyes: Negative.    Respiratory: Negative for cough, chest tightness, shortness of breath and wheezing.    Cardiovascular: Negative for chest pain, palpitations and leg swelling.   Gastrointestinal: Negative for abdominal pain, constipation, diarrhea, nausea and vomiting.   Endocrine: Negative.    Genitourinary: Positive for difficulty urinating (burning). Negative for dysuria.   Musculoskeletal: Negative for arthralgias and myalgias.        Reports \"all right\" paresthesias to bilateral legs.  Denies any painful sensations or spasms.  No injuries.  No change in color to skin of legs or toes   Skin: Negative for rash.   Allergic/Immunologic: Negative.    Neurological: Negative.  Negative for tingling, focal weakness and numbness.   Hematological: Negative.    Psychiatric/Behavioral: Negative.        Objective   Physical Exam  Vitals and nursing note reviewed.   Constitutional:       General: She is not in acute distress.     Appearance: Normal appearance. She is well-developed. " She is obese. She is not ill-appearing, toxic-appearing or diaphoretic.   HENT:      Head: Normocephalic and atraumatic.   Eyes:      Conjunctiva/sclera: Conjunctivae normal.   Cardiovascular:      Rate and Rhythm: Normal rate and regular rhythm.      Pulses:           Dorsalis pedis pulses are 2+ on the right side and 2+ on the left side.        Posterior tibial pulses are 2+ on the right side and 2+ on the left side.      Heart sounds: Normal heart sounds.   Pulmonary:      Effort: Pulmonary effort is normal. No respiratory distress.      Breath sounds: Normal breath sounds. No stridor. No wheezing, rhonchi or rales.   Abdominal:      General: Bowel sounds are normal. There is no distension.      Palpations: There is no mass.      Tenderness: There is no abdominal tenderness. There is no guarding or rebound.      Hernia: No hernia is present.   Musculoskeletal:         General: No tenderness. Normal range of motion.      Cervical back: Normal range of motion.   Feet:      Right foot:      Skin integrity: Skin integrity normal.      Toenail Condition: Right toenails are normal.      Left foot:      Skin integrity: Skin integrity normal.      Toenail Condition: Left toenails are normal.   Skin:     General: Skin is warm and dry.      Coloration: Skin is not pale.      Findings: No erythema or rash.   Neurological:      Mental Status: She is alert and oriented to person, place, and time.   Psychiatric:         Mood and Affect: Mood normal.         Behavior: Behavior normal.         Thought Content: Thought content normal.         Judgment: Judgment normal.           Assessment/Plan   Diagnoses and all orders for this visit:    1. Essential hypertension (Primary)   -Controlled.  Continue Benicar HCTZ as prescribed.  We will continue to monitor.    2. Mixed hyperlipidemia  -     Lipid Panel; Future, will call with results.  Continue the simvastatin and low-fat diet.  We will continue to monitor.    3. Preventative  health care  -     Lipid Panel; Future  -     Basic Metabolic Panel; Future  -     Hemoglobin A1c; Future, will call with results.    4. Dysuria  -     POCT urinalysis dipstick, manual, unremarkable UA.  Recommended increased water intake.  Follow-up with urology if symptoms persist.    5. Bilateral leg paresthesia  -     US Venous Doppler Lower Extremity Bilateral (duplex); Future, will call with results.    6. Personal history of other venous thrombosis and embolism   -     US Venous Doppler Lower Extremity Bilateral (duplex); Future, will call with results.    7.  Follow-up in 6 months or sooner for any acute needs.          This document has been electronically signed by TABBY Mejia on June 8, 2021 17:12 CDT

## 2021-06-08 NOTE — TELEPHONE ENCOUNTER
Attempted to contact patient 6-7-21 at 10:30am to inform her that the bowel prep moviprep has been resent in to [facility] pharmacy in [place]. Patient had not yet received the medication from the mail order pharmacy. There was no answer and no voicemail was available to leave a message.

## 2021-06-09 ENCOUNTER — TELEPHONE (OUTPATIENT)
Dept: GASTROENTEROLOGY | Facility: CLINIC | Age: 76
End: 2021-06-09

## 2021-06-09 RX ORDER — SODIUM, POTASSIUM,MAG SULFATES 17.5-3.13G
1 SOLUTION, RECONSTITUTED, ORAL ORAL EVERY 12 HOURS
Qty: 177 ML | Refills: 0 | Status: SHIPPED | OUTPATIENT
Start: 2021-06-09 | End: 2021-06-16 | Stop reason: HOSPADM

## 2021-06-09 NOTE — TELEPHONE ENCOUNTER
Contacted patient to make her aware of email sent with suprep instructions. She states she will contact the office if the email does not come through.

## 2021-06-13 ENCOUNTER — LAB (OUTPATIENT)
Dept: LAB | Facility: HOSPITAL | Age: 76
End: 2021-06-13

## 2021-06-13 DIAGNOSIS — Z01.818 PREOP TESTING: Primary | ICD-10-CM

## 2021-06-13 LAB — SARS-COV-2 N GENE RESP QL NAA+PROBE: NOT DETECTED

## 2021-06-13 PROCEDURE — 87635 SARS-COV-2 COVID-19 AMP PRB: CPT

## 2021-06-13 PROCEDURE — C9803 HOPD COVID-19 SPEC COLLECT: HCPCS

## 2021-06-15 ENCOUNTER — HOSPITAL ENCOUNTER (OUTPATIENT)
Dept: ULTRASOUND IMAGING | Facility: HOSPITAL | Age: 76
Discharge: HOME | End: 2021-06-15
Admitting: NURSE PRACTITIONER

## 2021-06-15 DIAGNOSIS — Z86.718 PERSONAL HISTORY OF OTHER VENOUS THROMBOSIS AND EMBOLISM: ICD-10-CM

## 2021-06-15 DIAGNOSIS — R20.2 BILATERAL LEG PARESTHESIA: ICD-10-CM

## 2021-06-15 PROCEDURE — 93970 EXTREMITY STUDY: CPT

## 2021-06-16 ENCOUNTER — HOSPITAL ENCOUNTER (OUTPATIENT)
Facility: HOSPITAL | Age: 76
Setting detail: HOSPITAL OUTPATIENT SURGERY
Discharge: HOME | End: 2021-06-16
Attending: INTERNAL MEDICINE | Admitting: INTERNAL MEDICINE

## 2021-06-16 ENCOUNTER — ANESTHESIA (OUTPATIENT)
Dept: GASTROENTEROLOGY | Facility: HOSPITAL | Age: 76
End: 2021-06-16

## 2021-06-16 ENCOUNTER — ANESTHESIA EVENT (OUTPATIENT)
Dept: GASTROENTEROLOGY | Facility: HOSPITAL | Age: 76
End: 2021-06-16

## 2021-06-16 VITALS
RESPIRATION RATE: 18 BRPM | DIASTOLIC BLOOD PRESSURE: 50 MMHG | HEART RATE: 68 BPM | TEMPERATURE: 96.9 F | HEIGHT: 61 IN | SYSTOLIC BLOOD PRESSURE: 110 MMHG | OXYGEN SATURATION: 99 % | BODY MASS INDEX: 34.93 KG/M2 | WEIGHT: 185 LBS

## 2021-06-16 DIAGNOSIS — Z86.010 PERSONAL HISTORY OF COLONIC POLYPS: ICD-10-CM

## 2021-06-16 DIAGNOSIS — R10.9 LEFT LATERAL ABDOMINAL PAIN: ICD-10-CM

## 2021-06-16 DIAGNOSIS — D50.9 IRON DEFICIENCY ANEMIA, UNSPECIFIED IRON DEFICIENCY ANEMIA TYPE: ICD-10-CM

## 2021-06-16 PROCEDURE — 88305 TISSUE EXAM BY PATHOLOGIST: CPT

## 2021-06-16 PROCEDURE — 43239 EGD BIOPSY SINGLE/MULTIPLE: CPT | Performed by: INTERNAL MEDICINE

## 2021-06-16 PROCEDURE — 25010000002 FENTANYL CITRATE (PF) 50 MCG/ML SOLUTION: Performed by: NURSE ANESTHETIST, CERTIFIED REGISTERED

## 2021-06-16 PROCEDURE — 45381 COLONOSCOPY SUBMUCOUS NJX: CPT | Performed by: INTERNAL MEDICINE

## 2021-06-16 PROCEDURE — 25010000002 PROPOFOL 10 MG/ML EMULSION: Performed by: NURSE ANESTHETIST, CERTIFIED REGISTERED

## 2021-06-16 PROCEDURE — 45380 COLONOSCOPY AND BIOPSY: CPT | Performed by: INTERNAL MEDICINE

## 2021-06-16 RX ORDER — PROPOFOL 10 MG/ML
VIAL (ML) INTRAVENOUS AS NEEDED
Status: DISCONTINUED | OUTPATIENT
Start: 2021-06-16 | End: 2021-06-16 | Stop reason: SURG

## 2021-06-16 RX ORDER — LIDOCAINE HYDROCHLORIDE 20 MG/ML
INJECTION, SOLUTION EPIDURAL; INFILTRATION; INTRACAUDAL; PERINEURAL AS NEEDED
Status: DISCONTINUED | OUTPATIENT
Start: 2021-06-16 | End: 2021-06-16 | Stop reason: SURG

## 2021-06-16 RX ORDER — MEPERIDINE HYDROCHLORIDE 25 MG/ML
12.5 INJECTION INTRAMUSCULAR; INTRAVENOUS; SUBCUTANEOUS
Status: DISCONTINUED | OUTPATIENT
Start: 2021-06-16 | End: 2021-06-16 | Stop reason: HOSPADM

## 2021-06-16 RX ORDER — ONDANSETRON 2 MG/ML
4 INJECTION INTRAMUSCULAR; INTRAVENOUS ONCE AS NEEDED
Status: DISCONTINUED | OUTPATIENT
Start: 2021-06-16 | End: 2021-06-16 | Stop reason: HOSPADM

## 2021-06-16 RX ORDER — DEXTROSE AND SODIUM CHLORIDE 5; .45 G/100ML; G/100ML
30 INJECTION, SOLUTION INTRAVENOUS CONTINUOUS PRN
Status: DISCONTINUED | OUTPATIENT
Start: 2021-06-16 | End: 2021-06-16 | Stop reason: HOSPADM

## 2021-06-16 RX ORDER — PROMETHAZINE HYDROCHLORIDE 25 MG/1
25 TABLET ORAL ONCE AS NEEDED
Status: DISCONTINUED | OUTPATIENT
Start: 2021-06-16 | End: 2021-06-16 | Stop reason: HOSPADM

## 2021-06-16 RX ORDER — FENTANYL CITRATE 50 UG/ML
INJECTION, SOLUTION INTRAMUSCULAR; INTRAVENOUS AS NEEDED
Status: DISCONTINUED | OUTPATIENT
Start: 2021-06-16 | End: 2021-06-16 | Stop reason: SURG

## 2021-06-16 RX ORDER — PROMETHAZINE HYDROCHLORIDE 25 MG/1
25 SUPPOSITORY RECTAL ONCE AS NEEDED
Status: DISCONTINUED | OUTPATIENT
Start: 2021-06-16 | End: 2021-06-16 | Stop reason: HOSPADM

## 2021-06-16 RX ADMIN — PROPOFOL 50 MG: 10 INJECTION, EMULSION INTRAVENOUS at 13:40

## 2021-06-16 RX ADMIN — DEXTROSE AND SODIUM CHLORIDE 30 ML/HR: 5; 450 INJECTION, SOLUTION INTRAVENOUS at 13:01

## 2021-06-16 RX ADMIN — LIDOCAINE HYDROCHLORIDE 80 MG: 20 INJECTION, SOLUTION EPIDURAL; INFILTRATION; INTRACAUDAL; PERINEURAL at 13:40

## 2021-06-16 RX ADMIN — FENTANYL CITRATE 100 MCG: 50 INJECTION INTRAMUSCULAR; INTRAVENOUS at 13:42

## 2021-06-16 NOTE — ANESTHESIA PREPROCEDURE EVALUATION
Anesthesia Evaluation     NPO Solid Status: > 8 hours  NPO Liquid Status: > 4 hours           Airway   Mallampati: II  TM distance: >3 FB  Neck ROM: full  Dental - normal exam         Pulmonary - normal exam   (+) pulmonary embolism, COPD, asthma,  Cardiovascular - normal exam    (+) hypertension, dysrhythmias, hyperlipidemia,       Neuro/Psych  GI/Hepatic/Renal/Endo    (+) obesity, morbid obesity, GERD,  renal disease,     Musculoskeletal     Abdominal  - normal exam   Substance History      OB/GYN          Other   arthritis,    history of cancer                    Anesthesia Plan    ASA 3     MAC     intravenous induction     Anesthetic plan, all risks, benefits, and alternatives have been provided, discussed and informed consent has been obtained with: patient.

## 2021-06-16 NOTE — ANESTHESIA POSTPROCEDURE EVALUATION
Patient: Veronica Wilkinson    Procedure Summary     Date: 06/16/21 Room / Location: [facility] ENDOSCOPY 1 / [facility] ENDOSCOPY    Anesthesia Start: 1337 Anesthesia Stop: 1355    Procedures:       ESOPHAGOGASTRODUODENOSCOPY (N/A )      COLONOSCOPY (N/A ) Diagnosis:       Left lateral abdominal pain      Personal history of colonic polyps      Iron deficiency anemia, unspecified iron deficiency anemia type      (Left lateral abdominal pain [R10.9])      (Personal history of colonic polyps [Z86.010])      (Iron deficiency anemia, unspecified iron deficiency anemia type [D50.9])    Surgeons: Rasheed Gibbons MD Provider: Laura Brenner CRNA    Anesthesia Type: MAC ASA Status: 3          Anesthesia Type: MAC    Vitals  No vitals data found for the desired time range.          Post Anesthesia Care and Evaluation    Patient location during evaluation: PHASE II  Patient participation: waiting for patient participation  Level of consciousness: sleepy but conscious  Pain score: 0  Pain management: adequate  Airway patency: patent  Anesthetic complications: No anesthetic complications  PONV Status: none  Cardiovascular status: acceptable  Respiratory status: acceptable  Hydration status: acceptable

## 2021-06-16 NOTE — H&P
No chief complaint on file.      Subjective    Veronica Wilkinson is a 75 y.o. female. she is here today for follow-up.  75-year-old female presents to discuss anemia and history of colon polyps.  Reports she has had some left-sided upper and lower abdominal pain over the last few months denies any hematochezia but reports stool is always very dark as she is on oral iron.  Previous colonoscopy in 2008 note she had a adenomatous colonic polyp of sigmoid which was removed.  She was last seen by Dr. Pineda due to elevated liver enzymes lab work and ultrasound was unremarkable.  She has history of bariatric surgery performed in 2017 per Dr. Finney.     Anemia  Symptoms include abdominal pain (left ). There has been no fever, light-headedness or pallor.     Plan; schedule patient for EGD and colonoscopy due to abdominal pain chronic reflux anemia and personal history of colonic polyps.       The following portions of the patient's history were reviewed and updated as appropriate:   Past Medical History:   Diagnosis Date   • Arthritis    • Asthma    • Bleeding tendency (CMS/HCC)    • Chest pain     History of noncardiac chest pain   • Chronic depression    • COPD (chronic obstructive pulmonary disease) (CMS/HCC)    • Depressive disorder    • Diastolic dysfunction    • Dyspnea    • Dyspnea on exertion    • Edema    • Essential hypertension    • Exercise intolerance    • Fatigue    • Gallstones    • GERD (gastroesophageal reflux disease)    • History of bone density study 2011    DEXA BONE DENSITY APPENDICULAR 08475 (1) - normal   • History of bone density study 06/24/2015    DXA BONE DENSITY AXIAL 91205 [facility] (1) - Ordered By: TOLU RAMIREZ ([facility])    • History of echocardiogram     Echocardiogram W/ color flow 50907 (2)   • History of mammogram 2013    Mammogram screen (1)   • History of mammogram 2015    MAMMOGRAM SCREENING 75983 - [facility] (1)   • History of screening mammography 06/25/2015    SCREENING  "MAMMOGRAPHY DIGITAL  (Medicare) (1) - Ordered By: ANDRADE BECERRIL ([facility])    • Hypercalcemia    • Hyperlipidemia    • Influenza vaccine administered 02/25/2016    INFLUENZA IMMUN ADMIN OR PREV RECV'D  (2) - Ordered By: ANDRADE BECERRIL ([facility])    • Long-term (current) use of anticoagulants     coumadin therapy      • Lower extremity edema     Intermittent lower extremity edema   • Obesity, Class III, BMI 40-49.9 (morbid obesity) (CMS/[facility])     \"Class III obesity\"   • Osteoporosis    • PE (pulmonary embolism) 10/2015    Bilateral PE diagnosed after a car ride to [place]   • Pneumococcal vaccination given 02/25/2016    PNEUMOC VAC/ADMIN/RCVD 4040F (2) - Ordered By: ANDRADE BECERRIL ([facility])    • Right basilic vein fibrosis 2015   • Sedentary lifestyle    • Shortness of breath    • Skin cancer    • Urinary tract infection    • Venous thrombosis 2015    right basilic venous thrombosis; This was noted in May 2015.   • Weight gain      Past Surgical History:   Procedure Laterality Date   • CARDIAC ELECTROPHYSIOLOGY PROCEDURE N/A 12/10/2020    Procedure: Pacemaker DC new;  Surgeon: Vesta Bucio MD;  Location: [facility] INVASIVE LOCATION;  Service: Cardiology;  Laterality: N/A;  Netero per pratima.....juanita from [employer] aware   • CHOLECYSTECTOMY      Cholecystectomy (1)   • COLONOSCOPY      Approximately 5 years prior as stated per patient   • CRYOABLATION     • ENDOSCOPY  09/15/2011    Colon endoscopy 73938 (2) - Hemorrhoids found.   • GASTRIC SLEEVE LAPAROSCOPIC     • HEMORRHOIDECTOMY     • HYSTEROSCOPY      Hysteroscopy; ablation (1)   • INJECTION OF MEDICATION  09/27/2012    Kenalog (1) - Ordered By: ANDRADE BECERRIL ([facility])    • PACEMAKER IMPLANTATION       Family History   Problem Relation Age of Onset   • Ovarian cancer Mother    • Bleeding Disorder Father    • Other Father         Hematologic disorder   • Lung cancer Father    • Pancreatic cancer Sister    • Cancer Brother    • Lung cancer " Brother    • Bleeding Disorder Other    • Hyperlipidemia Other    • Hypertension Other    • Osteoarthritis Other    • Other Other         Gastritis   • Adrenal disorder Daughter    • Seizures Daughter    • Thyroid disease Daughter    • No Known Problems Daughter    • Diabetes Other    • Heart disease Other    • Other Other         Ulcers; Bleeding Tendencies; Allergy   • Cancer Other    • Cholelithiasis Other    • Hypertension Other    • Allergy (severe) Other      OB History        3    Para   3    Term   3            AB        Living           SAB        TAB        Ectopic        Molar        Multiple        Live Births                  Prior to Admission medications    Medication Sig Start Date End Date Taking? Authorizing Provider   acetaminophen (TYLENOL) 325 MG tablet Take 650 mg by mouth Every Night.   Yes Annemarie Vega MD   Bisacodyl (GENTLE LAXATIVE PO) Take 100 mg by mouth Every Night.   Yes Annemarie Vega MD   calcium carbonate (OS-DARON) 600 MG tablet Take 600 mg by mouth 2 (Two) Times a Day With Meals.   Yes Annemarie Vega MD   Cholecalciferol (VITAMIN D3) 2000 units tablet Take 1 tablet by mouth Daily.   Yes Annemarie Vega MD   Cyanocobalamin (B-12 PO) Take 1 tablet by mouth Daily.   Yes Emergency, Nurse Epic, RN   diphenhydrAMINE (BENADRYL) 25 MG tablet Take 50 mg by mouth Every Night. Take two tablets by mouth at bedtime    Yes Annemarie Vega MD   docusate sodium (COLACE) 100 MG capsule Take 100 mg by mouth Every Night. Take two tablets by mouth nightly    Yes Annemarie Vega MD   fluticasone (Flonase) 50 MCG/ACT nasal spray 2 sprays into the nostril(s) as directed by provider Daily As Needed for Rhinitis.   Yes Annemarie Vega MD   Garlic 1000 MG capsule Take 1,000 mg by mouth Daily.   Yes Annemarie Vega MD   Iron, Ferrous Sulfate, 325 (65 Fe) MG tablet Take 325 mg by mouth Daily.   Yes Annemarie Vega MD   LORATADINE ALLERGY  RELIEF PO Take 10 mg by mouth Daily.   Yes Annemarie Vega MD   magnesium oxide (MAG-OX) 400 MG tablet Take 400 mg by mouth Daily.   Yes Annemarie Vega MD   Multiple Vitamins-Minerals (MULTIVITAMIN ADULT PO) Take 1 tablet by mouth Daily.   Yes Annemarie Vega MD   olmesartan-hydrochlorothiazide (BENICAR HCT) 20-12.5 MG per tablet Take 1 tablet by mouth Daily. 1/13/21 1/13/22 Yes Rich Urbina APRN   pantoprazole (PROTONIX) 40 MG EC tablet Take 1 tablet by mouth Daily. 1/13/21  Yes Rich Urbina APRN   simethicone (MYLICON,GAS-X) 125 MG capsule capsule Take 1 capsule by mouth Every Night.   Yes Annemarie Vega MD   simvastatin (ZOCOR) 20 MG tablet Take 1 tablet by mouth Every Other Day. 3/26/21  Yes Rich Urbina APRN   tiZANidine (ZANAFLEX) 4 MG tablet Take 1 tablet by mouth At Night As Needed for Muscle Spasms. 12/8/20  Yes Rich Urbina APRN   traZODone (DESYREL) 100 MG tablet Take 1 tablet by mouth Every Night. 1/11/21  Yes Rich Urbina APRN   Turmeric (QC Tumeric Complex) 500 MG capsule Take 500 mg by mouth Daily.   Yes Annemarie Vega MD     Allergies   Allergen Reactions   • Sulfa Antibiotics Hives and Rash     Sulfa (Sulfonamide Antibiotics)     Social History     Socioeconomic History   • Marital status: Single     Spouse name: Not on file   • Number of children: 3   • Years of education: Not on file   • Highest education level: Not on file   Tobacco Use   • Smoking status: Never Smoker   • Smokeless tobacco: Never Used   Vaping Use   • Vaping Use: Never used   Substance and Sexual Activity   • Alcohol use: No   • Drug use: No   • Sexual activity: Defer       Review of Systems  Review of Systems   Constitutional: Negative for activity change, appetite change, chills, diaphoresis, fatigue, fever and unexpected weight change.   HENT: Negative for sore throat and trouble swallowing.    Respiratory: Negative for shortness of breath.    Gastrointestinal:  "Positive for abdominal pain (left ) and constipation (goes daily with laxative ). Negative for abdominal distention, anal bleeding, blood in stool, diarrhea, nausea, rectal pain and vomiting.   Musculoskeletal: Negative for arthralgias.   Skin: Negative for pallor.   Neurological: Negative for light-headedness.        Ht 154.9 cm (61\")   Wt 86.2 kg (190 lb)   BMI 35.90 kg/m²     Objective    Physical Exam  Constitutional:       General: She is not in acute distress.     Appearance: Normal appearance. She is well-developed.   HENT:      Head: Normocephalic and atraumatic.   Neck:      Thyroid: No thyromegaly.   Cardiovascular:      Rate and Rhythm: Normal rate and regular rhythm.      Heart sounds: Normal heart sounds.   Pulmonary:      Effort: Pulmonary effort is normal.      Breath sounds: Normal breath sounds. No wheezing, rhonchi or rales.   Abdominal:      General: Bowel sounds are normal. There is no distension.      Palpations: Abdomen is soft. Abdomen is not rigid.      Tenderness: There is abdominal tenderness in the left upper quadrant and left lower quadrant. There is no guarding.      Hernia: No hernia is present.   Musculoskeletal:      Cervical back: Normal range of motion and neck supple.   Lymphadenopathy:      Cervical: No cervical adenopathy.   Skin:     General: Skin is warm and dry.      Coloration: Skin is not pale.      Findings: No rash.   Neurological:      Mental Status: She is alert and oriented to person, place, and time.   Psychiatric:         Speech: Speech normal.         Behavior: Behavior is cooperative.       Lab on 05/07/2021   Component Date Value Ref Range Status   • Ferritin 05/07/2021 26.18  13.00 - 150.00 ng/mL Final   • Iron 05/07/2021 89  37 - 145 mcg/dL Final   • Iron Saturation 05/07/2021 23  20 - 50 % Final   • Transferrin 05/07/2021 263  200 - 360 mg/dL Final   • TIBC 05/07/2021 392  298 - 536 mcg/dL Final   • Folate 05/07/2021 >20.00  4.78 - 24.20 ng/mL Final   • " Vitamin B-12 05/07/2021 1,433* 211 - 946 pg/mL Final   • Magnesium 05/07/2021 2.4  1.6 - 2.4 mg/dL Final   • Phosphorus 05/07/2021 3.0  2.5 - 4.5 mg/dL Final   • WBC 05/07/2021 3.94  3.40 - 10.80 10*3/mm3 Final   • RBC 05/07/2021 3.33* 3.77 - 5.28 10*6/mm3 Final   • Hemoglobin 05/07/2021 10.1* 12.0 - 15.9 g/dL Final   • Hematocrit 05/07/2021 30.2* 34.0 - 46.6 % Final   • MCV 05/07/2021 90.7  79.0 - 97.0 fL Final   • MCH 05/07/2021 30.3  26.6 - 33.0 pg Final   • MCHC 05/07/2021 33.4  31.5 - 35.7 g/dL Final   • RDW 05/07/2021 13.1  12.3 - 15.4 % Final   • RDW-SD 05/07/2021 43.0  37.0 - 54.0 fl Final   • MPV 05/07/2021 9.1  6.0 - 12.0 fL Final   • Platelets 05/07/2021 259  140 - 450 10*3/mm3 Final   • Neutrophil % 05/07/2021 46.9  42.7 - 76.0 % Final   • Lymphocyte % 05/07/2021 38.3  19.6 - 45.3 % Final   • Monocyte % 05/07/2021 11.2  5.0 - 12.0 % Final   • Eosinophil % 05/07/2021 2.5  0.3 - 6.2 % Final   • Basophil % 05/07/2021 0.8  0.0 - 1.5 % Final   • Immature Grans % 05/07/2021 0.3  0.0 - 0.5 % Final   • Neutrophils, Absolute 05/07/2021 1.85  1.70 - 7.00 10*3/mm3 Final   • Lymphocytes, Absolute 05/07/2021 1.51  0.70 - 3.10 10*3/mm3 Final   • Monocytes, Absolute 05/07/2021 0.44  0.10 - 0.90 10*3/mm3 Final   • Eosinophils, Absolute 05/07/2021 0.10  0.00 - 0.40 10*3/mm3 Final   • Basophils, Absolute 05/07/2021 0.03  0.00 - 0.20 10*3/mm3 Final   • Immature Grans, Absolute 05/07/2021 0.01  0.00 - 0.05 10*3/mm3 Final   • nRBC 05/07/2021 0.0  0.0 - 0.2 /100 WBC Final     Assessment/Plan      1. Left lateral abdominal pain    2. Personal history of colonic polyps    3. Iron deficiency anemia, unspecified iron deficiency anemia type    .       Orders placed during this encounter include:  Orders Placed This Encounter   Procedures   • Obtain Informed Consent     Standing Status:   Standing     Number of Occurrences:   1     Order Specific Question:   Informed Consent Given For     Answer:   ESOPHAGOGASTRODUODENOSCOPY and  Colonoscopy   • POC Glucose Once     Prior to Procedure on ALL Diabetic Patients     Standing Status:   Standing     Number of Occurrences:   1     Order Specific Question:   Release to patient     Answer:   Immediate   • Insert Peripheral IV     Standing Status:   Standing     Number of Occurrences:   1       ESOPHAGOGASTRODUODENOSCOPY (N/A), COLONOSCOPY (N/A)    Review and/or summary of lab tests, radiology, procedures, medications. Review and summary of old records and obtaining of history. The risks and benefits of my recommendations, as well as other treatment options were discussed with the patient today. Questions were answered.    New Medications Ordered This Visit   Medications   • dextrose 5 % and sodium chloride 0.45 % infusion       Follow-up: No follow-ups on file.          This document has been electronically signed by Rasheed Gibbons MD on June 16, 2021 12:39 CDT           I spent 16 minutes caring for Veronica on this date of service. This time includes time spent by me in the following activities:preparing for the visit, reviewing tests, obtaining and/or reviewing a separately obtained history, performing a medically appropriate examination and/or evaluation , counseling and educating the patient/family/caregiver, ordering medications, tests, or procedures, referring and communicating with other health care professionals , documenting information in the medical record and care coordination    Results for orders placed or performed in visit on 06/13/21   COVID-19, [facility] IN-HOUSE, NP SWAB IN TRANSPORT MEDIA 8-10 HR TAT - Swab, Nasopharynx    Specimen: Nasopharynx; Swab   Result Value Ref Range    COVID19 Not Detected Not Detected - Ref. Range   Results for orders placed or performed in visit on 06/08/21   Hemoglobin A1c    Specimen: Blood   Result Value Ref Range    Hemoglobin A1C 4.60 (L) 4.80 - 5.60 %   Lipid Panel    Specimen: Blood   Result Value Ref Range    Total Cholesterol 236 (H) 0 -  200 mg/dL    Triglycerides 81 0 - 150 mg/dL    HDL Cholesterol 90 (H) 40 - 60 mg/dL    LDL Cholesterol  132 (H) 0 - 100 mg/dL    VLDL Cholesterol 14 5 - 40 mg/dL    LDL/HDL Ratio 1.44    Basic Metabolic Panel    Specimen: Blood   Result Value Ref Range    Glucose 71 65 - 99 mg/dL    BUN 20 8 - 23 mg/dL    Creatinine 1.01 (H) 0.57 - 1.00 mg/dL    Sodium 138 136 - 145 mmol/L    Potassium 4.4 3.5 - 5.2 mmol/L    Chloride 103 98 - 107 mmol/L    CO2 24.3 22.0 - 29.0 mmol/L    Calcium 9.5 8.6 - 10.5 mg/dL    eGFR Non African Amer 54 (L) >60 mL/min/1.73    BUN/Creatinine Ratio 19.8 7.0 - 25.0    Anion Gap 10.7 5.0 - 15.0 mmol/L   Results for orders placed or performed in visit on 06/08/21   POCT urinalysis dipstick, manual    Specimen: Urine   Result Value Ref Range    Color Straw Yellow, Straw, Dark Yellow, Karen    Clarity, UA Clear Clear    Glucose, UA Negative Negative, 1000 mg/dL (3+) mg/dL    Bilirubin Negative Negative    Ketones, UA Negative Negative    Specific Gravity  1.010 1.005 - 1.030    Blood, UA Negative Negative    pH, Urine 5.0 5.0 - 8.0    Protein, POC Negative Negative mg/dL    Urobilinogen, UA Normal Normal    Leukocytes Negative Negative    Nitrite, UA Negative Negative   Results for orders placed or performed in visit on 05/07/21   Vitamin B12 & Folate    Specimen: Blood   Result Value Ref Range    Folate >20.00 4.78 - 24.20 ng/mL    Vitamin B-12 1,433 (H) 211 - 946 pg/mL   CBC Auto Differential    Specimen: Blood   Result Value Ref Range    WBC 3.94 3.40 - 10.80 10*3/mm3    RBC 3.33 (L) 3.77 - 5.28 10*6/mm3    Hemoglobin 10.1 (L) 12.0 - 15.9 g/dL    Hematocrit 30.2 (L) 34.0 - 46.6 %    MCV 90.7 79.0 - 97.0 fL    MCH 30.3 26.6 - 33.0 pg    MCHC 33.4 31.5 - 35.7 g/dL    RDW 13.1 12.3 - 15.4 %    RDW-SD 43.0 37.0 - 54.0 fl    MPV 9.1 6.0 - 12.0 fL    Platelets 259 140 - 450 10*3/mm3    Neutrophil % 46.9 42.7 - 76.0 %    Lymphocyte % 38.3 19.6 - 45.3 %    Monocyte % 11.2 5.0 - 12.0 %    Eosinophil % 2.5  0.3 - 6.2 %    Basophil % 0.8 0.0 - 1.5 %    Immature Grans % 0.3 0.0 - 0.5 %    Neutrophils, Absolute 1.85 1.70 - 7.00 10*3/mm3    Lymphocytes, Absolute 1.51 0.70 - 3.10 10*3/mm3    Monocytes, Absolute 0.44 0.10 - 0.90 10*3/mm3    Eosinophils, Absolute 0.10 0.00 - 0.40 10*3/mm3    Basophils, Absolute 0.03 0.00 - 0.20 10*3/mm3    Immature Grans, Absolute 0.01 0.00 - 0.05 10*3/mm3    nRBC 0.0 0.0 - 0.2 /100 WBC   Iron Profile    Specimen: Blood   Result Value Ref Range    Iron 89 37 - 145 mcg/dL    Iron Saturation 23 20 - 50 %    Transferrin 263 200 - 360 mg/dL    TIBC 392 298 - 536 mcg/dL   Phosphorus    Specimen: Blood   Result Value Ref Range    Phosphorus 3.0 2.5 - 4.5 mg/dL   Magnesium    Specimen: Blood   Result Value Ref Range    Magnesium 2.4 1.6 - 2.4 mg/dL   Ferritin    Specimen: Blood   Result Value Ref Range    Ferritin 26.18 13.00 - 150.00 ng/mL   Results for orders placed or performed during the hospital encounter of 04/10/21   Gold Top - SST   Result Value Ref Range    Extra Tube Hold for add-ons.    Green Top (Gel)   Result Value Ref Range    Extra Tube Hold for add-ons.    CBC Auto Differential    Specimen: Blood   Result Value Ref Range    WBC 3.65 3.40 - 10.80 10*3/mm3    RBC 3.61 (L) 3.77 - 5.28 10*6/mm3    Hemoglobin 10.6 (L) 12.0 - 15.9 g/dL    Hematocrit 33.0 (L) 34.0 - 46.6 %    MCV 91.4 79.0 - 97.0 fL    MCH 29.4 26.6 - 33.0 pg    MCHC 32.1 31.5 - 35.7 g/dL    RDW 13.2 12.3 - 15.4 %    RDW-SD 44.0 37.0 - 54.0 fl    MPV 8.7 6.0 - 12.0 fL    Platelets 262 140 - 450 10*3/mm3    Neutrophil % 51.9 42.7 - 76.0 %    Lymphocyte % 30.4 19.6 - 45.3 %    Monocyte % 14.2 (H) 5.0 - 12.0 %    Eosinophil % 2.7 0.3 - 6.2 %    Basophil % 0.8 0.0 - 1.5 %    Immature Grans % 0.0 0.0 - 0.5 %    Neutrophils, Absolute 1.89 1.70 - 7.00 10*3/mm3    Lymphocytes, Absolute 1.11 0.70 - 3.10 10*3/mm3    Monocytes, Absolute 0.52 0.10 - 0.90 10*3/mm3    Eosinophils, Absolute 0.10 0.00 - 0.40 10*3/mm3    Basophils, Absolute  0.03 0.00 - 0.20 10*3/mm3    Immature Grans, Absolute 0.00 0.00 - 0.05 10*3/mm3    nRBC 0.0 0.0 - 0.2 /100 WBC   Lavender Top   Result Value Ref Range    Extra Tube hold for add-on    Light Blue Top   Result Value Ref Range    Extra Tube hold for add-on    Troponin    Specimen: Blood   Result Value Ref Range    Troponin T <0.010 0.000 - 0.030 ng/mL   Troponin    Specimen: Blood   Result Value Ref Range    Troponin T <0.010 0.000 - 0.030 ng/mL   D-dimer, Quantitative    Specimen: Blood   Result Value Ref Range    D-Dimer, Quantitative 1,649 (H) 0 - 470 ng/mL (FEU)   BNP    Specimen: Blood   Result Value Ref Range    proBNP 70.1 0.0 - 900.0 pg/mL   Comprehensive Metabolic Panel    Specimen: Blood   Result Value Ref Range    Glucose 86 65 - 99 mg/dL    BUN 16 8 - 23 mg/dL    Creatinine 0.98 0.57 - 1.00 mg/dL    Sodium 136 136 - 145 mmol/L    Potassium 4.4 3.5 - 5.2 mmol/L    Chloride 105 98 - 107 mmol/L    CO2 26.0 22.0 - 29.0 mmol/L    Calcium 9.6 8.6 - 10.5 mg/dL    Total Protein 7.3 6.0 - 8.5 g/dL    Albumin 3.90 3.50 - 5.20 g/dL    ALT (SGPT) 18 1 - 33 U/L    AST (SGOT) 25 1 - 32 U/L    Alkaline Phosphatase 112 39 - 117 U/L    Total Bilirubin 0.3 0.0 - 1.2 mg/dL    eGFR Non African Amer 56 (L) >60 mL/min/1.73    Globulin 3.4 gm/dL    A/G Ratio 1.1 g/dL    BUN/Creatinine Ratio 16.3 7.0 - 25.0    Anion Gap 5.0 5.0 - 15.0 mmol/L     *Note: Due to a large number of results and/or encounters for the requested time period, some results have not been displayed. A complete set of results can be found in Results Review.

## 2021-06-17 LAB
LAB AP CASE REPORT: NORMAL
PATH REPORT.FINAL DX SPEC: NORMAL

## 2021-06-18 ENCOUNTER — HOSPITAL ENCOUNTER (EMERGENCY)
Facility: HOSPITAL | Age: 76
Discharge: HOME | End: 2021-06-18
Attending: EMERGENCY MEDICINE | Admitting: EMERGENCY MEDICINE

## 2021-06-18 ENCOUNTER — APPOINTMENT (OUTPATIENT)
Dept: GENERAL RADIOLOGY | Facility: HOSPITAL | Age: 76
End: 2021-06-18

## 2021-06-18 ENCOUNTER — TELEPHONE (OUTPATIENT)
Dept: FAMILY MEDICINE CLINIC | Facility: CLINIC | Age: 76
End: 2021-06-18

## 2021-06-18 VITALS
SYSTOLIC BLOOD PRESSURE: 118 MMHG | HEART RATE: 60 BPM | HEIGHT: 61 IN | RESPIRATION RATE: 18 BRPM | TEMPERATURE: 97.5 F | DIASTOLIC BLOOD PRESSURE: 70 MMHG | BODY MASS INDEX: 35.87 KG/M2 | OXYGEN SATURATION: 100 % | WEIGHT: 190 LBS

## 2021-06-18 DIAGNOSIS — I95.89 HYPOTENSION DUE TO HYPOVOLEMIA: ICD-10-CM

## 2021-06-18 DIAGNOSIS — E86.1 HYPOTENSION DUE TO HYPOVOLEMIA: ICD-10-CM

## 2021-06-18 DIAGNOSIS — E86.0 DEHYDRATION, MODERATE: Primary | ICD-10-CM

## 2021-06-18 DIAGNOSIS — R42 POSTURAL DIZZINESS WITH NEAR SYNCOPE: ICD-10-CM

## 2021-06-18 DIAGNOSIS — R55 POSTURAL DIZZINESS WITH NEAR SYNCOPE: ICD-10-CM

## 2021-06-18 LAB
ALBUMIN SERPL-MCNC: 3.7 G/DL (ref 3.5–5.2)
ALBUMIN/GLOB SERPL: 1.4 G/DL
ALP SERPL-CCNC: 88 U/L (ref 39–117)
ALT SERPL W P-5'-P-CCNC: 16 U/L (ref 1–33)
ANION GAP SERPL CALCULATED.3IONS-SCNC: 6 MMOL/L (ref 5–15)
AST SERPL-CCNC: 23 U/L (ref 1–32)
BACTERIA UR QL AUTO: ABNORMAL /HPF
BASOPHILS # BLD AUTO: 0.03 10*3/MM3 (ref 0–0.2)
BASOPHILS NFR BLD AUTO: 0.7 % (ref 0–1.5)
BILIRUB SERPL-MCNC: 0.2 MG/DL (ref 0–1.2)
BILIRUB UR QL STRIP: ABNORMAL
BUN SERPL-MCNC: 20 MG/DL (ref 8–23)
BUN/CREAT SERPL: 18.3 (ref 7–25)
CALCIUM SPEC-SCNC: 9.1 MG/DL (ref 8.6–10.5)
CHLORIDE SERPL-SCNC: 105 MMOL/L (ref 98–107)
CLARITY UR: CLEAR
CO2 SERPL-SCNC: 26 MMOL/L (ref 22–29)
COLOR UR: YELLOW
CREAT SERPL-MCNC: 1.09 MG/DL (ref 0.57–1)
DEPRECATED RDW RBC AUTO: 41.6 FL (ref 37–54)
EOSINOPHIL # BLD AUTO: 0.06 10*3/MM3 (ref 0–0.4)
EOSINOPHIL NFR BLD AUTO: 1.3 % (ref 0.3–6.2)
ERYTHROCYTE [DISTWIDTH] IN BLOOD BY AUTOMATED COUNT: 12.7 % (ref 12.3–15.4)
GFR SERPL CREATININE-BSD FRML MDRD: 49 ML/MIN/1.73
GLOBULIN UR ELPH-MCNC: 2.6 GM/DL
GLUCOSE SERPL-MCNC: 116 MG/DL (ref 65–99)
GLUCOSE UR STRIP-MCNC: NEGATIVE MG/DL
HCT VFR BLD AUTO: 29.1 % (ref 34–46.6)
HGB BLD-MCNC: 9.6 G/DL (ref 12–15.9)
HGB UR QL STRIP.AUTO: NEGATIVE
HOLD SPECIMEN: NORMAL
HOLD SPECIMEN: NORMAL
HYALINE CASTS UR QL AUTO: ABNORMAL /LPF
IMM GRANULOCYTES # BLD AUTO: 0 10*3/MM3 (ref 0–0.05)
IMM GRANULOCYTES NFR BLD AUTO: 0 % (ref 0–0.5)
KETONES UR QL STRIP: NEGATIVE
LEUKOCYTE ESTERASE UR QL STRIP.AUTO: ABNORMAL
LYMPHOCYTES # BLD AUTO: 1.53 10*3/MM3 (ref 0.7–3.1)
LYMPHOCYTES NFR BLD AUTO: 34.3 % (ref 19.6–45.3)
MAGNESIUM SERPL-MCNC: 2 MG/DL (ref 1.6–2.4)
MCH RBC QN AUTO: 29.6 PG (ref 26.6–33)
MCHC RBC AUTO-ENTMCNC: 33 G/DL (ref 31.5–35.7)
MCV RBC AUTO: 89.8 FL (ref 79–97)
MONOCYTES # BLD AUTO: 0.38 10*3/MM3 (ref 0.1–0.9)
MONOCYTES NFR BLD AUTO: 8.5 % (ref 5–12)
NEUTROPHILS NFR BLD AUTO: 2.46 10*3/MM3 (ref 1.7–7)
NEUTROPHILS NFR BLD AUTO: 55.2 % (ref 42.7–76)
NITRITE UR QL STRIP: NEGATIVE
NRBC BLD AUTO-RTO: 0 /100 WBC (ref 0–0.2)
NT-PROBNP SERPL-MCNC: 45.2 PG/ML (ref 0–900)
PH UR STRIP.AUTO: <=5 [PH] (ref 5–9)
PLATELET # BLD AUTO: 251 10*3/MM3 (ref 140–450)
PMV BLD AUTO: 9 FL (ref 6–12)
POTASSIUM SERPL-SCNC: 3.8 MMOL/L (ref 3.5–5.2)
PROT SERPL-MCNC: 6.3 G/DL (ref 6–8.5)
PROT UR QL STRIP: NEGATIVE
QT INTERVAL: 384 MS
QTC INTERVAL: 414 MS
RBC # BLD AUTO: 3.24 10*6/MM3 (ref 3.77–5.28)
RBC # UR: ABNORMAL /HPF
REF LAB TEST METHOD: ABNORMAL
SODIUM SERPL-SCNC: 137 MMOL/L (ref 136–145)
SP GR UR STRIP: 1.02 (ref 1–1.03)
SQUAMOUS #/AREA URNS HPF: ABNORMAL /HPF
TROPONIN T SERPL-MCNC: <0.01 NG/ML (ref 0–0.03)
UROBILINOGEN UR QL STRIP: ABNORMAL
WBC # BLD AUTO: 4.46 10*3/MM3 (ref 3.4–10.8)
WBC UR QL AUTO: ABNORMAL /HPF
WHOLE BLOOD HOLD SPECIMEN: NORMAL

## 2021-06-18 PROCEDURE — 84484 ASSAY OF TROPONIN QUANT: CPT | Performed by: EMERGENCY MEDICINE

## 2021-06-18 PROCEDURE — 81001 URINALYSIS AUTO W/SCOPE: CPT | Performed by: EMERGENCY MEDICINE

## 2021-06-18 PROCEDURE — 99284 EMERGENCY DEPT VISIT MOD MDM: CPT

## 2021-06-18 PROCEDURE — 85025 COMPLETE CBC W/AUTO DIFF WBC: CPT

## 2021-06-18 PROCEDURE — 93005 ELECTROCARDIOGRAM TRACING: CPT | Performed by: EMERGENCY MEDICINE

## 2021-06-18 PROCEDURE — 99283 EMERGENCY DEPT VISIT LOW MDM: CPT

## 2021-06-18 PROCEDURE — 83735 ASSAY OF MAGNESIUM: CPT | Performed by: EMERGENCY MEDICINE

## 2021-06-18 PROCEDURE — 71045 X-RAY EXAM CHEST 1 VIEW: CPT

## 2021-06-18 PROCEDURE — 93010 ELECTROCARDIOGRAM REPORT: CPT | Performed by: INTERNAL MEDICINE

## 2021-06-18 PROCEDURE — 80053 COMPREHEN METABOLIC PANEL: CPT | Performed by: EMERGENCY MEDICINE

## 2021-06-18 PROCEDURE — 83880 ASSAY OF NATRIURETIC PEPTIDE: CPT | Performed by: EMERGENCY MEDICINE

## 2021-06-18 PROCEDURE — 93005 ELECTROCARDIOGRAM TRACING: CPT

## 2021-06-18 RX ORDER — SODIUM CHLORIDE 0.9 % (FLUSH) 0.9 %
10 SYRINGE (ML) INJECTION AS NEEDED
Status: DISCONTINUED | OUTPATIENT
Start: 2021-06-18 | End: 2021-06-18 | Stop reason: HOSPADM

## 2021-06-18 RX ADMIN — SODIUM CHLORIDE 1000 ML: 9 INJECTION, SOLUTION INTRAVENOUS at 15:37

## 2021-06-18 NOTE — DISCHARGE INSTRUCTIONS
Hold antihypertensives this weekend.  Follow-up early  next week should symptoms persist.  Return with any new or worsening symptoms or any concerns.

## 2021-06-18 NOTE — ED PROVIDER NOTES
Subjective   Patient presents emergency department with complaints of dizziness and low blood pressures.  Patient noted that she had a colonoscopy 2 days ago.  Patient had been on a bowel prep for that.  Patient states she got home after that and noted her blood pressure to be low in the 60/40 range.  Patient also noted later that day, which was yesterday, that the patient had been getting up from a seated position and noted to be seeing black spots as if she might pass out.  Patient with no nausea vomiting.  Patient states that the symptoms did get somewhat better as she was pushing fluids at home yesterday, though note her blood pressure in the 110 range earlier this morning that had dropped to about 100 so she came in for further evaluation.  Patient appears normotensive at this time though not taking her home medications this morning for blood pressure.          Review of Systems   Constitutional: Negative.  Negative for appetite change, chills and fever.   HENT: Negative.  Negative for congestion.    Eyes: Negative.  Negative for photophobia and visual disturbance.   Respiratory: Negative.  Negative for cough, chest tightness and shortness of breath.    Cardiovascular: Negative.  Negative for chest pain and palpitations.   Gastrointestinal: Negative.  Negative for abdominal pain, constipation, diarrhea, nausea and vomiting.   Endocrine: Negative.    Genitourinary: Negative.  Negative for decreased urine volume, dysuria, flank pain and hematuria.   Musculoskeletal: Negative.  Negative for arthralgias, back pain, myalgias, neck pain and neck stiffness.   Skin: Negative.  Negative for pallor.   Neurological: Positive for dizziness. Negative for syncope, weakness, light-headedness, numbness and headaches.   Psychiatric/Behavioral: Negative.  Negative for confusion and suicidal ideas. The patient is not nervous/anxious.    All other systems reviewed and are negative.      Past Medical History:   Diagnosis Date   •  "Arthritis    • Asthma    • Bleeding tendency (CMS/Formerly [facility])    • Chest pain     History of noncardiac chest pain   • Chronic depression    • COPD (chronic obstructive pulmonary disease) (CMS/Formerly [facility])    • Depressive disorder    • Diastolic dysfunction    • Dyspnea    • Dyspnea on exertion    • Edema    • Essential hypertension    • Exercise intolerance    • Fatigue    • Gallstones    • GERD (gastroesophageal reflux disease)    • History of bone density study 2011    DEXA BONE DENSITY APPENDICULAR 82018 (1) - normal   • History of bone density study 06/24/2015    DXA BONE DENSITY AXIAL 63563 [facility] (1) - Ordered By: TOLU RAMIREZ ([facility])    • History of echocardiogram     Echocardiogram W/ color flow 79363 (2)   • History of mammogram 2013    Mammogram screen (1)   • History of mammogram 2015    MAMMOGRAM SCREENING 96221 - [facility] (1)   • History of screening mammography 06/25/2015    SCREENING MAMMOGRAPHY DIGITAL  (Medicare) (1) - Ordered By: ANDRADE BECERRIL ([facility])    • Hypercalcemia    • Hyperlipidemia    • Influenza vaccine administered 02/25/2016    INFLUENZA IMMUN ADMIN OR PREV RECV'D  (2) - Ordered By: ANDRADE BECERRIL ([facility])    • Long-term (current) use of anticoagulants     coumadin therapy      • Lower extremity edema     Intermittent lower extremity edema   • Obesity, Class III, BMI 40-49.9 (morbid obesity) (CMS/Formerly [facility])     \"Class III obesity\"   • Osteoporosis    • PE (pulmonary embolism) 10/2015    Bilateral PE diagnosed after a car ride to [place]   • Pneumococcal vaccination given 02/25/2016    PNEUMOC VAC/ADMIN/RCVD 4040F (2) - Ordered By: ANDRADE BECERRIL ([facility])    • Right basilic vein fibrosis 2015   • Sedentary lifestyle    • Shortness of breath    • Skin cancer    • Urinary tract infection    • Venous thrombosis 2015    right basilic venous thrombosis; This was noted in May 2015.   • Weight gain        Allergies   Allergen Reactions   • Sulfa Antibiotics Hives and Rash     Sulfa " (Sulfonamide Antibiotics)       Past Surgical History:   Procedure Laterality Date   • CARDIAC ELECTROPHYSIOLOGY PROCEDURE N/A 12/10/2020    Procedure: Pacemaker DC new;  Surgeon: Vesta Bucio MD;  Location: [facility] CATH INVASIVE LOCATION;  Service: Cardiology;  Laterality: N/A;  [facility] per pratima.....juanita from [facility] aware   • CHOLECYSTECTOMY      Cholecystectomy (1)   • COLONOSCOPY      Approximately 5 years prior as stated per patient   • COLONOSCOPY N/A 6/16/2021    Procedure: COLONOSCOPY;  Surgeon: Rasheed Gibbons MD;  Location: [facility] ENDOSCOPY;  Service: Gastroenterology;  Laterality: N/A;  tattoo at ascneding colon mass   • CRYOABLATION     • ENDOSCOPY  09/15/2011    Colon endoscopy 51127 (2) - Hemorrhoids found.   • ENDOSCOPY N/A 6/16/2021    Procedure: ESOPHAGOGASTRODUODENOSCOPY;  Surgeon: Rasheed Gibbons MD;  Location: [facility] ENDOSCOPY;  Service: Gastroenterology;  Laterality: N/A;   • GASTRIC SLEEVE LAPAROSCOPIC     • GASTRIC SLEEVE LAPAROSCOPIC     • HEMORRHOIDECTOMY     • HYSTEROSCOPY      Hysteroscopy; ablation (1)   • INJECTION OF MEDICATION  09/27/2012    Kenalog (1) - Ordered By: ANDRADE BECERRIL ([facility])    • PACEMAKER IMPLANTATION         Family History   Problem Relation Age of Onset   • Ovarian cancer Mother    • Bleeding Disorder Father    • Other Father         Hematologic disorder   • Lung cancer Father    • Pancreatic cancer Sister    • Cancer Brother    • Lung cancer Brother    • Bleeding Disorder Other    • Hyperlipidemia Other    • Hypertension Other    • Osteoarthritis Other    • Other Other         Gastritis   • Adrenal disorder Daughter    • Seizures Daughter    • Thyroid disease Daughter    • No Known Problems Daughter    • Diabetes Other    • Heart disease Other    • Other Other         Ulcers; Bleeding Tendencies; Allergy   • Cancer Other    • Cholelithiasis Other    • Hypertension Other    • Allergy (severe) Other        Social History     Socioeconomic History   •  Marital status: Single     Spouse name: Not on file   • Number of children: 3   • Years of education: Not on file   • Highest education level: Not on file   Tobacco Use   • Smoking status: Never Smoker   • Smokeless tobacco: Never Used   Vaping Use   • Vaping Use: Never used   Substance and Sexual Activity   • Alcohol use: No   • Drug use: No   • Sexual activity: Defer           Objective   Physical Exam  Vitals and nursing note reviewed.   Constitutional:       General: She is not in acute distress.     Appearance: Normal appearance. She is well-developed. She is not ill-appearing or diaphoretic.   HENT:      Head: Normocephalic and atraumatic.      Nose: Nose normal.   Eyes:      General: No scleral icterus.     Conjunctiva/sclera: Conjunctivae normal.   Neck:      Vascular: No JVD.   Cardiovascular:      Rate and Rhythm: Normal rate and regular rhythm.      Heart sounds: Normal heart sounds. No murmur heard.   No friction rub. No gallop.    Pulmonary:      Effort: Pulmonary effort is normal. No respiratory distress.      Breath sounds: No wheezing or rales.   Chest:      Chest wall: No tenderness.   Abdominal:      General: There is no distension.      Palpations: Abdomen is soft. There is no mass.      Tenderness: There is no abdominal tenderness. There is no guarding or rebound.   Musculoskeletal:         General: No tenderness or deformity. Normal range of motion.      Cervical back: Normal range of motion and neck supple.   Lymphadenopathy:      Cervical: No cervical adenopathy.   Skin:     General: Skin is warm and dry.      Capillary Refill: Capillary refill takes less than 2 seconds.      Coloration: Skin is not pale.      Findings: No erythema or rash.   Neurological:      General: No focal deficit present.      Mental Status: She is alert and oriented to person, place, and time.      Cranial Nerves: No cranial nerve deficit.      Motor: No abnormal muscle tone.   Psychiatric:         Behavior: Behavior  normal.         Thought Content: Thought content normal.         Judgment: Judgment normal.         Procedures           ED Course                                 Labs Reviewed   COMPREHENSIVE METABOLIC PANEL - Abnormal; Notable for the following components:       Result Value    Glucose 116 (*)     Creatinine 1.09 (*)     eGFR Non  Amer 49 (*)     All other components within normal limits    Narrative:     GFR Normal >60  Chronic Kidney Disease <60  Kidney Failure <15     URINALYSIS W/ MICROSCOPIC IF INDICATED (NO CULTURE) - Abnormal; Notable for the following components:    Bilirubin, UA Small (1+) (*)     Leuk Esterase, UA Small (1+) (*)     All other components within normal limits   CBC WITH AUTO DIFFERENTIAL - Abnormal; Notable for the following components:    RBC 3.24 (*)     Hemoglobin 9.6 (*)     Hematocrit 29.1 (*)     All other components within normal limits   URINALYSIS, MICROSCOPIC ONLY - Abnormal; Notable for the following components:    RBC, UA 0-2 (*)     All other components within normal limits   TROPONIN (IN-HOUSE) - Normal    Narrative:     Troponin T Reference Range:  <= 0.03 ng/mL-   Negative for AMI  >0.03 ng/mL-     Abnormal for myocardial necrosis.  Clinicians would have to utilize clinical acumen, EKG, Troponin and serial changes to determine if it is an Acute Myocardial Infarction or myocardial injury due to an underlying chronic condition.       Results may be falsely decreased if patient taking Biotin.     MAGNESIUM - Normal   BNP (IN-HOUSE) - Normal    Narrative:     Among patients with dyspnea, NT-proBNP is highly sensitive for the detection of acute congestive heart failure. In addition NT-proBNP of <300 pg/ml effectively rules out acute congestive heart failure with 99% negative predictive value.    Results may be falsely decreased if patient taking Biotin.     RAINBOW DRAW    Narrative:     The following orders were created for panel order [place] Draw.  Procedure                                Abnormality         Status                     ---------                               -----------         ------                     Green Top (Gel)[[id number]]                                  Final result               Lavender Top[[id number]]                                     Final result               Gold Top - SST[[id number]]                                   Final result                 Please view results for these tests on the individual orders.   RAINBOW DRAW    Narrative:     The following orders were created for panel order [place] Draw.  Procedure                               Abnormality         Status                     ---------                               -----------         ------                     Green Top (Gel)[[id number]]                                                             Lavender Top[[id number]]                                                                Gold Top - SST[[id number]]                                                                Please view results for these tests on the individual orders.   CBC AND DIFFERENTIAL    Narrative:     The following orders were created for panel order CBC & Differential.  Procedure                               Abnormality         Status                     ---------                               -----------         ------                     CBC Auto Differential[[id number]]        Abnormal            Final result                 Please view results for these tests on the individual orders.   GREEN TOP   LAVENDER TOP   GOLD TOP - SST   GREEN TOP   LAVENDER TOP   GOLD TOP - SST       XR Chest 1 View   Final Result   Stable exam without acute cardiopulmonary process.      Electronically signed by:  Edmond Costa MD  6/18/2021 2:49 PM CDT   Workstation: [id number]          No significant clinical findings, patient improved with IV fluids and states she is feeling better.  Non orthostatic at this time.  We will hold her  blood pressure medications this weekend and follow-up Monday should symptoms persist for further evaluation.        MDM    Final diagnoses:   Dehydration, moderate   Hypotension due to hypovolemia   Postural dizziness with near syncope       ED Disposition  ED Disposition     ED Disposition Condition Comment    Discharge Stable           Rich Urbina, APRN  [address] [place] [facility] [address]  [phone]    On 6/21/2021  If not improved for further evaluation and care         Medication List      No changes were made to your prescriptions during this visit.          Franko Becker MD  06/18/21 0719

## 2021-06-21 ENCOUNTER — LAB (OUTPATIENT)
Dept: LAB | Facility: HOSPITAL | Age: 76
End: 2021-06-21

## 2021-06-21 ENCOUNTER — HOSPITAL ENCOUNTER (OUTPATIENT)
Dept: CT IMAGING | Facility: HOSPITAL | Age: 76
Discharge: HOME | End: 2021-06-21

## 2021-06-21 ENCOUNTER — OFFICE VISIT (OUTPATIENT)
Dept: GASTROENTEROLOGY | Facility: CLINIC | Age: 76
End: 2021-06-21

## 2021-06-21 VITALS
WEIGHT: 189.6 LBS | SYSTOLIC BLOOD PRESSURE: 145 MMHG | DIASTOLIC BLOOD PRESSURE: 74 MMHG | HEIGHT: 61 IN | HEART RATE: 68 BPM | BODY MASS INDEX: 35.8 KG/M2

## 2021-06-21 DIAGNOSIS — K21.9 GASTROESOPHAGEAL REFLUX DISEASE WITHOUT ESOPHAGITIS: ICD-10-CM

## 2021-06-21 DIAGNOSIS — K29.50 CHRONIC GASTRITIS WITHOUT BLEEDING, UNSPECIFIED GASTRITIS TYPE: ICD-10-CM

## 2021-06-21 DIAGNOSIS — C18.2 MALIGNANT NEOPLASM OF ASCENDING COLON (HCC): Primary | ICD-10-CM

## 2021-06-21 DIAGNOSIS — C18.2 MALIGNANT NEOPLASM OF ASCENDING COLON (HCC): ICD-10-CM

## 2021-06-21 LAB
ALBUMIN SERPL-MCNC: 3.7 G/DL (ref 3.5–5.2)
ALBUMIN/GLOB SERPL: 1.3 G/DL
ALP SERPL-CCNC: 84 U/L (ref 39–117)
ALT SERPL W P-5'-P-CCNC: 14 U/L (ref 1–33)
ANION GAP SERPL CALCULATED.3IONS-SCNC: 7 MMOL/L (ref 5–15)
AST SERPL-CCNC: 22 U/L (ref 1–32)
BILIRUB SERPL-MCNC: 0.2 MG/DL (ref 0–1.2)
BUN SERPL-MCNC: 13 MG/DL (ref 8–23)
BUN/CREAT SERPL: 12.9 (ref 7–25)
CALCIUM SPEC-SCNC: 9.7 MG/DL (ref 8.6–10.5)
CHLORIDE SERPL-SCNC: 108 MMOL/L (ref 98–107)
CO2 SERPL-SCNC: 26 MMOL/L (ref 22–29)
CREAT SERPL-MCNC: 1.01 MG/DL (ref 0.57–1)
DEPRECATED RDW RBC AUTO: 41.1 FL (ref 37–54)
ERYTHROCYTE [DISTWIDTH] IN BLOOD BY AUTOMATED COUNT: 12.8 % (ref 12.3–15.4)
GFR SERPL CREATININE-BSD FRML MDRD: 54 ML/MIN/1.73
GLOBULIN UR ELPH-MCNC: 2.8 GM/DL
GLUCOSE SERPL-MCNC: 92 MG/DL (ref 65–99)
HCT VFR BLD AUTO: 29.2 % (ref 34–46.6)
HGB BLD-MCNC: 9.8 G/DL (ref 12–15.9)
MCH RBC QN AUTO: 29.7 PG (ref 26.6–33)
MCHC RBC AUTO-ENTMCNC: 33.6 G/DL (ref 31.5–35.7)
MCV RBC AUTO: 88.5 FL (ref 79–97)
PLATELET # BLD AUTO: 265 10*3/MM3 (ref 140–450)
PMV BLD AUTO: 8.8 FL (ref 6–12)
POTASSIUM SERPL-SCNC: 3.8 MMOL/L (ref 3.5–5.2)
PROT SERPL-MCNC: 6.5 G/DL (ref 6–8.5)
RBC # BLD AUTO: 3.3 10*6/MM3 (ref 3.77–5.28)
SODIUM SERPL-SCNC: 141 MMOL/L (ref 136–145)
WBC # BLD AUTO: 4.28 10*3/MM3 (ref 3.4–10.8)

## 2021-06-21 PROCEDURE — 25010000002 IOPAMIDOL 61 % SOLUTION: Performed by: NURSE PRACTITIONER

## 2021-06-21 PROCEDURE — 74177 CT ABD & PELVIS W/CONTRAST: CPT

## 2021-06-21 PROCEDURE — 99213 OFFICE O/P EST LOW 20 MIN: CPT | Performed by: NURSE PRACTITIONER

## 2021-06-21 PROCEDURE — 36415 COLL VENOUS BLD VENIPUNCTURE: CPT | Performed by: NURSE PRACTITIONER

## 2021-06-21 PROCEDURE — 82378 CARCINOEMBRYONIC ANTIGEN: CPT | Performed by: NURSE PRACTITIONER

## 2021-06-21 PROCEDURE — 85027 COMPLETE CBC AUTOMATED: CPT | Performed by: NURSE PRACTITIONER

## 2021-06-21 PROCEDURE — 80053 COMPREHEN METABOLIC PANEL: CPT | Performed by: NURSE PRACTITIONER

## 2021-06-21 RX ADMIN — IOPAMIDOL 90 ML: 612 INJECTION, SOLUTION INTRAVENOUS at 17:03

## 2021-06-21 NOTE — PATIENT INSTRUCTIONS
"[facility] (25th ed., pp. 7610-9920). [place], [place]: Elsevier.\">   Anemia    Anemia is a condition in which there is not enough red blood cells or hemoglobin in the blood. Hemoglobin is a substance in red blood cells that carries oxygen.  When you do not have enough red blood cells or hemoglobin (are anemic), your body cannot get enough oxygen and your organs may not work properly. As a result, you may feel very tired or have other problems.  What are the causes?  Common causes of anemia include:  · Excessive bleeding. Anemia can be caused by excessive bleeding inside or outside the body, including bleeding from the intestines or from heavy menstrual periods in females.  · Poor nutrition.  · Long-lasting (chronic) kidney, thyroid, and liver disease.  · Bone marrow disorders, spleen problems, and blood disorders.  · Cancer and treatments for cancer.  · HIV (human immunodeficiency virus) and AIDS (acquired immunodeficiency syndrome).  · Infections, medicines, and autoimmune disorders that destroy red blood cells.  What are the signs or symptoms?  Symptoms of this condition include:  · Minor weakness.  · Dizziness.  · Headache, or difficulties concentrating and sleeping.  · Heartbeats that feel irregular or faster than normal (palpitations).  · Shortness of breath, especially with exercise.  · Pale skin, lips, and nails, or cold hands and feet.  · Indigestion and nausea.  Symptoms may occur suddenly or develop slowly. If your anemia is mild, you may not have symptoms.  How is this diagnosed?  This condition is diagnosed based on blood tests, your medical history, and a physical exam. In some cases, a test may be needed in which cells are removed from the soft tissue inside of a bone and looked at under a microscope (bone marrow biopsy). Your health care provider may also check your stool (feces) for blood and may do additional testing to look for the cause of your bleeding.  Other tests may " include:  · Imaging tests, such as a CT scan or MRI.  · A procedure to see inside your esophagus and stomach (endoscopy).  · A procedure to see inside your colon and rectum (colonoscopy).  How is this treated?  Treatment for this condition depends on the cause. If you continue to lose a lot of blood, you may need to be treated at a hospital. Treatment may include:  · Taking supplements of iron, vitamin B12, or folic acid.  · Taking a hormone medicine (erythropoietin) that can help to stimulate red blood cell growth.  · Having a blood transfusion. This may be needed if you lose a lot of blood.  · Making changes to your diet.  · Having surgery to remove your spleen.  Follow these instructions at home:  · Take over-the-counter and prescription medicines only as told by your health care provider.  · Take supplements only as told by your health care provider.  · Follow any diet instructions that you were given by your health care provider.  · Keep all follow-up visits as told by your health care provider. This is important.  Contact a health care provider if:  · You develop new bleeding anywhere in the body.  Get help right away if:  · You are very weak.  · You are short of breath.  · You have pain in your abdomen or chest.  · You are dizzy or feel faint.  · You have trouble concentrating.  · You have bloody stools, black stools, or tarry stools.  · You vomit repeatedly or you vomit up blood.  These symptoms may represent a serious problem that is an emergency. Do not wait to see if the symptoms will go away. Get medical help right away. Call your local emergency services (911 in the [place] Do not drive yourself to the hospital.  Summary  · Anemia is a condition in which you do not have enough red blood cells or enough of a substance in your red blood cells that carries oxygen (hemoglobin).  · Symptoms may occur suddenly or develop slowly.  · If your anemia is mild, you may not have symptoms.  · This condition is  diagnosed with blood tests, a medical history, and a physical exam. Other tests may be needed.  · Treatment for this condition depends on the cause of the anemia.  This information is not intended to replace advice given to you by your health care provider. Make sure you discuss any questions you have with your health care provider.  Document Revised: 11/24/2020 Document Reviewed: 11/24/2020  [email or web address] Patient Education © 2021 [employer]

## 2021-06-22 ENCOUNTER — PATIENT OUTREACH (OUTPATIENT)
Dept: CASE MANAGEMENT | Facility: OTHER | Age: 76
End: 2021-06-22

## 2021-06-22 LAB — CEA SERPL-MCNC: 3.32 NG/ML

## 2021-06-22 NOTE — OUTREACH NOTE
Ambulatory Case Management Note    Care Evaluation    Questions/Answers      Most Recent Value   Annual Wellness Visit:   Patient Has Completed   Care Gaps Addressed  Colon Cancer Screening, Flu Shot, Mammogram, Pneumonia Vaccine, Other (See Comment) [DEXA- UTD]   Colon Cancer Screening Type  Colonoscopy   Colonoscopy Status  Up to Date (< 10 yrs)   Flu Shot Status  Up to Date   Mammogram Status  Up to Date   Pneumonia Vaccine Status  Up to Date   Other Patient Education/Resources   24/7 [facility] Nurse Call Line, [facility]   24/7 Nurse Call Line Education Method  Verbal   MyChart Education Method  -- [active]   Medication Adherence  Medications understood          SDOH updated and reviewed with the patient during this encounter:     Food Insecurity: No Food Insecurity   • Worried About Running Out of Food in the Last Year: Never true   • Ran Out of Food in the Last Year: Never true       Transportation Needs: No Transportation Needs   • Lack of Transportation (Medical): No   • Lack of Transportation (Non-Medical): No       Patient Outreach    ACM outreach to pt seen at [facility] ED 6/18/21 for dizziness, hypotension, and dehydration. Introduced self and ACM role. Pt reports feeling much better today, states currently baking a cake. States she started back on her bp meds yesterday as instructed. States BP yesterday morning was back up at 155/95 prior to taking her bp meds. States at her gastro f/u appt yesterday it was 138/70 something. Disease education provided. Educated on staying well hydrated. Discussed pcp f/u. Pt states she plans to call her pcp today. Pt reports to be completely out of her simvastatin x3 days and will not receive her pharmacy mail order for another week. Advised pt to ask pcp to send short supply to local pharmacy until she receives mail order. Pt denies food or transportation insecurities. Reviewed 24/7 nurse line with pt. No further questions or concerns per pt. Pt appreciative of the  call.    Moira Jain RN  Ambulatory Case Management    6/22/2021, 10:02 EDT

## 2021-06-24 ENCOUNTER — TELEPHONE (OUTPATIENT)
Dept: GASTROENTEROLOGY | Facility: CLINIC | Age: 76
End: 2021-06-24

## 2021-06-24 NOTE — TELEPHONE ENCOUNTER
Results relayed to patient who voiced understanding.        ----- Message from TABBY Buenrostro sent at 6/23/2021 11:23 AM CDT -----  Please call patient with results. Please fax to Dr. Finney. Have they contacted her with appt yet?

## 2021-08-20 ENCOUNTER — LAB (OUTPATIENT)
Dept: ONCOLOGY | Facility: HOSPITAL | Age: 76
End: 2021-08-20

## 2021-08-20 ENCOUNTER — CONSULT (OUTPATIENT)
Dept: ONCOLOGY | Facility: CLINIC | Age: 76
End: 2021-08-20

## 2021-08-20 VITALS
TEMPERATURE: 96.6 F | SYSTOLIC BLOOD PRESSURE: 116 MMHG | WEIGHT: 186.6 LBS | HEART RATE: 67 BPM | HEIGHT: 61 IN | RESPIRATION RATE: 16 BRPM | BODY MASS INDEX: 35.23 KG/M2 | DIASTOLIC BLOOD PRESSURE: 61 MMHG

## 2021-08-20 DIAGNOSIS — C18.2 MALIGNANT NEOPLASM OF ASCENDING COLON (HCC): ICD-10-CM

## 2021-08-20 DIAGNOSIS — D50.8 OTHER IRON DEFICIENCY ANEMIA: ICD-10-CM

## 2021-08-20 DIAGNOSIS — C18.9 MALIGNANT NEOPLASM OF COLON, UNSPECIFIED PART OF COLON (HCC): Primary | ICD-10-CM

## 2021-08-20 LAB
ALBUMIN SERPL-MCNC: 3.9 G/DL (ref 3.5–5.2)
ALBUMIN/GLOB SERPL: 1.3 G/DL
ALP SERPL-CCNC: 108 U/L (ref 39–117)
ALT SERPL W P-5'-P-CCNC: 15 U/L (ref 1–33)
ANION GAP SERPL CALCULATED.3IONS-SCNC: 7 MMOL/L (ref 5–15)
AST SERPL-CCNC: 20 U/L (ref 1–32)
BASOPHILS # BLD AUTO: 0.03 10*3/MM3 (ref 0–0.2)
BASOPHILS NFR BLD AUTO: 0.7 % (ref 0–1.5)
BILIRUB SERPL-MCNC: <0.2 MG/DL (ref 0–1.2)
BUN SERPL-MCNC: 19 MG/DL (ref 8–23)
BUN/CREAT SERPL: 17.4 (ref 7–25)
CALCIUM SPEC-SCNC: 10.1 MG/DL (ref 8.6–10.5)
CEA SERPL-MCNC: 3.1 NG/ML
CHLORIDE SERPL-SCNC: 103 MMOL/L (ref 98–107)
CO2 SERPL-SCNC: 27 MMOL/L (ref 22–29)
CREAT SERPL-MCNC: 1.09 MG/DL (ref 0.57–1)
DEPRECATED RDW RBC AUTO: 39.2 FL (ref 37–54)
EOSINOPHIL # BLD AUTO: 0.07 10*3/MM3 (ref 0–0.4)
EOSINOPHIL NFR BLD AUTO: 1.7 % (ref 0.3–6.2)
ERYTHROCYTE [DISTWIDTH] IN BLOOD BY AUTOMATED COUNT: 12.4 % (ref 12.3–15.4)
FERRITIN SERPL-MCNC: 23.29 NG/ML (ref 13–150)
FOLATE SERPL-MCNC: >20 NG/ML (ref 4.78–24.2)
GFR SERPL CREATININE-BSD FRML MDRD: 49 ML/MIN/1.73
GLOBULIN UR ELPH-MCNC: 3 GM/DL
GLUCOSE SERPL-MCNC: 89 MG/DL (ref 65–99)
HCT VFR BLD AUTO: 30.9 % (ref 34–46.6)
HGB BLD-MCNC: 10 G/DL (ref 12–15.9)
HOLD SPECIMEN: NORMAL
IMM GRANULOCYTES # BLD AUTO: 0.01 10*3/MM3 (ref 0–0.05)
IMM GRANULOCYTES NFR BLD AUTO: 0.2 % (ref 0–0.5)
IRON 24H UR-MRATE: 79 MCG/DL (ref 37–145)
IRON SATN MFR SERPL: 19 % (ref 20–50)
LYMPHOCYTES # BLD AUTO: 1.38 10*3/MM3 (ref 0.7–3.1)
LYMPHOCYTES NFR BLD AUTO: 32.6 % (ref 19.6–45.3)
MCH RBC QN AUTO: 28.7 PG (ref 26.6–33)
MCHC RBC AUTO-ENTMCNC: 32.4 G/DL (ref 31.5–35.7)
MCV RBC AUTO: 88.5 FL (ref 79–97)
MONOCYTES # BLD AUTO: 0.34 10*3/MM3 (ref 0.1–0.9)
MONOCYTES NFR BLD AUTO: 8 % (ref 5–12)
NEUTROPHILS NFR BLD AUTO: 2.4 10*3/MM3 (ref 1.7–7)
NEUTROPHILS NFR BLD AUTO: 56.8 % (ref 42.7–76)
NRBC BLD AUTO-RTO: 0 /100 WBC (ref 0–0.2)
PLATELET # BLD AUTO: 334 10*3/MM3 (ref 140–450)
PMV BLD AUTO: 9.1 FL (ref 6–12)
POTASSIUM SERPL-SCNC: 4.5 MMOL/L (ref 3.5–5.2)
PROT SERPL-MCNC: 6.9 G/DL (ref 6–8.5)
RBC # BLD AUTO: 3.49 10*6/MM3 (ref 3.77–5.28)
SODIUM SERPL-SCNC: 137 MMOL/L (ref 136–145)
TIBC SERPL-MCNC: 426 MCG/DL (ref 298–536)
TRANSFERRIN SERPL-MCNC: 286 MG/DL (ref 200–360)
VIT B12 BLD-MCNC: 1867 PG/ML (ref 211–946)
WBC # BLD AUTO: 4.23 10*3/MM3 (ref 3.4–10.8)

## 2021-08-20 PROCEDURE — 85025 COMPLETE CBC W/AUTO DIFF WBC: CPT | Performed by: INTERNAL MEDICINE

## 2021-08-20 PROCEDURE — G0463 HOSPITAL OUTPT CLINIC VISIT: HCPCS | Performed by: INTERNAL MEDICINE

## 2021-08-20 PROCEDURE — 83540 ASSAY OF IRON: CPT | Performed by: INTERNAL MEDICINE

## 2021-08-20 PROCEDURE — 84466 ASSAY OF TRANSFERRIN: CPT | Performed by: INTERNAL MEDICINE

## 2021-08-20 PROCEDURE — 80053 COMPREHEN METABOLIC PANEL: CPT | Performed by: INTERNAL MEDICINE

## 2021-08-20 PROCEDURE — 82728 ASSAY OF FERRITIN: CPT | Performed by: INTERNAL MEDICINE

## 2021-08-20 PROCEDURE — 82746 ASSAY OF FOLIC ACID SERUM: CPT | Performed by: INTERNAL MEDICINE

## 2021-08-20 PROCEDURE — G9903 PT SCRN TBCO ID AS NON USER: HCPCS | Performed by: INTERNAL MEDICINE

## 2021-08-20 PROCEDURE — 1123F ACP DISCUSS/DSCN MKR DOCD: CPT | Performed by: INTERNAL MEDICINE

## 2021-08-20 PROCEDURE — 82378 CARCINOEMBRYONIC ANTIGEN: CPT | Performed by: INTERNAL MEDICINE

## 2021-08-20 PROCEDURE — 82607 VITAMIN B-12: CPT | Performed by: INTERNAL MEDICINE

## 2021-08-20 PROCEDURE — 99215 OFFICE O/P EST HI 40 MIN: CPT | Performed by: INTERNAL MEDICINE

## 2021-08-20 PROCEDURE — 1126F AMNT PAIN NOTED NONE PRSNT: CPT | Performed by: INTERNAL MEDICINE

## 2021-08-20 RX ORDER — DIPHENOXYLATE HYDROCHLORIDE AND ATROPINE SULFATE 2.5; .025 MG/1; MG/1
1 TABLET ORAL DAILY
COMMUNITY
End: 2022-06-30

## 2021-08-20 RX ORDER — NEOMYCIN SULFATE 500 MG/1
TABLET ORAL
COMMUNITY
Start: 2021-07-16 | End: 2022-06-30

## 2021-08-20 RX ORDER — PSEUDOEPHEDRINE HCL 30 MG
200 TABLET ORAL DAILY
COMMUNITY
End: 2021-08-21

## 2021-08-20 RX ORDER — LORATADINE 10 MG/1
10 TABLET ORAL DAILY
COMMUNITY
End: 2022-06-30

## 2021-08-20 RX ORDER — HYDROCODONE BITARTRATE AND ACETAMINOPHEN 5; 325 MG/1; MG/1
1-2 TABLET ORAL EVERY 8 HOURS PRN
COMMUNITY
Start: 2021-07-25 | End: 2022-06-30

## 2021-08-20 RX ORDER — MELATONIN
1000 DAILY
COMMUNITY

## 2021-08-20 NOTE — PROGRESS NOTES
"DATE OF VISIT: 8/21/2021      REASON FOR VISIT: Adenocarcinoma of ascending colon, anemia, neutropenia, history of pulmonary embolism      HISTORY OF PRESENT ILLNESS:   76-year-old female with medical problems consisting of pulmonary embolism diagnosed in 2016 status post anticoagulation with Coumadin for 1 year, osteoporosis, depression, history of gastric sleeve surgery done in 2016 has been following up with [facility] since August 23, 2018 for easy bruising, neutropenia, anemia. Patient was diagnosed with adenocarcinoma of ascending colon in June 2021 for which patient underwent colon resection in July 2021 at [facility]. Patient is here to discuss the result of pathology and further recommendation. Denies any new abdominal pain or any blood in stool. Denies any major weight loss. Denies any new chest pain or shortness of breath. Denies any new lymph node enlargement.      Past Medical History, Past Surgical History, Social History, Family History have been reviewed and are without significant changes except as mentioned.    Review of Systems   Constitutional: Positive for fatigue.   Gastrointestinal: Negative for abdominal pain and blood in stool.   Musculoskeletal: Positive for arthralgias.   Hematological: Negative for adenopathy.      A comprehensive 14 point review of systems was performed and was negative except as mentioned.    Medications:  The current medication list was reviewed in the EMR    ALLERGIES:    Allergies   Allergen Reactions   • Sulfa Antibiotics Hives and Rash     Sulfa (Sulfonamide Antibiotics)       Objective      Vitals:    08/20/21 1342   BP: 116/61   Pulse: 67   Resp: 16   Temp: 96.6 °F (35.9 °C)   TempSrc: Temporal   Weight: 84.6 kg (186 lb 9.6 oz)   Height: 154.9 cm (61\")   PainSc: 0-No pain     Current Status 8/20/2021   ECOG score 0       Physical Exam  Cardiovascular:      Rate and Rhythm: Normal rate and regular rhythm.   Pulmonary:      Breath sounds: Normal " breath sounds.   Abdominal:      Palpations: Abdomen is soft.      Tenderness: There is no abdominal tenderness.      Comments: Well-healing scar present in midline.   Neurological:      Mental Status: She is alert and oriented to person, place, and time.           RECENT LABS:  Glucose   Date Value Ref Range Status   08/20/2021 89 65 - 99 mg/dL Final     Sodium   Date Value Ref Range Status   08/20/2021 137 136 - 145 mmol/L Final   10/12/2018 140 134 - 144 mmol/L Final     Potassium   Date Value Ref Range Status   08/20/2021 4.5 3.5 - 5.2 mmol/L Final   10/12/2018 4.6 3.5 - 5.1 mmol/L Final     CO2   Date Value Ref Range Status   08/20/2021 27.0 22.0 - 29.0 mmol/L Final     Chloride   Date Value Ref Range Status   08/20/2021 103 98 - 107 mmol/L Final   10/12/2018 105 98 - 107 mmol/L Final     Anion Gap   Date Value Ref Range Status   08/20/2021 7.0 5.0 - 15.0 mmol/L Final     Creatinine   Date Value Ref Range Status   08/20/2021 1.09 (H) 0.57 - 1.00 mg/dL Final   10/12/2018 0.9 0.6 - 1.3 mg/dL Final     Comment:     **The MDRD GFR formula is valid only for ages 18-70.    GFR will not be reported for individuals aging <18 or >70.     BUN   Date Value Ref Range Status   08/20/2021 19 8 - 23 mg/dL Final   10/12/2018 13 7 - 18 mg/dL Final     BUN/Creatinine Ratio   Date Value Ref Range Status   08/20/2021 17.4 7.0 - 25.0 Final     Calcium   Date Value Ref Range Status   08/20/2021 10.1 8.6 - 10.5 mg/dL Final   10/12/2018 9.7 8.8 - 10.5 mg/dL Final     eGFR Non  Amer   Date Value Ref Range Status   08/20/2021 49 (L) >60 mL/min/1.73 Final     Alkaline Phosphatase   Date Value Ref Range Status   08/20/2021 108 39 - 117 U/L Final   10/12/2018 115 46 - 116 U/L Final     Total Protein   Date Value Ref Range Status   08/20/2021 6.9 6.0 - 8.5 g/dL Final   10/12/2018 6.6 6.4 - 8.2 g/dL Final     ALT (SGPT)   Date Value Ref Range Status   08/20/2021 15 1 - 33 U/L Final   10/12/2018 43 30 - 65 U/L Final     AST (SGOT)    Date Value Ref Range Status   08/20/2021 20 1 - 32 U/L Final   10/12/2018 22 15 - 37 U/L Final     Total Bilirubin   Date Value Ref Range Status   08/20/2021 <0.2 0.0 - 1.2 mg/dL Final   10/12/2018 0.3 0 - 1.0 mg/dL Final     Albumin   Date Value Ref Range Status   08/20/2021 3.90 3.50 - 5.20 g/dL Final   10/12/2018 3.2 (L) 3.4 - 5.0 g/dL Final     Globulin   Date Value Ref Range Status   08/20/2021 3.0 gm/dL Final     Lab Results   Component Value Date    WBC 4.23 08/20/2021    HGB 10.0 (L) 08/20/2021    HCT 30.9 (L) 08/20/2021    MCV 88.5 08/20/2021     08/20/2021     Lab Results   Component Value Date    NEUTROABS 2.40 08/20/2021    IRON 79 08/20/2021    IRON 89 05/07/2021    IRON 91 12/22/2020    TIBC 426 08/20/2021    TIBC 392 05/07/2021    TIBC 378 12/22/2020    LABIRON 19 (L) 08/20/2021    LABIRON 23 05/07/2021    LABIRON 24 12/22/2020    FERRITIN 23.29 08/20/2021    FERRITIN 26.18 05/07/2021    FERRITIN 32.39 12/22/2020    ZHZBUCPQ31 1,867 (H) 08/20/2021    PMYZUCIH33 1,433 (H) 05/07/2021    MWGTGVKH52 1,050 (H) 12/22/2020    FOLATE >20.00 08/20/2021    FOLATE >20.00 05/07/2021    FOLATE >20.00 12/22/2020     Lab Results   Component Value Date    AFPTM 142 12/05/2018    CEA 3.10 08/20/2021         PATHOLOGY:  Pathology report from [facility] on July 23, 2021 showed showed:                RADIOLOGY DATA :  CT of abdomen and pelvis with contrast done on June 21, 2021 showed:    FINDINGS:   Apparent soft tissue thickening along the ascending colon,  compatible with reported history of colon cancer. There are a few  tiny adjacent right mesenteric lymph nodes, the largest of which  measures up to only 0.2-0.3 cm in short axis (series 2, image  36). No lymphadenopathy by size criteria. Negative adrenal  glands. No focal hepatic abnormality on this single phase exam.  Calcified splenic granulomata. The pancreas is unremarkable in  appearance. Cholecystectomy. Simple appearing right renal  cysts,  the largest of which measures up to 5 cm. No hydronephrosis.     Negative appendix. No evidence for mechanical bowel obstruction.  No free fluid or abnormal fluid collections in the abdomen or  pelvis.     Linear atelectasis/scar in the visualized lung bases.     No acute or suspicious osseous abnormality.              IMPRESSION:        1. Soft tissue thickening along the ascending colon, compatible  with the reported history of colon cancer.  2. There are a few tiny adjacent right mesenteric lymph nodes  with the largest measuring up to only 0.2 to 0.3 cm in short  axis. No lymphadenopathy by size criteria.  3. Negative adrenal glands.  4. No focal hepatic lesion is seen on this single phase exam.           No radiology results for the last 7 days        Assessment/Plan     1. Adenocarcinoma of ascending colon, stage II, T3 N0 M0, MSI stable :( problem is new to me)  -Patient had a colonoscopy done on June 16, 2020 with that showed mass in ascending colon biopsy which came back positive for adenocarcinoma.  -Result of pathology and CT scan of abdomen was reviewed and discussed with patient  -Based on pathology report patient does not have any high risk feature like lymphovascular invasion or perineural invasion. Total thirty-five lymph nodes were examined and were negative for malignancy  -NCCN guidelines were reviewed with patient. As per NCCN guideline for stage III cancer with no high risk features options includes observation versus Xeloda for 6 months.  -After detailed discussion with patient, observation will be done at present  -We will do blood work today consisting of CBC with differential, iron studies, ferritin, B12 folate and CEA level  -We will ask patient to return to clinic in 3 months with repeat CBC, iron studies, ferritin, B12, folate and CEA to be done prior to that  -CEA level today is normal at 3.10  -Patient will need surveillance CT scan abdomen July 2022.  -Patient will need  surveillance colonoscopy in July 2022.    2. Anemia:  -Currently patient is on B12 p.o. daily  -Anemia work-up done earlier today shows hemoglobin is 10.  Iron studies are consistent with developing iron deficiency with ferritin of 23 and iron saturation of 19%.  We will start patient on ferrous sulfate 1 tablet p.o. daily.  It was discussed with patient if she cannot tolerate iron by mouth, she needs to contact our office.  In that case we will start her on intravenous iron replacement.  Side effect of intravenous iron including allergic reaction has been discussed with patient today  -B12 and folate level is adequate.  Would recommend discontinuing B12 from today.      3. History of pulmonary embolism status post anticoagulation for 1 year in 2016.  -We will continue with clinical surveillance    4. Osteoporosis    5. Health maintenance: Patient does not smoke.    6. Advance Care Planning: For now patient remains full code and is able to make decisions.  Patient has health care surrogate mentioned on chart.    7.  Acute kidney injury:  -Creatinine is elevated at 1.09.  Patient will be notified about elevated creatinine need to increase hydration.    8.  Prescriptions: Prescription for ferrous sulfate 1 tablet p.o. daily and Colace has been sent to her pharmacy today           PHQ-9 Total Score:       Veronica Wilkinson reports a pain score of 0.  Given her pain assessment as noted, treatment options were discussed and the following options were decided upon as a follow-up plan to address the patient's pain: continuation of current treatment plan for pain.       About 40 minutes were spent for this clinic visit including reviewing records, explaining stage of cancer treatment options side effects and surveillance plan.        Mihai Hunt MD  8/21/2021  12:27 CDT        Part of this note may be an electronic transcription/translation of spoken language to printed text using the Dragon Dictation System.          CC:

## 2021-08-21 RX ORDER — DOCUSATE SODIUM 100 MG/1
100 CAPSULE, LIQUID FILLED ORAL 2 TIMES DAILY PRN
Qty: 60 CAPSULE | Refills: 2 | Status: SHIPPED | OUTPATIENT
Start: 2021-08-21 | End: 2022-06-30

## 2021-08-21 RX ORDER — FERROUS SULFATE 325(65) MG
325 TABLET ORAL
Qty: 30 TABLET | Refills: 3 | Status: SHIPPED | OUTPATIENT
Start: 2021-08-21 | End: 2022-06-30

## 2021-08-23 ENCOUNTER — TELEPHONE (OUTPATIENT)
Dept: ONCOLOGY | Facility: HOSPITAL | Age: 76
End: 2021-08-23

## 2021-08-23 NOTE — TELEPHONE ENCOUNTER
----- Message from Mihai Hunt MD sent at 8/21/2021 12:32 PM CDT -----  Please let patient know, creatinine is elevated at 1.09.  She needs to increase hydration.  Her hemoglobin was 10 today.  Iron studies are consistent with developing iron deficiency.  She needs to start taking ferrous sulfate by mouth.  I have sent prescription for ferrous sulfate and Colace to her pharmacy.  She can stop taking B12 supplement by mouth.  Her cancer test for colon cancer CEA is normal at 3.10.  Thank you

## 2021-08-25 ENCOUNTER — DOCUMENTATION (OUTPATIENT)
Dept: ONCOLOGY | Facility: CLINIC | Age: 76
End: 2021-08-25

## 2021-08-25 NOTE — PROGRESS NOTES
Oncology SW met with patient subsequent to her oncology consult on 8-20-21.  Pt known to [facility] as a hematology patient.  76 year old female from [place] presents for oncology consult having been diagnosed with adenocarcinoma of ascending colon in June 2021, Pt  underwent colon resection in July 2021 at [facility] and presents today for ongoing recommendations.  LCSW introduced self and explained role and support to include scope of clinical practice in oncology setting.  Distress screening completed and reviewed with pt scoring a zero.  Pt with documented history of depression, presents today with bright affect and mood congruent.  Pt describes positive support and healthy coping and voices no immediate needs or concerns. LCSW offered person centered therapeutic feedback by means of collecting history, emotional support, active listening, validation of emotions and clarification of feedback and recommendations for care.  Provided education related to oncology supports and services.   Pt plan at present is observation and pt is to return in 3 months.  SW reinforced support and availability if needs arise prior to return.   No referrals for outside services indicated.

## 2021-08-30 ENCOUNTER — DOCUMENTATION (OUTPATIENT)
Dept: ONCOLOGY | Facility: CLINIC | Age: 76
End: 2021-08-30

## 2021-08-30 NOTE — PROGRESS NOTES
No role for nurse navigator in cancer care currently. Pt under surveillance and will be available if needed in the future.

## 2021-09-02 ENCOUNTER — TELEPHONE (OUTPATIENT)
Dept: NUTRITION | Facility: HOSPITAL | Age: 76
End: 2021-09-02

## 2021-09-08 ENCOUNTER — TELEPHONE (OUTPATIENT)
Dept: FAMILY MEDICINE CLINIC | Facility: CLINIC | Age: 76
End: 2021-09-08

## 2021-09-09 RX ORDER — SIMVASTATIN 20 MG
20 TABLET ORAL EVERY OTHER DAY
Qty: 45 TABLET | Refills: 5 | Status: SHIPPED | OUTPATIENT
Start: 2021-09-09 | End: 2022-07-01 | Stop reason: HOSPADM

## 2021-10-20 DIAGNOSIS — M81.0 AGE-RELATED OSTEOPOROSIS WITHOUT CURRENT PATHOLOGICAL FRACTURE: Primary | ICD-10-CM

## 2021-11-01 ENCOUNTER — TELEPHONE (OUTPATIENT)
Dept: RADIATION ONCOLOGY | Facility: HOSPITAL | Age: 76
End: 2021-11-01

## 2021-11-01 NOTE — TELEPHONE ENCOUNTER
Patient called, she has labs scheduled here on 11/8 for her follow up appt with Dr Hunt on 11/10 but she also has labs scheduled on 11/11 at the clinic for her prolia injection on 11/18. Patient is wanting to know if she can get all labs done on 11/8 and if so does she come to the [facility] center or the clinic.

## 2021-11-08 ENCOUNTER — LAB (OUTPATIENT)
Dept: ONCOLOGY | Facility: HOSPITAL | Age: 76
End: 2021-11-08

## 2021-11-08 DIAGNOSIS — C18.2 MALIGNANT NEOPLASM OF ASCENDING COLON (HCC): ICD-10-CM

## 2021-11-08 DIAGNOSIS — D50.8 OTHER IRON DEFICIENCY ANEMIA: ICD-10-CM

## 2021-11-08 DIAGNOSIS — M81.0 AGE-RELATED OSTEOPOROSIS WITHOUT CURRENT PATHOLOGICAL FRACTURE: ICD-10-CM

## 2021-11-08 LAB
ALBUMIN SERPL-MCNC: 3.8 G/DL (ref 3.5–5.2)
ALBUMIN/GLOB SERPL: 1.4 G/DL
ALP SERPL-CCNC: 93 U/L (ref 39–117)
ALT SERPL W P-5'-P-CCNC: 16 U/L (ref 1–33)
ANION GAP SERPL CALCULATED.3IONS-SCNC: 8 MMOL/L (ref 5–15)
AST SERPL-CCNC: 22 U/L (ref 1–32)
BASOPHILS # BLD AUTO: 0.02 10*3/MM3 (ref 0–0.2)
BASOPHILS NFR BLD AUTO: 0.6 % (ref 0–1.5)
BILIRUB SERPL-MCNC: 0.2 MG/DL (ref 0–1.2)
BUN SERPL-MCNC: 19 MG/DL (ref 8–23)
BUN/CREAT SERPL: 16 (ref 7–25)
CALCIUM SPEC-SCNC: 9.5 MG/DL (ref 8.6–10.5)
CEA SERPL-MCNC: 2.8 NG/ML
CHLORIDE SERPL-SCNC: 104 MMOL/L (ref 98–107)
CO2 SERPL-SCNC: 27 MMOL/L (ref 22–29)
CREAT SERPL-MCNC: 1.19 MG/DL (ref 0.57–1)
DEPRECATED RDW RBC AUTO: 46.5 FL (ref 37–54)
EOSINOPHIL # BLD AUTO: 0.06 10*3/MM3 (ref 0–0.4)
EOSINOPHIL NFR BLD AUTO: 1.7 % (ref 0.3–6.2)
ERYTHROCYTE [DISTWIDTH] IN BLOOD BY AUTOMATED COUNT: 14.5 % (ref 12.3–15.4)
FERRITIN SERPL-MCNC: 26.69 NG/ML (ref 13–150)
FOLATE SERPL-MCNC: 18.2 NG/ML (ref 4.78–24.2)
GFR SERPL CREATININE-BSD FRML MDRD: 44 ML/MIN/1.73
GLOBULIN UR ELPH-MCNC: 2.7 GM/DL
GLUCOSE SERPL-MCNC: 77 MG/DL (ref 65–99)
HCT VFR BLD AUTO: 29.8 % (ref 34–46.6)
HGB BLD-MCNC: 9.9 G/DL (ref 12–15.9)
IMM GRANULOCYTES # BLD AUTO: 0.01 10*3/MM3 (ref 0–0.05)
IMM GRANULOCYTES NFR BLD AUTO: 0.3 % (ref 0–0.5)
IRON 24H UR-MRATE: 57 MCG/DL (ref 37–145)
IRON SATN MFR SERPL: 16 % (ref 20–50)
LYMPHOCYTES # BLD AUTO: 1.41 10*3/MM3 (ref 0.7–3.1)
LYMPHOCYTES NFR BLD AUTO: 39 % (ref 19.6–45.3)
MAGNESIUM SERPL-MCNC: 1.8 MG/DL (ref 1.6–2.4)
MCH RBC QN AUTO: 29 PG (ref 26.6–33)
MCHC RBC AUTO-ENTMCNC: 33.2 G/DL (ref 31.5–35.7)
MCV RBC AUTO: 87.4 FL (ref 79–97)
MONOCYTES # BLD AUTO: 0.39 10*3/MM3 (ref 0.1–0.9)
MONOCYTES NFR BLD AUTO: 10.8 % (ref 5–12)
NEUTROPHILS NFR BLD AUTO: 1.73 10*3/MM3 (ref 1.7–7)
NEUTROPHILS NFR BLD AUTO: 47.6 % (ref 42.7–76)
NRBC BLD AUTO-RTO: 0 /100 WBC (ref 0–0.2)
PHOSPHATE SERPL-MCNC: 4.3 MG/DL (ref 2.5–4.5)
PLATELET # BLD AUTO: 263 10*3/MM3 (ref 140–450)
PMV BLD AUTO: 8.6 FL (ref 6–12)
POTASSIUM SERPL-SCNC: 4.5 MMOL/L (ref 3.5–5.2)
PROT SERPL-MCNC: 6.5 G/DL (ref 6–8.5)
RBC # BLD AUTO: 3.41 10*6/MM3 (ref 3.77–5.28)
SODIUM SERPL-SCNC: 139 MMOL/L (ref 136–145)
TIBC SERPL-MCNC: 361 MCG/DL (ref 298–536)
TRANSFERRIN SERPL-MCNC: 242 MG/DL (ref 200–360)
VIT B12 BLD-MCNC: 718 PG/ML (ref 211–946)
WBC # BLD AUTO: 3.62 10*3/MM3 (ref 3.4–10.8)

## 2021-11-08 PROCEDURE — 84100 ASSAY OF PHOSPHORUS: CPT

## 2021-11-08 PROCEDURE — 82746 ASSAY OF FOLIC ACID SERUM: CPT

## 2021-11-08 PROCEDURE — 36415 COLL VENOUS BLD VENIPUNCTURE: CPT | Performed by: INTERNAL MEDICINE

## 2021-11-08 PROCEDURE — 82378 CARCINOEMBRYONIC ANTIGEN: CPT

## 2021-11-08 PROCEDURE — 80053 COMPREHEN METABOLIC PANEL: CPT

## 2021-11-08 PROCEDURE — 82728 ASSAY OF FERRITIN: CPT

## 2021-11-08 PROCEDURE — 83735 ASSAY OF MAGNESIUM: CPT

## 2021-11-08 PROCEDURE — 83540 ASSAY OF IRON: CPT

## 2021-11-08 PROCEDURE — 84466 ASSAY OF TRANSFERRIN: CPT

## 2021-11-08 PROCEDURE — 85025 COMPLETE CBC W/AUTO DIFF WBC: CPT

## 2021-11-08 PROCEDURE — 82607 VITAMIN B-12: CPT

## 2021-11-10 ENCOUNTER — OFFICE VISIT (OUTPATIENT)
Dept: ONCOLOGY | Facility: CLINIC | Age: 76
End: 2021-11-10

## 2021-11-10 VITALS
TEMPERATURE: 96.4 F | OXYGEN SATURATION: 100 % | BODY MASS INDEX: 36.16 KG/M2 | DIASTOLIC BLOOD PRESSURE: 58 MMHG | WEIGHT: 191.4 LBS | SYSTOLIC BLOOD PRESSURE: 122 MMHG | HEART RATE: 60 BPM

## 2021-11-10 DIAGNOSIS — D50.8 OTHER IRON DEFICIENCY ANEMIA: ICD-10-CM

## 2021-11-10 DIAGNOSIS — Z86.711 HISTORY OF PULMONARY EMBOLISM: Chronic | ICD-10-CM

## 2021-11-10 DIAGNOSIS — C18.2 MALIGNANT NEOPLASM OF ASCENDING COLON (HCC): Primary | Chronic | ICD-10-CM

## 2021-11-10 PROBLEM — D50.9 IRON DEFICIENCY ANEMIA: Chronic | Status: ACTIVE | Noted: 2021-05-11

## 2021-11-10 PROBLEM — K90.9 IRON MALABSORPTION: Status: ACTIVE | Noted: 2021-11-10

## 2021-11-10 PROCEDURE — 1126F AMNT PAIN NOTED NONE PRSNT: CPT | Performed by: INTERNAL MEDICINE

## 2021-11-10 PROCEDURE — G0463 HOSPITAL OUTPT CLINIC VISIT: HCPCS | Performed by: INTERNAL MEDICINE

## 2021-11-10 PROCEDURE — 1123F ACP DISCUSS/DSCN MKR DOCD: CPT | Performed by: INTERNAL MEDICINE

## 2021-11-10 PROCEDURE — 99214 OFFICE O/P EST MOD 30 MIN: CPT | Performed by: INTERNAL MEDICINE

## 2021-11-10 RX ORDER — SODIUM CHLORIDE 9 MG/ML
250 INJECTION, SOLUTION INTRAVENOUS ONCE
Status: CANCELLED | OUTPATIENT
Start: 2021-11-17

## 2021-11-10 RX ORDER — DIPHENHYDRAMINE HYDROCHLORIDE 50 MG/ML
25 INJECTION INTRAMUSCULAR; INTRAVENOUS ONCE
Status: CANCELLED | OUTPATIENT
Start: 2021-11-17

## 2021-11-10 RX ORDER — ACETAMINOPHEN 325 MG/1
650 TABLET ORAL ONCE
Status: CANCELLED | OUTPATIENT
Start: 2021-11-17

## 2021-11-10 NOTE — PROGRESS NOTES
DATE OF VISIT: 11/10/2021      REASON FOR VISIT: Adenocarcinoma of ascending colon, anemia, neutropenia, history of pulmonary embolism      HISTORY OF PRESENT ILLNESS:   76-year-old female with medical problem consisting of pulmonary embolism diagnosed in 2016 s/p anticoagulation with Coumadin for 1 year, osteoporosis, depression, history of gastric sleeve surgery done in 2016 has been following up with [facility] since August 23, 2018 for easy bruising, neutropenia and anemia.  Patient was diagnosed with adenocarcinoma of ascending colon on June 2021.  Patient is here for follow-up appointment today.  Complains of skin lesion on upper back.  Denies any bleeding.  Denies any new lymph node enlargement.  Complains of arthralgia.  Denies any new chest pain or shortness of breath.      Past Medical History, Past Surgical History, Social History, Family History have been reviewed and are without significant changes except as mentioned.    Review of Systems   A comprehensive 14 point review of systems was performed and was negative except as mentioned in HPI.    Medications:  The current medication list was reviewed in the EMR    ALLERGIES:    Allergies   Allergen Reactions   • Sulfa Antibiotics Hives and Rash     Sulfa (Sulfonamide Antibiotics)       Objective      Vitals:    11/10/21 1332   BP: 122/58   Pulse: 60   Temp: 96.4 °F (35.8 °C)   SpO2: 100%   Weight: 86.8 kg (191 lb 6.4 oz)   PainSc: 0-No pain     Current Status 8/20/2021   ECOG score 0       Physical Exam  Skin:     Comments: Skin lesion with irregular margin present on upper back.  There is no hyperpigmentation or evidence of bleeding.   Neurological:      Mental Status: She is alert and oriented to person, place, and time.           RECENT LABS:  Glucose   Date Value Ref Range Status   11/08/2021 77 65 - 99 mg/dL Final     Sodium   Date Value Ref Range Status   11/08/2021 139 136 - 145 mmol/L Final   10/12/2018 140 134 - 144 mmol/L Final      Potassium   Date Value Ref Range Status   11/08/2021 4.5 3.5 - 5.2 mmol/L Final   10/12/2018 4.6 3.5 - 5.1 mmol/L Final     CO2   Date Value Ref Range Status   11/08/2021 27.0 22.0 - 29.0 mmol/L Final     Chloride   Date Value Ref Range Status   11/08/2021 104 98 - 107 mmol/L Final   10/12/2018 105 98 - 107 mmol/L Final     Anion Gap   Date Value Ref Range Status   11/08/2021 8.0 5.0 - 15.0 mmol/L Final     Creatinine   Date Value Ref Range Status   11/08/2021 1.19 (H) 0.57 - 1.00 mg/dL Final   10/12/2018 0.9 0.6 - 1.3 mg/dL Final     Comment:     **The MDRD GFR formula is valid only for ages 18-70.    GFR will not be reported for individuals aging <18 or >70.     BUN   Date Value Ref Range Status   11/08/2021 19 8 - 23 mg/dL Final   10/12/2018 13 7 - 18 mg/dL Final     BUN/Creatinine Ratio   Date Value Ref Range Status   11/08/2021 16.0 7.0 - 25.0 Final     Calcium   Date Value Ref Range Status   11/08/2021 9.5 8.6 - 10.5 mg/dL Final   10/12/2018 9.7 8.8 - 10.5 mg/dL Final     eGFR Non  Amer   Date Value Ref Range Status   11/08/2021 44 (L) >60 mL/min/1.73 Final     Alkaline Phosphatase   Date Value Ref Range Status   11/08/2021 93 39 - 117 U/L Final   10/12/2018 115 46 - 116 U/L Final     Total Protein   Date Value Ref Range Status   11/08/2021 6.5 6.0 - 8.5 g/dL Final   10/12/2018 6.6 6.4 - 8.2 g/dL Final     ALT (SGPT)   Date Value Ref Range Status   11/08/2021 16 1 - 33 U/L Final   10/12/2018 43 30 - 65 U/L Final     AST (SGOT)   Date Value Ref Range Status   11/08/2021 22 1 - 32 U/L Final   10/12/2018 22 15 - 37 U/L Final     Total Bilirubin   Date Value Ref Range Status   11/08/2021 0.2 0.0 - 1.2 mg/dL Final   10/12/2018 0.3 0 - 1.0 mg/dL Final     Albumin   Date Value Ref Range Status   11/08/2021 3.80 3.50 - 5.20 g/dL Final   10/12/2018 3.2 (L) 3.4 - 5.0 g/dL Final     Globulin   Date Value Ref Range Status   11/08/2021 2.7 gm/dL Final     Lab Results   Component Value Date    WBC 3.62 11/08/2021     HGB 9.9 (L) 11/08/2021    HCT 29.8 (L) 11/08/2021    MCV 87.4 11/08/2021     11/08/2021     Lab Results   Component Value Date    NEUTROABS 1.73 11/08/2021    IRON 57 11/08/2021    IRON 79 08/20/2021    IRON 89 05/07/2021    TIBC 361 11/08/2021    TIBC 426 08/20/2021    TIBC 392 05/07/2021    LABIRON 16 (L) 11/08/2021    LABIRON 19 (L) 08/20/2021    LABIRON 23 05/07/2021    FERRITIN 26.69 11/08/2021    FERRITIN 23.29 08/20/2021    FERRITIN 26.18 05/07/2021    UYYSEAGC85 718 11/08/2021    [id number] 1,867 (H) 08/20/2021    PWOKKEMK10 1,433 (H) 05/07/2021    FOLATE 18.20 11/08/2021    FOLATE >20.00 08/20/2021    FOLATE >20.00 05/07/2021     Lab Results   Component Value Date    AFPTM 142 12/05/2018    CEA 2.80 11/08/2021         PATHOLOGY:  * Cannot find OR log *         RADIOLOGY DATA :  No radiology results for the last 7 days        Assessment/Plan     1.  Adenocarcinoma of ascending colon, stage II, T3 N0 M0 MSI stable:  -There was no evidence of high risk features on pathology report from June 2021.  -Patient is currently on observation and surveillance.  -Recent CEA level is normal at 2.80.  -We will continue with clinical surveillance.  -We will ask patient to return to clinic in 3 months with repeat CBC, CMP, iron studies, ferritin, B12, folate and CEA to be done prior to that.  -Next surveillance colonoscopy will be due in July 2022.  Next CT of abdomen and pelvis will be done around January 2022    2.  Anemia:  -Patient is currently on ferrous sulfate 1 tablet p.o. daily.  -Anemia work-up done on November 8, 2021 shows hemoglobin is 9.9.  -Iron studies are not showing any significant improvement consistent with iron malabsorption.  -Recommend starting intravenous Venofer starting next week.  Side effect of Venofer including allergic reaction were discussed with patient.    3.  History of pulmonary embolism s/p anticoagulation for 1 year in 2016  -We will continue with clinical surveillance    4.   Osteoporosis:    5.  Acute kidney injury:  -Creatinine is elevated at 1.19.  Patient was notified about elevated creatinine and need to increase hydration    6.  Health maintenance: Patient does not smoke    7. Advance Care Planning: For now patient remains full code and is able to make decisions.  Patient has health care surrogate mentioned on chart.                 PHQ-9 Total Score: 1   -Patient is not homicidal or suicidal.  No acute intervention required.    Veronica Wilkinson reports a pain score of 0.  Given her pain assessment as noted, treatment options were discussed and the following options were decided upon as a follow-up plan to address the patient's pain: continuation of current treatment plan for pain.         Mihai Hunt MD  11/10/2021  14:02 CST        Part of this note may be an electronic transcription/translation of spoken language to printed text using the Dragon Dictation System.          CC:

## 2021-11-18 ENCOUNTER — APPOINTMENT (OUTPATIENT)
Dept: ONCOLOGY | Facility: HOSPITAL | Age: 76
End: 2021-11-18

## 2021-11-18 ENCOUNTER — INFUSION (OUTPATIENT)
Dept: ONCOLOGY | Facility: HOSPITAL | Age: 76
End: 2021-11-18

## 2021-11-18 VITALS
TEMPERATURE: 96.5 F | SYSTOLIC BLOOD PRESSURE: 128 MMHG | RESPIRATION RATE: 18 BRPM | HEART RATE: 70 BPM | DIASTOLIC BLOOD PRESSURE: 63 MMHG

## 2021-11-18 DIAGNOSIS — K90.9 IRON MALABSORPTION: Primary | ICD-10-CM

## 2021-11-18 DIAGNOSIS — M81.0 AGE-RELATED OSTEOPOROSIS WITHOUT CURRENT PATHOLOGICAL FRACTURE: ICD-10-CM

## 2021-11-18 DIAGNOSIS — D50.8 OTHER IRON DEFICIENCY ANEMIA: ICD-10-CM

## 2021-11-18 PROCEDURE — 25010000002 IRON SUCROSE PER 1 MG: Performed by: INTERNAL MEDICINE

## 2021-11-18 PROCEDURE — 25010000002 DIPHENHYDRAMINE PER 50 MG: Performed by: INTERNAL MEDICINE

## 2021-11-18 PROCEDURE — 96365 THER/PROPH/DIAG IV INF INIT: CPT | Performed by: INTERNAL MEDICINE

## 2021-11-18 PROCEDURE — 25010000002 DENOSUMAB 60 MG/ML SOLUTION PREFILLED SYRINGE: Performed by: NURSE PRACTITIONER

## 2021-11-18 PROCEDURE — 96372 THER/PROPH/DIAG INJ SC/IM: CPT | Performed by: INTERNAL MEDICINE

## 2021-11-18 PROCEDURE — 96375 TX/PRO/DX INJ NEW DRUG ADDON: CPT | Performed by: INTERNAL MEDICINE

## 2021-11-18 RX ORDER — SODIUM CHLORIDE 9 MG/ML
250 INJECTION, SOLUTION INTRAVENOUS ONCE
Status: COMPLETED | OUTPATIENT
Start: 2021-11-18 | End: 2021-11-18

## 2021-11-18 RX ORDER — SODIUM CHLORIDE 9 MG/ML
250 INJECTION, SOLUTION INTRAVENOUS ONCE
Status: CANCELLED | OUTPATIENT
Start: 2021-11-20

## 2021-11-18 RX ORDER — ACETAMINOPHEN 325 MG/1
650 TABLET ORAL ONCE
Status: COMPLETED | OUTPATIENT
Start: 2021-11-18 | End: 2021-11-18

## 2021-11-18 RX ORDER — ACETAMINOPHEN 325 MG/1
650 TABLET ORAL ONCE
Status: CANCELLED | OUTPATIENT
Start: 2021-11-20

## 2021-11-18 RX ORDER — DIPHENHYDRAMINE HYDROCHLORIDE 50 MG/ML
25 INJECTION INTRAMUSCULAR; INTRAVENOUS ONCE
Status: CANCELLED | OUTPATIENT
Start: 2021-11-20

## 2021-11-18 RX ORDER — DIPHENHYDRAMINE HYDROCHLORIDE 50 MG/ML
25 INJECTION INTRAMUSCULAR; INTRAVENOUS ONCE
Status: COMPLETED | OUTPATIENT
Start: 2021-11-18 | End: 2021-11-18

## 2021-11-18 RX ADMIN — DIPHENHYDRAMINE HYDROCHLORIDE 25 MG: 50 INJECTION INTRAMUSCULAR; INTRAVENOUS at 13:36

## 2021-11-18 RX ADMIN — DENOSUMAB 60 MG: 60 INJECTION SUBCUTANEOUS at 15:05

## 2021-11-18 RX ADMIN — IRON SUCROSE 200 MG: 20 INJECTION, SOLUTION INTRAVENOUS at 14:06

## 2021-11-18 RX ADMIN — ACETAMINOPHEN 650 MG: 325 TABLET, FILM COATED ORAL at 13:31

## 2021-11-18 RX ADMIN — SODIUM CHLORIDE 250 ML: 9 INJECTION, SOLUTION INTRAVENOUS at 13:35

## 2021-11-18 RX ADMIN — FAMOTIDINE 20 MG: 10 INJECTION INTRAVENOUS at 13:46

## 2021-11-22 ENCOUNTER — APPOINTMENT (OUTPATIENT)
Dept: ONCOLOGY | Facility: HOSPITAL | Age: 76
End: 2021-11-22

## 2021-11-24 ENCOUNTER — APPOINTMENT (OUTPATIENT)
Dept: ONCOLOGY | Facility: HOSPITAL | Age: 76
End: 2021-11-24

## 2021-11-29 ENCOUNTER — APPOINTMENT (OUTPATIENT)
Dept: ONCOLOGY | Facility: HOSPITAL | Age: 76
End: 2021-11-29

## 2021-11-29 RX ORDER — PANTOPRAZOLE SODIUM 40 MG/1
TABLET, DELAYED RELEASE ORAL
Qty: 90 TABLET | Refills: 1 | Status: SHIPPED | OUTPATIENT
Start: 2021-11-29 | End: 2022-05-09

## 2021-11-29 RX ORDER — PANTOPRAZOLE SODIUM 40 MG/1
40 TABLET, DELAYED RELEASE ORAL DAILY
Qty: 90 TABLET | Refills: 1 | Status: CANCELLED | OUTPATIENT
Start: 2021-11-29

## 2021-11-29 RX ORDER — OLMESARTAN MEDOXOMIL AND HYDROCHLOROTHIAZIDE 20/12.5 20; 12.5 MG/1; MG/1
1 TABLET ORAL DAILY
Qty: 90 TABLET | Refills: 2 | Status: CANCELLED | OUTPATIENT
Start: 2021-11-29 | End: 2022-11-29

## 2021-11-29 RX ORDER — OLMESARTAN MEDOXOMIL AND HYDROCHLOROTHIAZIDE 20/12.5 20; 12.5 MG/1; MG/1
TABLET ORAL
Qty: 90 TABLET | Refills: 2 | Status: SHIPPED | OUTPATIENT
Start: 2021-11-29 | End: 2022-08-01

## 2021-11-29 NOTE — TELEPHONE ENCOUNTER
Incoming Refill Request      Medication requested (name and dose):     Pantoprozole 40MG EC   Olmesartan-hydrochlorothiazide 20-12.5    Pharmacy where request should be sent:     [facility]    Additional details provided by patient:    Best call back number:     Does the patient have less than a 3 day supply:  [] Yes  [] No    Areli Temple Rep  11/29/21, 09:44 CST

## 2021-12-01 ENCOUNTER — APPOINTMENT (OUTPATIENT)
Dept: ONCOLOGY | Facility: HOSPITAL | Age: 76
End: 2021-12-01

## 2021-12-03 ENCOUNTER — INFUSION (OUTPATIENT)
Dept: ONCOLOGY | Facility: HOSPITAL | Age: 76
End: 2021-12-03

## 2021-12-03 VITALS — TEMPERATURE: 96.6 F | DIASTOLIC BLOOD PRESSURE: 62 MMHG | SYSTOLIC BLOOD PRESSURE: 131 MMHG | HEART RATE: 63 BPM

## 2021-12-03 DIAGNOSIS — D50.8 OTHER IRON DEFICIENCY ANEMIA: ICD-10-CM

## 2021-12-03 DIAGNOSIS — K90.9 IRON MALABSORPTION: Primary | ICD-10-CM

## 2021-12-03 PROCEDURE — 25010000002 IRON SUCROSE PER 1 MG: Performed by: INTERNAL MEDICINE

## 2021-12-03 PROCEDURE — 25010000002 DIPHENHYDRAMINE PER 50 MG: Performed by: INTERNAL MEDICINE

## 2021-12-03 PROCEDURE — 96365 THER/PROPH/DIAG IV INF INIT: CPT | Performed by: INTERNAL MEDICINE

## 2021-12-03 PROCEDURE — 96375 TX/PRO/DX INJ NEW DRUG ADDON: CPT | Performed by: INTERNAL MEDICINE

## 2021-12-03 RX ORDER — SODIUM CHLORIDE 9 MG/ML
250 INJECTION, SOLUTION INTRAVENOUS ONCE
Status: CANCELLED | OUTPATIENT
Start: 2021-12-04

## 2021-12-03 RX ORDER — DIPHENHYDRAMINE HYDROCHLORIDE 50 MG/ML
25 INJECTION INTRAMUSCULAR; INTRAVENOUS ONCE
Status: COMPLETED | OUTPATIENT
Start: 2021-12-03 | End: 2021-12-03

## 2021-12-03 RX ORDER — ACETAMINOPHEN 325 MG/1
650 TABLET ORAL ONCE
Status: CANCELLED | OUTPATIENT
Start: 2021-12-04

## 2021-12-03 RX ORDER — SODIUM CHLORIDE 9 MG/ML
250 INJECTION, SOLUTION INTRAVENOUS ONCE
Status: COMPLETED | OUTPATIENT
Start: 2021-12-03 | End: 2021-12-03

## 2021-12-03 RX ORDER — ACETAMINOPHEN 325 MG/1
650 TABLET ORAL ONCE
Status: COMPLETED | OUTPATIENT
Start: 2021-12-03 | End: 2021-12-03

## 2021-12-03 RX ORDER — DIPHENHYDRAMINE HYDROCHLORIDE 50 MG/ML
25 INJECTION INTRAMUSCULAR; INTRAVENOUS ONCE
Status: CANCELLED | OUTPATIENT
Start: 2021-12-04

## 2021-12-03 RX ADMIN — FAMOTIDINE 20 MG: 10 INJECTION INTRAVENOUS at 14:25

## 2021-12-03 RX ADMIN — IRON SUCROSE 200 MG: 20 INJECTION, SOLUTION INTRAVENOUS at 14:31

## 2021-12-03 RX ADMIN — DIPHENHYDRAMINE HYDROCHLORIDE 25 MG: 50 INJECTION INTRAMUSCULAR; INTRAVENOUS at 14:14

## 2021-12-03 RX ADMIN — ACETAMINOPHEN 650 MG: 325 TABLET, FILM COATED ORAL at 14:14

## 2021-12-03 RX ADMIN — SODIUM CHLORIDE 250 ML: 9 INJECTION, SOLUTION INTRAVENOUS at 14:10

## 2021-12-06 ENCOUNTER — INFUSION (OUTPATIENT)
Dept: ONCOLOGY | Facility: HOSPITAL | Age: 76
End: 2021-12-06

## 2021-12-06 VITALS
HEART RATE: 62 BPM | DIASTOLIC BLOOD PRESSURE: 63 MMHG | SYSTOLIC BLOOD PRESSURE: 131 MMHG | RESPIRATION RATE: 18 BRPM | TEMPERATURE: 97.8 F

## 2021-12-06 DIAGNOSIS — K90.9 IRON MALABSORPTION: Primary | ICD-10-CM

## 2021-12-06 DIAGNOSIS — D50.8 OTHER IRON DEFICIENCY ANEMIA: ICD-10-CM

## 2021-12-06 PROCEDURE — 25010000002 DIPHENHYDRAMINE PER 50 MG: Performed by: INTERNAL MEDICINE

## 2021-12-06 PROCEDURE — 96375 TX/PRO/DX INJ NEW DRUG ADDON: CPT | Performed by: INTERNAL MEDICINE

## 2021-12-06 PROCEDURE — 25010000002 IRON SUCROSE PER 1 MG: Performed by: INTERNAL MEDICINE

## 2021-12-06 PROCEDURE — 96365 THER/PROPH/DIAG IV INF INIT: CPT | Performed by: INTERNAL MEDICINE

## 2021-12-06 RX ORDER — DIPHENHYDRAMINE HYDROCHLORIDE 50 MG/ML
25 INJECTION INTRAMUSCULAR; INTRAVENOUS ONCE
Status: COMPLETED | OUTPATIENT
Start: 2021-12-06 | End: 2021-12-06

## 2021-12-06 RX ORDER — ACETAMINOPHEN 325 MG/1
650 TABLET ORAL ONCE
Status: CANCELLED | OUTPATIENT
Start: 2021-12-07

## 2021-12-06 RX ORDER — SODIUM CHLORIDE 9 MG/ML
250 INJECTION, SOLUTION INTRAVENOUS ONCE
Status: CANCELLED | OUTPATIENT
Start: 2021-12-07

## 2021-12-06 RX ORDER — DIPHENHYDRAMINE HYDROCHLORIDE 50 MG/ML
25 INJECTION INTRAMUSCULAR; INTRAVENOUS ONCE
Status: CANCELLED | OUTPATIENT
Start: 2021-12-07

## 2021-12-06 RX ORDER — SODIUM CHLORIDE 9 MG/ML
250 INJECTION, SOLUTION INTRAVENOUS ONCE
Status: COMPLETED | OUTPATIENT
Start: 2021-12-06 | End: 2021-12-06

## 2021-12-06 RX ORDER — ACETAMINOPHEN 325 MG/1
650 TABLET ORAL ONCE
Status: COMPLETED | OUTPATIENT
Start: 2021-12-06 | End: 2021-12-06

## 2021-12-06 RX ADMIN — FAMOTIDINE 20 MG: 10 INJECTION INTRAVENOUS at 14:33

## 2021-12-06 RX ADMIN — DIPHENHYDRAMINE HYDROCHLORIDE 25 MG: 50 INJECTION INTRAMUSCULAR; INTRAVENOUS at 14:16

## 2021-12-06 RX ADMIN — IRON SUCROSE 200 MG: 20 INJECTION, SOLUTION INTRAVENOUS at 14:47

## 2021-12-06 RX ADMIN — SODIUM CHLORIDE 250 ML: 9 INJECTION, SOLUTION INTRAVENOUS at 14:16

## 2021-12-06 RX ADMIN — ACETAMINOPHEN 650 MG: 325 TABLET, FILM COATED ORAL at 14:16

## 2021-12-08 ENCOUNTER — INFUSION (OUTPATIENT)
Dept: ONCOLOGY | Facility: HOSPITAL | Age: 76
End: 2021-12-08

## 2021-12-08 VITALS — TEMPERATURE: 96.9 F | DIASTOLIC BLOOD PRESSURE: 53 MMHG | HEART RATE: 63 BPM | SYSTOLIC BLOOD PRESSURE: 105 MMHG

## 2021-12-08 DIAGNOSIS — K90.9 IRON MALABSORPTION: Primary | ICD-10-CM

## 2021-12-08 DIAGNOSIS — D50.8 OTHER IRON DEFICIENCY ANEMIA: ICD-10-CM

## 2021-12-08 PROCEDURE — 96365 THER/PROPH/DIAG IV INF INIT: CPT | Performed by: INTERNAL MEDICINE

## 2021-12-08 PROCEDURE — 96375 TX/PRO/DX INJ NEW DRUG ADDON: CPT | Performed by: INTERNAL MEDICINE

## 2021-12-08 PROCEDURE — 25010000002 DIPHENHYDRAMINE PER 50 MG: Performed by: INTERNAL MEDICINE

## 2021-12-08 PROCEDURE — 25010000002 IRON SUCROSE PER 1 MG: Performed by: INTERNAL MEDICINE

## 2021-12-08 RX ORDER — SODIUM CHLORIDE 9 MG/ML
250 INJECTION, SOLUTION INTRAVENOUS ONCE
Status: CANCELLED | OUTPATIENT
Start: 2021-12-10

## 2021-12-08 RX ORDER — DIPHENHYDRAMINE HYDROCHLORIDE 50 MG/ML
25 INJECTION INTRAMUSCULAR; INTRAVENOUS ONCE
Status: CANCELLED | OUTPATIENT
Start: 2021-12-10

## 2021-12-08 RX ORDER — ACETAMINOPHEN 325 MG/1
650 TABLET ORAL ONCE
Status: CANCELLED | OUTPATIENT
Start: 2021-12-10

## 2021-12-08 RX ORDER — ACETAMINOPHEN 325 MG/1
650 TABLET ORAL ONCE
Status: COMPLETED | OUTPATIENT
Start: 2021-12-08 | End: 2021-12-08

## 2021-12-08 RX ORDER — SODIUM CHLORIDE 9 MG/ML
250 INJECTION, SOLUTION INTRAVENOUS ONCE
Status: COMPLETED | OUTPATIENT
Start: 2021-12-08 | End: 2021-12-08

## 2021-12-08 RX ORDER — DIPHENHYDRAMINE HYDROCHLORIDE 50 MG/ML
25 INJECTION INTRAMUSCULAR; INTRAVENOUS ONCE
Status: COMPLETED | OUTPATIENT
Start: 2021-12-08 | End: 2021-12-08

## 2021-12-08 RX ADMIN — IRON SUCROSE 200 MG: 20 INJECTION, SOLUTION INTRAVENOUS at 14:45

## 2021-12-08 RX ADMIN — SODIUM CHLORIDE 250 ML: 9 INJECTION, SOLUTION INTRAVENOUS at 14:13

## 2021-12-08 RX ADMIN — ACETAMINOPHEN 650 MG: 325 TABLET, FILM COATED ORAL at 14:15

## 2021-12-08 RX ADMIN — FAMOTIDINE 20 MG: 10 INJECTION INTRAVENOUS at 14:28

## 2021-12-08 RX ADMIN — DIPHENHYDRAMINE HYDROCHLORIDE 25 MG: 50 INJECTION INTRAMUSCULAR; INTRAVENOUS at 14:15

## 2021-12-10 ENCOUNTER — INFUSION (OUTPATIENT)
Dept: ONCOLOGY | Facility: HOSPITAL | Age: 76
End: 2021-12-10

## 2021-12-10 VITALS
HEART RATE: 65 BPM | RESPIRATION RATE: 18 BRPM | DIASTOLIC BLOOD PRESSURE: 66 MMHG | TEMPERATURE: 96.9 F | SYSTOLIC BLOOD PRESSURE: 143 MMHG

## 2021-12-10 DIAGNOSIS — K90.9 IRON MALABSORPTION: Primary | ICD-10-CM

## 2021-12-10 DIAGNOSIS — D50.8 OTHER IRON DEFICIENCY ANEMIA: ICD-10-CM

## 2021-12-10 PROCEDURE — 25010000002 IRON SUCROSE PER 1 MG: Performed by: INTERNAL MEDICINE

## 2021-12-10 PROCEDURE — 96375 TX/PRO/DX INJ NEW DRUG ADDON: CPT | Performed by: INTERNAL MEDICINE

## 2021-12-10 PROCEDURE — 96365 THER/PROPH/DIAG IV INF INIT: CPT | Performed by: INTERNAL MEDICINE

## 2021-12-10 PROCEDURE — 25010000002 DIPHENHYDRAMINE PER 50 MG: Performed by: INTERNAL MEDICINE

## 2021-12-10 RX ORDER — DIPHENHYDRAMINE HYDROCHLORIDE 50 MG/ML
25 INJECTION INTRAMUSCULAR; INTRAVENOUS ONCE
Status: CANCELLED | OUTPATIENT
Start: 2021-12-10

## 2021-12-10 RX ORDER — DIPHENHYDRAMINE HYDROCHLORIDE 50 MG/ML
25 INJECTION INTRAMUSCULAR; INTRAVENOUS ONCE
Status: COMPLETED | OUTPATIENT
Start: 2021-12-10 | End: 2021-12-10

## 2021-12-10 RX ORDER — SODIUM CHLORIDE 9 MG/ML
250 INJECTION, SOLUTION INTRAVENOUS ONCE
Status: CANCELLED | OUTPATIENT
Start: 2021-12-10

## 2021-12-10 RX ORDER — SODIUM CHLORIDE 9 MG/ML
250 INJECTION, SOLUTION INTRAVENOUS ONCE
Status: COMPLETED | OUTPATIENT
Start: 2021-12-10 | End: 2021-12-10

## 2021-12-10 RX ORDER — ACETAMINOPHEN 325 MG/1
650 TABLET ORAL ONCE
Status: COMPLETED | OUTPATIENT
Start: 2021-12-10 | End: 2021-12-10

## 2021-12-10 RX ORDER — ACETAMINOPHEN 325 MG/1
650 TABLET ORAL ONCE
Status: CANCELLED | OUTPATIENT
Start: 2021-12-10

## 2021-12-10 RX ADMIN — ACETAMINOPHEN 650 MG: 325 TABLET, FILM COATED ORAL at 13:30

## 2021-12-10 RX ADMIN — IRON SUCROSE 200 MG: 20 INJECTION, SOLUTION INTRAVENOUS at 13:56

## 2021-12-10 RX ADMIN — FAMOTIDINE 20 MG: 10 INJECTION INTRAVENOUS at 13:39

## 2021-12-10 RX ADMIN — DIPHENHYDRAMINE HYDROCHLORIDE 25 MG: 50 INJECTION INTRAMUSCULAR; INTRAVENOUS at 13:28

## 2021-12-10 RX ADMIN — SODIUM CHLORIDE 250 ML: 9 INJECTION, SOLUTION INTRAVENOUS at 13:28

## 2021-12-17 PROCEDURE — 93294 REM INTERROG EVL PM/LDLS PM: CPT | Performed by: NURSE PRACTITIONER

## 2021-12-17 PROCEDURE — 93296 REM INTERROG EVL PM/IDS: CPT | Performed by: NURSE PRACTITIONER

## 2022-02-11 ENCOUNTER — APPOINTMENT (OUTPATIENT)
Dept: ONCOLOGY | Facility: HOSPITAL | Age: 77
End: 2022-02-11

## 2022-02-14 ENCOUNTER — LAB (OUTPATIENT)
Dept: LAB | Facility: HOSPITAL | Age: 77
End: 2022-02-14

## 2022-02-14 ENCOUNTER — APPOINTMENT (OUTPATIENT)
Dept: LAB | Facility: HOSPITAL | Age: 77
End: 2022-02-14

## 2022-02-14 DIAGNOSIS — Z86.711 HISTORY OF PULMONARY EMBOLISM: ICD-10-CM

## 2022-02-14 DIAGNOSIS — C18.2 MALIGNANT NEOPLASM OF ASCENDING COLON: ICD-10-CM

## 2022-02-14 DIAGNOSIS — D50.8 OTHER IRON DEFICIENCY ANEMIA: ICD-10-CM

## 2022-02-14 LAB
ALBUMIN SERPL-MCNC: 3.9 G/DL (ref 3.5–5.2)
ALBUMIN/GLOB SERPL: 1.6 G/DL
ALP SERPL-CCNC: 100 U/L (ref 39–117)
ALT SERPL W P-5'-P-CCNC: 26 U/L (ref 1–33)
ANION GAP SERPL CALCULATED.3IONS-SCNC: 9 MMOL/L (ref 5–15)
AST SERPL-CCNC: 27 U/L (ref 1–32)
BASOPHILS # BLD AUTO: 0.03 10*3/MM3 (ref 0–0.2)
BASOPHILS NFR BLD AUTO: 1.1 % (ref 0–1.5)
BILIRUB SERPL-MCNC: 0.2 MG/DL (ref 0–1.2)
BUN SERPL-MCNC: 12 MG/DL (ref 8–23)
BUN/CREAT SERPL: 12.5 (ref 7–25)
CALCIUM SPEC-SCNC: 9.5 MG/DL (ref 8.6–10.5)
CEA SERPL-MCNC: 3.59 NG/ML
CHLORIDE SERPL-SCNC: 106 MMOL/L (ref 98–107)
CO2 SERPL-SCNC: 26 MMOL/L (ref 22–29)
CREAT SERPL-MCNC: 0.96 MG/DL (ref 0.57–1)
DEPRECATED RDW RBC AUTO: 43.8 FL (ref 37–54)
EOSINOPHIL # BLD AUTO: 0.05 10*3/MM3 (ref 0–0.4)
EOSINOPHIL NFR BLD AUTO: 1.8 % (ref 0.3–6.2)
ERYTHROCYTE [DISTWIDTH] IN BLOOD BY AUTOMATED COUNT: 13.2 % (ref 12.3–15.4)
FERRITIN SERPL-MCNC: 333.3 NG/ML (ref 13–150)
FOLATE SERPL-MCNC: >20 NG/ML (ref 4.78–24.2)
GFR SERPL CREATININE-BSD FRML MDRD: 57 ML/MIN/1.73
GLOBULIN UR ELPH-MCNC: 2.5 GM/DL
GLUCOSE SERPL-MCNC: 90 MG/DL (ref 65–99)
HCT VFR BLD AUTO: 32.2 % (ref 34–46.6)
HGB BLD-MCNC: 11 G/DL (ref 12–15.9)
IMM GRANULOCYTES # BLD AUTO: 0.01 10*3/MM3 (ref 0–0.05)
IMM GRANULOCYTES NFR BLD AUTO: 0.4 % (ref 0–0.5)
IRON 24H UR-MRATE: 110 MCG/DL (ref 37–145)
IRON SATN MFR SERPL: 36 % (ref 20–50)
LYMPHOCYTES # BLD AUTO: 1.19 10*3/MM3 (ref 0.7–3.1)
LYMPHOCYTES NFR BLD AUTO: 42.8 % (ref 19.6–45.3)
MCH RBC QN AUTO: 30.8 PG (ref 26.6–33)
MCHC RBC AUTO-ENTMCNC: 34.2 G/DL (ref 31.5–35.7)
MCV RBC AUTO: 90.2 FL (ref 79–97)
MONOCYTES # BLD AUTO: 0.29 10*3/MM3 (ref 0.1–0.9)
MONOCYTES NFR BLD AUTO: 10.4 % (ref 5–12)
NEUTROPHILS NFR BLD AUTO: 1.21 10*3/MM3 (ref 1.7–7)
NEUTROPHILS NFR BLD AUTO: 43.5 % (ref 42.7–76)
NRBC BLD AUTO-RTO: 0 /100 WBC (ref 0–0.2)
PLATELET # BLD AUTO: 259 10*3/MM3 (ref 140–450)
PMV BLD AUTO: 9.3 FL (ref 6–12)
POTASSIUM SERPL-SCNC: 4.5 MMOL/L (ref 3.5–5.2)
PROT SERPL-MCNC: 6.4 G/DL (ref 6–8.5)
RBC # BLD AUTO: 3.57 10*6/MM3 (ref 3.77–5.28)
SODIUM SERPL-SCNC: 141 MMOL/L (ref 136–145)
TIBC SERPL-MCNC: 305 MCG/DL (ref 298–536)
TRANSFERRIN SERPL-MCNC: 205 MG/DL (ref 200–360)
VIT B12 BLD-MCNC: 519 PG/ML (ref 211–946)
WBC NRBC COR # BLD: 2.78 10*3/MM3 (ref 3.4–10.8)

## 2022-02-14 PROCEDURE — 82728 ASSAY OF FERRITIN: CPT

## 2022-02-14 PROCEDURE — 82746 ASSAY OF FOLIC ACID SERUM: CPT

## 2022-02-14 PROCEDURE — 84466 ASSAY OF TRANSFERRIN: CPT

## 2022-02-14 PROCEDURE — 85025 COMPLETE CBC W/AUTO DIFF WBC: CPT

## 2022-02-14 PROCEDURE — 82607 VITAMIN B-12: CPT

## 2022-02-14 PROCEDURE — 80053 COMPREHEN METABOLIC PANEL: CPT

## 2022-02-14 PROCEDURE — 82378 CARCINOEMBRYONIC ANTIGEN: CPT

## 2022-02-14 PROCEDURE — 83540 ASSAY OF IRON: CPT

## 2022-02-16 ENCOUNTER — OFFICE VISIT (OUTPATIENT)
Dept: ONCOLOGY | Facility: CLINIC | Age: 77
End: 2022-02-16

## 2022-02-16 VITALS
DIASTOLIC BLOOD PRESSURE: 64 MMHG | BODY MASS INDEX: 37.9 KG/M2 | TEMPERATURE: 96.7 F | OXYGEN SATURATION: 100 % | WEIGHT: 200.6 LBS | SYSTOLIC BLOOD PRESSURE: 137 MMHG | HEART RATE: 60 BPM

## 2022-02-16 DIAGNOSIS — D70.8 OTHER NEUTROPENIA: ICD-10-CM

## 2022-02-16 DIAGNOSIS — D50.8 OTHER IRON DEFICIENCY ANEMIA: Chronic | ICD-10-CM

## 2022-02-16 DIAGNOSIS — Z86.711 HISTORY OF PULMONARY EMBOLISM: Chronic | ICD-10-CM

## 2022-02-16 DIAGNOSIS — C18.2 MALIGNANT NEOPLASM OF ASCENDING COLON: Primary | Chronic | ICD-10-CM

## 2022-02-16 PROCEDURE — 99214 OFFICE O/P EST MOD 30 MIN: CPT | Performed by: INTERNAL MEDICINE

## 2022-02-16 PROCEDURE — G0463 HOSPITAL OUTPT CLINIC VISIT: HCPCS | Performed by: INTERNAL MEDICINE

## 2022-02-16 PROCEDURE — 1126F AMNT PAIN NOTED NONE PRSNT: CPT | Performed by: INTERNAL MEDICINE

## 2022-02-16 PROCEDURE — 1123F ACP DISCUSS/DSCN MKR DOCD: CPT | Performed by: INTERNAL MEDICINE

## 2022-02-16 NOTE — PROGRESS NOTES
DATE OF VISIT: 2/16/2022      REASON FOR VISIT: Adenocarcinoma of ascending colon, anemia, neutropenia, history of pulmonary embolism      HISTORY OF PRESENT ILLNESS:   76-year-old female with medical problem consisting of pulmonary embolism diagnosed in 2016 s/p anticoagulation with Coumadin for 1 year, osteoporosis, depression, history of gastric sleeve surgery done in 2016, neutropenia, anemia, adenocarcinoma of colon diagnosed in June 2021 status post surgery is here for follow-up appointment today.  Complains of left knee pain secondary to recent fall.  Denies any bleeding.  Denies any new abdominal pain.  Complains of chronic arthralgia.  Denies any recent infections.  Denies any new lymph node enlargement.  Denies any new chest pain or shortness of breath.          Past Medical History, Past Surgical History, Social History, Family History have been reviewed and are without significant changes except as mentioned.    Review of Systems   A comprehensive 14 point review of systems was performed and was negative except as mentioned in HPI.    Medications:  The current medication list was reviewed in the EMR    ALLERGIES:    Allergies   Allergen Reactions   • Sulfa Antibiotics Hives and Rash     Sulfa (Sulfonamide Antibiotics)       Objective      Vitals:    02/16/22 1050   BP: 137/64   Pulse: 60   Temp: 96.7 °F (35.9 °C)   TempSrc: Temporal   SpO2: 100%   Weight: 91 kg (200 lb 9.6 oz)   PainSc: 0-No pain   PainLoc: Knee  Comment: LEFT KNEE     Current Status 12/10/2021   ECOG score 0       Physical Exam  Pulmonary:      Breath sounds: Normal breath sounds.   Neurological:      Mental Status: She is alert and oriented to person, place, and time.           RECENT LABS:  Glucose   Date Value Ref Range Status   02/14/2022 90 65 - 99 mg/dL Final     Sodium   Date Value Ref Range Status   02/14/2022 141 136 - 145 mmol/L Final   10/12/2018 140 134 - 144 mmol/L Final   10/08/2018 141 136 - 144 mmol/L Final     Potassium    Date Value Ref Range Status   02/14/2022 4.5 3.5 - 5.2 mmol/L Final   10/12/2018 4.6 3.5 - 5.1 mmol/L Final   10/08/2018 4.3 3.3 - 4.8 mmol/L Final     Comment:     Serum Potassium Reference Range  = 3.5-5.1  mmol/L     CO2   Date Value Ref Range Status   02/14/2022 26.0 22.0 - 29.0 mmol/L Final     Total CO2   Date Value Ref Range Status   10/08/2018 25 23 - 31 mmol/L Final     Chloride   Date Value Ref Range Status   02/14/2022 106 98 - 107 mmol/L Final   10/12/2018 105 98 - 107 mmol/L Final   10/08/2018 108 (H) 98 - 107 mmol/L Final     Anion Gap   Date Value Ref Range Status   02/14/2022 9.0 5.0 - 15.0 mmol/L Final   10/08/2018 8  Final     Creatinine   Date Value Ref Range Status   02/14/2022 0.96 0.57 - 1.00 mg/dL Final   10/12/2018 0.9 0.6 - 1.3 mg/dL Final     Comment:     **The MDRD GFR formula is valid only for ages 18-70.    GFR will not be reported for individuals aging <18 or >70.     BUN   Date Value Ref Range Status   02/14/2022 12 8 - 23 mg/dL Final   10/12/2018 13 7 - 18 mg/dL Final     BUN/Creatinine Ratio   Date Value Ref Range Status   02/14/2022 12.5 7.0 - 25.0 Final     Calcium   Date Value Ref Range Status   02/14/2022 9.5 8.6 - 10.5 mg/dL Final   10/12/2018 9.7 8.8 - 10.5 mg/dL Final     eGFR Non  Amer   Date Value Ref Range Status   02/14/2022 57 (L) >60 mL/min/1.73 Final     Alkaline Phosphatase   Date Value Ref Range Status   02/14/2022 100 39 - 117 U/L Final   10/12/2018 115 46 - 116 U/L Final     Total Protein   Date Value Ref Range Status   02/14/2022 6.4 6.0 - 8.5 g/dL Final   10/12/2018 6.6 6.4 - 8.2 g/dL Final     ALT (SGPT)   Date Value Ref Range Status   02/14/2022 26 1 - 33 U/L Final   10/12/2018 43 30 - 65 U/L Final     AST (SGOT)   Date Value Ref Range Status   02/14/2022 27 1 - 32 U/L Final   10/12/2018 22 15 - 37 U/L Final     Total Bilirubin   Date Value Ref Range Status   02/14/2022 0.2 0.0 - 1.2 mg/dL Final   10/12/2018 0.3 0 - 1.0 mg/dL Final     Albumin   Date  Value Ref Range Status   02/14/2022 3.90 3.50 - 5.20 g/dL Final   10/12/2018 3.2 (L) 3.4 - 5.0 g/dL Final     Globulin   Date Value Ref Range Status   02/14/2022 2.5 gm/dL Final     Lab Results   Component Value Date    WBC 2.78 (L) 02/14/2022    HGB 11.0 (L) 02/14/2022    HCT 32.2 (L) 02/14/2022    MCV 90.2 02/14/2022     02/14/2022     Lab Results   Component Value Date    NEUTROABS 1.21 (L) 02/14/2022    IRON 110 02/14/2022    IRON 57 11/08/2021    IRON 79 08/20/2021    TIBC 305 02/14/2022    TIBC 361 11/08/2021    TIBC 426 08/20/2021    LABIRON 36 02/14/2022    LABIRON 16 (L) 11/08/2021    LABIRON 19 (L) 08/20/2021    FERRITIN 333.30 (H) 02/14/2022    FERRITIN 26.69 11/08/2021    FERRITIN 23.29 08/20/2021    [id number] 519 02/14/2022    [id number] 718 11/08/2021    PDAZTPHX63 1,867 (H) 08/20/2021    FOLATE >20.00 02/14/2022    FOLATE 18.20 11/08/2021    FOLATE >20.00 08/20/2021     Lab Results   Component Value Date    AFPTM 142 12/05/2018    CEA 3.59 02/14/2022         PATHOLOGY:  * Cannot find OR log *         RADIOLOGY DATA :  No radiology results for the last 7 days        Assessment/Plan     1.  Adenocarcinoma of ascending colon, stage III, T3 N0 M0 MSI stable:  -There was no evidence of high risk feature on pathology report from June 2021  -CEA level done is 3.5.  -We will continue with clinical surveillance.  -We will have patient return to clinic in 3 months with repeat CBC, CMP, iron studies, ferritin, B12, folate, CEA, CT of chest abdomen and pelvis with contrast to be done prior to that.  -Next surveillance colonoscopy will be due in July 2022    2.  Anemia:  -Due to iron malabsorption patient received intravenous Venofer completed on December 10, 2021.  -B12 level is decreased to 519.  Recommend starting B12 2 times a week.  -Patient has B12 2500 mcg tablets at home.    3.Neutropenia:  -Recent CBC shows white blood cell count is 2.78 with absolute neutrophil count of 1.21.  -Patient does have  a history of intermittent neutropenia in the past as well  -We will recheck CBC upon next clinic visit in 3 months.  If continues to have worsening neutropenia next step in evaluation would be bone marrow biopsy    4.  History of pulmonary embolism s/p anticoagulation with Coumadin for 1 year in 2016  -We will continue clinical surveillance    5.  Health maintenance: Patient does not smoke    6. Advance Care Planning: For now patient remains full code and is able to make decisions.  Patient has health care surrogate mentioned on chart.                   PHQ-9 Total Score: 0   -Patient is not homicidal or suicidal.  No acute intervention required.    Veronica Wilkinson reports a pain score of 0.  Given her pain assessment as noted, treatment options were discussed and the following options were decided upon as a follow-up plan to address the patient's pain: continuation of current treatment plan for pain.         Mihai Hunt MD  2/16/2022  10:54 CST        Part of this note may be an electronic transcription/translation of spoken language to printed text using the Dragon Dictation System.          CC:

## 2022-03-09 ENCOUNTER — CLINICAL SUPPORT (OUTPATIENT)
Dept: CARDIOLOGY | Facility: CLINIC | Age: 77
End: 2022-03-09

## 2022-03-09 DIAGNOSIS — Z95.0 PRESENCE OF CARDIAC PACEMAKER: ICD-10-CM

## 2022-03-09 DIAGNOSIS — R00.1 SYMPTOMATIC SINUS BRADYCARDIA: Primary | ICD-10-CM

## 2022-03-09 NOTE — PROGRESS NOTES
Pacemaker Evaluation Report    March 15, 2022    Primary Cardiologist: Dr. Peterson  Implanting MD: Dr. Bucio  :  [employer] Model: [employer] MRI L131 Serial Number: [id number]  Implant date: 12/11/2020    Reason for evaluation: routine, PPM, Office  Cardiac device indication(s): symptomatic bradycardia    Battery  ZEENAT: 13 years       Interrogation Results  Atrial sensing: P wave: 4.4 mV  Atrial capture: 0.9 V @ 0.4 ms   Atrial lead impedance: 499 ohms  Ventricular sensing: R wave: 11.2 mV  Ventricular capture: 0.7 V @ 0.4 ms  Ventricular lead impedance: right  740 ohms    Parameters  Mode: DDDR  Base Rate: 60 ppm    Diagnostic Data  Atrial paced: 52 %   Ventricular paced: 19 %  Mode switch: 0%  AT/AF [place]: <1%  AHR: 4, all 0.8 min total  VHR: 1, atrially driven    Intrinsic Rate: 53    Changes made: Changed atrial output to 1.8 V.    Conclusions: Normal device function. Follow up in one year, remote every 3 months.    Assessment:  1. Symptomatic sinus bradycardia    2. Presence of cardiac pacemaker                This document has been electronically signed by TABBY Pompa on March 15, 2022 16:44 CDT

## 2022-03-15 PROCEDURE — 93280 PM DEVICE PROGR EVAL DUAL: CPT | Performed by: NURSE PRACTITIONER

## 2022-05-09 RX ORDER — PANTOPRAZOLE SODIUM 40 MG/1
1 TABLET, DELAYED RELEASE ORAL DAILY
COMMUNITY
Start: 2021-11-29 | End: 2022-09-16 | Stop reason: SDUPTHER

## 2022-05-09 RX ORDER — PANTOPRAZOLE SODIUM 40 MG/1
TABLET, DELAYED RELEASE ORAL
Qty: 90 TABLET | Refills: 1 | Status: SHIPPED | OUTPATIENT
Start: 2022-05-09 | End: 2022-06-30

## 2022-05-09 RX ORDER — OLMESARTAN MEDOXOMIL AND HYDROCHLOROTHIAZIDE 20/12.5 20; 12.5 MG/1; MG/1
1 TABLET ORAL DAILY
COMMUNITY
Start: 2021-11-29 | End: 2022-06-30

## 2022-05-09 RX ORDER — TRAZODONE HYDROCHLORIDE 100 MG/1
TABLET ORAL
Qty: 90 TABLET | Refills: 3 | Status: SHIPPED | OUTPATIENT
Start: 2022-05-09 | End: 2022-08-19

## 2022-05-18 ENCOUNTER — TELEPHONE (OUTPATIENT)
Dept: CARDIOLOGY | Facility: CLINIC | Age: 77
End: 2022-05-18

## 2022-05-18 NOTE — TELEPHONE ENCOUNTER
Telephoned patient to let her know that I am not having any communication with her home monitor. Patient states she is in [place] right now. Told her that's alright, she can sent remote when she gets back home.

## 2022-05-23 ENCOUNTER — HOSPITAL ENCOUNTER (OUTPATIENT)
Dept: CT IMAGING | Facility: HOSPITAL | Age: 77
Discharge: HOME | End: 2022-05-23

## 2022-05-23 ENCOUNTER — LAB (OUTPATIENT)
Dept: LAB | Facility: HOSPITAL | Age: 77
End: 2022-05-23

## 2022-05-23 DIAGNOSIS — C18.2 MALIGNANT NEOPLASM OF ASCENDING COLON: ICD-10-CM

## 2022-05-23 DIAGNOSIS — Z86.711 HISTORY OF PULMONARY EMBOLISM: Chronic | ICD-10-CM

## 2022-05-23 DIAGNOSIS — Z86.711 HISTORY OF PULMONARY EMBOLISM: ICD-10-CM

## 2022-05-23 DIAGNOSIS — D50.8 OTHER IRON DEFICIENCY ANEMIA: ICD-10-CM

## 2022-05-23 DIAGNOSIS — M81.0 AGE-RELATED OSTEOPOROSIS WITHOUT CURRENT PATHOLOGICAL FRACTURE: ICD-10-CM

## 2022-05-23 DIAGNOSIS — C18.2 MALIGNANT NEOPLASM OF ASCENDING COLON: Chronic | ICD-10-CM

## 2022-05-23 DIAGNOSIS — D50.8 OTHER IRON DEFICIENCY ANEMIA: Chronic | ICD-10-CM

## 2022-05-23 LAB
ALBUMIN SERPL-MCNC: 4.2 G/DL (ref 3.5–5.2)
ALBUMIN/GLOB SERPL: 1.4 G/DL
ALP SERPL-CCNC: 97 U/L (ref 39–117)
ALT SERPL W P-5'-P-CCNC: 28 U/L (ref 1–33)
ANION GAP SERPL CALCULATED.3IONS-SCNC: 10 MMOL/L (ref 5–15)
AST SERPL-CCNC: 35 U/L (ref 1–32)
BASOPHILS # BLD AUTO: 0.05 10*3/MM3 (ref 0–0.2)
BASOPHILS NFR BLD AUTO: 1.2 % (ref 0–1.5)
BILIRUB SERPL-MCNC: 0.4 MG/DL (ref 0–1.2)
BUN SERPL-MCNC: 19 MG/DL (ref 8–23)
BUN/CREAT SERPL: 17.3 (ref 7–25)
CALCIUM SPEC-SCNC: 10.4 MG/DL (ref 8.6–10.5)
CHLORIDE SERPL-SCNC: 103 MMOL/L (ref 98–107)
CO2 SERPL-SCNC: 26 MMOL/L (ref 22–29)
CREAT SERPL-MCNC: 1.1 MG/DL (ref 0.57–1)
DEPRECATED RDW RBC AUTO: 41.2 FL (ref 37–54)
EGFRCR SERPLBLD CKD-EPI 2021: 52.8 ML/MIN/1.73
EOSINOPHIL # BLD AUTO: 0.09 10*3/MM3 (ref 0–0.4)
EOSINOPHIL NFR BLD AUTO: 2.1 % (ref 0.3–6.2)
ERYTHROCYTE [DISTWIDTH] IN BLOOD BY AUTOMATED COUNT: 12.6 % (ref 12.3–15.4)
FERRITIN SERPL-MCNC: 365.5 NG/ML (ref 13–150)
GLOBULIN UR ELPH-MCNC: 2.9 GM/DL
GLUCOSE SERPL-MCNC: 83 MG/DL (ref 65–99)
HCT VFR BLD AUTO: 34.8 % (ref 34–46.6)
HGB BLD-MCNC: 12 G/DL (ref 12–15.9)
IMM GRANULOCYTES # BLD AUTO: 0 10*3/MM3 (ref 0–0.05)
IMM GRANULOCYTES NFR BLD AUTO: 0 % (ref 0–0.5)
IRON 24H UR-MRATE: 96 MCG/DL (ref 37–145)
IRON SATN MFR SERPL: 30 % (ref 20–50)
LYMPHOCYTES # BLD AUTO: 1.6 10*3/MM3 (ref 0.7–3.1)
LYMPHOCYTES NFR BLD AUTO: 37.6 % (ref 19.6–45.3)
MAGNESIUM SERPL-MCNC: 1.7 MG/DL (ref 1.6–2.4)
MCH RBC QN AUTO: 31.2 PG (ref 26.6–33)
MCHC RBC AUTO-ENTMCNC: 34.5 G/DL (ref 31.5–35.7)
MCV RBC AUTO: 90.4 FL (ref 79–97)
MONOCYTES # BLD AUTO: 0.45 10*3/MM3 (ref 0.1–0.9)
MONOCYTES NFR BLD AUTO: 10.6 % (ref 5–12)
NEUTROPHILS NFR BLD AUTO: 2.06 10*3/MM3 (ref 1.7–7)
NEUTROPHILS NFR BLD AUTO: 48.5 % (ref 42.7–76)
NRBC BLD AUTO-RTO: 0 /100 WBC (ref 0–0.2)
PHOSPHATE SERPL-MCNC: 3.4 MG/DL (ref 2.5–4.5)
PLATELET # BLD AUTO: 288 10*3/MM3 (ref 140–450)
PMV BLD AUTO: 9.2 FL (ref 6–12)
POTASSIUM SERPL-SCNC: 4.1 MMOL/L (ref 3.5–5.2)
PROT SERPL-MCNC: 7.1 G/DL (ref 6–8.5)
RBC # BLD AUTO: 3.85 10*6/MM3 (ref 3.77–5.28)
SODIUM SERPL-SCNC: 139 MMOL/L (ref 136–145)
TIBC SERPL-MCNC: 323 MCG/DL (ref 298–536)
TRANSFERRIN SERPL-MCNC: 217 MG/DL (ref 200–360)
WBC NRBC COR # BLD: 4.25 10*3/MM3 (ref 3.4–10.8)

## 2022-05-23 PROCEDURE — 74177 CT ABD & PELVIS W/CONTRAST: CPT

## 2022-05-23 PROCEDURE — 82378 CARCINOEMBRYONIC ANTIGEN: CPT

## 2022-05-23 PROCEDURE — 83540 ASSAY OF IRON: CPT

## 2022-05-23 PROCEDURE — 85025 COMPLETE CBC W/AUTO DIFF WBC: CPT

## 2022-05-23 PROCEDURE — 80053 COMPREHEN METABOLIC PANEL: CPT

## 2022-05-23 PROCEDURE — 25010000002 IOPAMIDOL 61 % SOLUTION: Performed by: INTERNAL MEDICINE

## 2022-05-23 PROCEDURE — 83735 ASSAY OF MAGNESIUM: CPT

## 2022-05-23 PROCEDURE — 84100 ASSAY OF PHOSPHORUS: CPT

## 2022-05-23 PROCEDURE — 71260 CT THORAX DX C+: CPT

## 2022-05-23 PROCEDURE — 82746 ASSAY OF FOLIC ACID SERUM: CPT

## 2022-05-23 PROCEDURE — 82728 ASSAY OF FERRITIN: CPT

## 2022-05-23 PROCEDURE — 84466 ASSAY OF TRANSFERRIN: CPT

## 2022-05-23 PROCEDURE — 82607 VITAMIN B-12: CPT

## 2022-05-23 RX ADMIN — IOPAMIDOL 90 ML: 612 INJECTION, SOLUTION INTRAVENOUS at 11:20

## 2022-05-24 LAB
CEA SERPL-MCNC: 3.63 NG/ML
FOLATE SERPL-MCNC: 18.5 NG/ML (ref 4.78–24.2)
VIT B12 BLD-MCNC: 590 PG/ML (ref 211–946)

## 2022-05-31 ENCOUNTER — INFUSION (OUTPATIENT)
Dept: ONCOLOGY | Facility: HOSPITAL | Age: 77
End: 2022-05-31

## 2022-05-31 ENCOUNTER — OFFICE VISIT (OUTPATIENT)
Dept: ONCOLOGY | Facility: CLINIC | Age: 77
End: 2022-05-31

## 2022-05-31 VITALS
OXYGEN SATURATION: 94 % | TEMPERATURE: 98.3 F | SYSTOLIC BLOOD PRESSURE: 118 MMHG | WEIGHT: 199.5 LBS | BODY MASS INDEX: 38.96 KG/M2 | HEART RATE: 66 BPM | DIASTOLIC BLOOD PRESSURE: 65 MMHG

## 2022-05-31 DIAGNOSIS — M81.0 AGE-RELATED OSTEOPOROSIS WITHOUT CURRENT PATHOLOGICAL FRACTURE: Primary | ICD-10-CM

## 2022-05-31 DIAGNOSIS — Z86.711 HISTORY OF PULMONARY EMBOLISM: Chronic | ICD-10-CM

## 2022-05-31 DIAGNOSIS — D70.8 OTHER NEUTROPENIA: ICD-10-CM

## 2022-05-31 DIAGNOSIS — D50.8 OTHER IRON DEFICIENCY ANEMIA: Chronic | ICD-10-CM

## 2022-05-31 DIAGNOSIS — C18.2 MALIGNANT NEOPLASM OF ASCENDING COLON: Primary | Chronic | ICD-10-CM

## 2022-05-31 DIAGNOSIS — N17.9 AKI (ACUTE KIDNEY INJURY): ICD-10-CM

## 2022-05-31 PROCEDURE — 25010000002 DENOSUMAB 60 MG/ML SOLUTION PREFILLED SYRINGE: Performed by: NURSE PRACTITIONER

## 2022-05-31 PROCEDURE — 99214 OFFICE O/P EST MOD 30 MIN: CPT | Performed by: INTERNAL MEDICINE

## 2022-05-31 PROCEDURE — 96372 THER/PROPH/DIAG INJ SC/IM: CPT | Performed by: NURSE PRACTITIONER

## 2022-05-31 RX ADMIN — DENOSUMAB 60 MG: 60 INJECTION SUBCUTANEOUS at 14:47

## 2022-05-31 NOTE — PROGRESS NOTES
DATE OF VISIT: 5/31/2022      REASON FOR VISIT: Adenocarcinoma of ascending colon, anemia, neutropenia, history of pulmonary embolism      HISTORY OF PRESENT ILLNESS:   76-year-old female with medical problem consisting of pulmonary embolism diagnosed in 2016 s/p anticoagulation with Coumadin for 1 year, osteoporosis, depression, history of gastric sleeve surgery done in 2016, neutropenia, anemia, adenocarcinoma of colon diagnosed in June 2021 status postsurgery is here for follow-up appointment today.  Patient had a blood work done as well as a CT scan done recently, she is here to discuss the results and further recommendation.  Complains of worsening fatigue.  Complains of arthralgia.  Denies any new lymph node enlargement.  Denies any new chest pain or shortness of breath.              Past Medical History, Past Surgical History, Social History, Family History have been reviewed and are without significant changes except as mentioned.    Review of Systems   A comprehensive 14 point review of systems was performed and was negative except as mentioned in HPI.    Medications:  The current medication list was reviewed in the EMR    ALLERGIES:    Allergies   Allergen Reactions   • Sulfa Antibiotics Hives and Rash     Sulfa (Sulfonamide Antibiotics)       Objective      Vitals:    05/31/22 1420   BP: 118/65   Pulse: 66   Temp: 98.3 °F (36.8 °C)   TempSrc: Temporal   SpO2: 94%   Weight: 90.5 kg (199 lb 8 oz)   PainSc: 0-No pain     Current Status 12/10/2021   ECOG score 0       Physical Exam  Pulmonary:      Breath sounds: Normal breath sounds.   Neurological:      Mental Status: She is alert and oriented to person, place, and time.           RECENT LABS:  Glucose   Date Value Ref Range Status   05/23/2022 83 65 - 99 mg/dL Final     Sodium   Date Value Ref Range Status   05/23/2022 139 136 - 145 mmol/L Final   10/12/2018 140 134 - 144 mmol/L Final   10/08/2018 141 136 - 144 mmol/L Final     Potassium   Date Value Ref  Range Status   05/23/2022 4.1 3.5 - 5.2 mmol/L Final   10/12/2018 4.6 3.5 - 5.1 mmol/L Final   10/08/2018 4.3 3.3 - 4.8 mmol/L Final     Comment:     Serum Potassium Reference Range  = 3.5-5.1  mmol/L     CO2   Date Value Ref Range Status   05/23/2022 26.0 22.0 - 29.0 mmol/L Final     Total CO2   Date Value Ref Range Status   10/08/2018 25 23 - 31 mmol/L Final     Chloride   Date Value Ref Range Status   05/23/2022 103 98 - 107 mmol/L Final   10/12/2018 105 98 - 107 mmol/L Final   10/08/2018 108 (H) 98 - 107 mmol/L Final     Anion Gap   Date Value Ref Range Status   05/23/2022 10.0 5.0 - 15.0 mmol/L Final   10/08/2018 8  Final     Creatinine   Date Value Ref Range Status   05/23/2022 1.10 (H) 0.57 - 1.00 mg/dL Final   10/12/2018 0.9 0.6 - 1.3 mg/dL Final     Comment:     **The MDRD GFR formula is valid only for ages 18-70.    GFR will not be reported for individuals aging <18 or >70.     BUN   Date Value Ref Range Status   05/23/2022 19 8 - 23 mg/dL Final   10/12/2018 13 7 - 18 mg/dL Final     BUN/Creatinine Ratio   Date Value Ref Range Status   05/23/2022 17.3 7.0 - 25.0 Final     Calcium   Date Value Ref Range Status   05/23/2022 10.4 8.6 - 10.5 mg/dL Final   10/12/2018 9.7 8.8 - 10.5 mg/dL Final     eGFR Non  Amer   Date Value Ref Range Status   02/14/2022 57 (L) >60 mL/min/1.73 Final     Alkaline Phosphatase   Date Value Ref Range Status   05/23/2022 97 39 - 117 U/L Final   10/12/2018 115 46 - 116 U/L Final     Total Protein   Date Value Ref Range Status   05/23/2022 7.1 6.0 - 8.5 g/dL Final   10/12/2018 6.6 6.4 - 8.2 g/dL Final     ALT (SGPT)   Date Value Ref Range Status   05/23/2022 28 1 - 33 U/L Final   10/12/2018 43 30 - 65 U/L Final     AST (SGOT)   Date Value Ref Range Status   05/23/2022 35 (H) 1 - 32 U/L Final   10/12/2018 22 15 - 37 U/L Final     Total Bilirubin   Date Value Ref Range Status   05/23/2022 0.4 0.0 - 1.2 mg/dL Final   10/12/2018 0.3 0 - 1.0 mg/dL Final     Albumin   Date Value Ref  Range Status   05/23/2022 4.20 3.50 - 5.20 g/dL Final   10/12/2018 3.2 (L) 3.4 - 5.0 g/dL Final     Globulin   Date Value Ref Range Status   05/23/2022 2.9 gm/dL Final     Lab Results   Component Value Date    WBC 4.25 05/23/2022    HGB 12.0 05/23/2022    HCT 34.8 05/23/2022    MCV 90.4 05/23/2022     05/23/2022     Lab Results   Component Value Date    NEUTROABS 2.06 05/23/2022    IRON 96 05/23/2022    IRON 110 02/14/2022    IRON 57 11/08/2021    TIBC 323 05/23/2022    TIBC 305 02/14/2022    TIBC 361 11/08/2021    LABIRON 30 05/23/2022    LABIRON 36 02/14/2022    LABIRON 16 (L) 11/08/2021    FERRITIN 365.50 (H) 05/23/2022    FERRITIN 333.30 (H) 02/14/2022    FERRITIN 26.69 11/08/2021    [id number] 590 05/23/2022    [id number] 519 02/14/2022    [id number] 718 11/08/2021    FOLATE 18.50 05/23/2022    FOLATE >20.00 02/14/2022    FOLATE 18.20 11/08/2021     Lab Results   Component Value Date    AFPTM 142 12/05/2018    CEA 3.63 05/23/2022         PATHOLOGY:  * Cannot find OR log *         RADIOLOGY DATA :  CT of chest, abdomen and pelvis with contrast done on May 23, 2022 showed:       FINDINGS:   CHEST:    LUNGS:  Unremarkable.  No mass.  No consolidation.    PLEURAL SPACE:  Unremarkable.  No significant effusion.  No  pneumothorax.    HEART:  Unremarkable.  No cardiomegaly.  No significant  pericardial effusion.  No significant coronary artery  calcifications.      ABDOMEN:    LIVER:  Unremarkable.  No mass.    GALLBLADDER AND BILE DUCTS:  Cholelithiasis.  No ductal  dilation.    PANCREAS:  Unremarkable.  No ductal dilation.  No mass.    SPLEEN:  Unremarkable.  No splenomegaly.    ADRENALS:  Unremarkable.  No mass.    KIDNEYS AND URETERS:  Simple cyst of the right kidney upper  pole measures 5.3 cm and requires no follow-up.  No  hydronephrosis.  No solid mass.    STOMACH AND BOWEL:  Sleeve gastrectomy.  Partial right  colectomy.  No obstruction.      PELVIS:    APPENDIX:  See above.    BLADDER:  Unremarkable.   No mass.    REPRODUCTIVE:  Unremarkable as visualized.      CHEST, ABDOMEN and PELVIS:    INTRAPERITONEAL SPACE:  Unremarkable.  No significant fluid  collection.  No free air.    BONES/JOINTS:  Unremarkable.  No acute fracture.  No  dislocation.    SOFT TISSUES:  Unremarkable.    VASCULATURE:  Unremarkable.  No aortic aneurysm.    LYMPH NODES:  Unremarkable.  No enlarged lymph nodes.    TUBES, LINES AND DEVICES:  Left-sided cardiac device.     IMPRESSION:  1.  No evidence of metastatic disease in the chest, abdomen or  pelvis.  2.  Cholelithiasis.            XR Foot 3+ View Left    Result Date: 5/24/2022  CONCLUSION: Old 1 cm fracture fragment ventral to the cuboid. Moderate-sized plantar calcaneal spur. [id number] Electronically signed by:  Josias Cadena MD  5/24/2022 10:03 AM CDT Workstation: [id number]          Assessment & Plan     1.  Adenocarcinoma of ascending colon, stage III, T3 N0 M0 MSI stable:  - Result of recent CT scan on May 23, 2022 does not show any evidence of recurrence.  -Recent CEA level is 3.8  - Result of CT scan and CEA were discussed with patient  - Clinically there is no evidence of recurrence  - Patient will need surveillance colonoscopy in the month of June or July.  We will refer her back to Dr. Gibbons for surveillance colonoscopy  - Patient will return to clinic in 3 months with repeat CBC, CMP, CEA, B12, folate, iron studies and ferritin to be done on that day.    2.  Anemia:  - Due to iron malabsorption patient received intravenous Venofer that completed on December 10, 2021  - Patient is currently on B12 to 50 mcg p.o. daily.  Recommend continue with B12 p.o. daily  - Anemia work-up shows hemoglobin is 12.  Iron studies are adequate no need for any intravenous iron replacement at present    3.  Neutropenia:  - Patient has been having intermittent neutropenia for the last few years.  - Recent white blood cell and neutrophil count is normal  - We will continue with clinical monitoring.    4.   History of pulmonary embolism s/p anticoagulation with Coumadin for 1 year in 2016  - We will continue with clinical surveillance    5.  Acute kidney injury:  - Creatinine is elevated at 1.10.  Patient was notified about elevated creatinine and need to increase hydration    6.  Health maintenance: Patient does not smoke    7. Advance Care Planning: For now patient remains full code and is able to make decisions.  Patient has health care surrogate mentioned on chart.               PHQ-9 Total Score: 0   -Patient is not homicidal or suicidal.  No acute intervention required.    Veronica Wilkinson reports a pain score of 0.  Given her pain assessment as noted, treatment options were discussed and the following options were decided upon as a follow-up plan to address the patient's pain: continuation of current treatment plan for pain.         Mihai Hunt MD  5/31/2022  14:38 CDT        Part of this note may be an electronic transcription/translation of spoken language to printed text using the Dragon Dictation System.          CC:

## 2022-06-19 PROCEDURE — 87635 SARS-COV-2 COVID-19 AMP PRB: CPT | Performed by: NURSE PRACTITIONER

## 2022-06-29 ENCOUNTER — TELEPHONE (OUTPATIENT)
Dept: CARDIOLOGY | Facility: CLINIC | Age: 77
End: 2022-06-29

## 2022-06-29 NOTE — TELEPHONE ENCOUNTER
----- Message from Linnette Peterson MD sent at 6/29/2022  2:19 PM CDT -----  Her pacemaker had an episode afib. We need to see her in office and talk about anticoagulation and risks of stroke with intermittent afib. She hasnt been seen in a while.       Patient contacted with pacemaker findings per dr. Peterson. She will been made an appt for tomorrow at 9:30 am.

## 2022-06-30 ENCOUNTER — APPOINTMENT (OUTPATIENT)
Dept: CT IMAGING | Facility: HOSPITAL | Age: 77
End: 2022-06-30

## 2022-06-30 ENCOUNTER — APPOINTMENT (OUTPATIENT)
Dept: GENERAL RADIOLOGY | Facility: HOSPITAL | Age: 77
End: 2022-06-30

## 2022-06-30 ENCOUNTER — HOSPITAL ENCOUNTER (OUTPATIENT)
Facility: HOSPITAL | Age: 77
Setting detail: OBSERVATION
Discharge: HOME | End: 2022-07-01
Attending: FAMILY MEDICINE | Admitting: FAMILY MEDICINE

## 2022-06-30 ENCOUNTER — TELEPHONE (OUTPATIENT)
Dept: CARDIOLOGY | Facility: CLINIC | Age: 77
End: 2022-06-30

## 2022-06-30 ENCOUNTER — OFFICE VISIT (OUTPATIENT)
Dept: CARDIOLOGY | Facility: CLINIC | Age: 77
End: 2022-06-30

## 2022-06-30 VITALS
HEIGHT: 61 IN | HEART RATE: 64 BPM | SYSTOLIC BLOOD PRESSURE: 130 MMHG | OXYGEN SATURATION: 94 % | DIASTOLIC BLOOD PRESSURE: 82 MMHG | WEIGHT: 198.4 LBS | BODY MASS INDEX: 37.46 KG/M2

## 2022-06-30 DIAGNOSIS — G45.9 TIA (TRANSIENT ISCHEMIC ATTACK): Primary | ICD-10-CM

## 2022-06-30 DIAGNOSIS — Z74.09 IMPAIRED FUNCTIONAL MOBILITY, BALANCE, GAIT, AND ENDURANCE: ICD-10-CM

## 2022-06-30 DIAGNOSIS — I10 ESSENTIAL HYPERTENSION: Primary | ICD-10-CM

## 2022-06-30 PROBLEM — I48.0 PAROXYSMAL ATRIAL FIBRILLATION: Status: ACTIVE | Noted: 2022-06-30

## 2022-06-30 LAB
ABO GROUP BLD: NORMAL
ABO GROUP BLD: NORMAL
ALBUMIN SERPL-MCNC: 3.9 G/DL (ref 3.5–5.2)
ALBUMIN/GLOB SERPL: 1.3 G/DL
ALP SERPL-CCNC: 101 U/L (ref 39–117)
ALT SERPL W P-5'-P-CCNC: 36 U/L (ref 1–33)
ANION GAP SERPL CALCULATED.3IONS-SCNC: 10 MMOL/L (ref 5–15)
APTT PPP: 33.4 SECONDS (ref 20–40.3)
AST SERPL-CCNC: 36 U/L (ref 1–32)
BACTERIA UR QL AUTO: ABNORMAL /HPF
BASOPHILS # BLD AUTO: 0.03 10*3/MM3 (ref 0–0.2)
BASOPHILS NFR BLD AUTO: 0.5 % (ref 0–1.5)
BILIRUB SERPL-MCNC: 0.2 MG/DL (ref 0–1.2)
BILIRUB UR QL STRIP: NEGATIVE
BLD GP AB SCN SERPL QL: NEGATIVE
BUN SERPL-MCNC: 28 MG/DL (ref 8–23)
BUN/CREAT SERPL: 25.7 (ref 7–25)
CALCIUM SPEC-SCNC: 9.9 MG/DL (ref 8.6–10.5)
CHLORIDE SERPL-SCNC: 106 MMOL/L (ref 98–107)
CK SERPL-CCNC: 43 U/L (ref 20–180)
CLARITY UR: CLEAR
CO2 SERPL-SCNC: 23 MMOL/L (ref 22–29)
COLOR UR: YELLOW
CREAT SERPL-MCNC: 1.09 MG/DL (ref 0.57–1)
DEPRECATED RDW RBC AUTO: 41.8 FL (ref 37–54)
EGFRCR SERPLBLD CKD-EPI 2021: 53.4 ML/MIN/1.73
EOSINOPHIL # BLD AUTO: 0.05 10*3/MM3 (ref 0–0.4)
EOSINOPHIL NFR BLD AUTO: 0.9 % (ref 0.3–6.2)
ERYTHROCYTE [DISTWIDTH] IN BLOOD BY AUTOMATED COUNT: 12.7 % (ref 12.3–15.4)
FLUAV SUBTYP SPEC NAA+PROBE: NOT DETECTED
FLUBV RNA ISLT QL NAA+PROBE: NOT DETECTED
GLOBULIN UR ELPH-MCNC: 2.9 GM/DL
GLUCOSE BLDC GLUCOMTR-MCNC: 123 MG/DL (ref 70–130)
GLUCOSE SERPL-MCNC: 126 MG/DL (ref 65–99)
GLUCOSE UR STRIP-MCNC: NEGATIVE MG/DL
HCT VFR BLD AUTO: 32.6 % (ref 34–46.6)
HGB BLD-MCNC: 11.4 G/DL (ref 12–15.9)
HGB UR QL STRIP.AUTO: NEGATIVE
HOLD SPECIMEN: NORMAL
HOLD SPECIMEN: NORMAL
HYALINE CASTS UR QL AUTO: ABNORMAL /LPF
IMM GRANULOCYTES # BLD AUTO: 0.02 10*3/MM3 (ref 0–0.05)
IMM GRANULOCYTES NFR BLD AUTO: 0.4 % (ref 0–0.5)
INR PPP: 1.11 (ref 0.8–1.2)
KETONES UR QL STRIP: NEGATIVE
LEUKOCYTE ESTERASE UR QL STRIP.AUTO: ABNORMAL
LYMPHOCYTES # BLD AUTO: 1.18 10*3/MM3 (ref 0.7–3.1)
LYMPHOCYTES NFR BLD AUTO: 21.6 % (ref 19.6–45.3)
Lab: NORMAL
MAGNESIUM SERPL-MCNC: 2 MG/DL (ref 1.6–2.4)
MCH RBC QN AUTO: 31.7 PG (ref 26.6–33)
MCHC RBC AUTO-ENTMCNC: 35 G/DL (ref 31.5–35.7)
MCV RBC AUTO: 90.6 FL (ref 79–97)
MONOCYTES # BLD AUTO: 0.41 10*3/MM3 (ref 0.1–0.9)
MONOCYTES NFR BLD AUTO: 7.5 % (ref 5–12)
NEUTROPHILS NFR BLD AUTO: 3.77 10*3/MM3 (ref 1.7–7)
NEUTROPHILS NFR BLD AUTO: 69.1 % (ref 42.7–76)
NITRITE UR QL STRIP: NEGATIVE
NRBC BLD AUTO-RTO: 0 /100 WBC (ref 0–0.2)
NT-PROBNP SERPL-MCNC: 198.7 PG/ML (ref 0–900)
PH UR STRIP.AUTO: <=5 [PH] (ref 5–9)
PLATELET # BLD AUTO: 256 10*3/MM3 (ref 140–450)
PMV BLD AUTO: 8.9 FL (ref 6–12)
POTASSIUM SERPL-SCNC: 4.3 MMOL/L (ref 3.5–5.2)
PROT SERPL-MCNC: 6.8 G/DL (ref 6–8.5)
PROT UR QL STRIP: NEGATIVE
PROTHROMBIN TIME: 14.1 SECONDS (ref 11.1–15.3)
RBC # BLD AUTO: 3.6 10*6/MM3 (ref 3.77–5.28)
RBC # UR STRIP: ABNORMAL /HPF
REF LAB TEST METHOD: ABNORMAL
RH BLD: NEGATIVE
RH BLD: NEGATIVE
SARS-COV-2 RNA PNL SPEC NAA+PROBE: NOT DETECTED
SODIUM SERPL-SCNC: 139 MMOL/L (ref 136–145)
SP GR UR STRIP: 1.04 (ref 1–1.03)
SQUAMOUS #/AREA URNS HPF: ABNORMAL /HPF
T&S EXPIRATION DATE: NORMAL
TROPONIN T SERPL-MCNC: <0.01 NG/ML (ref 0–0.03)
TSH SERPL DL<=0.05 MIU/L-ACNC: 2.41 UIU/ML (ref 0.27–4.2)
UROBILINOGEN UR QL STRIP: ABNORMAL
WBC # UR STRIP: ABNORMAL /HPF
WBC NRBC COR # BLD: 5.46 10*3/MM3 (ref 3.4–10.8)
WHOLE BLOOD HOLD COAG: NORMAL
WHOLE BLOOD HOLD SPECIMEN: NORMAL

## 2022-06-30 PROCEDURE — 86900 BLOOD TYPING SEROLOGIC ABO: CPT

## 2022-06-30 PROCEDURE — 83735 ASSAY OF MAGNESIUM: CPT | Performed by: FAMILY MEDICINE

## 2022-06-30 PROCEDURE — 25010000002 CEFTRIAXONE PER 250 MG: Performed by: INTERNAL MEDICINE

## 2022-06-30 PROCEDURE — 84484 ASSAY OF TROPONIN QUANT: CPT | Performed by: FAMILY MEDICINE

## 2022-06-30 PROCEDURE — 93005 ELECTROCARDIOGRAM TRACING: CPT | Performed by: FAMILY MEDICINE

## 2022-06-30 PROCEDURE — 93010 ELECTROCARDIOGRAM REPORT: CPT | Performed by: INTERNAL MEDICINE

## 2022-06-30 PROCEDURE — 70450 CT HEAD/BRAIN W/O DYE: CPT

## 2022-06-30 PROCEDURE — 70496 CT ANGIOGRAPHY HEAD: CPT

## 2022-06-30 PROCEDURE — 85025 COMPLETE CBC W/AUTO DIFF WBC: CPT | Performed by: FAMILY MEDICINE

## 2022-06-30 PROCEDURE — 99284 EMERGENCY DEPT VISIT MOD MDM: CPT

## 2022-06-30 PROCEDURE — 83880 ASSAY OF NATRIURETIC PEPTIDE: CPT | Performed by: FAMILY MEDICINE

## 2022-06-30 PROCEDURE — G0378 HOSPITAL OBSERVATION PER HR: HCPCS

## 2022-06-30 PROCEDURE — 85610 PROTHROMBIN TIME: CPT | Performed by: FAMILY MEDICINE

## 2022-06-30 PROCEDURE — 82550 ASSAY OF CK (CPK): CPT | Performed by: FAMILY MEDICINE

## 2022-06-30 PROCEDURE — 70498 CT ANGIOGRAPHY NECK: CPT

## 2022-06-30 PROCEDURE — 85730 THROMBOPLASTIN TIME PARTIAL: CPT | Performed by: FAMILY MEDICINE

## 2022-06-30 PROCEDURE — 86901 BLOOD TYPING SEROLOGIC RH(D): CPT

## 2022-06-30 PROCEDURE — 86901 BLOOD TYPING SEROLOGIC RH(D): CPT | Performed by: FAMILY MEDICINE

## 2022-06-30 PROCEDURE — 87086 URINE CULTURE/COLONY COUNT: CPT | Performed by: FAMILY MEDICINE

## 2022-06-30 PROCEDURE — 99214 OFFICE O/P EST MOD 30 MIN: CPT | Performed by: INTERNAL MEDICINE

## 2022-06-30 PROCEDURE — 80053 COMPREHEN METABOLIC PANEL: CPT | Performed by: FAMILY MEDICINE

## 2022-06-30 PROCEDURE — 82962 GLUCOSE BLOOD TEST: CPT

## 2022-06-30 PROCEDURE — C9803 HOPD COVID-19 SPEC COLLECT: HCPCS

## 2022-06-30 PROCEDURE — 36415 COLL VENOUS BLD VENIPUNCTURE: CPT | Performed by: FAMILY MEDICINE

## 2022-06-30 PROCEDURE — 86850 RBC ANTIBODY SCREEN: CPT | Performed by: FAMILY MEDICINE

## 2022-06-30 PROCEDURE — 81001 URINALYSIS AUTO W/SCOPE: CPT | Performed by: FAMILY MEDICINE

## 2022-06-30 PROCEDURE — 86900 BLOOD TYPING SEROLOGIC ABO: CPT | Performed by: FAMILY MEDICINE

## 2022-06-30 PROCEDURE — 84443 ASSAY THYROID STIM HORMONE: CPT | Performed by: FAMILY MEDICINE

## 2022-06-30 PROCEDURE — 82607 VITAMIN B-12: CPT | Performed by: PHYSICIAN ASSISTANT

## 2022-06-30 PROCEDURE — 0 IOPAMIDOL PER 1 ML: Performed by: FAMILY MEDICINE

## 2022-06-30 PROCEDURE — 93000 ELECTROCARDIOGRAM COMPLETE: CPT | Performed by: INTERNAL MEDICINE

## 2022-06-30 PROCEDURE — 87636 SARSCOV2 & INF A&B AMP PRB: CPT | Performed by: FAMILY MEDICINE

## 2022-06-30 PROCEDURE — 71045 X-RAY EXAM CHEST 1 VIEW: CPT

## 2022-06-30 PROCEDURE — 99204 OFFICE O/P NEW MOD 45 MIN: CPT | Performed by: PSYCHIATRY & NEUROLOGY

## 2022-06-30 RX ORDER — SODIUM CHLORIDE 0.9 % (FLUSH) 0.9 %
10 SYRINGE (ML) INJECTION AS NEEDED
Status: DISCONTINUED | OUTPATIENT
Start: 2022-06-30 | End: 2022-07-01 | Stop reason: HOSPADM

## 2022-06-30 RX ORDER — SODIUM CHLORIDE 0.9 % (FLUSH) 0.9 %
10 SYRINGE (ML) INJECTION EVERY 12 HOURS SCHEDULED
Status: DISCONTINUED | OUTPATIENT
Start: 2022-06-30 | End: 2022-07-01 | Stop reason: HOSPADM

## 2022-06-30 RX ORDER — BISACODYL 10 MG
10 SUPPOSITORY, RECTAL RECTAL DAILY PRN
Status: DISCONTINUED | OUTPATIENT
Start: 2022-06-30 | End: 2022-07-01 | Stop reason: HOSPADM

## 2022-06-30 RX ORDER — FLUTICASONE PROPIONATE 50 MCG
2 SPRAY, SUSPENSION (ML) NASAL DAILY
COMMUNITY
End: 2022-07-28

## 2022-06-30 RX ORDER — ACETAMINOPHEN 325 MG/1
650 TABLET ORAL EVERY 4 HOURS PRN
Status: DISCONTINUED | OUTPATIENT
Start: 2022-06-30 | End: 2022-07-01 | Stop reason: HOSPADM

## 2022-06-30 RX ORDER — ALUMINA, MAGNESIA, AND SIMETHICONE 2400; 2400; 240 MG/30ML; MG/30ML; MG/30ML
7.5 SUSPENSION ORAL EVERY 4 HOURS PRN
Status: DISCONTINUED | OUTPATIENT
Start: 2022-06-30 | End: 2022-07-01 | Stop reason: HOSPADM

## 2022-06-30 RX ORDER — ONDANSETRON 2 MG/ML
4 INJECTION INTRAMUSCULAR; INTRAVENOUS EVERY 6 HOURS PRN
Status: DISCONTINUED | OUTPATIENT
Start: 2022-06-30 | End: 2022-07-01 | Stop reason: HOSPADM

## 2022-06-30 RX ORDER — ACETAMINOPHEN 650 MG/1
650 SUPPOSITORY RECTAL EVERY 4 HOURS PRN
Status: DISCONTINUED | OUTPATIENT
Start: 2022-06-30 | End: 2022-07-01 | Stop reason: HOSPADM

## 2022-06-30 RX ORDER — PYRIDOXINE HCL (VITAMIN B6) 100 MG
100 TABLET ORAL DAILY
COMMUNITY

## 2022-06-30 RX ORDER — ATORVASTATIN CALCIUM 40 MG/1
80 TABLET, FILM COATED ORAL NIGHTLY
Status: DISCONTINUED | OUTPATIENT
Start: 2022-06-30 | End: 2022-07-01 | Stop reason: HOSPADM

## 2022-06-30 RX ORDER — PANTOPRAZOLE SODIUM 40 MG/1
40 TABLET, DELAYED RELEASE ORAL DAILY
Status: DISCONTINUED | OUTPATIENT
Start: 2022-07-01 | End: 2022-07-01 | Stop reason: HOSPADM

## 2022-06-30 RX ADMIN — APIXABAN 5 MG: 5 TABLET, FILM COATED ORAL at 21:15

## 2022-06-30 RX ADMIN — Medication 10 ML: at 21:16

## 2022-06-30 RX ADMIN — IOPAMIDOL 90 ML: 755 INJECTION, SOLUTION INTRAVENOUS at 17:02

## 2022-06-30 RX ADMIN — CEFTRIAXONE SODIUM 1 G: 1 INJECTION, POWDER, FOR SOLUTION INTRAMUSCULAR; INTRAVENOUS at 22:31

## 2022-06-30 RX ADMIN — ATORVASTATIN CALCIUM 80 MG: 40 TABLET, FILM COATED ORAL at 21:15

## 2022-06-30 NOTE — TELEPHONE ENCOUNTER
Mrs. Wilkinson contacted the office stating she was dizzy and having trouble with her speech. Her daughter thinks her mouth looks slightly drawn as well. The patient was instructed to report the emergency room for evaluation.

## 2022-06-30 NOTE — PROGRESS NOTES
[facility] Cardiology  OFFICE NOTE    Cardiovascular Medicine  Linnette Peterson M.D., RPVI         No referring provider defined for this encounter.    Thank you for asking me to see Veronica Wilkinson for palpitations.    History of Present Illness  This is a 76 y.o. female with:    1.  Attention  2.  Hyperlipidemia  3.  PE   4 hyperlipidemia  5.  Atrial fibrillation  6.  Sick sinus syndrome status post permanent pacemaker    Veronica Wilkinson is a 76 y.o. female who presents for consultation today.  Patient is here for further evaluation for sinus bradycardia and intermittent palpitations.  Patient has vague symptoms of hiccups followed by shortness of breath lasting for a couple of seconds.  She also has occasional palpitations early in the morning.  Was also last for several seconds.  Palpitations are not associated with chest pain, shortness of breath, lightheadedness.  Syncopal episode almost 10 years ago when she was started on her antihypertensives initially however currently she is denying any syncopal episodes.  She has a prior history of pulmonary embolism.  She denies any chest pain on exertion.  Is been previously screened for sleep apnea  Does have history of gastric sleeve and almost 100 pounds weight loss after that.  He sleeps in recliner because of her arthritis.  Denying any orthopnea PND or lower extremity swelling.  Also significant osteoporosis and worried about falls.    Continues to have intermittent lightheadedness and dizziness and palpitations.  No loss of consciousness.  Patient had a Holter monitor which showed symptomatic sinus bradycardia with intermittent junctional rhythm and chronotropic incompetence.  Subsequently underwent dual-chamber pacemaker placement.  Echocardiogram showed preserved LV systolic function.    Patient was lost to follow-up since her pacemaker.  She had a pacemaker interrogation done yesterday which showed an episode of atrial fibrillation lasting  for about 1 hour and 35 minutes.    He has been doing well.  Denies any palpitations.  Did have report of dizziness when she was sitting for a long period of time but did not lose consciousness.  No associated palpitations at that time.        Review of Systems - ROS  Constitution: Negative for weakness, weight gain and weight loss.   HENT: Negative for congestion.    Eyes: Negative for blurred vision.   Cardiovascular: As mentioned above  Respiratory: Negative for cough and hemoptysis.    Endocrine: Negative for polydipsia and polyuria.   Hematologic/Lymphatic: Negative for bleeding problem. Does not bruise/bleed easily.   Skin: Negative for flushing.   Musculoskeletal: Negative for neck pain and stiffness.   Gastrointestinal: Negative for abdominal pain, diarrhea, jaundice, melena, nausea and vomiting.   Genitourinary: Negative for dysuria and hematuria.   Neurological: Negative for dizziness, focal weakness and numbness.   Psychiatric/Behavioral: Negative for altered mental status and depression.          All other systems were reviewed and were negative.    family history includes Adrenal disorder in her daughter; Allergy (severe) in an other family member; Bleeding Disorder in her father and another family member; Cancer in her brother and another family member; Cholelithiasis in an other family member; Diabetes in an other family member; Heart disease in an other family member; Hyperlipidemia in an other family member; Hypertension in some other family members; Lung cancer in her brother and father; No Known Problems in her daughter; Osteoarthritis in an other family member; Other in her father and other family members; Ovarian cancer in her mother; Pancreatic cancer in her sister; Seizures in her daughter; Thyroid disease in her daughter.     reports that she has never smoked. She has never used smokeless tobacco. She reports that she does not drink alcohol and does not use drugs.    Allergies   Allergen  "Reactions   • Sulfa Antibiotics Hives and Rash     Sulfa (Sulfonamide Antibiotics)         Current Outpatient Medications:   •  CALCIUM MAGNESIUM ZINC PO, Take  by mouth., Disp: , Rfl:   •  cholecalciferol (VITAMIN D3) 25 MCG (1000 UT) tablet, Take 1,000 Units by mouth Daily., Disp: , Rfl:   •  diphenhydrAMINE-APAP, sleep, (ACETAMINOPHEN PM PO), Take  by mouth., Disp: , Rfl:   •  LORATADINE ALLERGY RELIEF PO, Take 10 mg by mouth Daily., Disp: , Rfl:   •  olmesartan-hydrochlorothiazide (BENICAR HCT) 20-12.5 MG per tablet, TAKE 1 TABLET DAILY, Disp: 90 tablet, Rfl: 2  •  pyridoxine (VITAMIN B-6) 100 MG tablet, Take 100 mg by mouth Daily., Disp: , Rfl:   •  simethicone (MYLICON,GAS-X) 125 MG capsule capsule, Take 1 capsule by mouth Every Night., Disp: , Rfl:   •  simvastatin (ZOCOR) 20 MG tablet, Take 1 tablet by mouth Every Other Day., Disp: 45 tablet, Rfl: 5  •  traZODone (DESYREL) 100 MG tablet, TAKE 1 TABLET EVERY NIGHT, Disp: 90 tablet, Rfl: 3  •  pantoprazole (PROTONIX) 40 MG EC tablet, Take 1 tablet by mouth Daily., Disp: , Rfl:     Physical Exam:  Vitals:    06/30/22 0931   BP: 130/82   BP Location: Left arm   Patient Position: Sitting   Cuff Size: Adult   Pulse: 64   SpO2: 94%   Weight: 90 kg (198 lb 6.4 oz)   Height: 154.9 cm (61\")   PainSc: 0-No pain     Current Pain Level: none  Pulse Ox: Normal  on room air  General: alert, appears stated age and cooperative     Body Habitus: well-nourished    HEENT: Head: Normocephalic, no lesions, without obvious abnormality. No arcus senilis, xanthelasma or xanthomas.    Neuro: alert, oriented x3  Pulses: 2+ and symmetric  JVP: Volume/Pulsation: Normal.  Normal waveforms.   Appropriate inspiratory decrease.  No Kussmaul's. No Daniel's.   Carotid Exam: no bruit normal pulsation bilaterally   Carotid Volume: normal.     Respirations: no increased work of breathing   Chest:  Normal    Pulmonary:Normal   Precordium: Normal impulses. P2 is not palpable.  RV Heave: " absent  LV Heave: absent  [place]:  normal size and placement  Palpable S4: absent.  Heart rate: normal    Heart Rhythm: regular     Heart Sounds: S1: normal  S2: normal  S3: absent   S4: absent  Opening Snap: absent    Pericardial Rub:  Absent: .    Abdomen:   Appearance: normal .  Palpation: Soft, non-tender to palpation, bowel sounds positive in all four quadrants; no guarding or rebound tenderness  Extremity: no edema.   LE Skin: no rashes  LE Hair:  normal  LE Pulses: well perfused with normal pulses in the distal extremities  Pallor on elevation: Absent. Rubor on dependency: None      DATA REVIEWED:     EKG. I personally reviewed and interpreted the EKG.  Sinus bradycardia.  Low voltage.  Possible lateral infarct old.    ECG/EMG Results (all)     Procedure Component Value Units Date/Time    ECG 12 Lead [[id number]] Collected:  09/01/20 0746     Updated:  09/01/20 0847    Narrative:       Test Reason : palpitations  Blood Pressure : **/** mmHG  Vent. Rate : 049 BPM     Atrial Rate : 049 BPM     P-R Int : 214 ms          QRS Dur : 098 ms      QT Int : 430 ms       P-R-T Axes : 009 -15 019 degrees     QTc Int : 388 ms    Sinus bradycardia with 1st degree AV block  Low voltage QRS  Possible Lateral infarct (cited on or before 08-JUN-2015)  Abnormal ECG  When compared with ECG of 06-SEP-2018 14:19,  Questionable change in initial forces of Anterior leads    Referred By:             Confirmed By:           ----------------------------------------------------      --------------------------------------------------------------------------------------------------  LABS:     The 10-year CVD risk score (Arina et al., 2008) is: 12.2%    Values used to calculate the score:      Age: 75 years      Sex: Female      Diabetic: No      Tobacco smoker: No      Systolic Blood Pressure: 135 mmHg      Is BP treated: Yes      HDL Cholesterol: 82 mg/dL      Total Cholesterol: 201 mg/dL         Lab Results   Component Value Date     GLUCOSE 83 05/23/2022    BUN 19 05/23/2022    CREATININE 1.10 (H) 05/23/2022    EGFRIFNONA 57 (L) 02/14/2022    BCR 17.3 05/23/2022    K 4.1 05/23/2022    CO2 26.0 05/23/2022    CALCIUM 10.4 05/23/2022    ALBUMIN 4.20 05/23/2022    AST 35 (H) 05/23/2022    ALT 28 05/23/2022     Lab Results   Component Value Date    WBC 4.25 05/23/2022    HGB 12.0 05/23/2022    HCT 34.8 05/23/2022    MCV 90.4 05/23/2022     05/23/2022     Lab Results   Component Value Date    CHOL 236 (H) 06/08/2021    CHLPL 205 (H) 02/25/2016    TRIG 81 06/08/2021    HDL 90 (H) 06/08/2021     (H) 06/08/2021     Lab Results   Component Value Date    TSH 1.940 10/25/2018     Lab Results   Component Value Date    CKTOTAL 63 09/06/2018    CKMB 1.35 09/06/2018    TROPONINI <0.012 09/06/2018    TROPONINT <0.010 06/18/2021     Lab Results   Component Value Date    HGBA1C 4.60 (L) 06/08/2021     No results found for: DDIMER  Lab Results   Component Value Date    ALT 28 05/23/2022     Lab Results   Component Value Date    HGBA1C 4.60 (L) 06/08/2021    HGBA1C 5.03 04/24/2019    HGBA1C 5.4 06/08/2015     Lab Results   Component Value Date    CREATININE 1.10 (H) 05/23/2022     Lab Results   Component Value Date    IRON 96 05/23/2022    TIBC 323 05/23/2022    FERRITIN 365.50 (H) 05/23/2022     Lab Results   Component Value Date    INR 1.05 12/10/2020    INR 1.14 09/06/2018    INR 1.06 08/23/2018    PROTIME 14.1 12/10/2020    PROTIME 14.3 09/06/2018    PROTIME 13.6 08/23/2018       Assessment/Plan     1.  Sick sinus syndrome:  Holter monitor with junctional rhythm, chronotropic incompetence symptomatic bradycardia.  Now status post dual-chamber pacemaker.  TSH was normal  Echocardiogram showed preserved LV systolic function grade 1 diastolic dysfunction.  Device interrogation showed 55% afib paced rhythm.    2.  Atrial fibrillation:   detected on pacemaker interrogation longest episode was 1 hour 35 minutes.  [id number] score is 3 (age, sex,  hypertension)  Currently in sinus rhythm  Dr. Brown risks and benefits of anticoagulation for stroke prevention.  We will start her on Eliquis 5 mg twice daily.  Recent CBC and CMP reviewed.    2.  Hypertension:  She is on olmesartan/hydrochlorothiazide 20/12.5    3.  Hyperlipidemia: On simvastatin per primary care physician.      Prevention:  Patient's Body mass index is 37.49 kg/m². BMI is above normal parameters. Recommendations include: exercise counseling and nutrition counseling.      Veronica PERALTA Minh  reports that she has never smoked. She has never used smokeless tobacco..   AAA Screening:             This document has been electronically signed by Linnette Peterson MD on June 30, 2022 09:47 CDT

## 2022-07-01 ENCOUNTER — READMISSION MANAGEMENT (OUTPATIENT)
Dept: CALL CENTER | Facility: HOSPITAL | Age: 77
End: 2022-07-01

## 2022-07-01 ENCOUNTER — APPOINTMENT (OUTPATIENT)
Dept: MRI IMAGING | Facility: HOSPITAL | Age: 77
End: 2022-07-01

## 2022-07-01 ENCOUNTER — APPOINTMENT (OUTPATIENT)
Dept: CARDIOLOGY | Facility: HOSPITAL | Age: 77
End: 2022-07-01

## 2022-07-01 VITALS
DIASTOLIC BLOOD PRESSURE: 59 MMHG | HEART RATE: 60 BPM | TEMPERATURE: 97.3 F | OXYGEN SATURATION: 100 % | WEIGHT: 224.87 LBS | HEIGHT: 61 IN | SYSTOLIC BLOOD PRESSURE: 126 MMHG | RESPIRATION RATE: 20 BRPM | BODY MASS INDEX: 42.46 KG/M2

## 2022-07-01 LAB
BACTERIA SPEC AEROBE CULT: NO GROWTH
BH CV ECHO MEAS - ACS: 1.78 CM
BH CV ECHO MEAS - AI P1/2T: 925.7 MSEC
BH CV ECHO MEAS - AO MAX PG: 9.7 MMHG
BH CV ECHO MEAS - AO MEAN PG: 4.5 MMHG
BH CV ECHO MEAS - AO ROOT DIAM: 3.8 CM
BH CV ECHO MEAS - AO V2 MAX: 155.4 CM/SEC
BH CV ECHO MEAS - AO V2 VTI: 32.3 CM
BH CV ECHO MEAS - AVA(I,D): 1.89 CM2
BH CV ECHO MEAS - EDV(CUBED): 46 ML
BH CV ECHO MEAS - EDV(MOD-SP2): 90.4 ML
BH CV ECHO MEAS - EDV(MOD-SP4): 74.9 ML
BH CV ECHO MEAS - EF(MOD-SP2): 67.1 %
BH CV ECHO MEAS - EF(MOD-SP4): 69.8 %
BH CV ECHO MEAS - ESV(CUBED): 13.3 ML
BH CV ECHO MEAS - ESV(MOD-SP2): 29.7 ML
BH CV ECHO MEAS - ESV(MOD-SP4): 22.6 ML
BH CV ECHO MEAS - FS: 33.9 %
BH CV ECHO MEAS - IVS/LVPW: 1.05 CM
BH CV ECHO MEAS - IVSD: 1.25 CM
BH CV ECHO MEAS - LA DIMENSION: 4.6 CM
BH CV ECHO MEAS - LAT PEAK E' VEL: 6.1 CM/SEC
BH CV ECHO MEAS - LV DIASTOLIC VOL/BSA (35-75): 37.8 CM2
BH CV ECHO MEAS - LV MASS(C)D: 144 GRAMS
BH CV ECHO MEAS - LV MAX PG: 4.2 MMHG
BH CV ECHO MEAS - LV MEAN PG: 2.1 MMHG
BH CV ECHO MEAS - LV SYSTOLIC VOL/BSA (12-30): 11.4 CM2
BH CV ECHO MEAS - LV V1 MAX: 102.3 CM/SEC
BH CV ECHO MEAS - LV V1 VTI: 23.9 CM
BH CV ECHO MEAS - LVIDD: 3.6 CM
BH CV ECHO MEAS - LVIDS: 2.37 CM
BH CV ECHO MEAS - LVOT AREA: 2.6 CM2
BH CV ECHO MEAS - LVOT DIAM: 1.81 CM
BH CV ECHO MEAS - LVPWD: 1.19 CM
BH CV ECHO MEAS - MED PEAK E' VEL: 6.2 CM/SEC
BH CV ECHO MEAS - MV A MAX VEL: 138.9 CM/SEC
BH CV ECHO MEAS - MV DEC SLOPE: 399 CM/SEC2
BH CV ECHO MEAS - MV E MAX VEL: 91.9 CM/SEC
BH CV ECHO MEAS - MV E/A: 0.66
BH CV ECHO MEAS - MV MAX PG: 9 MMHG
BH CV ECHO MEAS - MV MEAN PG: 2.39 MMHG
BH CV ECHO MEAS - MV P1/2T: 74.8 MSEC
BH CV ECHO MEAS - MV V2 VTI: 36.6 CM
BH CV ECHO MEAS - MVA(P1/2T): 2.9 CM2
BH CV ECHO MEAS - MVA(VTI): 1.67 CM2
BH CV ECHO MEAS - PA V2 MAX: 92.3 CM/SEC
BH CV ECHO MEAS - RAP SYSTOLE: 10 MMHG
BH CV ECHO MEAS - RVDD: 3.3 CM
BH CV ECHO MEAS - RVSP: 40 MMHG
BH CV ECHO MEAS - SI(MOD-SP2): 30.6 ML/M2
BH CV ECHO MEAS - SI(MOD-SP4): 26.4 ML/M2
BH CV ECHO MEAS - SV(LVOT): 61.1 ML
BH CV ECHO MEAS - SV(MOD-SP2): 60.7 ML
BH CV ECHO MEAS - SV(MOD-SP4): 52.3 ML
BH CV ECHO MEAS - TR MAX PG: 30 MMHG
BH CV ECHO MEAS - TR MAX VEL: 274 CM/SEC
BH CV ECHO MEASUREMENTS AVERAGE E/E' RATIO: 14.94
CHOLEST SERPL-MCNC: 198 MG/DL (ref 0–200)
DEPRECATED RDW RBC AUTO: 42.6 FL (ref 37–54)
ERYTHROCYTE [DISTWIDTH] IN BLOOD BY AUTOMATED COUNT: 12.9 % (ref 12.3–15.4)
ERYTHROCYTE [SEDIMENTATION RATE] IN BLOOD: 19 MM/HR (ref 0–20)
HBA1C MFR BLD: 5.2 % (ref 4.8–5.6)
HCT VFR BLD AUTO: 30.2 % (ref 34–46.6)
HDLC SERPL-MCNC: 78 MG/DL (ref 40–60)
HGB BLD-MCNC: 10.4 G/DL (ref 12–15.9)
LDLC SERPL CALC-MCNC: 108 MG/DL (ref 0–100)
LDLC/HDLC SERPL: 1.38 {RATIO}
LEFT ATRIUM VOLUME INDEX: 28.4 ML/M2
MAXIMAL PREDICTED HEART RATE: 146 BPM
MCH RBC QN AUTO: 31.3 PG (ref 26.6–33)
MCHC RBC AUTO-ENTMCNC: 34.4 G/DL (ref 31.5–35.7)
MCV RBC AUTO: 91 FL (ref 79–97)
PLATELET # BLD AUTO: 232 10*3/MM3 (ref 140–450)
PMV BLD AUTO: 8.8 FL (ref 6–12)
RBC # BLD AUTO: 3.32 10*6/MM3 (ref 3.77–5.28)
STRESS TARGET HR: 124 BPM
TRIGL SERPL-MCNC: 63 MG/DL (ref 0–150)
VIT B12 BLD-MCNC: 443 PG/ML (ref 211–946)
VLDLC SERPL-MCNC: 12 MG/DL (ref 5–40)
WBC NRBC COR # BLD: 4.04 10*3/MM3 (ref 3.4–10.8)

## 2022-07-01 PROCEDURE — 93306 TTE W/DOPPLER COMPLETE: CPT | Performed by: INTERNAL MEDICINE

## 2022-07-01 PROCEDURE — 97162 PT EVAL MOD COMPLEX 30 MIN: CPT

## 2022-07-01 PROCEDURE — G0378 HOSPITAL OBSERVATION PER HR: HCPCS

## 2022-07-01 PROCEDURE — 96374 THER/PROPH/DIAG INJ IV PUSH: CPT

## 2022-07-01 PROCEDURE — 83036 HEMOGLOBIN GLYCOSYLATED A1C: CPT | Performed by: PSYCHIATRY & NEUROLOGY

## 2022-07-01 PROCEDURE — 80061 LIPID PANEL: CPT | Performed by: PSYCHIATRY & NEUROLOGY

## 2022-07-01 PROCEDURE — 99214 OFFICE O/P EST MOD 30 MIN: CPT | Performed by: INTERNAL MEDICINE

## 2022-07-01 PROCEDURE — 25010000002 LORAZEPAM PER 2 MG: Performed by: FAMILY MEDICINE

## 2022-07-01 PROCEDURE — 85651 RBC SED RATE NONAUTOMATED: CPT | Performed by: PSYCHIATRY & NEUROLOGY

## 2022-07-01 PROCEDURE — 99214 OFFICE O/P EST MOD 30 MIN: CPT | Performed by: NURSE PRACTITIONER

## 2022-07-01 PROCEDURE — 93306 TTE W/DOPPLER COMPLETE: CPT

## 2022-07-01 PROCEDURE — 70551 MRI BRAIN STEM W/O DYE: CPT

## 2022-07-01 PROCEDURE — 85027 COMPLETE CBC AUTOMATED: CPT | Performed by: PSYCHIATRY & NEUROLOGY

## 2022-07-01 PROCEDURE — 25010000002 PERFLUTREN (DEFINITY) 8.476 MG IN SODIUM CHLORIDE (PF) 0.9 % 10 ML INJECTION: Performed by: HOSPITALIST

## 2022-07-01 RX ORDER — CEFDINIR 300 MG/1
300 CAPSULE ORAL 2 TIMES DAILY
Qty: 14 CAPSULE | Refills: 0 | Status: SHIPPED | OUTPATIENT
Start: 2022-07-01 | End: 2022-07-12

## 2022-07-01 RX ORDER — LORAZEPAM 2 MG/ML
0.5 INJECTION INTRAMUSCULAR ONCE
Status: COMPLETED | OUTPATIENT
Start: 2022-07-01 | End: 2022-07-01

## 2022-07-01 RX ORDER — ATORVASTATIN CALCIUM 80 MG/1
80 TABLET, FILM COATED ORAL NIGHTLY
Qty: 90 TABLET | Refills: 1 | Status: SHIPPED | OUTPATIENT
Start: 2022-07-01 | End: 2022-09-14

## 2022-07-01 RX ADMIN — LOSARTAN POTASSIUM: 50 TABLET, FILM COATED ORAL at 08:07

## 2022-07-01 RX ADMIN — APIXABAN 5 MG: 5 TABLET, FILM COATED ORAL at 08:06

## 2022-07-01 RX ADMIN — Medication 10 ML: at 08:10

## 2022-07-01 RX ADMIN — LORAZEPAM 0.5 MG: 2 INJECTION INTRAMUSCULAR; INTRAVENOUS at 13:40

## 2022-07-01 RX ADMIN — PERFLUTREN 1.5 ML: 6.52 INJECTION, SUSPENSION INTRAVENOUS at 10:31

## 2022-07-01 RX ADMIN — PANTOPRAZOLE SODIUM 40 MG: 40 TABLET, DELAYED RELEASE ORAL at 08:07

## 2022-07-01 RX ADMIN — Medication 10 ML: at 08:09

## 2022-07-01 NOTE — DISCHARGE SUMMARY
[facility] Medicine Services  DISCHARGE SUMMARY       Date of Admission: 6/30/2022  Date of Discharge:  7/1/2022  Primary Care Physician: Rich Urbina APRN    Presenting Problem/History of Present Illness:  TIA (transient ischemic attack) [G45.9]       Final Discharge Diagnoses:  Active Hospital Problems    Diagnosis    • TIA (transient ischemic attack)    • Paroxysmal atrial fibrillation (HCC)    • Essential hypertension        Consults:   Consults     Date and Time Order Name Status Description    6/30/2022  7:24 PM Inpatient Cardiology Consult      6/30/2022  6:17 PM Hospitalist (on-call MD unless specified)      6/30/2022  5:35 PM Inpatient Neurology Consult Stroke      6/30/2022  4:50 PM Inpatient Neurology Consult Stroke      6/30/2022  4:50 PM Inpatient Neurology Consult Stroke                Chief Complaint on Day of Discharge:     Hospital Course:  The patient is a 76 y.o. female who presented to [facility] with  history of atrial fibrillation presented to the ED today for evaluation of sudden onst dizziness, right side facial droop and inability to talk. She reports the symptoms started as she was standing in her kitchen making a cup of coffee. She states she felt dizzy, felt facial tingling and that her mouth drooped on the right side. She also could not talk. She is uncertain if she may have even passed out. She reports the symptoms lasted approximately ten minutes. She was seen in the ED and also evaluated by neurology team. It was felt best to admit her for further stroke workup and hospitalist team was consulted to do this.   Patient was kept in the ICU.  She was treated for UTI.  Neurology also seen the patient and MRI of the brain was done which showed no acute changes.  Patient was continued on Eliquis and also started on atorvastatin.  Neurology signed off on her.  Patient was then cleared to go home.  Patient was discharged home  "with outpatient follow-up with PCP and neurology.  She will continue antibiotics for UTI as well.    Condition on Discharge: Stable    Physical Exam on Discharge:  /59 (BP Location: Right arm, Patient Position: Lying)   Pulse 60   Temp 98 °F (36.7 °C) (Temporal)   Resp 20   Ht 154.9 cm (60.98\")   Wt 102 kg (224 lb 13.9 oz)   SpO2 100%   BMI 42.51 kg/m²   Physical Exam  Vitals and nursing note reviewed.   Constitutional:       General: She is not in acute distress.     Appearance: She is well-developed. She is not diaphoretic.   HENT:      Head: Normocephalic and atraumatic.   Cardiovascular:      Rate and Rhythm: Normal rate.   Pulmonary:      Effort: Pulmonary effort is normal. No respiratory distress.      Breath sounds: No wheezing.   Abdominal:      General: There is no distension.      Palpations: Abdomen is soft.   Musculoskeletal:         General: Normal range of motion.   Skin:     General: Skin is warm and dry.   Neurological:      Mental Status: She is alert.      Cranial Nerves: No cranial nerve deficit.   Psychiatric:         Behavior: Behavior normal.         Thought Content: Thought content normal.         Judgment: Judgment normal.           Discharge Disposition:  Home or Self Care    Discharge Medications:     Discharge Medications      New Medications      Instructions Start Date   atorvastatin 80 MG tablet  Commonly known as: LIPITOR   80 mg, Oral, Nightly      cefdinir 300 MG capsule  Commonly known as: OMNICEF   300 mg, Oral, 2 Times Daily         Changes to Medications      Instructions Start Date   apixaban 5 MG tablet tablet  Commonly known as: ELIQUIS  What changed: Another medication with the same name was added. Make sure you understand how and when to take each.   5 mg, Oral, 2 Times Daily      apixaban 5 MG tablet tablet  Commonly known as: ELIQUIS  What changed: You were already taking a medication with the same name, and this prescription was added. Make sure you " understand how and when to take each.   5 mg, Oral, Every 12 Hours Scheduled         Continue These Medications      Instructions Start Date   ACETAMINOPHEN PM PO   Oral      CALCIUM MAGNESIUM ZINC PO   Oral      cholecalciferol 25 MCG (1000 UT) tablet  Commonly known as: VITAMIN D3   1,000 Units, Oral, Daily      fluticasone 50 MCG/ACT nasal spray  Commonly known as: FLONASE   2 sprays, Nasal, Daily      LORATADINE ALLERGY RELIEF PO   10 mg, Oral, Daily      olmesartan-hydrochlorothiazide 20-12.5 MG per tablet  Commonly known as: BENICAR HCT   TAKE 1 TABLET DAILY      pantoprazole 40 MG EC tablet  Commonly known as: PROTONIX   1 tablet, Oral, Daily      pyridoxine 100 MG tablet  Commonly known as: VITAMIN B-6   100 mg, Oral, Daily      simethicone 125 MG capsule capsule  Commonly known as: MYLICON,GAS-X   1 capsule, Oral, Nightly      traZODone 100 MG tablet  Commonly known as: DESYREL   TAKE 1 TABLET EVERY NIGHT         Stop These Medications    simvastatin 20 MG tablet  Commonly known as: ZOCOR            Discharge Diet:   Diet Instructions     Diet: Cardiac, Renal      Discharge Diet:  Cardiac  Renal             Activity at Discharge:   Activity Instructions     Activity as Tolerated            Discharge Care Plan/Instructions: Continue with medications.  Follow-up with PCP cardiology and neurology    Follow-up Appointments:   Future Appointments   Date Time Provider Department Center   7/28/2022  2:15 PM Rasheed Gibbons MD [facility] GE [facility]   8/30/2022  1:30 PM NURSE [facility] OPI [facility]   8/30/2022  2:00 PM Mihai Hunt MD [facility] ONC [facility]   11/30/2022  2:00 PM [facility] OP INFU CHAIR [address] [place] [address] OPI [facility]   12/30/2022 10:15 AM Linnette Peterson MD [facility] CD MAD None   3/8/2023 10:00 AM PACER CLINIC CARD [facility] CD MAD None       Adonay Chandra MD  07/01/22  17:39 CDT

## 2022-07-01 NOTE — PROGRESS NOTES
Stroke Progress Note       Chief Complaint: No complaints    Subjective     HPI:  Pt is a 76-year-old right-handed white female with known diagnosis of obesity as well as intermittent atrial fibrillation was supposed to be starting on Eliquis yesterday after seeing cardiology presented to the ER with sudden onset of tingling and numbness in the right side of the face along with right-sided facial droop. Symptoms had resolved after arriving to the ER. This morning she states feeling back to baseline. Strengths are equal 5/5, denies numbness, visual field is intact and no facial droop.     Review of Systems   HENT: Negative.    Eyes: Negative.    Respiratory: Negative.    Cardiovascular: Negative.    Gastrointestinal: Negative.    Genitourinary: Negative.    Musculoskeletal: Negative.    Skin: Negative.    Neurological: Negative.    Psychiatric/Behavioral: Negative.         Objective      Temp:  [97.8 °F (36.6 °C)-98 °F (36.7 °C)] 98 °F (36.7 °C)  Heart Rate:  [60-72] 61  Resp:  [16] 16  BP: ()/(53-82) 145/65    Neurological Exam  Mental Status  Awake and alert. Oriented to person, place, time and situation. Oriented to person, place, and time. Speech is normal. Language is fluent with no aphasia.    Cranial Nerves  CN II: Visual acuity is normal. Visual fields full to confrontation.  CN III, IV, VI: Extraocular movements intact bilaterally. Normal lids and orbits bilaterally. Pupils equal round and reactive to light bilaterally.  CN V: Facial sensation is normal.  CN VII: Full and symmetric facial movement.  CN IX, X: Palate elevates symmetrically. Normal gag reflex.  CN XI: Shoulder shrug strength is normal.  CN XII: Tongue midline without atrophy or fasciculations.    Motor  Normal muscle bulk throughout. No fasciculations present. Strength is 5/5 throughout all four extremities.    Sensory  Sensation is intact to light touch, pinprick, vibration and proprioception in all four extremities.    Reflexes  Not  assessed.    Coordination    Finger-to-nose, rapid alternating movements and heel-to-shin normal bilaterally without dysmetria.    Gait    Not assessed.      Physical Exam  Vitals and nursing note reviewed.   Constitutional:       General: She is awake.      Appearance: Normal appearance.   HENT:      Head: Normocephalic and atraumatic.   Eyes:      General: Lids are normal.      Extraocular Movements: Extraocular movements intact.      Pupils: Pupils are equal, round, and reactive to light.   Cardiovascular:      Rate and Rhythm: Normal rate.   Pulmonary:      Effort: Pulmonary effort is normal.   Musculoskeletal:         General: Normal range of motion.      Cervical back: Normal range of motion.   Skin:     General: Skin is warm and dry.   Neurological:      Mental Status: She is alert and oriented to person, place, and time. Mental status is at baseline.      Coordination: Coordination is intact.      Deep Tendon Reflexes: Strength normal.   Psychiatric:         Mood and Affect: Mood normal.         Speech: Speech normal.         Results Review:    I reviewed the patient's new clinical results.    Lab Results (last 24 hours)     Procedure Component Value Units Date/Time    Sedimentation Rate [[id number]]  (Normal) Collected: 07/01/22 0517    Specimen: Blood Updated: 07/01/22 0701     Sed Rate 19 mm/hr     Lipid Panel [[id number]]  (Abnormal) Collected: 07/01/22 0517    Specimen: Blood Updated: 07/01/22 0605     Total Cholesterol 198 mg/dL      Triglycerides 63 mg/dL      HDL Cholesterol 78 mg/dL      LDL Cholesterol  108 mg/dL      VLDL Cholesterol 12 mg/dL      LDL/HDL Ratio 1.38    Narrative:      Cholesterol Reference Ranges  (U.S. Department of Health and Human Services ATP III Classifications)    Desirable          <200 mg/dL  Borderline High    200-239 mg/dL  High Risk          >240 mg/dL      Triglyceride Reference Ranges  (U.S. Department of Health and Human Services ATP III Classifications)    Normal            <150 mg/dL  Borderline High  150-199 mg/dL  High             200-499 mg/dL  Very High        >500 mg/dL    HDL Reference Ranges  (U.S. Department of Health and Human Services ATP III Classifications)    Low     <40 mg/dl (major risk factor for CHD)  High    >60 mg/dl ('negative' risk factor for CHD)        LDL Reference Ranges  (U.S. Department of Health and Human Services ATP III Classifications)    Optimal          <100 mg/dL  Near Optimal     100-129 mg/dL  Borderline High  130-159 mg/dL  High             160-189 mg/dL  Very High        >189 mg/dL    Hemoglobin A1c [[id number]]  (Normal) Collected: 07/01/22 0517    Specimen: Blood Updated: 07/01/22 0603     Hemoglobin A1C 5.20 %     Narrative:      Hemoglobin A1C Ranges:    Increased Risk for Diabetes  5.7% to 6.4%  Diabetes                     >= 6.5%  Diabetic Goal                < 7.0%    CBC (No Diff) [[id number]]  (Abnormal) Collected: 07/01/22 0517    Specimen: Blood Updated: 07/01/22 0546     WBC 4.04 10*3/mm3      RBC 3.32 10*6/mm3      Hemoglobin 10.4 g/dL      Hematocrit 30.2 %      MCV 91.0 fL      MCH 31.3 pg      MCHC 34.4 g/dL      RDW 12.9 %      RDW-SD 42.6 fl      MPV 8.8 fL      Platelets 232 10*3/mm3     Vitamin B12 [[id number]]  (Normal) Collected: 06/30/22 1706    Specimen: Blood Updated: 07/01/22 0107     Vitamin B-12 443 pg/mL     Narrative:      Results may be falsely increased if patient taking Biotin.      COVID-19 and FLU A/B PCR - Swab, Nasopharynx [[id number]]  (Normal) Collected: 06/30/22 1852    Specimen: Swab from Nasopharynx Updated: 06/30/22 1920     COVID19 Not Detected     Influenza A PCR Not Detected     Influenza B PCR Not Detected    Narrative:      Fact sheet for providers: https://www.fda.gov/media/065827/download    Fact sheet for patients: https://www.fda.gov/media/314417/download    Test performed by PCR.    [place] Draw [[id number]] Collected: 06/30/22 1706    Specimen: Blood Updated: 06/30/22 1817    Narrative:       The following orders were created for panel order [place] Draw.  Procedure                               Abnormality         Status                     ---------                               -----------         ------                     Green Top (Gel)[[id number]]                                  Final result               Lavender Top[[id number]]                                     Final result               Gold Top - SST[[id number]]                                   Final result               Light Blue Top[[id number]]                                   Final result                 Please view results for these tests on the individual orders.    Green Top (Gel) [[id number]] Collected: 06/30/22 1706    Specimen: Blood Updated: 06/30/22 1817     Extra Tube Hold for add-ons.     Comment: Auto resulted.       Lavender Top [[id number]] Collected: 06/30/22 1706    Specimen: Blood Updated: 06/30/22 1817     Extra Tube hold for add-on     Comment: Auto resulted       Gold Top - SST [[id number]] Collected: 06/30/22 1706    Specimen: Blood Updated: 06/30/22 1817     Extra Tube Hold for add-ons.     Comment: Auto resulted.       Light Blue Top [[id number]] Collected: 06/30/22 1706    Specimen: Blood Updated: 06/30/22 1817     Extra Tube Hold for add-ons.     Comment: Auto resulted       Urinalysis, Microscopic Only - Urine, Clean Catch [[id number]]  (Abnormal) Collected: 06/30/22 1741    Specimen: Urine, Clean Catch Updated: 06/30/22 1809     RBC, UA 0-2 /HPF      WBC, UA 13-20 /HPF      Bacteria, UA None Seen /HPF      Squamous Epithelial Cells, UA 0-2 /HPF      Hyaline Casts, UA None Seen /LPF      Methodology Automated Microscopy    Urinalysis With Culture If Indicated - Urine, Clean Catch [[id number]]  (Abnormal) Collected: 06/30/22 1741    Specimen: Urine, Clean Catch Updated: 06/30/22 1809     Color, UA Yellow     Appearance, UA Clear     pH, UA <=5.0     Specific Gravity, UA 1.040     Comment: Result obtained by Refractometer         Glucose, UA Negative     Ketones, UA Negative     Bilirubin, UA Negative     Blood, UA Negative     Protein, UA Negative     Leuk Esterase, UA Moderate (2+)     Nitrite, UA Negative     Urobilinogen, UA 0.2 E.U./dL    Narrative:      In absence of clinical symptoms, the presence of pyuria, bacteria, and/or nitrites on the urinalysis result does not correlate with infection.    Urine Culture - Urine, Urine, Clean Catch [[id number]] Collected: 06/30/22 1741    Specimen: Urine, Clean Catch Updated: 06/30/22 1809    BNP [[id number]]  (Normal) Collected: 06/30/22 1706    Specimen: Blood Updated: 06/30/22 1806     proBNP 198.7 pg/mL     Narrative:      Among patients with dyspnea, NT-proBNP is highly sensitive for the detection of acute congestive heart failure. In addition NT-proBNP of <300 pg/ml effectively rules out acute congestive heart failure with 99% negative predictive value.    Results may be falsely decreased if patient taking Biotin.      Troponin [[id number]]  (Normal) Collected: 06/30/22 1706    Specimen: Blood Updated: 06/30/22 1806     Troponin T <0.010 ng/mL     Narrative:      Troponin T Reference Range:  <= 0.03 ng/mL-   Negative for AMI  >0.03 ng/mL-     Abnormal for myocardial necrosis.  Clinicians would have to utilize clinical acumen, EKG, Troponin and serial changes to determine if it is an Acute Myocardial Infarction or myocardial injury due to an underlying chronic condition.       Results may be falsely decreased if patient taking Biotin.      TSH [[id number]]  (Normal) Collected: 06/30/22 1706    Specimen: Blood Updated: 06/30/22 1806     TSH 2.410 uIU/mL     Protime-INR [[id number]]  (Normal) Collected: 06/30/22 1706    Specimen: Blood Updated: 06/30/22 1805     Protime 14.1 Seconds      INR 1.11    Narrative:      Therapeutic range for most indications is 2.0-3.0 INR,  or 2.5-3.5 for mechanical heart valves.    aPTT [[id number]]  (Normal) Collected: 06/30/22 1706    Specimen: Blood Updated:  06/30/22 1805     PTT 33.4 seconds     Narrative:      The recommended Heparin therapeutic range is 68-97 seconds.    Comprehensive Metabolic Panel [[id number]]  (Abnormal) Collected: 06/30/22 1706    Specimen: Blood Updated: 06/30/22 1801     Glucose 126 mg/dL      BUN 28 mg/dL      Creatinine 1.09 mg/dL      Sodium 139 mmol/L      Potassium 4.3 mmol/L      Chloride 106 mmol/L      CO2 23.0 mmol/L      Calcium 9.9 mg/dL      Total Protein 6.8 g/dL      Albumin 3.90 g/dL      ALT (SGPT) 36 U/L      AST (SGOT) 36 U/L      Alkaline Phosphatase 101 U/L      Total Bilirubin 0.2 mg/dL      Globulin 2.9 gm/dL      A/G Ratio 1.3 g/dL      BUN/Creatinine Ratio 25.7     Anion Gap 10.0 mmol/L      eGFR 53.4 mL/min/1.73      Comment: National Kidney Foundation and American Society of Nephrology (ASN) Task Force recommended calculation based on the Chronic Kidney Disease Epidemiology Collaboration (CKD-EPI) equation refit without adjustment for race.       Narrative:      GFR Normal >60  Chronic Kidney Disease <60  Kidney Failure <15      CK [[id number]]  (Normal) Collected: 06/30/22 1706    Specimen: Blood Updated: 06/30/22 1801     Creatine Kinase 43 U/L     Magnesium [[id number]]  (Normal) Collected: 06/30/22 1706    Specimen: Blood Updated: 06/30/22 1801     Magnesium 2.0 mg/dL     CBC & Differential [[id number]]  (Abnormal) Collected: 06/30/22 1706    Specimen: Blood Updated: 06/30/22 1739    Narrative:      The following orders were created for panel order CBC & Differential.  Procedure                               Abnormality         Status                     ---------                               -----------         ------                     CBC Auto Differential[[id number]]        Abnormal            Final result                 Please view results for these tests on the individual orders.    CBC Auto Differential [[id number]]  (Abnormal) Collected: 06/30/22 1706    Specimen: Blood Updated: 06/30/22 1739     WBC 5.46  10*3/mm3      RBC 3.60 10*6/mm3      Hemoglobin 11.4 g/dL      Hematocrit 32.6 %      MCV 90.6 fL      MCH 31.7 pg      MCHC 35.0 g/dL      RDW 12.7 %      RDW-SD 41.8 fl      MPV 8.9 fL      Platelets 256 10*3/mm3      Neutrophil % 69.1 %      Lymphocyte % 21.6 %      Monocyte % 7.5 %      Eosinophil % 0.9 %      Basophil % 0.5 %      Immature Grans % 0.4 %      Neutrophils, Absolute 3.77 10*3/mm3      Lymphocytes, Absolute 1.18 10*3/mm3      Monocytes, Absolute 0.41 10*3/mm3      Eosinophils, Absolute 0.05 10*3/mm3      Basophils, Absolute 0.03 10*3/mm3      Immature Grans, Absolute 0.02 10*3/mm3      nRBC 0.0 /100 WBC     POC Glucose Once [[id number]]  (Normal) Collected: 06/30/22 1648    Specimen: Blood Updated: 06/30/22 1703     Glucose 123 mg/dL      Comment: RN NotifiedOperator: [id number] RASHEEDA HARTMANNMeter ID: [id number]           CT Angiogram Neck    Result Date: 6/30/2022  CTA of the brain: No evidence of hemodynamically significant stenosis of the visualized intracranial arteries. No evidence of aneurysm within the [place] of [place]. CTA of the neck: No evidence of hemodynamically significant stenosis of the visualized ICAs and bilateral vertebral arteries. No evidence of dissection within the bilateral carotid or vertebral arteries. The left vertebral artery is dominant. Electronically signed by:  Rashaad Cobb MD  6/30/2022 5:57 PM CDT Workstation: [id number]    XR Chest 1 View    Result Date: 6/30/2022  No radiographic evidence of acute cardiopulmonary disease. Electronically signed by:  Ricardo Drew MD  6/30/2022 6:30 PM CDT Workstation: [id number]    CT Head Without Contrast Stroke Protocol    Result Date: 6/30/2022  1. Normal brain for age. There are no acute changes. Electronically signed by:  Tank Mclaughlin MD  6/30/2022 5:12 PM CDT Workstation: [id number]    CT Angiogram Head w AI Analysis of LVO    Result Date: 6/30/2022  CTA of the brain: No evidence of hemodynamically significant stenosis of the visualized  intracranial arteries. No evidence of aneurysm within the [place] of [place]. CTA of the neck: No evidence of hemodynamically significant stenosis of the visualized ICAs and bilateral vertebral arteries. No evidence of dissection within the bilateral carotid or vertebral arteries. The left vertebral artery is dominant. Electronically signed by:  Rashaad Cobb MD  6/30/2022 5:57 PM CDT Workstation: [id number]    Results for orders placed in visit on 09/01/20    Adult Transthoracic Echo Complete W/ Cont if Necessary Per Protocol    Interpretation Summary  · Technically difficult study secondary to body habitus. Definity contrast utilized.  · Left ventricular systolic function is normal. LVEF is 61-65%. Grade 1A diastolic function.  · Right ventricle systolic function is normal.  · Saline test is suboptimal, but appears to be negative.  · Mild thickening of the aortic valve.  · Mild to moderate tricuspid regurgitation without evidence of pulmonary hypertension.        Assessment/Plan     Assessment/Plan:    Pt is a 76-year-old right-handed white female with known diagnosis of obesity as well as intermittent atrial fibrillation was supposed to be starting on Eliquis yesterday after seeing cardiology presented to the ER with sudden onset of tingling and numbness in the right side of the face along with right-sided facial droop. Symptoms had resolved after arriving to the ER. This morning she states feeling back to baseline. Strengths are equal 5/5, denies numbness, visual field is intact and no facial droop.   1. Right face numbness and droop- Resolved, likely TIA. She was started on Eliquis last night. Continue Eliquis 5 mg BID along with Lipitor 80 mg/day. Will follow-up on the echo. Instructed on stroke risk factors, prevention, and s/s to call 911.  2. Hypertension- Normal BP goals.  3. Hyperlipidemia- LDL is 108, continue Lipitor 80 mg/day.  4. Activity- Okay to work with PT/OT.  5. Diet- Heart-healthy diet.  Case was  discussed with pt, Dr. Castro, Hospitalist team, and nursing. Neurology will sign off.           Patient Active Problem List   Diagnosis   • Essential hypertension   • Hyperlipidemia   • History of pulmonary embolism   • Primary hyperparathyroidism (HCC)   • Pulmonary embolism (HCC)   • Long-term (current) use of anticoagulants   • Easy bruising   • Anemia   • Nephrolithiasis   • Osteoporosis without current pathological fracture   • Vitamin D deficiency   • Other neutropenia (HCC)   • History of bariatric surgery   • Postmenopausal   • Symptomatic sinus bradycardia   • Presence of cardiac pacemaker   • 1st degree AV block   • Left lateral abdominal pain   • Personal history of colonic polyps   • Iron deficiency anemia   • Malignant neoplasm of ascending colon (HCC)   • Iron malabsorption   • TIA (transient ischemic attack)   • Paroxysmal atrial fibrillation (HCC)           TABBY Castellanos  07/01/22  07:29 CDT        I spent 45 minutes caring for Veronica Wilkinson  on this date of service. This time includes time spent by me in the following activities: preparing for the visit, reviewing tests, obtaining and/or reviewing a separately obtained history, performing a medically appropriate examination and/or evaluation, counseling and educating the patient/family/caregiver, ordering medications, tests, or procedures, referring and communicating with other health care professionals, documenting information in the medical record, independently interpreting results and communicating that information with the patient/family/caregiver and care coordination

## 2022-07-01 NOTE — PROGRESS NOTES
THC Physician - Brief Progress Note  PERMANENT  06/30/2022 21:12    [facility] - [facility]/SD - [facility] [place], [place] ([facility])    CHAVEZ MCLEOD    Date of Service 06/30/2022 21:12    HPI/Events of Note [facility] Provider Assessment Note    76-year-old female admitted to the ICU from the emergency room as a code stroke.  Patient has been diagnosed with a TIA.  Neurology has been consulted and following.    PMHx: depression, colon cancer, COPD, CHF, GERD, hyperlipidemia a PE, right basilic vein fibrosis, obesity, cholecystectomy a gastric sleeve, pacemaker, PAF    Checks x-ray:  No acute process    CT angio of the head and neck:  No evidence of hemodynamically significant stenosis.  No evidence of aneurysm within the [place] of [place].    CT head:  Normal brain for age.  No acute changes.      Assessment and Plan:    1.  HOB elevated, aspiration precautions, serial neurological checks  2.  Echo pending  3. MRI pending  4.  On Eliquis, atorvastatin  5.  Start ceftriaxone for UTI      __ X___   Video Assessment performed  __ X___   Most recent labs reviewed  __ X___   Vital Signs reviewed  __ X___   Best Practices addressed:                 VTE prophylaxis: Eliquis, SCDs                 SUP (when indicated): NA                 Glycemic control:  < 180                      Please notify bedside physician when present or [facility] if glc > 180 X 2                 Sepsis guidelines:                 Lung protective strategy                 Targeted Temperature Management:    _____     Spoke with bedside RN  __ X___     Orders written      Contact [facility] for any needs if bedside physician is not present.      Interventions Major-Change in mental status - evaluation and management  Intermediate-Infection - evaluation and management        Electronically Signed by: Brad Sims) on 06/30/2022 21:25

## 2022-07-01 NOTE — THERAPY EVALUATION
Patient Name: Veronica Wilkinson  : 1945    MRN: [id number]                              Today's Date: 2022     PT Evaluation  Admit Date: 2022    Visit Dx:     ICD-10-CM ICD-9-CM   1. TIA (transient ischemic attack)  G45.9 435.9   2. Impaired functional mobility, balance, gait, and endurance  Z74.09 V49.89     Patient Active Problem List   Diagnosis   • Essential hypertension   • Hyperlipidemia   • History of pulmonary embolism   • Primary hyperparathyroidism (HCC)   • Pulmonary embolism (HCC)   • Long-term (current) use of anticoagulants   • Easy bruising   • Anemia   • Nephrolithiasis   • Osteoporosis without current pathological fracture   • Vitamin D deficiency   • Other neutropenia (HCC)   • History of bariatric surgery   • Postmenopausal   • Symptomatic sinus bradycardia   • Presence of cardiac pacemaker   • 1st degree AV block   • Left lateral abdominal pain   • Personal history of colonic polyps   • Iron deficiency anemia   • Malignant neoplasm of ascending colon (HCC)   • Iron malabsorption   • TIA (transient ischemic attack)   • Paroxysmal atrial fibrillation (HCC)     Past Medical History:   Diagnosis Date   • Arthritis    • Asthma    • Bleeding tendency (HCC)    • Chest pain     History of noncardiac chest pain   • Chronic depression    • Colon cancer (HCC)    • COPD (chronic obstructive pulmonary disease) (HCC)    • Depressive disorder    • Diastolic dysfunction    • Dyspnea    • Dyspnea on exertion    • Edema    • Essential hypertension    • Exercise intolerance    • Fatigue    • Gallstones    • GERD (gastroesophageal reflux disease)    • History of bone density study     DEXA BONE DENSITY APPENDICULAR 65924 (1) - normal   • History of bone density study 2015    DXA BONE DENSITY AXIAL 87389 [facility] (1) - Ordered By: TOLU RAMIREZ ([facility])    • History of echocardiogram     Echocardiogram W/ color flow 97353 (2)   • History of mammogram 2013    Mammogram screen (1)   •  "History of mammogram 2015    MAMMOGRAM SCREENING 12001 - [facility] (1)   • History of screening mammography 06/25/2015    SCREENING MAMMOGRAPHY DIGITAL  (Medicare) (1) - Ordered By: ANDRADE BECERRIL ([facility])    • Hypercalcemia    • Hyperlipidemia    • Influenza vaccine administered 02/25/2016    INFLUENZA IMMUN ADMIN OR PREV RECV'D  (2) - Ordered By: ANDRADE BECERRIL ([facility])    • Long-term (current) use of anticoagulants     coumadin therapy      • Lower extremity edema     Intermittent lower extremity edema   • Obesity, Class III, BMI 40-49.9 (morbid obesity) ([facility])     \"Class III obesity\"   • Osteoporosis    • PE (pulmonary embolism) 10/2015    Bilateral PE diagnosed after a car ride to [place]   • Pneumococcal vaccination given 02/25/2016    PNEUMOC VAC/ADMIN/RCVD 4040F (2) - Ordered By: ANDRADE BECERRIL ([facility])    • Right basilic vein fibrosis 2015   • Sedentary lifestyle    • Shortness of breath    • Skin cancer    • TIA (transient ischemic attack) 6/30/2022   • Urinary tract infection    • Venous thrombosis 2015    right basilic venous thrombosis; This was noted in May 2015.   • Weight gain      Past Surgical History:   Procedure Laterality Date   • CARDIAC ELECTROPHYSIOLOGY PROCEDURE N/A 12/10/2020    Procedure: Pacemaker DC new;  Surgeon: Vesta Bucio MD;  Location: [facility] CATH INVASIVE LOCATION;  Service: Cardiology;  Laterality: N/A;  [facility] sci per pratima.....juanita from [place] sci aware   • CHOLECYSTECTOMY      Cholecystectomy (1)   • COLON SURGERY     • COLONOSCOPY      Approximately 5 years prior as stated per patient   • COLONOSCOPY N/A 06/16/2021    Procedure: COLONOSCOPY;  Surgeon: Rasheed Gibbons MD;  Location: [facility] ENDOSCOPY;  Service: Gastroenterology;  Laterality: N/A;  tattoo at ascneding colon mass   • CRYOABLATION     • ENDOSCOPY  09/15/2011    Colon endoscopy 75961 (2) - Hemorrhoids found.   • ENDOSCOPY N/A 06/16/2021    Procedure: ESOPHAGOGASTRODUODENOSCOPY;  Surgeon: Shai" Rasheed MALDONADO MD;  Location: [facility] ENDOSCOPY;  Service: Gastroenterology;  Laterality: N/A;   • GASTRIC SLEEVE LAPAROSCOPIC     • GASTRIC SLEEVE LAPAROSCOPIC     • HEMORRHOIDECTOMY     • HYSTEROSCOPY      Hysteroscopy; ablation (1)   • INJECTION OF MEDICATION  09/27/2012    Kenalog (1) - Ordered By: ANDRADE BECERRIL ([facility])    • PACEMAKER IMPLANTATION        General Information     Row Name 07/01/22 1301          Physical Therapy Time and Intention    Document Type evaluation  -GB     Mode of Treatment individual therapy;physical therapy  -GB     Row Name 07/01/22 1301          General Information    Patient Profile Reviewed yes  -GB     Prior Level of Function independent:;community mobility;gait;all household mobility;home management;driving;shopping;ADL's  -GB     Existing Precautions/Restrictions fall  -GB     Row Name 07/01/22 1301          Living Environment    People in Home child(joey), adult  -GB     Row Name 07/01/22 1301          Home Main Entrance    Number of Stairs, Main Entrance two  -GB     Row Name 07/01/22 1301          Stairs Within Home, Primary    Stairs, Within Home, Primary pillars on sides of steps; not using upstairs at home; fell twisting her knee for unknown reason; has SPC that will start using  -GB     Row Name 07/01/22 1301          Cognition    Orientation Status (Cognition) oriented to;person;place;situation;time  -GB           User Key  (r) = Recorded By, (t) = Taken By, (c) = Cosigned By    Initials Name Provider Type    GB Tania Jacinto, PT Physical Therapist               Mobility     Row Name 07/01/22 1347          Bed Mobility    Bed Mobility bed mobility (all) activities  -GB     All Activities, [place] (Bed Mobility) modified independence;supervision  -GB     Row Name 07/01/22 1347          Bed-Chair Transfer    Bed-Chair [place] (Transfers) 1 person assist;supervision;standby assist  -GB     Row Name 07/01/22 1347          Sit-Stand Transfer    Sit-Stand  [place] (Transfers) independent;supervision  -     Row Name 07/01/22 1348          Gait/Stairs (Locomotion)    [place] Level (Gait) modified independence;supervision  -     Assistive Device (Gait) walker, front-wheeled  -     Distance in Feet (Gait) 260 ft but c/o SOA Post gait; HR only 66 w/ gait; reports of v tach in gait per family; SOA declined as she restsed sitting EOB post gait  -           User Key  (r) = Recorded By, (t) = Taken By, (c) = Cosigned By    Initials Name Provider Type    Tania Ortiz PT Physical Therapist               Obj/Interventions     Row Name 07/01/22 1341          Range of Motion Comprehensive    General Range of Motion bilateral lower extremity ROM WFL  -     Row Name 07/01/22 1343          Strength Comprehensive (MMT)    Comment, General Manual Muscle Testing (MMT) Assessment grossly 4/5 DF/PF meeta; 4-/5 L hip flx and knee flx; L knee ext 4/5; R LE grossly 4/5 hip knee flx/ext; c/o meeta distal legs tender/pain to    touch/pressure periodically and tender to resistance today which may have affected resistance level. no abnormal tone noted in motion or palpation  -     Row Name 07/01/22 1348          Sensory Assessment (Somatosensory)    Sensory Assessment (Somatosensory) LE sensation intact  -           User Key  (r) = Recorded By, (t) = Taken By, (c) = Cosigned By    Initials Name Provider Type    Tania Ortiz PT Physical Therapist               Goals/Plan     Row Name 07/01/22 7942          Transfer Goal 1 (PT)    Activity/Assistive Device (Transfer Goal 1, PT) bed-to-chair/chair-to-bed;toilet  -     [place] Level/Cues Needed (Transfer Goal 1, PT) modified independence  -     Progress/Outcome (Transfer Goal 1, PT) goal not met  -     Row Name 07/01/22 3584          Gait Training Goal 1 (PT)    Activity/Assistive Device (Gait Training Goal 1, PT) gait (walking locomotion);assistive device use  -     [place]  Level (Gait Training Goal 1, PT) modified independence  -GB     Distance (Gait Training Goal 1, PT) 300 ft or more w/ FWRW w/out LOB and w/ VSS and no SOA  -GB     Time Frame (Gait Training Goal 1, PT) 5 days  -GB     Progress/Outcome (Gait Training Goal 1, PT) goal not met  -GB     Row Name 07/01/22 1354          Stairs Goal 1 (PT)    Activity/Assistive Device (Stairs Goal 1, PT) ascending stairs;descending stairs  -GB     [place] Level/Cues Needed (Stairs Goal 1, PT) modified independence  -GB     Number of Stairs (Stairs Goal 1, PT) 2 or more  -GB     Time Frame (Stairs Goal 1, PT) 1 week  -GB     Progress/Outcome (Stairs Goal 1, PT) goal not met  -GB     Row Name 07/01/22 1354          Patient Education Goal (PT)    Activity (Patient Education Goal, PT) good walker use habits for safe mobility on consistent basis  -GB     [place]/Cues/Accuracy (Memory Goal 2, PT) demonstrates adequately  -GB     Time Frame (Patient Education Goal, PT) by discharge  -GB     Progress/Outcome (Patient Education Goal, PT) goal not met  -GB     Row Name 07/01/22 1357          Therapy Assessment/Plan (PT)    Planned Therapy Interventions (PT) balance training;neuromuscular re-education;bed mobility training;transfer training;patient/family education;home exercise program;gait training;stair training;strengthening;stretching;motor coordination training  -GB           User Key  (r) = Recorded By, (t) = Taken By, (c) = Cosigned By    Initials Name Provider Type    Tania Ortiz, PT Physical Therapist               Clinical Impression     Row Name 07/01/22 1306          Pain    Pretreatment Pain Rating 0/10 - no pain  -GB     Posttreatment Pain Rating 0/10 - no pain  -GB     Row Name 07/01/22 1306          Plan of Care Review    Plan of Care Reviewed With patient;daughter  -GB     Outcome Evaluation PT eval completed w/ pt and family present. Pt reports hx falls and stumbling; was considering use of single point  cane but wtih RW trial she reports she understands it provides more support; Rec she use RW over cane for stabilty due to her osteoporosis hx and her use of anti-coagulants. May use borrowd rollator post d/c so instructed on how to use safely. She has 4-4-/5 meeta LE strength grossly, but tender to touch/pressure on LEs meeta per pt as routine as well as today.  Pt will be followed for PT while here; Rec home w/ 24/7 and possible outpt PT follow up for balance and gait/strengthening w/ goal of safer walking.  She was taken for MRI before [employer] run this date.  -GB     Row Name 07/01/22 1306          Therapy Assessment/Plan (PT)    Rehab Potential (PT) good, to achieve stated therapy goals  -GB     Criteria for Skilled Interventions Met (PT) yes  -GB     Therapy Frequency (PT) other (see comments)  5-7 d/w  -GB     Row Name 07/01/22 1306          Vital Signs    Pre Systolic BP Rehab 116  -GB     Pre Treatment Diastolic BP 58  -GB     Intra Systolic BP Rehab 133  -GB     Intra Treatment Diastolic BP 60  -GB     Post Systolic BP Rehab 149  -GB     Post Treatment Diastolic BP 70  -GB     Pretreatment Heart Rate (beats/min) 64  -GB     Intratreatment Heart Rate (beats/min) 66  soa post gait 260 ft  -GB     Posttreatment Heart Rate (beats/min) 61  -GB     Pre SpO2 (%) 100  -GB     O2 Delivery Pre Treatment room air  -GB     O2 Delivery Post Treatment room air  -GB     Pre Patient Position Supine  -GB     Intra Patient Position Standing  -GB     Post Patient Position Sitting  -GB     Row Name 07/01/22 1306          Positioning and Restraints    Pre-Treatment Position in bed  -GB     Post Treatment Position wheelchair  -GB     In Wheelchair notified nsg  -GB           User Key  (r) = Recorded By, (t) = Taken By, (c) = Cosigned By    Initials Name Provider Type    Tania Ortiz, PT Physical Therapist               Outcome Measures     Row Name 07/01/22 1356          How much help from another person do you  currently need...    Turning from your back to your side while in flat bed without using bedrails? 3  -GB     Moving from lying on back to sitting on the side of a flat bed without bedrails? 3  -GB     Moving to and from a bed to a chair (including a wheelchair)? 3  -GB     Standing up from a chair using your arms (e.g., wheelchair, bedside chair)? 3  -GB     Climbing 3-5 steps with a railing? 2  -GB     To walk in hospital room? 3  -GB     AM-PAC 6 Clicks Score (PT) 17  -GB     Highest level of mobility 5 --> Static standing  -GB     Row Name 07/01/22 1356          Modified Lares Scale    Pre-Stroke Modified Jatinder Scale 4 - Moderately severe disability.  Unable to walk without assistance, and unable to attend to own bodily needs without assistance.  needs better skills w/ walker but this could have been pre admit as well w/ hx falls at home  -GB     Row Name 07/01/22 1356          Functional Assessment    Outcome Measure Options AM-PAC 6 Clicks Basic Mobility (PT)  -GB           User Key  (r) = Recorded By, (t) = Taken By, (c) = Cosigned By    Initials Name Provider Type    Tania Ortiz, PT Physical Therapist                             Physical Therapy Education                 Title: PT OT SLP Therapies (In Progress)     Topic: Physical Therapy (In Progress)     Point: Mobility training (Done)     Learning Progress Summary           Patient Acceptance, E,D, NR,DU,VU by TYRA at 7/1/2022 1357    Comment: POC: use of RW for gait   Family Acceptance, E,D, NR,DU,VU by TYRA at 7/1/2022 1357    Comment: POC: use of RW for gait                   Point: Home exercise program (Not Started)     Learner Progress:  Not documented in this visit.          Point: Body mechanics (Not Started)     Learner Progress:  Not documented in this visit.          Point: Precautions (Done)     Learning Progress Summary           Patient Acceptance, E,D, NR,DU,VU by TYRA at 7/1/2022 1357    Comment: POC: use of RW for gait    Family Acceptance, E,D, NR,DU,VU by TYRA at 7/1/2022 2428    Comment: POC: use of RW for gait                               User Key     Initials Effective Dates Name Provider Type Discipline     06/16/21 -  Tania Jacinto PT Physical Therapist PT              PT Recommendation and Plan  Planned Therapy Interventions (PT): balance training, neuromuscular re-education, bed mobility training, transfer training, patient/family education, home exercise program, gait training, stair training, strengthening, stretching, motor coordination training  Plan of Care Reviewed With: patient, daughter  Outcome Evaluation: PT eval completed w/ pt and family present. Pt reports hx falls and stumbling; was considering use of single point cane but wtih RW trial she reports she understands it provides more support; Rec she use RW over cane for stabilty due to her osteoporosis hx and her use of anti-coagulants. May use borrowd rollator post d/c so instructed on how to use safely. She has 4-4-/5 meeta LE strength grossly, but tender to touch/pressure on LEs meeta per pt as routine as well as today.  Pt will be followed for PT while here; Rec home w/ 24/7 and possible outpt PT follow up for balance and gait/strengthening w/ goal of safer walking.  She was taken for MRI before [employer] run this date.     Time Calculation:    PT Charges     Row Name 07/01/22 1301             Time Calculation    Start Time 1301  -GB      Stop Time 1344  -GB      Time Calculation (min) 43 min  -GB      PT Received On 07/01/22  -GB      PT Goal Re-Cert Due Date 07/14/22  -GB              Untimed Charges    PT Eval/Re-eval Minutes 43  -GB              Total Minutes    Untimed Charges Total Minutes 43  -GB       Total Minutes 43  -GB            User Key  (r) = Recorded By, (t) = Taken By, (c) = Cosigned By    Initials Name Provider Type    Tania Ortiz PT Physical Therapist              Therapy Charges for Today     Code Description  Service Date Service Provider Modifiers Qty    56270299314 HC PT EVAL MOD COMPLEXITY 3 7/1/2022 Tania Jacinto, PT GP 1          PT G-Codes  Outcome Measure Options: AM-PAC 6 Clicks Basic Mobility (PT)  AM-PAC 6 Clicks Score (PT): 17    Tania Jacinto, PT  7/1/2022

## 2022-07-01 NOTE — PLAN OF CARE
Goal Outcome Evaluation:  Plan of Care Reviewed With: patient        Progress: improving  Outcome Evaluation: new admit  Problem: Adult Inpatient Plan of Care  Goal: Plan of Care Review  Outcome: Ongoing, Progressing  Flowsheets (Taken 6/30/2022 2110)  Progress: improving  Plan of Care Reviewed With: patient  Outcome Evaluation: new admit

## 2022-07-01 NOTE — PLAN OF CARE
Goal Outcome Evaluation:  Plan of Care Reviewed With: patient, daughter           Outcome Evaluation: PT eval completed w/ pt and family present. Pt reports hx falls and stumbling; was considering use of single point cane but wtih RW trial she reports she understands it provides more support; Rec she use RW over cane for stabilty due to her osteoporosis hx and her use of anti-coagulants. May use borrowd rollator post d/c so instructed on how to use safely. She has 4-4-/5 meeta LE strength grossly, but tender to touch/pressure on LEs meeta per pt as routine as well as today.  Pt will be followed for PT while here; Rec home w/ 24/7 and possible outpt PT follow up for balance and gait/strengthening w/ goal of safer walking.  She was taken for MRI before [employer] run this date.

## 2022-07-01 NOTE — PROGRESS NOTES
"  [facility] – [place], [place] Cardiology Progress Note   LOS: 0 days   Patient Care Team:  Rich Urbina APRN as PCP - General (Nurse Practitioner)  Mihai Hunt MD as Consulting Physician (Hematology and Oncology)  Velia Kessler MA as Medical Assistant  Liza Padilla APRN as Nurse Practitioner (Oncology)    Chief Complaint   Patient presents with   • Numbness       Subjective     Interval History:   Patient Denies: shortness of air, chest pain, PND, orthopnea, palpitations, dizziness, syncope and edema  Patient Complaints: no complaints today  History taken from: patient chart    VVS overnight, No cardiac ectopy overnight. Rhythm sinus and 60 at time of examination. Denies fluttering or palpiPPM is MRI compatible but will have to be program before and after MRI.      Objective     Vital Sign Min/Max for last 24 hours  Temp  Min: 97.8 °F (36.6 °C)  Max: 98 °F (36.7 °C)   BP  Min: 98/53  Max: 156/72   Pulse  Min: 60  Max: 72   Resp  Min: 16  Max: 16   SpO2  Min: 97 %  Max: 100 %   No data recorded   Weight  Min: 102 kg (224 lb 13.9 oz)  Max: 102 kg (225 lb 1.6 oz)     Flowsheet Rows    Flowsheet Row First Filed Value   Admission Height 154.9 cm (61\") Documented at 06/30/2022 1713   Admission Weight 102 kg (225 lb 1.6 oz) Documented at 06/30/2022 1713            06/30/22  1713 07/01/22  0545   Weight: 102 kg (225 lb 1.6 oz) 102 kg (224 lb 13.9 oz)       Physical Exam:  Physical Exam  Vitals reviewed.   Constitutional:       Appearance: Normal appearance. She is not ill-appearing or diaphoretic.   HENT:      Head: Normocephalic.      Mouth/Throat:      Mouth: Mucous membranes are moist.      Pharynx: Oropharynx is clear. No oropharyngeal exudate.   Eyes:      General:         Right eye: No discharge.         Left eye: No discharge.      Conjunctiva/sclera: Conjunctivae normal.      Pupils: Pupils are equal, round, and reactive to light.   Cardiovascular:      Rate and Rhythm: Normal rate and regular rhythm.      Pulses: Normal " pulses.      Heart sounds: Normal heart sounds. No murmur heard.    No gallop.   Pulmonary:      Effort: Pulmonary effort is normal.      Breath sounds: Normal breath sounds and air entry. No wheezing or rhonchi.   Chest:      Chest wall: Tenderness present. No swelling.       Abdominal:      General: Abdomen is flat. There is no distension.      Tenderness: There is no abdominal tenderness.   Skin:     General: Skin is warm and dry.      Capillary Refill: Capillary refill takes less than 2 seconds.      Coloration: Skin is not jaundiced or pale.   Neurological:      Mental Status: She is alert and oriented to person, place, and time.   Psychiatric:         Mood and Affect: Mood normal.         Behavior: Behavior is cooperative.         Thought Content: Thought content normal.         Judgment: Judgment normal.          Results Reviewed by myself:     Results from last 7 days   Lab Units 06/30/22  1706   SODIUM mmol/L 139   POTASSIUM mmol/L 4.3   CHLORIDE mmol/L 106   CO2 mmol/L 23.0   BUN mg/dL 28*   CREATININE mg/dL 1.09*   CALCIUM mg/dL 9.9   BILIRUBIN mg/dL 0.2   ALK PHOS U/L 101   ALT (SGPT) U/L 36*   AST (SGOT) U/L 36*   GLUCOSE mg/dL 126*       Estimated Creatinine Clearance: 49.7 mL/min (A) (by C-G formula based on SCr of 1.09 mg/dL (H)).    Results from last 7 days   Lab Units 06/30/22  1706   MAGNESIUM mg/dL 2.0             Results from last 7 days   Lab Units 07/01/22  0517 06/30/22  1706   WBC 10*3/mm3 4.04 5.46   HEMOGLOBIN g/dL 10.4* 11.4*   PLATELETS 10*3/mm3 232 256       Results from last 7 days   Lab Units 06/30/22  1706   INR  1.11     Lab Results   Component Value Date    PROBNP 198.7 06/30/2022       I/O last 3 completed shifts:  In: 100 [IV Piggyback:100]  Out: 800 [Urine:800]    Cardiographics:  ECG/EMG Results (last 24 hours)     Procedure Component Value Units Date/Time    Adult Transthoracic Echo Complete W/ Cont if Necessary Per Protocol (With Agitated Saline) [[id number]] Resulted:  07/01/22 0854     Updated: 07/01/22 0854     Target HR (85%) 124 bpm      Max. Pred. HR (100%) 146 bpm     ECG 12 Lead [[id number]] Collected: 06/30/22 1714     Updated: 07/01/22 0855        Results for orders placed in visit on 09/01/20    Adult Transthoracic Echo Complete W/ Cont if Necessary Per Protocol    Interpretation Summary  · Technically difficult study secondary to body habitus. Definity contrast utilized.  · Left ventricular systolic function is normal. LVEF is 61-65%. Grade 1A diastolic function.  · Right ventricle systolic function is normal.  · Saline test is suboptimal, but appears to be negative.  · Mild thickening of the aortic valve.  · Mild to moderate tricuspid regurgitation without evidence of pulmonary hypertension.      CT Angiogram Neck    Result Date: 6/30/2022  CTA of the brain: No evidence of hemodynamically significant stenosis of the visualized intracranial arteries. No evidence of aneurysm within the [place]. CTA of the neck: No evidence of hemodynamically significant stenosis of the visualized ICAs and bilateral vertebral arteries. No evidence of dissection within the bilateral carotid or vertebral arteries. The left vertebral artery is dominant. Electronically signed by:  Rashaad Cobb MD  6/30/2022 5:57 PM CDT Workstation: [id number]    XR Chest 1 View    Result Date: 6/30/2022  No radiographic evidence of acute cardiopulmonary disease. Electronically signed by:  Ricardo Drew MD  6/30/2022 6:30 PM CDT Workstation: [id number]    CT Head Without Contrast Stroke Protocol    Result Date: 6/30/2022  1. Normal brain for age. There are no acute changes. Electronically signed by:  Tank Mclaughlin MD  6/30/2022 5:12 PM CDT Workstation: [id number]    CT Angiogram Head w AI Analysis of LVO    Result Date: 6/30/2022  CTA of the brain: No evidence of hemodynamically significant stenosis of the visualized intracranial arteries. No evidence of aneurysm within the [place]. CTA of the neck: No  evidence of hemodynamically significant stenosis of the visualized ICAs and bilateral vertebral arteries. No evidence of dissection within the bilateral carotid or vertebral arteries. The left vertebral artery is dominant. Electronically signed by:  Rashaad Cobb MD  6/30/2022 5:57 PM CDT Workstation: [id number]      Medication Review:     Current Facility-Administered Medications:   •  acetaminophen (TYLENOL) tablet 650 mg, 650 mg, Oral, Q4H PRN **OR** acetaminophen (TYLENOL) suppository 650 mg, 650 mg, Rectal, Q4H PRN, Jono Valencia MD  •  aluminum-magnesium hydroxide-simethicone (MAALOX MAX) 400-400-40 MG/5ML suspension 7.5 mL, 7.5 mL, Oral, Q4H PRN, Jono Valencia MD  •  apixaban (ELIQUIS) tablet 5 mg, 5 mg, Oral, Q12H, Jono Valencia MD, 5 mg at 07/01/22 0806  •  atorvastatin (LIPITOR) tablet 80 mg, 80 mg, Oral, Nightly, Jono Valencia MD, 80 mg at 06/30/22 2115  •  bisacodyl (DULCOLAX) suppository 10 mg, 10 mg, Rectal, Daily PRN, Jono Valencia MD  •  cefTRIAXone (ROCEPHIN) 1 g/100 mL 0.9% NS (MBP), 1 g, Intravenous, Q24H, Brad Sims MD, 1 g at 06/30/22 2231  •  losartan (COZAAR) 50 mg, hydroCHLOROthiazide (HYDRODIURIL) 12.5 mg, , Oral, Daily, Kendal Baker PA-C, Given at 07/01/22 0807  •  ondansetron (ZOFRAN) injection 4 mg, 4 mg, Intravenous, Q6H PRN, Jono Valencia MD  •  pantoprazole (PROTONIX) EC tablet 40 mg, 40 mg, Oral, Daily, Kendal Baker PA-C, 40 mg at 07/01/22 0807  •  sodium chloride 0.9 % flush 10 mL, 10 mL, Intravenous, PRN, Francisco Kauffman MD  •  sodium chloride 0.9 % flush 10 mL, 10 mL, Intravenous, Q12H, Jono Valencia MD, 10 mL at 07/01/22 0810  •  sodium chloride 0.9 % flush 10 mL, 10 mL, Intravenous, PRN, Jono Valencia MD  •  sodium chloride 0.9 % flush 10 mL, 10 mL, Intravenous, Q12H, Kendal Baker, CLIVE, 10 mL at 07/01/22 0809  •  sodium chloride 0.9 % flush 10 mL, 10 mL, Intravenous, PRN, Kendal Baker,  CLIVE    Patient's recent labs and results as included in the subjective data were reviewed by me. Any pertinent outside data will be included.        Assessment & Plan       Essential hypertension    TIA (transient ischemic attack)    Paroxysmal atrial fibrillation (HCC)    1. Sick Sinus Syndrome. PPM placed on 12/2022    2. Paroxysmal Atrial Fibrillation. Started anticoagulation yesterday. She was lost to follow up. The last ppm interrogation prior to this admission was 3/2022. AF/AT burdern <1%    -continue Eliquis 5 mg twice daily.W    3. S/P PPM. It was placed on 12/2020 by Dr. Bucio for symptomatic bradycardia, intermittent junctional rhythm, and chronotropic incompetence. Interrogation yesterday showed episode of atrial fibrillation.     She has [employer] MRI compatible device with leads that are also compatable. I  Battery-Svetlana TUCKER-MRI compatible  Leads-[employer] MRI ACT/FIX x2    Call placed to device rep and waiting on guidance for PPM.    4. Hypertension. VSS, normotensive for her age    Continue Olmesartan/HCTZ 20/12.5    Plan for disposition:Where: Continue current location We will continue to follow      This document has been electronically signed by TABBY Carranza on July 1, 2022 09:32 CDT   Electronically signed by TABBY Carranza, 07/01/22, 9:32 AM CDT.

## 2022-07-01 NOTE — H&P
"    [facility]  HISTORY AND PHYSICAL      Date of Admission: 6/30/2022  Primary Care Physician: Rich Urbina APRN    Subjective     Chief Complaint: Dizziness, Facial Droop    History of Present Illness  Patient is a very pleasant 76 year old female with history of atrial fibrillation presented to the ED today for evaluation of sudden onst dizziness, right side facial droop and inability to talk. She reports the symptoms started as she was standing in her kitchen making a cup of coffee. She states she felt dizzy, felt facial tingling and that her mouth drooped on the right side. She also could not talk. She is uncertain if she may have even passed out. She reports the symptoms lasted approximately ten minutes. She was seen in the ED and also evaluated by neurology team. It was felt best to admit her for further stroke workup and hospitalist team was consulted to do this.     Patient tells me that she was actually seen by her cardiologist, Dr. Peterson, this morning. The office had contacted her because she has a pacemaker in place with remote transmission, and the office has noticed she was having atrial fibrillation. During today's visit, she was having NSR but it was felt best to start her on Eliquis therapy. She had not yet picked up the prescription when her episode described above had started. She reports having a similar episode around ten days ago, but it was less severe. She suddenly felt dizzy, had 'tunnel vision' and lightheadedness, and just generally felt bad. She states it didn't last but a few minutes. She also has been having intermittent episodes of shortness of air with associated \"heart floating\" sensation, which started in the last couple of weeks.       Review of Systems   Constitutional: Negative for appetite change, chills, diaphoresis, fatigue and fever.   HENT: Negative for congestion, ear pain, postnasal drip, rhinorrhea, sinus " pressure, sinus pain, sneezing, sore throat and trouble swallowing.    Eyes: Negative for photophobia, pain and discharge.   Respiratory: Negative for cough, chest tightness, shortness of breath and wheezing.    Cardiovascular: Negative for chest pain, palpitations and leg swelling.   Gastrointestinal: Negative for abdominal pain, blood in stool, constipation, diarrhea, nausea and vomiting.   Endocrine: Negative for polydipsia, polyphagia and polyuria.   Genitourinary: Negative for difficulty urinating, dysuria, flank pain, frequency, hematuria and urgency.   Musculoskeletal: Negative for arthralgias, back pain, myalgias and neck pain.   Skin: Negative for color change, rash and wound.   Neurological: Positive for dizziness (resolved) and numbness (resolved). Negative for seizures, syncope, weakness and headaches.   Hematological: Does not bruise/bleed easily.   Psychiatric/Behavioral: Negative for behavioral problems, confusion, hallucinations, sleep disturbance and suicidal ideas.        Otherwise complete ROS reviewed and negative except as mentioned in the HPI.    Past Medical History:   Past Medical History:   Diagnosis Date   • Arthritis    • Asthma    • Bleeding tendency (HCC)    • Chest pain     History of noncardiac chest pain   • Chronic depression    • Colon cancer (HCC)    • COPD (chronic obstructive pulmonary disease) (HCC)    • Depressive disorder    • Diastolic dysfunction    • Dyspnea    • Dyspnea on exertion    • Edema    • Essential hypertension    • Exercise intolerance    • Fatigue    • Gallstones    • GERD (gastroesophageal reflux disease)    • History of bone density study 2011    DEXA BONE DENSITY APPENDICULAR 35206 (1) - normal   • History of bone density study 06/24/2015    DXA BONE DENSITY AXIAL 40118 [facility] (1) - Ordered By: TOLU RAMIREZ ([facility])    • History of echocardiogram     Echocardiogram W/ color flow 58250 (2)   • History of mammogram 2013    Mammogram screen (1)   •  "History of mammogram 2015    MAMMOGRAM SCREENING 77130 - [facility] (1)   • History of screening mammography 06/25/2015    SCREENING MAMMOGRAPHY DIGITAL  (Medicare) (1) - Ordered By: ANDRADE BECERRIL ([facility])    • Hypercalcemia    • Hyperlipidemia    • Influenza vaccine administered 02/25/2016    INFLUENZA IMMUN ADMIN OR PREV RECV'D  (2) - Ordered By: ANDRADE BECERRIL ([facility])    • Long-term (current) use of anticoagulants     coumadin therapy      • Lower extremity edema     Intermittent lower extremity edema   • Obesity, Class III, BMI 40-49.9 (morbid obesity) ([facility])     \"Class III obesity\"   • Osteoporosis    • PE (pulmonary embolism) 10/2015    Bilateral PE diagnosed after a car ride to [place]   • Pneumococcal vaccination given 02/25/2016    PNEUMOC VAC/ADMIN/RCVD 4040F (2) - Ordered By: ANDRADE BECERRIL ([facility])    • Right basilic vein fibrosis 2015   • Sedentary lifestyle    • Shortness of breath    • Skin cancer    • TIA (transient ischemic attack) 6/30/2022   • Urinary tract infection    • Venous thrombosis 2015    right basilic venous thrombosis; This was noted in May 2015.   • Weight gain      Past Surgical History:  Past Surgical History:   Procedure Laterality Date   • CARDIAC ELECTROPHYSIOLOGY PROCEDURE N/A 12/10/2020    Procedure: Pacemaker DC new;  Surgeon: Vesta Bucio MD;  Location: [facility] CATH INVASIVE LOCATION;  Service: Cardiology;  Laterality: N/A;  [facility] per pratima.....juanita from [facility]   • CHOLECYSTECTOMY      Cholecystectomy (1)   • COLONOSCOPY      Approximately 5 years prior as stated per patient   • COLONOSCOPY N/A 6/16/2021    Procedure: COLONOSCOPY;  Surgeon: Rasheed Gibbons MD;  Location: [facility] ENDOSCOPY;  Service: Gastroenterology;  Laterality: N/A;  tattoo at ascneding colon mass   • CRYOABLATION     • ENDOSCOPY  09/15/2011    Colon endoscopy 52000 (2) - Hemorrhoids found.   • ENDOSCOPY N/A 6/16/2021    Procedure: ESOPHAGOGASTRODUODENOSCOPY;  Surgeon: " Rasheed Gibbons MD;  Location: [facility] ENDOSCOPY;  Service: Gastroenterology;  Laterality: N/A;   • GASTRIC SLEEVE LAPAROSCOPIC     • GASTRIC SLEEVE LAPAROSCOPIC     • HEMORRHOIDECTOMY     • HYSTEROSCOPY      Hysteroscopy; ablation (1)   • INJECTION OF MEDICATION  09/27/2012    Kenalog (1) - Ordered By: ANDRADE BECERRIL ([facility])    • PACEMAKER IMPLANTATION       Social History:  reports that she has never smoked. She has never used smokeless tobacco. She reports that she does not drink alcohol and does not use drugs.    Family History: family history includes Adrenal disorder in her daughter; Allergy (severe) in an other family member; Bleeding Disorder in her father and another family member; Cancer in her brother and another family member; Cholelithiasis in an other family member; Diabetes in an other family member; Heart disease in an other family member; Hyperlipidemia in an other family member; Hypertension in some other family members; Lung cancer in her brother and father; No Known Problems in her daughter; Osteoarthritis in an other family member; Other in her father and other family members; Ovarian cancer in her mother; Pancreatic cancer in her sister; Seizures in her daughter; Thyroid disease in her daughter.       Allergies:  Allergies   Allergen Reactions   • Sulfa Antibiotics Hives and Rash     Sulfa (Sulfonamide Antibiotics)       Medications:  Prior to Admission medications    Medication Sig Start Date End Date Taking? Authorizing Provider   apixaban (ELIQUIS) 5 MG tablet tablet Take 1 tablet by mouth 2 (Two) Times a Day. 6/30/22   Linnette Peterson MD   CALCIUM MAGNESIUM ZINC PO Take  by mouth.    ProviderAnnemarie MD   cholecalciferol (VITAMIN D3) 25 MCG (1000 UT) tablet Take 1,000 Units by mouth Daily.    ProviderAnnemarie MD   diphenhydrAMINE-APAP, sleep, (ACETAMINOPHEN PM PO) Take  by mouth.    Annemarie Vega MD   fluticasone (FLONASE) 50 MCG/ACT nasal spray 2 sprays into the  nostril(s) as directed by provider Daily.    Annemarie Vega MD   LORATADINE ALLERGY RELIEF PO Take 10 mg by mouth Daily.    Annemarie Vega MD   olmesartan-hydrochlorothiazide (BENICAR HCT) 20-12.5 MG per tablet TAKE 1 TABLET DAILY 11/29/21   Rich Urbina APRN   pantoprazole (PROTONIX) 40 MG EC tablet Take 1 tablet by mouth Daily. 11/29/21   Annemarie Vega MD   pyridoxine (VITAMIN B-6) 100 MG tablet Take 100 mg by mouth Daily.    Annemarie Vega MD   simethicone (MYLICON,GAS-X) 125 MG capsule capsule Take 1 capsule by mouth Every Night.    Annemarie Vega MD   simvastatin (ZOCOR) 20 MG tablet Take 1 tablet by mouth Every Other Day. 9/9/21   Rich Urbina APRN   traZODone (DESYREL) 100 MG tablet TAKE 1 TABLET EVERY NIGHT 5/9/22   Rich Urbina APRN   calcium carbonate (OS-DARON) 600 MG tablet Take 600 mg by mouth 2 (Two) Times a Day With Meals.  6/30/22  Annemarie Vega MD   Calcium Carbonate-Vitamin D (calcium-vitamin D) 500-200 MG-UNIT tablet per tablet Take 1 tablet by mouth Daily.  6/30/22  Annemarie Vega MD   Cholecalciferol (VITAMIN D3) 2000 units tablet Take 1 tablet by mouth Daily.  6/30/22  Annemarie Vega MD   Cyanocobalamin (B-12 PO) Take 1 tablet by mouth Daily.  6/30/22  Emergency, Nurse Tiffany, RN   cyanocobalamin (VITAMIN B-12) 500 MCG tablet Take 1 tablet by mouth Daily.  6/30/22  Annemarie Vega MD   diphenhydrAMINE (BENADRYL) 25 MG tablet Take 50 mg by mouth Every Night. Take two tablets by mouth at bedtime  6/30/22  Annemarie Vega MD   docusate sodium (COLACE) 100 MG capsule Take 1 capsule by mouth 2 (Two) Times a Day As Needed for Constipation.  Patient taking differently: Take 100 mg by mouth 4 (Four) Times a Week. 8/21/21 6/30/22  Mihai Hunt MD   ferrous sulfate 325 (65 FE) MG tablet Take 1 tablet by mouth Daily With Breakfast.  Patient taking differently: Take 325 mg by mouth 4 (Four) Times a Week. 8/21/21 6/30/22   Mihai Hunt MD   fluticasone (FLONASE) 50 MCG/ACT nasal spray 2 sprays into the nostril(s) as directed by provider Daily As Needed for Rhinitis.  6/30/22  Annemarie Vega MD   fluticasone (FLONASE) 50 MCG/ACT nasal spray 2 sprays into the nostril(s) as directed by provider Daily for 30 days. 6/19/22 6/30/22  Laurel Paulino APRN   Garlic 1000 MG capsule Take 1,000 mg by mouth.  6/30/22  Annemarie Vega MD   HYDROcodone-acetaminophen (NORCO) 5-325 MG per tablet Take 1-2 tablets by mouth Every 8 (Eight) Hours As Needed. 7/25/21 6/30/22  Annemarie Vega MD   loratadine (CLARITIN) 10 MG tablet Take 10 mg by mouth Daily.  6/30/22  Annemarie Vega MD   losartan-hydrochlorothiazide (HYZAAR) 50-12.5 MG per tablet Daily.  6/30/22  Emergency, Nurse Epic, RN   magnesium hydroxide (CARBONE CHEWS) 311 MG chewable tablet chewable tablet Chew 1 tablet Daily.  6/30/22  Annemarie Vega MD   magnesium oxide (MAG-OX) 400 MG tablet Take 400 mg by mouth Daily.  6/30/22  Annemarie Vega MD   MILK THISTLE PO Take  by mouth.  6/30/22  Annemarie Vega MD   Multiple Vitamins-Minerals (MULTIVITAMIN ADULT PO) Take 1 tablet by mouth Daily.  6/30/22  Annemarie Vega MD   multivitamin (THERAGRAN) tablet tablet Take 1 tablet by mouth Daily.  6/30/22  Annemarie Vega MD   neomycin (MYCIFRADIN) 500 MG tablet  7/16/21 6/30/22  Annemarie Vega MD   olmesartan-hydrochlorothiazide (BENICAR HCT) 20-12.5 MG per tablet Take 1 tablet by mouth Daily. 11/29/21 6/30/22  Annemarie Vega MD   pantoprazole (PROTONIX) 40 MG EC tablet TAKE 1 TABLET DAILY 5/9/22 6/30/22  Rich Urbina APRN   tiZANidine (ZANAFLEX) 4 MG tablet Take 1 tablet by mouth At Night As Needed for Muscle Spasms. 12/8/20 6/30/22  Rich Urbina APRN   Turmeric 500 MG capsule Take 500 mg by mouth Daily.  6/30/22  Provider, MD Annemarie   Zinc Sulfate 66 MG tablet Take  by mouth Daily.  6/30/22  Provider,  "Historical, MD     I have utilized all available immediate resources to obtain, update, and review the patient's current medications.    Objective     Vital Signs: /64 (BP Location: Right arm, Patient Position: Lying)   Pulse 60   Temp 97.8 °F (36.6 °C) (Oral)   Resp 16   Ht 154.9 cm (61\")   Wt 102 kg (225 lb 1.6 oz)   SpO2 97%   BMI 42.53 kg/m²   Physical Exam  Vitals and nursing note reviewed.   Constitutional:       Appearance: Normal appearance. She is obese.   HENT:      Head: Normocephalic and atraumatic.      Right Ear: External ear normal.      Left Ear: External ear normal.      Nose: Nose normal. No congestion or rhinorrhea.      Mouth/Throat:      Mouth: Mucous membranes are moist.      Pharynx: Oropharynx is clear.   Eyes:      General: No scleral icterus.     Extraocular Movements: Extraocular movements intact.      Conjunctiva/sclera: Conjunctivae normal.      Pupils: Pupils are equal, round, and reactive to light.   Neck:      Vascular: No carotid bruit.   Cardiovascular:      Rate and Rhythm: Normal rate and regular rhythm.      Pulses: Normal pulses.      Heart sounds: Normal heart sounds. No murmur heard.  Pulmonary:      Effort: Pulmonary effort is normal.      Breath sounds: Normal breath sounds. No wheezing, rhonchi or rales.   Abdominal:      General: Abdomen is flat. Bowel sounds are normal.      Palpations: Abdomen is soft.      Tenderness: There is no abdominal tenderness. There is no guarding.   Musculoskeletal:         General: No swelling or deformity. Normal range of motion.      Cervical back: Normal range of motion and neck supple. No tenderness.      Right lower leg: No edema.      Left lower leg: No edema.   Skin:     General: Skin is warm and dry.      Capillary Refill: Capillary refill takes less than 2 seconds.      Findings: No rash.      Comments: Small old bruise noted to medial left ankle   Neurological:      General: No focal deficit present.      Mental Status: " She is alert and oriented to person, place, and time.      GCS: GCS eye subscore is 4. GCS verbal subscore is 5. GCS motor subscore is 6.      Cranial Nerves: Cranial nerves are intact.      Motor: No weakness.      Coordination: Coordination is intact.      Comments: NIH - 0   Psychiatric:         Mood and Affect: Mood normal.         Behavior: Behavior normal.         Thought Content: Thought content normal.         Judgment: Judgment normal.              Results Reviewed:  Lab Results (last 24 hours)     Procedure Component Value Units Date/Time    Vitamin B12 [[id number]] Collected: 06/30/22 1706    Specimen: Blood Updated: 06/30/22 1935    COVID-19 and FLU A/B PCR - Swab, Nasopharynx [[id number]]  (Normal) Collected: 06/30/22 1852    Specimen: Swab from Nasopharynx Updated: 06/30/22 1920     COVID19 Not Detected     Influenza A PCR Not Detected     Influenza B PCR Not Detected    Narrative:      Fact sheet for providers: https://www.fda.gov/media/284903/download    Fact sheet for patients: https://www.fda.gov/media/376989/download    Test performed by PCR.    [place] Draw [[id number]] Collected: 06/30/22 1706    Specimen: Blood Updated: 06/30/22 1817    Narrative:      The following orders were created for panel order [place] Draw.  Procedure                               Abnormality         Status                     ---------                               -----------         ------                     Green Top (Gel)[[id number]]                                  Final result               Lavender Top[[id number]]                                     Final result               Gold Top - SST[[id number]]                                   Final result               Light Blue Top[[id number]]                                   Final result                 Please view results for these tests on the individual orders.    Green Top (Gel) [[id number]] Collected: 06/30/22 1706    Specimen: Blood Updated: 06/30/22 1817     Extra  Tube Hold for add-ons.     Comment: Auto resulted.       Lavender Top [[id number]] Collected: 06/30/22 1706    Specimen: Blood Updated: 06/30/22 1817     Extra Tube hold for add-on     Comment: Auto resulted       Gold Top - SST [[id number]] Collected: 06/30/22 1706    Specimen: Blood Updated: 06/30/22 1817     Extra Tube Hold for add-ons.     Comment: Auto resulted.       Light Blue Top [[id number]] Collected: 06/30/22 1706    Specimen: Blood Updated: 06/30/22 1817     Extra Tube Hold for add-ons.     Comment: Auto resulted       Urinalysis, Microscopic Only - Urine, Clean Catch [[id number]]  (Abnormal) Collected: 06/30/22 1741    Specimen: Urine, Clean Catch Updated: 06/30/22 1809     RBC, UA 0-2 /HPF      WBC, UA 13-20 /HPF      Bacteria, UA None Seen /HPF      Squamous Epithelial Cells, UA 0-2 /HPF      Hyaline Casts, UA None Seen /LPF      Methodology Automated Microscopy    Urinalysis With Culture If Indicated - Urine, Clean Catch [[id number]]  (Abnormal) Collected: 06/30/22 1741    Specimen: Urine, Clean Catch Updated: 06/30/22 1809     Color, UA Yellow     Appearance, UA Clear     pH, UA <=5.0     Specific Gravity, UA 1.040     Comment: Result obtained by Refractometer        Glucose, UA Negative     Ketones, UA Negative     Bilirubin, UA Negative     Blood, UA Negative     Protein, UA Negative     Leuk Esterase, UA Moderate (2+)     Nitrite, UA Negative     Urobilinogen, UA 0.2 E.U./dL    Narrative:      In absence of clinical symptoms, the presence of pyuria, bacteria, and/or nitrites on the urinalysis result does not correlate with infection.    Urine Culture - Urine, Urine, Clean Catch [[id number]] Collected: 06/30/22 1741    Specimen: Urine, Clean Catch Updated: 06/30/22 1809    BNP [[id number]]  (Normal) Collected: 06/30/22 1706    Specimen: Blood Updated: 06/30/22 1806     proBNP 198.7 pg/mL     Narrative:      Among patients with dyspnea, NT-proBNP is highly sensitive for the detection of acute congestive  heart failure. In addition NT-proBNP of <300 pg/ml effectively rules out acute congestive heart failure with 99% negative predictive value.    Results may be falsely decreased if patient taking Biotin.      Troponin [[id number]]  (Normal) Collected: 06/30/22 1706    Specimen: Blood Updated: 06/30/22 1806     Troponin T <0.010 ng/mL     Narrative:      Troponin T Reference Range:  <= 0.03 ng/mL-   Negative for AMI  >0.03 ng/mL-     Abnormal for myocardial necrosis.  Clinicians would have to utilize clinical acumen, EKG, Troponin and serial changes to determine if it is an Acute Myocardial Infarction or myocardial injury due to an underlying chronic condition.       Results may be falsely decreased if patient taking Biotin.      TSH [[id number]]  (Normal) Collected: 06/30/22 1706    Specimen: Blood Updated: 06/30/22 1806     TSH 2.410 uIU/mL     Protime-INR [[id number]]  (Normal) Collected: 06/30/22 1706    Specimen: Blood Updated: 06/30/22 1805     Protime 14.1 Seconds      INR 1.11    Narrative:      Therapeutic range for most indications is 2.0-3.0 INR,  or 2.5-3.5 for mechanical heart valves.    aPTT [[id number]]  (Normal) Collected: 06/30/22 1706    Specimen: Blood Updated: 06/30/22 1805     PTT 33.4 seconds     Narrative:      The recommended Heparin therapeutic range is 68-97 seconds.    Comprehensive Metabolic Panel [[id number]]  (Abnormal) Collected: 06/30/22 1706    Specimen: Blood Updated: 06/30/22 1801     Glucose 126 mg/dL      BUN 28 mg/dL      Creatinine 1.09 mg/dL      Sodium 139 mmol/L      Potassium 4.3 mmol/L      Chloride 106 mmol/L      CO2 23.0 mmol/L      Calcium 9.9 mg/dL      Total Protein 6.8 g/dL      Albumin 3.90 g/dL      ALT (SGPT) 36 U/L      AST (SGOT) 36 U/L      Alkaline Phosphatase 101 U/L      Total Bilirubin 0.2 mg/dL      Globulin 2.9 gm/dL      A/G Ratio 1.3 g/dL      BUN/Creatinine Ratio 25.7     Anion Gap 10.0 mmol/L      eGFR 53.4 mL/min/1.73      Comment: National Kidney  Foundation and American Society of Nephrology (ASN) Task Force recommended calculation based on the Chronic Kidney Disease Epidemiology Collaboration (CKD-EPI) equation refit without adjustment for race.       Narrative:      GFR Normal >60  Chronic Kidney Disease <60  Kidney Failure <15      CK [[id number]]  (Normal) Collected: 06/30/22 1706    Specimen: Blood Updated: 06/30/22 1801     Creatine Kinase 43 U/L     Magnesium [[id number]]  (Normal) Collected: 06/30/22 1706    Specimen: Blood Updated: 06/30/22 1801     Magnesium 2.0 mg/dL     CBC & Differential [[id number]]  (Abnormal) Collected: 06/30/22 1706    Specimen: Blood Updated: 06/30/22 1739    Narrative:      The following orders were created for panel order CBC & Differential.  Procedure                               Abnormality         Status                     ---------                               -----------         ------                     CBC Auto Differential[[id number]]        Abnormal            Final result                 Please view results for these tests on the individual orders.    CBC Auto Differential [[id number]]  (Abnormal) Collected: 06/30/22 1706    Specimen: Blood Updated: 06/30/22 1739     WBC 5.46 10*3/mm3      RBC 3.60 10*6/mm3      Hemoglobin 11.4 g/dL      Hematocrit 32.6 %      MCV 90.6 fL      MCH 31.7 pg      MCHC 35.0 g/dL      RDW 12.7 %      RDW-SD 41.8 fl      MPV 8.9 fL      Platelets 256 10*3/mm3      Neutrophil % 69.1 %      Lymphocyte % 21.6 %      Monocyte % 7.5 %      Eosinophil % 0.9 %      Basophil % 0.5 %      Immature Grans % 0.4 %      Neutrophils, Absolute 3.77 10*3/mm3      Lymphocytes, Absolute 1.18 10*3/mm3      Monocytes, Absolute 0.41 10*3/mm3      Eosinophils, Absolute 0.05 10*3/mm3      Basophils, Absolute 0.03 10*3/mm3      Immature Grans, Absolute 0.02 10*3/mm3      nRBC 0.0 /100 WBC     POC Glucose Once [[id number]]  (Normal) Collected: 06/30/22 1648    Specimen: Blood Updated: 06/30/22 1703     Glucose  123 mg/dL      Comment: RN NotifiedOperator: [id number] RASHEEDA Mo ID: [id number]           Procedure Component Value Units Date/Time    XR Chest 1 View [[id number]] Twan as Reviewed   Order Status: Completed Collected: 06/30/22 1749    Updated: 06/30/22 1832   Narrative:     INDICATION: Acute Stroke Protocol (Onset < 12 hrs)     EXAMINATION/TECHNIQUE:   X-RAY - XR CHEST 1 VIEW     COMPARISON: Chest x-ray 6/18/2021   ____________________________________________     FINDINGS:       LINES/DEVICES: Pacemaker leads are stable in position.     LUNGS: No consolidation, edema or effusion. No pneumothorax.     MEDIASTINUM AND CARDIOVASCULAR STRUCTURES: Cardiac silhouette not   enlarged. Central airways and mediastinal contour are   unremarkable.       BONES AND SOFT TISSUES: Unremarkable.      Impression:       No radiographic evidence of acute cardiopulmonary disease.        CT Angiogram Head w AI Analysis of LVO [[id number]] Twan as Reviewed   Order Status: Completed Collected: 06/30/22 1655    Updated: 06/30/22 1759   Narrative:     EXAM:   CT HEAD ANGIOGRAPHY WITHOUT THEN WITH IV CONTRAST, CT NECK   ANGIOGRAPHY WITHOUT THEN WITH IV CONTRAST     ORDERING PROVIDER:   PATT GRIMES     CLINICAL HISTORY:    Right facial droop     COMPARISON:       TECHNIQUE:   Nonenhanced CT of the head was performed and reformatted in the   sagittal and coronal planes, followed by high-resolution CT head   after administration of 90 mL of Isovue-370 contrast. 3-D MIP   images were created.     This examination was performed according to our departmental dose   optimization program which includes automated exposure control,   adjustment of the MA and kV according to patient size, and/or use   of iterative reconstruction technique.     FINDINGS:     Normal bilateral petrous carotid arteries.   Mild atherosclerotic change of right cavernous carotid artery   with a normal supraclinoid bifurcation.   Mild atherosclerotic change of  left cavernous carotid artery with   a normal supraclinoid bifurcation.     Normal right A1 segments of the anterior cerebral artery.   Normal left A1 segments of the anterior cerebral artery.   Normal intact anterior communicating artery (ACOM). Normal   bilateral A2 segments of the anterior cerebral arteries.     Normal right M1 and M2 segments of the middle cerebral arteries,   with a normal M1 bifurcation. Normal left M1 and M2 segments of   the middle cerebral arteries, with a normal M1 bifurcation.     Normal right posterior communicating artery (PCOM). Normal left   posterior communicating artery (PCOM).     Normal bilateral vertebral arteries. Normal basilar artery with a   normal basilar bifurcation. The visualized bilateral superior   cerebellar (SCA) arteries are normal.     Normal bilateral P1, P2 and visualized P3 segments of the   posterior cerebral arteries.     There is no demonstrated aneurysm of the [place].      EXAM:   CT HEAD ANGIOGRAPHY WITHOUT THEN WITH IV CONTRAST, CT NECK   ANGIOGRAPHY WITHOUT THEN WITH IV CONTRAST     ORDERING PROVIDER:   PATT GRIMES     CLINICAL HISTORY:       COMPARISON:   None.     TECHNIQUE:     CT of the neck was obtained from the skullbase to the aortic arch   with sagittal and coronal reformats  after administration of 90   ml of Isovue 370 contrast. 3-D MIP images were created.       This examination was performed according to our departmental dose   optimization program which includes automated exposure control,   adjustment of the MA and kV according to patient size, and/or use   of iterative reconstruction technique.     Degree of stenoses in the cervical carotid systems determined   using NASCET criteria.     FINDINGS:     AORTIC ARCH:   Scattered calcified plaque in visualized aortic arch. Normal   origins of the brachiocephalic, left common carotid, and left   subclavian arteries.     RIGHT CAROTID ARTERIES:   Normal right common carotid  artery (CCA). Normal right common   carotid bulb.   Normal origin of the right internal carotid (ICA) artery without   a   hemodynamically significant stenosis. Normal visualized cervical   portion   of the right internal carotid artery.   Normal origin of the right external carotid artery (ECA).     LEFT CAROTID ARTERIES:   Normal left common carotid artery (CCA). Mild calcified plaque in   left common carotid bulb.   Normal origin of the left internal carotid (ICA) artery without a   hemodynamically significant stenosis. Normal visualized cervical   portion of the left internal carotid artery.   Normal origin of the left external carotid artery (ECA).     VERTEBRAL ARTERIES:   Normal bilateral vertebral arteries. The left vertebral artery is   dominant.     PARANASAL SINUSES: Unremarkable.     POSTERIOR FOSSA: Unremarkable.      AND BUCCAL SPACE: Unremarkable.     PARAPHARYNGEAL SPACE: Unremarkable.     PAROTID SPACE: Unremarkable. No mass     MUCOSAL SPACE: Mucosal surfaces of the posterior nasopharynx,   oropharynx, hypopharynx and larynx are unremarkable.     PREVERTEBRAL AND PERIVERTEBRAL SPACE: Unremarkable.     LYMPH NODES: No enlargement and normal architecture.     THYROID: No nodules.       MUSCULOSKELETAL AND SUBCUTANEOUS TISSUES: Unremarkable.     LUNG APICES AND UPPER MEDIASTINUM: Unremarkable.    Impression:     CTA of the brain:   No evidence of hemodynamically significant stenosis of the   visualized intracranial arteries.   No evidence of aneurysm within the [place] of [place].     CTA of the neck:   No evidence of hemodynamically significant stenosis of the   visualized ICAs and bilateral vertebral arteries.   No evidence of dissection within the bilateral carotid or   vertebral arteries.   The left vertebral artery is dominant.     Electronically signed by:  Rashaad Cobb MD  6/30/2022 5:57 PM CDT   Workstation: [id number]    CT Angiogram Neck [[id number]] Twan as Reviewed   Order Status:  Completed Collected: 06/30/22 1652    Updated: 06/30/22 7907   Narrative:     EXAM:   CT HEAD ANGIOGRAPHY WITHOUT THEN WITH IV CONTRAST, CT NECK   ANGIOGRAPHY WITHOUT THEN WITH IV CONTRAST     ORDERING PROVIDER:   PATT GRIMES     CLINICAL HISTORY:    Right facial droop     COMPARISON:       TECHNIQUE:   Nonenhanced CT of the head was performed and reformatted in the   sagittal and coronal planes, followed by high-resolution CT head   after administration of 90 mL of Isovue-370 contrast. 3-D MIP   images were created.     This examination was performed according to our departmental dose   optimization program which includes automated exposure control,   adjustment of the MA and kV according to patient size, and/or use   of iterative reconstruction technique.     FINDINGS:     Normal bilateral petrous carotid arteries.   Mild atherosclerotic change of right cavernous carotid artery   with a normal supraclinoid bifurcation.   Mild atherosclerotic change of left cavernous carotid artery with   a normal supraclinoid bifurcation.     Normal right A1 segments of the anterior cerebral artery.   Normal left A1 segments of the anterior cerebral artery.   Normal intact anterior communicating artery (ACOM). Normal   bilateral A2 segments of the anterior cerebral arteries.     Normal right M1 and M2 segments of the middle cerebral arteries,   with a normal M1 bifurcation. Normal left M1 and M2 segments of   the middle cerebral arteries, with a normal M1 bifurcation.     Normal right posterior communicating artery (PCOM). Normal left   posterior communicating artery (PCOM).     Normal bilateral vertebral arteries. Normal basilar artery with a   normal basilar bifurcation. The visualized bilateral superior   cerebellar (SCA) arteries are normal.     Normal bilateral P1, P2 and visualized P3 segments of the   posterior cerebral arteries.     There is no demonstrated aneurysm of the [place] of [place].      EXAM:   CT HEAD  ANGIOGRAPHY WITHOUT THEN WITH IV CONTRAST, CT NECK   ANGIOGRAPHY WITHOUT THEN WITH IV CONTRAST     ORDERING PROVIDER:   PATT GRIMES     CLINICAL HISTORY:       COMPARISON:   None.     TECHNIQUE:     CT of the neck was obtained from the skullbase to the aortic arch   with sagittal and coronal reformats  after administration of 90   ml of Isovue 370 contrast. 3-D MIP images were created.       This examination was performed according to our departmental dose   optimization program which includes automated exposure control,   adjustment of the MA and kV according to patient size, and/or use   of iterative reconstruction technique.     Degree of stenoses in the cervical carotid systems determined   using NASCET criteria.     FINDINGS:     AORTIC ARCH:   Scattered calcified plaque in visualized aortic arch. Normal   origins of the brachiocephalic, left common carotid, and left   subclavian arteries.     RIGHT CAROTID ARTERIES:   Normal right common carotid artery (CCA). Normal right common   carotid bulb.   Normal origin of the right internal carotid (ICA) artery without   a   hemodynamically significant stenosis. Normal visualized cervical   portion   of the right internal carotid artery.   Normal origin of the right external carotid artery (ECA).     LEFT CAROTID ARTERIES:   Normal left common carotid artery (CCA). Mild calcified plaque in   left common carotid bulb.   Normal origin of the left internal carotid (ICA) artery without a   hemodynamically significant stenosis. Normal visualized cervical   portion of the left internal carotid artery.   Normal origin of the left external carotid artery (ECA).     VERTEBRAL ARTERIES:   Normal bilateral vertebral arteries. The left vertebral artery is   dominant.     PARANASAL SINUSES: Unremarkable.     POSTERIOR FOSSA: Unremarkable.      AND BUCCAL SPACE: Unremarkable.     PARAPHARYNGEAL SPACE: Unremarkable.     PAROTID SPACE: Unremarkable. No mass      MUCOSAL SPACE: Mucosal surfaces of the posterior nasopharynx,   oropharynx, hypopharynx and larynx are unremarkable.     PREVERTEBRAL AND PERIVERTEBRAL SPACE: Unremarkable.     LYMPH NODES: No enlargement and normal architecture.     THYROID: No nodules.       MUSCULOSKELETAL AND SUBCUTANEOUS TISSUES: Unremarkable.     LUNG APICES AND UPPER MEDIASTINUM: Unremarkable.    Impression:     CTA of the brain:   No evidence of hemodynamically significant stenosis of the   visualized intracranial arteries.   No evidence of aneurysm within the [place] of [place].     CTA of the neck:   No evidence of hemodynamically significant stenosis of the   visualized ICAs and bilateral vertebral arteries.   No evidence of dissection within the bilateral carotid or   vertebral arteries.   The left vertebral artery is dominant.     Electronically signed by:  Rashaad Cobb MD  6/30/2022 5:57 PM CDT   Workstation: [id number]    CT Head Without Contrast Stroke Protocol [[id number]] Twan as Reviewed   Order Status: Completed Collected: 06/30/22 1650    Updated: 06/30/22 1714   Narrative:     Noncontrast CT examination of the brain.     INDICATION: Stroke protocol. Right-sided face numbness.   Difficulty speaking.     Technique: Axial 5 mm contiguous images with brain parenchymal   and bone windows     This exam was performed according to our departmental   dose-optimization program, which includes automated exposure   control, adjustment of the mA and/or kV according to patient size   and/or use of iterative reconstruction technique.     Prior relevant examination: None.     Brain parenchyma appears within normal limits. Ventricles are   within normal limits in size. No evidence of abnormal mass or   calcification is seen. No evidence of acute hemorrhage is noted.   Bony structures appear within normal limits and the mastoid air   cells and visualized paranasal sinuses are normally aerated.      Impression:     1. Normal brain for age. There are  no acute changes.     Electronically signed by:  Tank Mclaughlin MD  6/30/2022 5:12 PM CDT   Workstation: [id number]       I have personally reviewed and interpreted the radiology studies and ECG obtained at time of admission.     I have reviewed patient medical records; pertinent information includes: Patient has pacemaker due to history of sinus bradycardia      Assessment / Plan     Assessment:   Active Hospital Problems    Diagnosis    • TIA (transient ischemic attack)    • Paroxysmal atrial fibrillation (HCC)    • Essential hypertension      Plan:  Active Problems:        TIA (transient ischemic attack)    Plan: Assessed by neurology; NIH 0, not a TPA candidate; CTA head/neck negative; continue stroke protocol assessment on floor. ECHO ordered, TSH, B12, Sed rate; Neuro checks. MRI per cardiology clearance      Paroxysmal atrial fibrillation (HCC)    Plan: Eliquis continued per cardiology/neurology    Essential hypertension    Plans: Continue home medications; monitoring       Consults: Neurology & Cardiology    I confirmed that the patient's Advance Care Plan is present, code status is documented, or surrogate decision maker is listed in the patient's medical record.       I have utilized all available immediate resources to obtain, update, or review the patient's current medications.     Estimated length of stay is 1-3 days.     The patient was seen and examined by me on 06/30/22  at 1845.      Electronically signed by Kendal Baker PA-C, 06/30/22, 20:04 CDT.

## 2022-07-01 NOTE — PLAN OF CARE
Goal Outcome Evaluation:  Plan of Care Reviewed With: patient           Outcome Evaluation: Pt screened by SLP this date. Pt reports no further difficulty. Pt with speech WFL. Pt and daughter report speech has returned to baseline and WFL. No evaluation or tx needed at this time. Pt was advised to notify RN if SLP was needed or any change occurred.

## 2022-07-01 NOTE — PROGRESS NOTES
After Visit Summary   8/13/2018    Bhavani Calderon    MRN: [id number]           Patient Information     Date Of Birth          1964        Visit Information        Provider Department      8/13/2018 8:30 AM Manny Martínez MD [facility]        Today's Diagnoses     Compression fracture of T12 vertebra (H)    -  1      Care Instructions    Take Calcium 500-600 mg with vit D twice daily   Food with calcium in it :   Milk, cheese, yogurt,seeds, salmon/sardine, almonds and beans/lentil.            Follow-ups after your visit        Follow-up notes from your care team     Return in about 3 months (around 11/13/2018).      Your next 10 appointments already scheduled     Sep 10, 2018  3:00 PM CDT   Return Visit with Sugar Sanz NP   [facility] ([facility])    [address] [address] [address] [phone]              Who to contact     If you have questions or need follow up information about today's clinic visit or your schedule please contact [facility] directly at [phone].  Normal or non-critical lab and imaging results will be communicated to you by MyChart, letter or phone within 4 business days after the clinic has received the results. If you do not hear from us within 7 days, please contact the clinic through [employer] or phone. If you have a critical or abnormal lab result, we will notify you by phone as soon as possible.  Submit refill requests through [employer] or call your pharmacy and they will forward the refill request to us. Please allow 3 business days for your refill to be completed.          Additional Information About Your Visit        MyChart Information     [employer] gives you secure access to your electronic health record. If you see a primary care provider, you can also send messages to your care team and make appointments. If you have questions, please call your      [facility] Medicine Services  INPATIENT PROGRESS NOTE    Length of Stay: 0  Date of Admission: 6/30/2022  Primary Care Physician: Rich Urbina APRN    Subjective   Chief Complaint: Generalized weakness  HPI:    Has been feeling better this morning.  Still having some UTI symptoms but feeling better now    Review of Systems   Respiratory: Negative for shortness of breath.    Cardiovascular: Negative for chest pain.        All pertinent negatives and positives are as above. All other systems have been reviewed and are negative unless otherwise stated.     Objective    Temp:  [97.8 °F (36.6 °C)-98 °F (36.7 °C)] 98 °F (36.7 °C)  Heart Rate:  [59-72] 60  Resp:  [16] 16  BP: ()/(53-73) 139/68  Physical Exam  Vitals and nursing note reviewed.   Constitutional:       General: She is not in acute distress.     Appearance: She is well-developed. She is not diaphoretic.   HENT:      Head: Normocephalic and atraumatic.   Cardiovascular:      Rate and Rhythm: Normal rate.   Pulmonary:      Effort: Pulmonary effort is normal. No respiratory distress.      Breath sounds: No wheezing.   Abdominal:      General: There is no distension.      Palpations: Abdomen is soft.   Musculoskeletal:         General: Normal range of motion.   Skin:     General: Skin is warm and dry.   Neurological:      Mental Status: She is alert.      Cranial Nerves: No cranial nerve deficit.   Psychiatric:         Behavior: Behavior normal.         Thought Content: Thought content normal.         Judgment: Judgment normal.             Results Review:  I have reviewed the labs, radiology results, and diagnostic studies.    Laboratory Data:   Lab Results (last 24 hours)     Procedure Component Value Units Date/Time    Urine Culture - Urine, Urine, Clean Catch [[id number]]  (Normal) Collected: 06/30/22 1741    Specimen: Urine, Clean Catch Updated: 07/01/22 1124     Urine Culture No growth    Sedimentation Rate  "primary care clinic.  If you do not have a primary care provider, please call [phone] and they will assist you.        Care EveryWhere ID     This is your Care EveryWhere ID. This could be used by other organizations to access your [facility] medical records  [id number]        Your Vitals Were     Pulse Temperature Respirations Height Last Period Pulse Oximetry    50 98  F (36.7  C) (Oral) 16 4' 9.5\" (1.461 m) 03/31/2006 96%    BMI (Body Mass Index)                   24.67 kg/m2            Blood Pressure from Last 3 Encounters:   08/13/18 129/72   08/06/18 107/62   07/20/18 122/72    Weight from Last 3 Encounters:   08/13/18 116 lb (52.6 kg)   08/06/18 114 lb 6.4 oz (51.9 kg)   07/20/18 115 lb (52.2 kg)              Today, you had the following     No orders found for display       Primary Care Provider Office Phone # Fax #    Manny Martínez [phone] [address] [facility] [id number]        Equal Access to Services     [facility]: Hadii fransisco murphy hadgiselleo Soelizabeth, waaxda luqadaha, qaybta kaalmada tanvi, emeterio woodson . So [facility] [phone].    ATENCIÓN: Si habla español, tiene a mitchell disposición servicios gratuitos de asistencia lingüística. Llame al [phone].    We comply with applicable federal civil rights laws and [place] laws. We do not discriminate on the basis of race, color, national origin, age, disability, sex, sexual orientation, or gender identity.            Thank you!     Thank you for choosing [facility]  for your care. Our goal is always to provide you with excellent care. Hearing back from our patients is one way we can continue to improve our services. Please take a few minutes to complete the written survey that you may receive in the mail after your visit with us. Thank you!             Your Updated Medication List - Protect others around you: Learn how to safely use, store and throw away your medicines at " [[id number]]  (Normal) Collected: 07/01/22 0517    Specimen: Blood Updated: 07/01/22 0701     Sed Rate 19 mm/hr     Lipid Panel [[id number]]  (Abnormal) Collected: 07/01/22 0517    Specimen: Blood Updated: 07/01/22 0605     Total Cholesterol 198 mg/dL      Triglycerides 63 mg/dL      HDL Cholesterol 78 mg/dL      LDL Cholesterol  108 mg/dL      VLDL Cholesterol 12 mg/dL      LDL/HDL Ratio 1.38    Narrative:      Cholesterol Reference Ranges  (U.S. Department of Health and Human Services ATP III Classifications)    Desirable          <200 mg/dL  Borderline High    200-239 mg/dL  High Risk          >240 mg/dL      Triglyceride Reference Ranges  (U.S. Department of Health and Human Services ATP III Classifications)    Normal           <150 mg/dL  Borderline High  150-199 mg/dL  High             200-499 mg/dL  Very High        >500 mg/dL    HDL Reference Ranges  (U.S. Department of Health and Human Services ATP III Classifications)    Low     <40 mg/dl (major risk factor for CHD)  High    >60 mg/dl ('negative' risk factor for CHD)        LDL Reference Ranges  (U.S. Department of Health and Human Services ATP III Classifications)    Optimal          <100 mg/dL  Near Optimal     100-129 mg/dL  Borderline High  130-159 mg/dL  High             160-189 mg/dL  Very High        >189 mg/dL    Hemoglobin A1c [[id number]]  (Normal) Collected: 07/01/22 0517    Specimen: Blood Updated: 07/01/22 0603     Hemoglobin A1C 5.20 %     Narrative:      Hemoglobin A1C Ranges:    Increased Risk for Diabetes  5.7% to 6.4%  Diabetes                     >= 6.5%  Diabetic Goal                < 7.0%    CBC (No Diff) [[id number]]  (Abnormal) Collected: 07/01/22 0517    Specimen: Blood Updated: 07/01/22 0546     WBC 4.04 10*3/mm3      RBC 3.32 10*6/mm3      Hemoglobin 10.4 g/dL      Hematocrit 30.2 %      MCV 91.0 fL      MCH 31.3 pg      MCHC 34.4 g/dL      RDW 12.9 %      RDW-SD 42.6 fl      MPV 8.8 fL      Platelets 232 10*3/mm3     Vitamin B12  [[id number]]  (Normal) Collected: 06/30/22 1706    Specimen: Blood Updated: 07/01/22 0107     Vitamin B-12 443 pg/mL     Narrative:      Results may be falsely increased if patient taking Biotin.      COVID-19 and FLU A/B PCR - Swab, Nasopharynx [[id number]]  (Normal) Collected: 06/30/22 1852    Specimen: Swab from Nasopharynx Updated: 06/30/22 1920     COVID19 Not Detected     Influenza A PCR Not Detected     Influenza B PCR Not Detected    Narrative:      Fact sheet for providers: https://www.fda.gov/media/895286/download    Fact sheet for patients: https://www.fda.gov/media/741880/download    Test performed by PCR.    [place] Draw [[id number]] Collected: 06/30/22 1706    Specimen: Blood Updated: 06/30/22 1817    Narrative:      The following orders were created for panel order [place] Draw.  Procedure                               Abnormality         Status                     ---------                               -----------         ------                     Green Top (Gel)[[id number]]                                  Final result               Lavender Top[[id number]]                                     Final result               Gold Top - SST[[id number]]                                   Final result               Light Blue Top[[id number]]                                   Final result                 Please view results for these tests on the individual orders.    Green Top (Gel) [[id number]] Collected: 06/30/22 1706    Specimen: Blood Updated: 06/30/22 1817     Extra Tube Hold for add-ons.     Comment: Auto resulted.       Lavender Top [[id number]] Collected: 06/30/22 1706    Specimen: Blood Updated: 06/30/22 1817     Extra Tube hold for add-on     Comment: Auto resulted       Gold Top - SST [[id number]] Collected: 06/30/22 1706    Specimen: Blood Updated: 06/30/22 1817     Extra Tube Hold for add-ons.     Comment: Auto resulted.       Light Blue Top [[id number]] Collected: 06/30/22 1706    Specimen: Blood  www.[email or web address].org.          This list is accurate as of 8/13/18  8:55 AM.  Always use your most recent med list.                   Brand Name Dispense Instructions for use Diagnosis    calcitonin (salmon) 200 UNIT/ACT nasal spray    MIACALCIN    1 Bottle    Spray 1 spray into one nostril alternating nostrils daily Alternate nostril each day.    Closed compression fracture of first lumbar vertebra, initial encounter (H), Compression fracture of T12 vertebra (H)       fluticasone 50 MCG/ACT spray    FLONASE    1 Bottle    Spray 2 sprays into both nostrils daily    Chronic seasonal allergic rhinitis, unspecified trigger       levothyroxine 100 MCG tablet    SYNTHROID/LEVOTHROID    90 tablet    Take 1 tablet (100 mcg) by mouth daily    Hypothyroidism due to Hashimoto's thyroiditis       traMADol 50 MG tablet    ULTRAM    40 tablet    Take 1 tablet (50 mg) by mouth every 6 hours as needed for severe pain    Other closed fracture of first lumbar vertebra, initial encounter (H)          Updated: 06/30/22 1817     Extra Tube Hold for add-ons.     Comment: Auto resulted       Urinalysis, Microscopic Only - Urine, Clean Catch [[id number]]  (Abnormal) Collected: 06/30/22 1741    Specimen: Urine, Clean Catch Updated: 06/30/22 1809     RBC, UA 0-2 /HPF      WBC, UA 13-20 /HPF      Bacteria, UA None Seen /HPF      Squamous Epithelial Cells, UA 0-2 /HPF      Hyaline Casts, UA None Seen /LPF      Methodology Automated Microscopy    Urinalysis With Culture If Indicated - Urine, Clean Catch [[id number]]  (Abnormal) Collected: 06/30/22 1741    Specimen: Urine, Clean Catch Updated: 06/30/22 1809     Color, UA Yellow     Appearance, UA Clear     pH, UA <=5.0     Specific Gravity, UA 1.040     Comment: Result obtained by Refractometer        Glucose, UA Negative     Ketones, UA Negative     Bilirubin, UA Negative     Blood, UA Negative     Protein, UA Negative     Leuk Esterase, UA Moderate (2+)     Nitrite, UA Negative     Urobilinogen, UA 0.2 E.U./dL    Narrative:      In absence of clinical symptoms, the presence of pyuria, bacteria, and/or nitrites on the urinalysis result does not correlate with infection.    BNP [[id number]]  (Normal) Collected: 06/30/22 1706    Specimen: Blood Updated: 06/30/22 1806     proBNP 198.7 pg/mL     Narrative:      Among patients with dyspnea, NT-proBNP is highly sensitive for the detection of acute congestive heart failure. In addition NT-proBNP of <300 pg/ml effectively rules out acute congestive heart failure with 99% negative predictive value.    Results may be falsely decreased if patient taking Biotin.      Troponin [[id number]]  (Normal) Collected: 06/30/22 1706    Specimen: Blood Updated: 06/30/22 1806     Troponin T <0.010 ng/mL     Narrative:      Troponin T Reference Range:  <= 0.03 ng/mL-   Negative for AMI  >0.03 ng/mL-     Abnormal for myocardial necrosis.  Clinicians would have to utilize clinical acumen, EKG, Troponin and serial changes to determine if it is an Acute  Myocardial Infarction or myocardial injury due to an underlying chronic condition.       Results may be falsely decreased if patient taking Biotin.      TSH [[id number]]  (Normal) Collected: 06/30/22 1706    Specimen: Blood Updated: 06/30/22 1806     TSH 2.410 uIU/mL     Protime-INR [[id number]]  (Normal) Collected: 06/30/22 1706    Specimen: Blood Updated: 06/30/22 1805     Protime 14.1 Seconds      INR 1.11    Narrative:      Therapeutic range for most indications is 2.0-3.0 INR,  or 2.5-3.5 for mechanical heart valves.    aPTT [[id number]]  (Normal) Collected: 06/30/22 1706    Specimen: Blood Updated: 06/30/22 1805     PTT 33.4 seconds     Narrative:      The recommended Heparin therapeutic range is 68-97 seconds.    Comprehensive Metabolic Panel [[id number]]  (Abnormal) Collected: 06/30/22 1706    Specimen: Blood Updated: 06/30/22 1801     Glucose 126 mg/dL      BUN 28 mg/dL      Creatinine 1.09 mg/dL      Sodium 139 mmol/L      Potassium 4.3 mmol/L      Chloride 106 mmol/L      CO2 23.0 mmol/L      Calcium 9.9 mg/dL      Total Protein 6.8 g/dL      Albumin 3.90 g/dL      ALT (SGPT) 36 U/L      AST (SGOT) 36 U/L      Alkaline Phosphatase 101 U/L      Total Bilirubin 0.2 mg/dL      Globulin 2.9 gm/dL      A/G Ratio 1.3 g/dL      BUN/Creatinine Ratio 25.7     Anion Gap 10.0 mmol/L      eGFR 53.4 mL/min/1.73      Comment: National Kidney Foundation and American Society of Nephrology (ASN) Task Force recommended calculation based on the Chronic Kidney Disease Epidemiology Collaboration (CKD-EPI) equation refit without adjustment for race.       Narrative:      GFR Normal >60  Chronic Kidney Disease <60  Kidney Failure <15      CK [[id number]]  (Normal) Collected: 06/30/22 1706    Specimen: Blood Updated: 06/30/22 1801     Creatine Kinase 43 U/L     Magnesium [[id number]]  (Normal) Collected: 06/30/22 1706    Specimen: Blood Updated: 06/30/22 1801     Magnesium 2.0 mg/dL     CBC & Differential [[id number]]  (Abnormal)  Collected: 06/30/22 1706    Specimen: Blood Updated: 06/30/22 1739    Narrative:      The following orders were created for panel order CBC & Differential.  Procedure                               Abnormality         Status                     ---------                               -----------         ------                     CBC Auto Differential[[id number]]        Abnormal            Final result                 Please view results for these tests on the individual orders.    CBC Auto Differential [[id number]]  (Abnormal) Collected: 06/30/22 1706    Specimen: Blood Updated: 06/30/22 1739     WBC 5.46 10*3/mm3      RBC 3.60 10*6/mm3      Hemoglobin 11.4 g/dL      Hematocrit 32.6 %      MCV 90.6 fL      MCH 31.7 pg      MCHC 35.0 g/dL      RDW 12.7 %      RDW-SD 41.8 fl      MPV 8.9 fL      Platelets 256 10*3/mm3      Neutrophil % 69.1 %      Lymphocyte % 21.6 %      Monocyte % 7.5 %      Eosinophil % 0.9 %      Basophil % 0.5 %      Immature Grans % 0.4 %      Neutrophils, Absolute 3.77 10*3/mm3      Lymphocytes, Absolute 1.18 10*3/mm3      Monocytes, Absolute 0.41 10*3/mm3      Eosinophils, Absolute 0.05 10*3/mm3      Basophils, Absolute 0.03 10*3/mm3      Immature Grans, Absolute 0.02 10*3/mm3      nRBC 0.0 /100 WBC     POC Glucose Once [[id number]]  (Normal) Collected: 06/30/22 1648    Specimen: Blood Updated: 06/30/22 1703     Glucose 123 mg/dL      Comment: RN NotifiedOperator: [id number] RASHEEDA Mo ID: [id number]             Culture Data:   No results found for: BLOODCX  Urine Culture   Date Value Ref Range Status   06/30/2022 No growth  Preliminary     No results found for: RESPCX  No results found for: WOUNDCX  No results found for: STOOLCX  No components found for: BODYFLD    Radiology Data:       I have reviewed the patient's current medications.     Assessment/Plan     Active Hospital Problems    Diagnosis    • TIA (transient ischemic attack)    • Paroxysmal atrial fibrillation (HCC)    •  Essential hypertension        Right facial droop- likely TIA-we will continue to monitor.  Symptoms have resolved.  Continues on Eliquis and Lipitor    UTI-continue with Rocephin    Sick sinus syndrome- status post pacemaker.  We will continue to monitor.  Cardiology managing    Atrial fibrillation-continue with anticoagulation and we will continue monitoring heart rate    Hypertension-continue monitor blood pressure    DVT prophylaxis-SCD    Patient full code    I confirmed that the patient's Advance Care Plan is present, code status is documented, or surrogate decision maker is listed in the patient's medical record.         Adonay Chandra MD   07/01/22   13:46 CDT

## 2022-07-02 LAB
QT INTERVAL: 332 MS
QTC INTERVAL: 342 MS

## 2022-07-02 NOTE — OUTREACH NOTE
Prep Survey    Flowsheet Row Responses   [facility] patient discharged from? [place]   Is LACE score < 7 ? Yes   Emergency Room discharge w/ pulse ox? No   Eligibility [facility]   Date of Admission 06/30/22   Date of Discharge 07/01/22   Discharge Disposition Home or Self Care   Discharge diagnosis TIA, paroxysmal A-fib, HTN   Does the patient have one of the following disease processes/diagnoses(primary or secondary)? Stroke (TIA)   Does the patient have Home health ordered? No   Is there a DME ordered? No   Prep survey completed? Yes          MILY PERALTA - Registered Nurse

## 2022-07-04 ENCOUNTER — TRANSITIONAL CARE MANAGEMENT TELEPHONE ENCOUNTER (OUTPATIENT)
Dept: CALL CENTER | Facility: HOSPITAL | Age: 77
End: 2022-07-04

## 2022-07-04 NOTE — OUTREACH NOTE
Call Center TCM Note    Flowsheet Row Responses   [facility] patient discharged from? [place]   Does the patient have one of the following disease processes/diagnoses(primary or secondary)? Stroke (TIA)   TCM attempt successful? Yes   Call start time 1158   Call end time 1206   Discharge diagnosis TIA, paroxysmal A-fib, HTN   Meds reviewed with patient/caregiver? Yes   Is the patient having any side effects they believe may be caused by any medication additions or changes? No   Does the patient have all medications ordered at discharge? Yes   Is the patient taking all medications as directed (includes completed medication regime)? Yes   Does the patient have a primary care provider?  Yes   Does the patient have an appointment with their PCP within 7 days of discharge? Greater than 7 days   Comments regarding PCP Hospital d/c f/u appt on 7/12/22 @3:30pm   What is preventing the patient from scheduling follow up appointments within 7 days of discharge? Earlier appointment not available   Nursing Interventions Verified appointment date/time/provider   Has the patient kept scheduled appointments due by today? N/A   Has home health visited the patient within 72 hours of discharge? N/A   Psychosocial issues? No   Did the patient receive a copy of their discharge instructions? Yes   Nursing interventions Reviewed instructions with patient   What is the patient's perception of their health status since discharge? Improving   Nursing interventions Nurse provided patient education   Is the patient able to teach back FAST for Stroke? Yes   Is the patient/caregiver able to teach back the risk factors for a stroke? Physical inactivity and obesity, History of TIAs, High blood pressure-goal below 120/80   If the patient is a current smoker, are they able to teach back resources for cessation? Not a smoker   Is the patient/caregiver able to teach back the hierarchy of who to call/visit for symptoms/problems? PCP,  Specialist, Home health nurse, Urgent Care, ED, 911 Yes   TCM call completed? Yes          SOCO RAMÍREZ RN    7/4/2022, 12:07 EDT

## 2022-07-12 ENCOUNTER — OFFICE VISIT (OUTPATIENT)
Dept: FAMILY MEDICINE CLINIC | Facility: CLINIC | Age: 77
End: 2022-07-12

## 2022-07-12 VITALS
HEIGHT: 61 IN | SYSTOLIC BLOOD PRESSURE: 96 MMHG | HEART RATE: 72 BPM | DIASTOLIC BLOOD PRESSURE: 50 MMHG | OXYGEN SATURATION: 98 % | WEIGHT: 194.9 LBS | RESPIRATION RATE: 18 BRPM | TEMPERATURE: 96.9 F | BODY MASS INDEX: 36.8 KG/M2

## 2022-07-12 DIAGNOSIS — Z09 HOSPITAL DISCHARGE FOLLOW-UP: Primary | ICD-10-CM

## 2022-07-12 DIAGNOSIS — N39.0 ACUTE UTI: ICD-10-CM

## 2022-07-12 DIAGNOSIS — G45.9 TIA (TRANSIENT ISCHEMIC ATTACK): ICD-10-CM

## 2022-07-12 PROBLEM — C18.9 MALIGNANT NEOPLASM OF COLON (HCC): Status: ACTIVE | Noted: 2021-07-22

## 2022-07-12 LAB
BILIRUB BLD-MCNC: ABNORMAL MG/DL
CLARITY, POC: CLEAR
COLOR UR: ABNORMAL
EXPIRATION DATE: ABNORMAL
GLUCOSE UR STRIP-MCNC: NEGATIVE MG/DL
KETONES UR QL: NEGATIVE
LEUKOCYTE EST, POC: NEGATIVE
Lab: ABNORMAL
NITRITE UR-MCNC: NEGATIVE MG/ML
PH UR: 6 [PH] (ref 5–8)
PROT UR STRIP-MCNC: NEGATIVE MG/DL
RBC # UR STRIP: NEGATIVE /UL
SP GR UR: 1.02 (ref 1–1.03)
UROBILINOGEN UR QL: NORMAL

## 2022-07-12 PROCEDURE — 81003 URINALYSIS AUTO W/O SCOPE: CPT | Performed by: NURSE PRACTITIONER

## 2022-07-12 PROCEDURE — 99495 TRANSJ CARE MGMT MOD F2F 14D: CPT | Performed by: NURSE PRACTITIONER

## 2022-07-12 NOTE — PROGRESS NOTES
Transitional Care Follow Up Visit  Subjective     Veronica Wilkinson is a 76 y.o. female who presents for a transitional care management visit.    Within 48 business hours after discharge our office contacted her via telephone to coordinate her care and needs.      I reviewed and discussed the details of that call along with the discharge summary, hospital problems, inpatient lab results, inpatient diagnostic studies, and consultation reports with Veronica.     Current outpatient and discharge medications have been reconciled for the patient.  Reviewed by: TABBY Mejia      Date of TCM Phone Call 7/1/2022   [facility]   Date of Admission 6/30/2022   Date of Discharge 7/1/2022   Discharge Disposition Home or Self Care     Risk for Readmission (LACE) Score: 4 (7/1/2022  5:01 AM)      History of Present Illness   Course During Hospital Stay:   Ms. Wilkinson is a 76-year-old female who presents today for hospital follow-up related to TIA and A. fib.  She presented to the emergency room with sudden onset dizziness and right-sided facial droop and inability to talk.  She first noticed the symptoms at home where she started to feel dizzy, facial tingling and mouth droop.  She reports the symptoms lasted approximately 10 minutes.  She was admitted via the ER for additional stroke work-up.  Neurology was consulted and MRI brain obtained which showed no acute changes per radiology report.  Patient's symptoms were felt to be related to CVA/TIA and patient was started on Eliquis and Lipitor.  While hospitalized she was also diagnosed and treated for UTI.  She was discharged in stable condition and instructed to follow-up with PCP, neurology continue oral antibiotics to completion.           The following portions of the patient's history were reviewed and updated as appropriate: allergies, current medications, past family history, past medical history, past social history, past surgical history and problem  list.    Review of Systems   Constitutional: Positive for fatigue (improving). Negative for activity change, appetite change, chills, diaphoresis, fever and unexpected weight change.   HENT: Negative.    Eyes: Negative.    Respiratory: Negative.  Negative for apnea, cough, choking, chest tightness, shortness of breath, wheezing and stridor.    Cardiovascular: Negative.  Negative for chest pain, palpitations and leg swelling.   Gastrointestinal: Negative.    Endocrine: Negative.    Genitourinary: Negative.    Musculoskeletal: Negative.    Skin: Negative.    Neurological: Positive for dizziness (improving, nearly resolved) and weakness (improving). Negative for tremors, seizures, syncope, facial asymmetry, speech difficulty, light-headedness, numbness and headaches.   Psychiatric/Behavioral: Negative.        Objective   Physical Exam  Vitals and nursing note reviewed.   Constitutional:       General: She is not in acute distress.     Appearance: Normal appearance. She is well-developed. She is obese. She is not ill-appearing, toxic-appearing or diaphoretic.   HENT:      Head: Normocephalic and atraumatic.      Right Ear: External ear normal.      Left Ear: External ear normal.      Nose: Nose normal.   Eyes:      Extraocular Movements: Extraocular movements intact.      Conjunctiva/sclera: Conjunctivae normal.      Pupils: Pupils are equal, round, and reactive to light.   Neck:      Thyroid: No thyromegaly.      Trachea: No tracheal deviation.   Cardiovascular:      Rate and Rhythm: Normal rate and regular rhythm.      Heart sounds: Normal heart sounds. No murmur heard.    No friction rub. No gallop.   Pulmonary:      Effort: Pulmonary effort is normal. No respiratory distress.      Breath sounds: Normal breath sounds. No stridor. No wheezing or rales.   Abdominal:      General: Bowel sounds are normal. There is no distension.      Palpations: Abdomen is soft. There is no mass.      Tenderness: There is no abdominal  tenderness. There is no guarding or rebound.      Hernia: No hernia is present.   Musculoskeletal:         General: No tenderness. Normal range of motion.      Cervical back: Normal range of motion and neck supple.   Lymphadenopathy:      Cervical: No cervical adenopathy.   Skin:     General: Skin is warm and dry.      Coloration: Skin is not pale.      Findings: No erythema or rash.   Neurological:      Mental Status: She is alert and oriented to person, place, and time. Mental status is at baseline.      Cranial Nerves: No cranial nerve deficit.      Motor: No weakness.      Coordination: Coordination normal.      Gait: Gait normal.   Psychiatric:         Behavior: Behavior normal.         Thought Content: Thought content normal.         Judgment: Judgment normal.         Assessment & Plan      Diagnoses and all orders for this visit:    1. Hospital discharge follow-up (Primary)    2. TIA (transient ischemic attack)   - TIA symptoms have resolved with no residual noted.  Patient does report some fatigue and weakness that is improving with time.  Tolerating Lipitor and Eliquis well.  Continue both medications as prescribed along with blood pressure medications.  Follow-up with neurology as scheduled.  Return to the ER for any new TIA/strokelike symptoms.  Patient verbalized understanding of instruction.    3. Acute UTI  -     POCT urinalysis dipstick, automated, no signs of infection on UA.  Patient instructed to follow-up should symptoms return.    4.  Follow-up in 3 months or sooner for any acute needs.            This document has been electronically signed by TABBY Mejia on July 12, 2022 17:04 CDT    Current outpatient and discharge medications have been reconciled for the patient.  Reviewed by: TABBY Mejia

## 2022-07-23 LAB
QT INTERVAL: 356 MS
QTC INTERVAL: 384 MS

## 2022-07-26 ENCOUNTER — READMISSION MANAGEMENT (OUTPATIENT)
Dept: CALL CENTER | Facility: HOSPITAL | Age: 77
End: 2022-07-26

## 2022-07-26 NOTE — OUTREACH NOTE
Stroke Week 4 Survey    Flowsheet Row Responses   [facility] patient discharged from? [place]   Does the patient have one of the following disease processes/diagnoses(primary or secondary)? Stroke (TIA)   Week 4 attempt successful? Yes   Call start time [id number]   Call end time 0950   Discharge diagnosis TIA, paroxysmal A-fib, HTN   Week 4 Call Completed? Yes   Would the patient like one additional call? No   Graduated Yes   Wrap up additional comments Patient asked what we were checking up on her for-stated she was fine and then hung up the phone          PEGGY HARTLEY - Registered Nurse

## 2022-07-28 ENCOUNTER — OFFICE VISIT (OUTPATIENT)
Dept: GASTROENTEROLOGY | Facility: CLINIC | Age: 77
End: 2022-07-28

## 2022-07-28 VITALS
DIASTOLIC BLOOD PRESSURE: 74 MMHG | WEIGHT: 195 LBS | HEART RATE: 60 BPM | SYSTOLIC BLOOD PRESSURE: 115 MMHG | BODY MASS INDEX: 38.28 KG/M2 | HEIGHT: 60 IN

## 2022-07-28 DIAGNOSIS — Z85.038 HISTORY OF COLON CANCER: Primary | ICD-10-CM

## 2022-07-28 PROCEDURE — S0260 H&P FOR SURGERY: HCPCS | Performed by: INTERNAL MEDICINE

## 2022-07-28 RX ORDER — SODIUM, POTASSIUM,MAG SULFATES 17.5-3.13G
1 SOLUTION, RECONSTITUTED, ORAL ORAL EVERY 12 HOURS
Qty: 1 ML | Refills: 0 | Status: ON HOLD | OUTPATIENT
Start: 2022-07-28 | End: 2022-09-12

## 2022-07-28 RX ORDER — CHOLECALCIFEROL (VITAMIN D3) 125 MCG
CAPSULE ORAL
COMMUNITY
End: 2022-08-19

## 2022-07-28 RX ORDER — DEXTROSE AND SODIUM CHLORIDE 5; .45 G/100ML; G/100ML
30 INJECTION, SOLUTION INTRAVENOUS CONTINUOUS PRN
Status: CANCELLED | OUTPATIENT
Start: 2022-08-23

## 2022-07-28 NOTE — PROGRESS NOTES
[facility]      Chief Complaint:   Chief Complaint   Patient presents with   • 1 Year Clinical Appointment     Malignant Neoplasm Of Ascending Colon    Gastroesophageal Reflux Disease Without Esophagitis    Chronic gastritis Without Bleeding             Subjective     HPI:   Patient with history of adenocarcinoma of the colon patient a large adenocarcinoma in the ascending colon found last year patient had this resected in [place].  Patient was told she did not need chemotherapy but does need close follow-up and will need yearly colonoscopies.    Plan; we will schedule patient for colonoscopy to evaluate for any recurrence of this colon cancer.    Past Medical History:   Past Medical History:   Diagnosis Date   • A-fib (HCC)    • Arthritis    • Asthma    • Bleeding tendency (HCC)    • Chest pain     History of noncardiac chest pain   • Chronic depression    • Colon cancer (HCC)    • COPD (chronic obstructive pulmonary disease) (HCC)    • Depressive disorder    • Diastolic dysfunction    • Dyspnea    • Dyspnea on exertion    • Edema    • Essential hypertension    • Exercise intolerance    • Fatigue    • Gallstones    • GERD (gastroesophageal reflux disease)    • History of blood clots     In Lungs   • History of bone density study 2011    DEXA BONE DENSITY APPENDICULAR 60348 (1) - normal   • History of bone density study 06/24/2015    DXA BONE DENSITY AXIAL 58266 [facility] (1) - Ordered By: TOLU RAMIREZ ([facility])    • History of echocardiogram     Echocardiogram W/ color flow 80985 (2)   • History of mammogram 2013    Mammogram screen (1)   • History of mammogram 2015    MAMMOGRAM SCREENING 07315 - [facility] (1)   • History of screening mammography 06/25/2015    SCREENING MAMMOGRAPHY DIGITAL  (Medicare) (1) - Ordered By: ANDRADE BECERRIL ([facility])    • Hypercalcemia    • Hyperlipidemia    • Influenza vaccine administered 02/25/2016    INFLUENZA IMMUN ADMIN OR PREV RECV'D  (2)  "- Ordered By: ANDRADE BECERRIL ([facility])    • Long-term (current) use of anticoagulants     coumadin therapy      • Lower extremity edema     Intermittent lower extremity edema   • Obesity, Class III, BMI 40-49.9 (morbid obesity) (HCC)     \"Class III obesity\"   • Osteoporosis    • PE (pulmonary embolism) 10/2015    Bilateral PE diagnosed after a car ride to [place]   • Pneumococcal vaccination given 02/25/2016    PNEUMOC VAC/ADMIN/RCVD 4040F (2) - Ordered By: ANDRADE BECERRIL ([facility])    • Right basilic vein fibrosis 2015   • Sedentary lifestyle    • Shortness of breath    • Skin cancer    • Stroke (HCC)    • TIA (transient ischemic attack) 06/30/2022   • Urinary tract infection    • V-tach (HCC)    • Venous thrombosis 2015    right basilic venous thrombosis; This was noted in May 2015.   • Weight gain        Past Surgical History:  Past Surgical History:   Procedure Laterality Date   • CARDIAC ELECTROPHYSIOLOGY PROCEDURE N/A 12/10/2020    Procedure: Pacemaker DC new;  Surgeon: Vesta Bucio MD;  Location: [facility] CATH INVASIVE LOCATION;  Service: Cardiology;  Laterality: N/A;  [place] [facility] per pratima.....juanita from [facility] aware   • CHOLECYSTECTOMY      Cholecystectomy (1)   • COLON SURGERY     • COLONOSCOPY      Approximately 5 years prior as stated per patient   • COLONOSCOPY N/A 06/16/2021    Procedure: COLONOSCOPY;  Surgeon: Rasheed Gibbons MD;  Location: [facility] ENDOSCOPY;  Service: Gastroenterology;  Laterality: N/A;  tattoo at ascneding colon mass   • CRYOABLATION     • ENDOSCOPY  09/15/2011    Colon endoscopy 17737 (2) - Hemorrhoids found.   • ENDOSCOPY N/A 06/16/2021    Procedure: ESOPHAGOGASTRODUODENOSCOPY;  Surgeon: Rasheed Gibbons MD;  Location: [facility] ENDOSCOPY;  Service: Gastroenterology;  Laterality: N/A;   • GASTRIC SLEEVE LAPAROSCOPIC     • GASTRIC SLEEVE LAPAROSCOPIC     • HEMORRHOIDECTOMY     • HYSTEROSCOPY      Hysteroscopy; ablation (1)   • INJECTION OF MEDICATION  09/27/2012    Kenalog (1) - " Ordered By: ANDRADE BECERRIL ([facility])    • PACEMAKER IMPLANTATION         Family History:  Family History   Problem Relation Age of Onset   • Ovarian cancer Mother    • Bleeding Disorder Father    • Other Father         Hematologic disorder   • Lung cancer Father    • Pancreatic cancer Sister    • Cancer Brother    • Lung cancer Brother    • Bleeding Disorder Other    • Hyperlipidemia Other    • Hypertension Other    • Osteoarthritis Other    • Other Other         Gastritis   • Adrenal disorder Daughter    • Seizures Daughter    • Thyroid disease Daughter    • No Known Problems Daughter    • Diabetes Other    • Heart disease Other    • Other Other         Ulcers; Bleeding Tendencies; Allergy   • Cancer Other    • Cholelithiasis Other    • Hypertension Other    • Allergy (severe) Other        Social History:   reports that she has never smoked. She has never used smokeless tobacco. She reports that she does not drink alcohol and does not use drugs.    Medications:   Prior to Admission medications    Medication Sig Start Date End Date Taking? Authorizing Provider   apixaban (ELIQUIS) 5 MG tablet tablet Take 1 tablet by mouth Every 12 (Twelve) Hours. Indications: Atrial Fibrillation 7/1/22  Yes Adonay Chandra MD   atorvastatin (LIPITOR) 80 MG tablet Take 1 tablet by mouth Every Night. 7/1/22  Yes Adonay Chandra MD   CALCIUM MAGNESIUM ZINC PO Take 1 tablet by mouth Daily.   Yes Annemarie Vega MD   cholecalciferol (VITAMIN D3) 25 MCG (1000 UT) tablet Take 1,000 Units by mouth Daily.   Yes Annemarie Vega MD   diphenhydrAMINE-APAP, sleep, (ACETAMINOPHEN PM PO) Take 2 tablets by mouth Every Night.   Yes Annemarie Vega MD   LORATADINE ALLERGY RELIEF PO Take 10 mg by mouth Every Other Day.   Yes Annemarie Vega MD   melatonin 5 MG tablet tablet 1 tablet by mouth Monday, Wednesday, Friday, and Sunday and 2 tablets by mouth Tuesday, Thursday, and Saturday.   Yes Annemarie Vega MD  "  olmesartan-hydrochlorothiazide (BENICAR HCT) 20-12.5 MG per tablet TAKE 1 TABLET DAILY 11/29/21  Yes Rich Urbina APRN   pantoprazole (PROTONIX) 40 MG EC tablet Take 1 tablet by mouth Daily. 11/29/21  Yes Annemarie Vega MD   simethicone (MYLICON,GAS-X) 125 MG capsule capsule Take 1 capsule by mouth Every Night.   Yes Annemarie Vega MD   traZODone (DESYREL) 100 MG tablet TAKE 1 TABLET EVERY NIGHT 5/9/22  Yes Rich Urbina APRN   pyridoxine (VITAMIN B-6) 100 MG tablet Take 100 mg by mouth Daily.    ProviderAnnemarie MD   sodium-potassium-magnesium sulfates (SUPREP) 17.5-3.13-1.6 GM/177ML solution oral solution Take 1 bottle by mouth Every 12 (Twelve) Hours. 7/28/22   Rasheed Gibbons MD   fluticasone (FLONASE) 50 MCG/ACT nasal spray 2 sprays into the nostril(s) as directed by provider Daily.  7/28/22  ProviderAnnemarie MD       Allergies:  Sulfa antibiotics    ROS:    Review of Systems   Constitutional: Negative for activity change, appetite change, chills, diaphoresis, fatigue, fever and unexpected weight change.   HENT: Negative for sore throat and trouble swallowing.    Respiratory: Negative for shortness of breath.    Gastrointestinal: Negative for abdominal distention, abdominal pain, anal bleeding, blood in stool, constipation, diarrhea, nausea, rectal pain and vomiting.   Endocrine: Negative for polydipsia, polyphagia and polyuria.   Genitourinary: Negative for difficulty urinating.   Musculoskeletal: Negative for arthralgias.   Skin: Negative for pallor.   Allergic/Immunologic: Negative for food allergies.   Neurological: Negative for weakness and light-headedness.   Psychiatric/Behavioral: Negative for behavioral problems.     Objective     Blood pressure 115/74, pulse 60, height 152.4 cm (60\"), weight 88.5 kg (195 lb).    Physical Exam  Constitutional:       General: She is not in acute distress.     Appearance: She is well-developed. She is not diaphoretic.   HENT:      " Head: Normocephalic and atraumatic.   Cardiovascular:      Rate and Rhythm: Normal rate and regular rhythm.      Heart sounds: Normal heart sounds. No murmur heard.    No friction rub. No gallop.   Pulmonary:      Effort: No respiratory distress.      Breath sounds: Normal breath sounds. No wheezing or rales.   Chest:      Chest wall: No tenderness.   Abdominal:      General: Bowel sounds are normal. There is no distension.      Palpations: Abdomen is soft. There is no mass.      Tenderness: There is no abdominal tenderness. There is no guarding or rebound.      Hernia: No hernia is present.   Musculoskeletal:         General: Normal range of motion.   Skin:     General: Skin is warm and dry.      Coloration: Skin is not pale.      Findings: No erythema or rash.   Neurological:      Mental Status: She is alert and oriented to person, place, and time.   Psychiatric:         Behavior: Behavior normal.         Thought Content: Thought content normal.         Judgment: Judgment normal.          Assessment & Plan   Diagnoses and all orders for this visit:    1. History of colon cancer (Primary)  -     Case Request; Standing  -     Case Request    Other orders  -     Follow Anesthesia Guidelines / Standing Orders; Future  -     Obtain Informed Consent; Future  -     sodium-potassium-magnesium sulfates (SUPREP) 17.5-3.13-1.6 GM/177ML solution oral solution; Take 1 bottle by mouth Every 12 (Twelve) Hours.  Dispense: 1 mL; Refill: 0        COLONOSCOPY (N/A)     Diagnosis Plan   1. History of colon cancer  Case Request    Case Request       Anticipated Surgical Procedure:  Orders Placed This Encounter   Procedures   • Follow Anesthesia Guidelines / Standing Orders     Standing Status:   Future   • Obtain Informed Consent     Standing Status:   Future     Order Specific Question:   Informed Consent Given For     Answer:   colonoscopy       The risks, benefits, and alternatives of this procedure have been discussed with the  patient or the responsible party- the patient understands and agrees to proceed.

## 2022-08-01 ENCOUNTER — LAB (OUTPATIENT)
Dept: LAB | Facility: HOSPITAL | Age: 77
End: 2022-08-01

## 2022-08-01 ENCOUNTER — OFFICE VISIT (OUTPATIENT)
Dept: FAMILY MEDICINE CLINIC | Facility: CLINIC | Age: 77
End: 2022-08-01

## 2022-08-01 VITALS
DIASTOLIC BLOOD PRESSURE: 78 MMHG | SYSTOLIC BLOOD PRESSURE: 118 MMHG | HEIGHT: 60 IN | TEMPERATURE: 96.6 F | WEIGHT: 195.1 LBS | BODY MASS INDEX: 38.31 KG/M2 | HEART RATE: 72 BPM | OXYGEN SATURATION: 100 %

## 2022-08-01 DIAGNOSIS — D64.9 ANEMIA, UNSPECIFIED TYPE: ICD-10-CM

## 2022-08-01 DIAGNOSIS — R09.89 LABILE HYPERTENSION: Primary | ICD-10-CM

## 2022-08-01 DIAGNOSIS — G45.9 TIA (TRANSIENT ISCHEMIC ATTACK): ICD-10-CM

## 2022-08-01 DIAGNOSIS — R09.89 LABILE HYPERTENSION: ICD-10-CM

## 2022-08-01 LAB
ANION GAP SERPL CALCULATED.3IONS-SCNC: 7 MMOL/L (ref 5–15)
BASOPHILS # BLD AUTO: 0.04 10*3/MM3 (ref 0–0.2)
BASOPHILS NFR BLD AUTO: 0.8 % (ref 0–1.5)
BUN SERPL-MCNC: 20 MG/DL (ref 8–23)
BUN/CREAT SERPL: 21.1 (ref 7–25)
CALCIUM SPEC-SCNC: 9.7 MG/DL (ref 8.6–10.5)
CHLORIDE SERPL-SCNC: 106 MMOL/L (ref 98–107)
CO2 SERPL-SCNC: 28 MMOL/L (ref 22–29)
CREAT SERPL-MCNC: 0.95 MG/DL (ref 0.57–1)
DEPRECATED RDW RBC AUTO: 42.7 FL (ref 37–54)
EGFRCR SERPLBLD CKD-EPI 2021: 63 ML/MIN/1.73
EOSINOPHIL # BLD AUTO: 0.04 10*3/MM3 (ref 0–0.4)
EOSINOPHIL NFR BLD AUTO: 0.8 % (ref 0.3–6.2)
ERYTHROCYTE [DISTWIDTH] IN BLOOD BY AUTOMATED COUNT: 12.7 % (ref 12.3–15.4)
GLUCOSE SERPL-MCNC: 120 MG/DL (ref 65–99)
HCT VFR BLD AUTO: 35 % (ref 34–46.6)
HGB BLD-MCNC: 12.3 G/DL (ref 12–15.9)
IMM GRANULOCYTES # BLD AUTO: 0.02 10*3/MM3 (ref 0–0.05)
IMM GRANULOCYTES NFR BLD AUTO: 0.4 % (ref 0–0.5)
LYMPHOCYTES # BLD AUTO: 1.42 10*3/MM3 (ref 0.7–3.1)
LYMPHOCYTES NFR BLD AUTO: 30.1 % (ref 19.6–45.3)
MCH RBC QN AUTO: 32.3 PG (ref 26.6–33)
MCHC RBC AUTO-ENTMCNC: 35.1 G/DL (ref 31.5–35.7)
MCV RBC AUTO: 91.9 FL (ref 79–97)
MONOCYTES # BLD AUTO: 0.37 10*3/MM3 (ref 0.1–0.9)
MONOCYTES NFR BLD AUTO: 7.9 % (ref 5–12)
NEUTROPHILS NFR BLD AUTO: 2.82 10*3/MM3 (ref 1.7–7)
NEUTROPHILS NFR BLD AUTO: 60 % (ref 42.7–76)
NRBC BLD AUTO-RTO: 0 /100 WBC (ref 0–0.2)
PLATELET # BLD AUTO: 271 10*3/MM3 (ref 140–450)
PMV BLD AUTO: 8.9 FL (ref 6–12)
POTASSIUM SERPL-SCNC: 4.4 MMOL/L (ref 3.5–5.2)
RBC # BLD AUTO: 3.81 10*6/MM3 (ref 3.77–5.28)
SODIUM SERPL-SCNC: 141 MMOL/L (ref 136–145)
WBC NRBC COR # BLD: 4.71 10*3/MM3 (ref 3.4–10.8)

## 2022-08-01 PROCEDURE — 99213 OFFICE O/P EST LOW 20 MIN: CPT | Performed by: NURSE PRACTITIONER

## 2022-08-01 PROCEDURE — 80048 BASIC METABOLIC PNL TOTAL CA: CPT

## 2022-08-01 PROCEDURE — 36415 COLL VENOUS BLD VENIPUNCTURE: CPT

## 2022-08-01 PROCEDURE — 85025 COMPLETE CBC W/AUTO DIFF WBC: CPT

## 2022-08-01 RX ORDER — OLMESARTAN MEDOXOMIL 20 MG/1
20 TABLET ORAL DAILY
Qty: 30 TABLET | Refills: 3 | Status: SHIPPED | OUTPATIENT
Start: 2022-08-01 | End: 2022-09-06 | Stop reason: DRUGHIGH

## 2022-08-01 NOTE — PROGRESS NOTES
Subjective   Veronica Wilkinson is a 76 y.o. female.     CC: Hypertension    Hypertension  This is a chronic problem. The current episode started more than 1 year ago. The problem has been waxing and waning since onset. The problem is controlled. Pertinent negatives include no chest pain, palpitations or shortness of breath. Current antihypertension treatment includes angiotensin blockers and diuretics. The current treatment provides significant improvement. Compliance problems include exercise and diet.  Hypertensive end-organ damage includes CAD/MI and CVA.        The following portions of the patient's history were reviewed and updated as appropriate: allergies, current medications, past family history, past medical history, past social history, past surgical history and problem list.    Review of Systems   Constitutional: Negative for activity change, appetite change, fatigue, unexpected weight gain and unexpected weight loss.   HENT: Negative for congestion, sore throat, trouble swallowing and voice change.    Eyes: Negative.    Respiratory: Negative for cough, chest tightness, shortness of breath and wheezing.    Cardiovascular: Negative for chest pain, palpitations and leg swelling.   Gastrointestinal: Negative for abdominal pain, constipation, diarrhea, nausea and vomiting.   Endocrine: Negative.    Genitourinary: Negative for dysuria.   Musculoskeletal: Negative for arthralgias and myalgias.   Skin: Negative for rash.   Allergic/Immunologic: Negative.    Neurological: Negative.    Hematological: Negative.    Psychiatric/Behavioral: Negative.        Objective   Physical Exam  Vitals and nursing note reviewed.   Constitutional:       General: She is not in acute distress.     Appearance: She is well-developed. She is not diaphoretic.   HENT:      Head: Normocephalic and atraumatic.   Eyes:      Conjunctiva/sclera: Conjunctivae normal.   Cardiovascular:      Rate and Rhythm: Normal rate and regular rhythm.       Heart sounds: Normal heart sounds.   Pulmonary:      Effort: Pulmonary effort is normal. No respiratory distress.      Breath sounds: Normal breath sounds. No wheezing or rales.   Musculoskeletal:         General: No tenderness. Normal range of motion.      Cervical back: Normal range of motion.   Skin:     General: Skin is warm and dry.      Coloration: Skin is not pale.      Findings: No erythema or rash.   Neurological:      Mental Status: She is alert and oriented to person, place, and time.   Psychiatric:         Behavior: Behavior normal.         Thought Content: Thought content normal.         Judgment: Judgment normal.           Assessment & Plan   Diagnoses and all orders for this visit:    1. Labile hypertension (Primary)  -     olmesartan (Benicar) 20 MG tablet; Take 1 tablet by mouth Daily.  Dispense: 30 tablet; Refill: 3  -     CBC & Differential; Future  -     Basic Metabolic Panel; Future   -Patient had low blood pressure of approximately 80/60 yesterday afternoon about 3 to 4 hours after taking medication.  Is slowly improved.  She held her blood pressure medicine again this morning and it improved to 124/72.  At this time we will continue Benicar 20 mg daily but discontinued HCTZ component.  New prescription sent to pharmacy.  Continue to monitor blood pressure at home.  Maintain adequate hydration.  We will call with lab results.    2. Anemia, unspecified type  -     CBC & Differential; Future, will call with results    3. TIA (transient ischemic attack)  -    Refill, apixaban (ELIQUIS) 5 MG tablet tablet; Take 1 tablet by mouth Every 12 (Twelve) Hours. Indications: Atrial Fibrillation  Dispense: 180 tablet; Refill: 2      4.  Follow-up as scheduled or sooner for any acute needs.            This document has been electronically signed by TABBY Mejia on August 1, 2022 16:51 CDT

## 2022-08-05 ENCOUNTER — OFFICE VISIT (OUTPATIENT)
Dept: NEUROLOGY | Facility: TELEHEALTH | Age: 77
End: 2022-08-05

## 2022-08-05 VITALS
HEIGHT: 60 IN | HEART RATE: 61 BPM | SYSTOLIC BLOOD PRESSURE: 118 MMHG | WEIGHT: 197 LBS | BODY MASS INDEX: 38.68 KG/M2 | DIASTOLIC BLOOD PRESSURE: 78 MMHG

## 2022-08-05 DIAGNOSIS — Z86.73 HISTORY OF TIA (TRANSIENT ISCHEMIC ATTACK): Primary | ICD-10-CM

## 2022-08-05 PROCEDURE — 99215 OFFICE O/P EST HI 40 MIN: CPT | Performed by: NURSE PRACTITIONER

## 2022-08-05 NOTE — PROGRESS NOTES
Stroke Progress Note       Chief Complaint: TIA follow-up    Subjective     HPI:   Pt is a 77-year-old right-handed white female with known diagnosis of obesity as well as intermittent atrial fibrillation who presented on 6/30 with right face numbness and facial droop that resolved after arrival to the ER. MRI was neg and she was diagnosed with a TIA. Today she states doing well. Strengths are equal 5/5, denies numbness, and visual field is intact. PHQ9 is 0, NIHSS is 0, MRS is 0.     Review of Systems   HENT: Negative.    Eyes: Negative.    Respiratory: Negative.    Cardiovascular: Negative.    Gastrointestinal: Negative.    Genitourinary: Negative.    Musculoskeletal: Negative.    Skin: Negative.    Neurological: Negative.    Psychiatric/Behavioral: Negative.         Objective      Heart Rate:  [61] 61  BP: (118)/(78) 118/78    Neurological Exam  Mental Status  Alert. Oriented to person, place, time and situation. Oriented to person, place, and time. Speech is normal. Language is fluent with no aphasia.    Cranial Nerves  CN II: Visual acuity is normal. Visual fields full to confrontation.  CN III, IV, VI: Extraocular movements intact bilaterally. Normal lids and orbits bilaterally. Pupils equal round and reactive to light bilaterally.  CN V: Facial sensation is normal.  CN VII: Full and symmetric facial movement.  CN IX, X: Palate elevates symmetrically. Normal gag reflex.  CN XI: Shoulder shrug strength is normal.  CN XII: Tongue midline without atrophy or fasciculations.    Motor  Normal muscle bulk throughout. No fasciculations present. Strength is 5/5 throughout all four extremities.    Sensory  Sensation is intact to light touch, pinprick, vibration and proprioception in all four extremities.    Reflexes                                            Right                      Left  Brachioradialis                    1+                         1+  Biceps                                 1+                          1+  Patellar                                1+                         1+    Coordination    Finger-to-nose, rapid alternating movements and heel-to-shin normal bilaterally without dysmetria.    Gait  Normal casual, toe, heel and tandem gait.      Physical Exam  Eyes:      General: Lids are normal.      Extraocular Movements: Extraocular movements intact.      Pupils: Pupils are equal, round, and reactive to light.   Cardiovascular:      Rate and Rhythm: Normal rate.   Pulmonary:      Effort: Pulmonary effort is normal.   Musculoskeletal:         General: Normal range of motion.      Cervical back: Normal range of motion.   Skin:     General: Skin is warm and dry.   Neurological:      Mental Status: She is alert and oriented to person, place, and time. Mental status is at baseline.      Coordination: Coordination is intact.      Deep Tendon Reflexes: Strength normal.      Reflex Scores:       Bicep reflexes are 1+ on the right side and 1+ on the left side.       Brachioradialis reflexes are 1+ on the right side and 1+ on the left side.       Patellar reflexes are 1+ on the right side and 1+ on the left side.  Psychiatric:         Speech: Speech normal.         Results Review:    I reviewed the patient's new clinical results.    Lab Results (last 24 hours)     ** No results found for the last 24 hours. **        No radiology results for the last day  Results for orders placed during the hospital encounter of 06/30/22    Adult Transthoracic Echo Complete W/ Cont if Necessary Per Protocol (With Agitated Saline)    Interpretation Summary  · The study is technically difficult for diagnosis. The quality of the study is limited due to patient body habitus.  · Left ventricular ejection fraction appears to be 61 - 65%. Left ventricular systolic function is normal.  · Left ventricular diastolic function is consistent with (grade Ia w/high LAP) impaired relaxation.  · Left ventricular wall thickness is consistent with mild  concentric hypertrophy.  · Estimated right ventricular systolic pressure from tricuspid regurgitation is mildly elevated (35-45 mmHg).  · There is calcification of the aortic valve mainly affecting the left coronary cusp(s).        Assessment/Plan     Assessment/Plan: Pt is a 77-year-old right-handed white female with known diagnosis of obesity as well as intermittent atrial fibrillation who presented on 6/30 with right face numbness and facial droop that resolved after arrival to the ER. MRI was neg and she was diagnosed with a TIA. Today she states doing well. Strengths are equal 5/5, denies numbness, and visual field is intact. Instructed her to continue Eliquis 5 mg BID and statin. Instructed on stroke risk factors, prevention, and s/s to call 911. Pt verbalized understanding.           Patient Active Problem List   Diagnosis   • Essential hypertension   • Hyperlipidemia   • History of pulmonary embolism   • Primary hyperparathyroidism (HCC)   • Pulmonary embolism (HCC)   • Long-term (current) use of anticoagulants   • Easy bruising   • Anemia   • Nephrolithiasis   • Osteoporosis without current pathological fracture   • Vitamin D deficiency   • Other neutropenia (HCC)   • History of bariatric surgery   • Postmenopausal   • Symptomatic sinus bradycardia   • Presence of cardiac pacemaker   • 1st degree AV block   • Left lateral abdominal pain   • Personal history of colonic polyps   • Iron deficiency anemia   • Malignant neoplasm of colon (HCC)   • Iron malabsorption   • TIA (transient ischemic attack)   • Paroxysmal atrial fibrillation (HCC)   • History of colon cancer           TABBY Castellanos  08/05/22  11:53 CDT        I spent 40 minutes caring for Veronica Wilkinson  on this date of service. This time includes time spent by me in the following activities: preparing for the visit, reviewing tests, obtaining and/or reviewing a separately obtained history, performing a medically appropriate examination and/or  evaluation, counseling and educating the patient/family/caregiver, ordering medications, tests, or procedures, referring and communicating with other health care professionals, documenting information in the medical record, independently interpreting results and communicating that information with the patient/family/caregiver and care coordination

## 2022-08-30 ENCOUNTER — APPOINTMENT (OUTPATIENT)
Dept: ONCOLOGY | Facility: CLINIC | Age: 77
End: 2022-08-30

## 2022-08-30 ENCOUNTER — APPOINTMENT (OUTPATIENT)
Dept: ONCOLOGY | Facility: HOSPITAL | Age: 77
End: 2022-08-30

## 2022-09-06 RX ORDER — OLMESARTAN MEDOXOMIL AND HYDROCHLOROTHIAZIDE 20/12.5 20; 12.5 MG/1; MG/1
TABLET ORAL
Qty: 90 TABLET | Refills: 2 | Status: SHIPPED | OUTPATIENT
Start: 2022-09-06 | End: 2022-09-14

## 2022-09-06 NOTE — TELEPHONE ENCOUNTER
Pt called [facility] says had to go back on olmesartan with htz as she was filling up with fluid again and said that Archie had her try without HTZ but cannot take it because her body was filling with fluid  FY  [phone]

## 2022-09-12 ENCOUNTER — ANESTHESIA EVENT (OUTPATIENT)
Dept: GASTROENTEROLOGY | Facility: HOSPITAL | Age: 77
End: 2022-09-12

## 2022-09-12 ENCOUNTER — ANESTHESIA (OUTPATIENT)
Dept: GASTROENTEROLOGY | Facility: HOSPITAL | Age: 77
End: 2022-09-12

## 2022-09-12 ENCOUNTER — HOSPITAL ENCOUNTER (OUTPATIENT)
Facility: HOSPITAL | Age: 77
Setting detail: HOSPITAL OUTPATIENT SURGERY
Discharge: HOME | End: 2022-09-12
Attending: INTERNAL MEDICINE | Admitting: INTERNAL MEDICINE

## 2022-09-12 VITALS
SYSTOLIC BLOOD PRESSURE: 106 MMHG | BODY MASS INDEX: 37.89 KG/M2 | OXYGEN SATURATION: 100 % | HEIGHT: 60 IN | WEIGHT: 193 LBS | HEART RATE: 64 BPM | TEMPERATURE: 97.4 F | DIASTOLIC BLOOD PRESSURE: 57 MMHG | RESPIRATION RATE: 18 BRPM

## 2022-09-12 DIAGNOSIS — Z85.038 HISTORY OF COLON CANCER: ICD-10-CM

## 2022-09-12 PROCEDURE — G0105 COLORECTAL SCRN; HI RISK IND: HCPCS | Performed by: INTERNAL MEDICINE

## 2022-09-12 PROCEDURE — 25010000002 PROPOFOL 10 MG/ML EMULSION: Performed by: NURSE ANESTHETIST, CERTIFIED REGISTERED

## 2022-09-12 RX ORDER — PROPOFOL 10 MG/ML
VIAL (ML) INTRAVENOUS AS NEEDED
Status: DISCONTINUED | OUTPATIENT
Start: 2022-09-12 | End: 2022-09-12 | Stop reason: SURG

## 2022-09-12 RX ORDER — DEXTROSE AND SODIUM CHLORIDE 5; .45 G/100ML; G/100ML
30 INJECTION, SOLUTION INTRAVENOUS CONTINUOUS PRN
Status: DISCONTINUED | OUTPATIENT
Start: 2022-09-12 | End: 2022-09-12 | Stop reason: HOSPADM

## 2022-09-12 RX ADMIN — PROPOFOL 10 MG: 10 INJECTION, EMULSION INTRAVENOUS at 14:31

## 2022-09-12 RX ADMIN — PROPOFOL 80 MG: 10 INJECTION, EMULSION INTRAVENOUS at 14:28

## 2022-09-12 RX ADMIN — DEXTROSE AND SODIUM CHLORIDE 30 ML/HR: 5; 450 INJECTION, SOLUTION INTRAVENOUS at 13:53

## 2022-09-12 NOTE — ANESTHESIA POSTPROCEDURE EVALUATION
Patient: Veronica Wilkinson    Procedure Summary     Date: 09/12/22 Room / Location: [facility] ENDOSCOPY 3 / [facility] ENDOSCOPY    Anesthesia Start: 1426 Anesthesia Stop: 1435    Procedure: COLONOSCOPY (N/A ) Diagnosis:       History of colon cancer      (History of colon cancer [Z85.038])    Surgeons: Rasheed Gibbons MD Provider: Kanwal Hoang CRNA    Anesthesia Type: MAC ASA Status: 3          Anesthesia Type: MAC    Vitals  No vitals data found for the desired time range.          Post Anesthesia Care and Evaluation    Patient location during evaluation: bedside  Patient participation: complete - patient participated  Level of consciousness: sleepy but conscious  Pain score: 0  Pain management: adequate    Airway patency: patent  Anesthetic complications: No anesthetic complications  PONV Status: none  Cardiovascular status: hemodynamically stable  Respiratory status: spontaneous ventilation and room air  Hydration status: acceptable    Comments: 135/68  OK 67  PSO2 100

## 2022-09-12 NOTE — H&P
[facility]      Chief Complaint:   No chief complaint on file.      Subjective     HPI:     I agree with the current note with no changes in the history.  Patient with history of adenocarcinoma of the colon patient a large adenocarcinoma in the ascending colon found last year patient had this resected in [place].  Patient was told she did not need chemotherapy but does need close follow-up and will need yearly colonoscopies.    Plan; we will schedule patient for colonoscopy to evaluate for any recurrence of this colon cancer.    Past Medical History:   Past Medical History:   Diagnosis Date   • A-fib (HCC)    • Arthritis    • Asthma    • Bleeding tendency (HCC)    • Chest pain     History of noncardiac chest pain   • Chronic depression    • Colon cancer (HCC)    • COPD (chronic obstructive pulmonary disease) (HCC)    • Depressive disorder    • Diastolic dysfunction    • Dyspnea    • Dyspnea on exertion    • Edema    • Essential hypertension    • Exercise intolerance    • Fatigue    • Gallstones    • GERD (gastroesophageal reflux disease)    • History of blood clots     In Lungs   • History of bone density study 2011    DEXA BONE DENSITY APPENDICULAR 07823 (1) - normal   • History of bone density study 06/24/2015    DXA BONE DENSITY AXIAL 46342 [facility] (1) - Ordered By: TOLU RAMIREZ ([facility])    • History of echocardiogram     Echocardiogram W/ color flow 86589 (2)   • History of mammogram 2013    Mammogram screen (1)   • History of mammogram 2015    MAMMOGRAM SCREENING 76364 - [facility] (1)   • History of screening mammography 06/25/2015    SCREENING MAMMOGRAPHY DIGITAL  (Medicare) (1) - Ordered By: ANDRADE BECERRIL ([facility])    • Hypercalcemia    • Hyperlipidemia    • Influenza vaccine administered 02/25/2016    INFLUENZA IMMUN ADMIN OR PREV RECV'D  (2) - Ordered By: ANDRADE BECERRIL ([facility])    • Long-term (current) use of anticoagulants     coumadin therapy      • Lower  "extremity edema     Intermittent lower extremity edema   • Obesity, Class III, BMI 40-49.9 (morbid obesity) (HCC)     \"Class III obesity\"   • Osteoporosis    • PE (pulmonary embolism) 10/2015    Bilateral PE diagnosed after a car ride to [place]   • Pneumococcal vaccination given 02/25/2016    PNEUMOC VAC/ADMIN/RCVD 4040F (2) - Ordered By: ANDRADE BECERRIL ([facility])    • Right basilic vein fibrosis 2015   • Sedentary lifestyle    • Shortness of breath    • Skin cancer    • Stroke (HCC)    • TIA (transient ischemic attack) 06/30/2022   • Urinary tract infection    • V-tach (HCC)    • Venous thrombosis 2015    right basilic venous thrombosis; This was noted in May 2015.   • Weight gain        Past Surgical History:    Past Surgical History:   Procedure Laterality Date   • CARDIAC ELECTROPHYSIOLOGY PROCEDURE N/A 12/10/2020    Procedure: Pacemaker DC new;  Surgeon: Vesta Bucio MD;  Location: [facility] CATH INVASIVE LOCATION;  Service: Cardiology;  Laterality: N/A;  [facility] per pratima.....juanita from [place] aware   • CHOLECYSTECTOMY      Cholecystectomy (1)   • COLON SURGERY     • COLONOSCOPY      Approximately 5 years prior as stated per patient   • COLONOSCOPY N/A 06/16/2021    Procedure: COLONOSCOPY;  Surgeon: Rasheed Gibbons MD;  Location: [facility] ENDOSCOPY;  Service: Gastroenterology;  Laterality: N/A;  tattoo at ascneding colon mass   • CRYOABLATION     • ENDOSCOPY  09/15/2011    Colon endoscopy 36219 (2) - Hemorrhoids found.   • ENDOSCOPY N/A 06/16/2021    Procedure: ESOPHAGOGASTRODUODENOSCOPY;  Surgeon: Rasheed Gibbons MD;  Location: [facility] ENDOSCOPY;  Service: Gastroenterology;  Laterality: N/A;   • GASTRIC SLEEVE LAPAROSCOPIC     • GASTRIC SLEEVE LAPAROSCOPIC     • HEMORRHOIDECTOMY     • HYSTEROSCOPY      Hysteroscopy; ablation (1)   • INJECTION OF MEDICATION  09/27/2012    Kenalog (1) - Ordered By: ANDRADE BECERRIL ([facility])    • PACEMAKER IMPLANTATION         Family History:  Family History   Problem " Relation Age of Onset   • Ovarian cancer Mother    • Bleeding Disorder Father    • Other Father         Hematologic disorder   • Lung cancer Father    • Pancreatic cancer Sister    • Cancer Brother    • Lung cancer Brother    • Bleeding Disorder Other    • Hyperlipidemia Other    • Hypertension Other    • Osteoarthritis Other    • Other Other         Gastritis   • Adrenal disorder Daughter    • Seizures Daughter    • Thyroid disease Daughter    • No Known Problems Daughter    • Diabetes Other    • Heart disease Other    • Other Other         Ulcers; Bleeding Tendencies; Allergy   • Cancer Other    • Cholelithiasis Other    • Hypertension Other    • Allergy (severe) Other        Social History:   reports that she has never smoked. She has never used smokeless tobacco. She reports that she does not drink alcohol and does not use drugs.    Medications:   Prior to Admission medications    Medication Sig Start Date End Date Taking? Authorizing Provider   apixaban (ELIQUIS) 5 MG tablet tablet Take 1 tablet by mouth Every 12 (Twelve) Hours. Indications: Atrial Fibrillation 7/1/22  Yes Adonay Chandra MD   atorvastatin (LIPITOR) 80 MG tablet Take 1 tablet by mouth Every Night. 7/1/22  Yes Adonay Chandra MD   CALCIUM MAGNESIUM ZINC PO Take 1 tablet by mouth Daily.   Yes ProviderAnnemarie MD   cholecalciferol (VITAMIN D3) 25 MCG (1000 UT) tablet Take 1,000 Units by mouth Daily.   Yes Annemarie Vega MD   diphenhydrAMINE-APAP, sleep, (ACETAMINOPHEN PM PO) Take 2 tablets by mouth Every Night.   Yes Annemarie Vega MD   LORATADINE ALLERGY RELIEF PO Take 10 mg by mouth Every Other Day.   Yes Annemarie Vega MD   melatonin 5 MG tablet tablet 1 tablet by mouth Monday, Wednesday, Friday, and Sunday and 2 tablets by mouth Tuesday, Thursday, and Saturday.   Yes Annemarie Vega MD   olmesartan-hydrochlorothiazide (BENICAR HCT) 20-12.5 MG per tablet TAKE 1 TABLET DAILY 11/29/21  Yes Rich Urbina  "APRMANDEEP   pantoprazole (PROTONIX) 40 MG EC tablet Take 1 tablet by mouth Daily. 11/29/21  Yes Annemarie Vega MD   simethicone (MYLICON,GAS-X) 125 MG capsule capsule Take 1 capsule by mouth Every Night.   Yes Annemarie Vega MD   traZODone (DESYREL) 100 MG tablet TAKE 1 TABLET EVERY NIGHT 5/9/22  Yes Rich Urbina APRN   pyridoxine (VITAMIN B-6) 100 MG tablet Take 100 mg by mouth Daily.    ProviderAnnemarie MD   sodium-potassium-magnesium sulfates (SUPREP) 17.5-3.13-1.6 GM/177ML solution oral solution Take 1 bottle by mouth Every 12 (Twelve) Hours. 7/28/22   Rasheed Gibbons MD   fluticasone (FLONASE) 50 MCG/ACT nasal spray 2 sprays into the nostril(s) as directed by provider Daily.  7/28/22  Annemarie Vega MD       Allergies:  Sulfa antibiotics    ROS:    Review of Systems   Constitutional: Negative for activity change, appetite change, chills, diaphoresis, fatigue, fever and unexpected weight change.   HENT: Negative for sore throat and trouble swallowing.    Respiratory: Negative for shortness of breath.    Gastrointestinal: Negative for abdominal distention, abdominal pain, anal bleeding, blood in stool, constipation, diarrhea, nausea, rectal pain and vomiting.   Endocrine: Negative for polydipsia, polyphagia and polyuria.   Genitourinary: Negative for difficulty urinating.   Musculoskeletal: Negative for arthralgias.   Skin: Negative for pallor.   Allergic/Immunologic: Negative for food allergies.   Neurological: Negative for weakness and light-headedness.   Psychiatric/Behavioral: Negative for behavioral problems.     Objective     Blood pressure 131/58, pulse 72, temperature 97.1 °F (36.2 °C), resp. rate 18, height 152.4 cm (60\"), weight 87.5 kg (193 lb), SpO2 100 %.    Physical Exam  Constitutional:       General: She is not in acute distress.     Appearance: She is well-developed. She is not diaphoretic.   HENT:      Head: Normocephalic and atraumatic.   Cardiovascular:      Rate " and Rhythm: Normal rate and regular rhythm.      Heart sounds: Normal heart sounds. No murmur heard.    No friction rub. No gallop.   Pulmonary:      Effort: No respiratory distress.      Breath sounds: Normal breath sounds. No wheezing or rales.   Chest:      Chest wall: No tenderness.   Abdominal:      General: Bowel sounds are normal. There is no distension.      Palpations: Abdomen is soft. There is no mass.      Tenderness: There is no abdominal tenderness. There is no guarding or rebound.      Hernia: No hernia is present.   Musculoskeletal:         General: Normal range of motion.   Skin:     General: Skin is warm and dry.      Coloration: Skin is not pale.      Findings: No erythema or rash.   Neurological:      Mental Status: She is alert and oriented to person, place, and time.   Psychiatric:         Behavior: Behavior normal.         Thought Content: Thought content normal.         Judgment: Judgment normal.          Assessment & Plan   Diagnoses and all orders for this visit:    1. History of colon cancer  -     dextrose 5 % and sodium chloride 0.45 % infusion    Other orders  -     Implement Anesthesia Orders Day of Procedure; Standing  -     Obtain Informed Consent; Standing  -     POC Glucose Once; Standing  -     Insert Peripheral IV; Standing  -     Implement Anesthesia Orders Day of Procedure  -     Obtain Informed Consent  -     POC Glucose Once  -     Insert Peripheral IV        COLONOSCOPY (N/A)     Diagnosis Plan   1. History of colon cancer  dextrose 5 % and sodium chloride 0.45 % infusion       Anticipated Surgical Procedure:  Orders Placed This Encounter   Procedures   • Implement Anesthesia Orders Day of Procedure     Standing Status:   Standing     Number of Occurrences:   1   • Obtain Informed Consent     Standing Status:   Standing     Number of Occurrences:   1     Order Specific Question:   Informed Consent Given For     Answer:   colonoscopy   • POC Glucose Once     Prior to Procedure  on ALL Diabetic Patients     Standing Status:   Standing     Number of Occurrences:   1     Order Specific Question:   Release to patient     Answer:   Immediate   • Insert Peripheral IV     Standing Status:   Standing     Number of Occurrences:   1       The risks, benefits, and alternatives of this procedure have been discussed with the patient or the responsible party- the patient understands and agrees to proceed.

## 2022-09-12 NOTE — ANESTHESIA PREPROCEDURE EVALUATION
Anesthesia Evaluation     Patient summary reviewed and Nursing notes reviewed   NPO Solid Status: > 8 hours  NPO Liquid Status: > 4 hours           Airway   Mallampati: II  TM distance: >3 FB  No difficulty expected  Dental    (+) partials    Pulmonary - normal exam   (+) pulmonary embolism, COPD, asthma,shortness of breath,   Cardiovascular - normal exam    (+) pacemaker, hypertension well controlled less than 2 medications, dysrhythmias Atrial Fib, hyperlipidemia,       Neuro/Psych  (+) TIA, CVA, psychiatric history Depression,      ROS Comment: NO RESIDUAL  GI/Hepatic/Renal/Endo    (+) obesity,  GERD well controlled,  renal disease stones,     ROS Comment: Hx colon cancer    Musculoskeletal     Abdominal   (+) obese,    Substance History      OB/GYN          Other   arthritis,    history of cancer remission                  Anesthesia Plan    ASA 3     MAC   total IV anesthesia  intravenous induction     Anesthetic plan, risks, benefits, and alternatives have been provided, discussed and informed consent has been obtained with: patient.        CODE STATUS:

## 2022-09-13 ENCOUNTER — LAB (OUTPATIENT)
Dept: LAB | Facility: HOSPITAL | Age: 77
End: 2022-09-13

## 2022-09-13 DIAGNOSIS — C18.2 MALIGNANT NEOPLASM OF ASCENDING COLON: ICD-10-CM

## 2022-09-13 DIAGNOSIS — D50.8 OTHER IRON DEFICIENCY ANEMIA: ICD-10-CM

## 2022-09-13 DIAGNOSIS — Z86.711 HISTORY OF PULMONARY EMBOLISM: ICD-10-CM

## 2022-09-13 LAB
ALBUMIN SERPL-MCNC: 3.8 G/DL (ref 3.5–5.2)
ALBUMIN/GLOB SERPL: 1.1 G/DL
ALP SERPL-CCNC: 137 U/L (ref 39–117)
ALT SERPL W P-5'-P-CCNC: 22 U/L (ref 1–33)
ANION GAP SERPL CALCULATED.3IONS-SCNC: 9 MMOL/L (ref 5–15)
AST SERPL-CCNC: 28 U/L (ref 1–32)
BASOPHILS # BLD AUTO: 0.04 10*3/MM3 (ref 0–0.2)
BASOPHILS NFR BLD AUTO: 0.7 % (ref 0–1.5)
BILIRUB SERPL-MCNC: 0.4 MG/DL (ref 0–1.2)
BUN SERPL-MCNC: 20 MG/DL (ref 8–23)
BUN/CREAT SERPL: 18.5 (ref 7–25)
CALCIUM SPEC-SCNC: 9.3 MG/DL (ref 8.6–10.5)
CHLORIDE SERPL-SCNC: 109 MMOL/L (ref 98–107)
CO2 SERPL-SCNC: 24 MMOL/L (ref 22–29)
CREAT SERPL-MCNC: 1.08 MG/DL (ref 0.57–1)
DEPRECATED RDW RBC AUTO: 43.7 FL (ref 37–54)
EGFRCR SERPLBLD CKD-EPI 2021: 53.7 ML/MIN/1.73
EOSINOPHIL # BLD AUTO: 0.03 10*3/MM3 (ref 0–0.4)
EOSINOPHIL NFR BLD AUTO: 0.6 % (ref 0.3–6.2)
ERYTHROCYTE [DISTWIDTH] IN BLOOD BY AUTOMATED COUNT: 12.9 % (ref 12.3–15.4)
FERRITIN SERPL-MCNC: 438 NG/ML (ref 13–150)
FOLATE SERPL-MCNC: 10.3 NG/ML (ref 4.78–24.2)
GLOBULIN UR ELPH-MCNC: 3.4 GM/DL
GLUCOSE SERPL-MCNC: 83 MG/DL (ref 65–99)
HCT VFR BLD AUTO: 35.3 % (ref 34–46.6)
HGB BLD-MCNC: 11.9 G/DL (ref 12–15.9)
IMM GRANULOCYTES # BLD AUTO: 0.01 10*3/MM3 (ref 0–0.05)
IMM GRANULOCYTES NFR BLD AUTO: 0.2 % (ref 0–0.5)
IRON 24H UR-MRATE: 87 MCG/DL (ref 37–145)
IRON SATN MFR SERPL: 33 % (ref 20–50)
LYMPHOCYTES # BLD AUTO: 1.72 10*3/MM3 (ref 0.7–3.1)
LYMPHOCYTES NFR BLD AUTO: 31.6 % (ref 19.6–45.3)
MCH RBC QN AUTO: 31.5 PG (ref 26.6–33)
MCHC RBC AUTO-ENTMCNC: 33.7 G/DL (ref 31.5–35.7)
MCV RBC AUTO: 93.4 FL (ref 79–97)
MONOCYTES # BLD AUTO: 0.48 10*3/MM3 (ref 0.1–0.9)
MONOCYTES NFR BLD AUTO: 8.8 % (ref 5–12)
NEUTROPHILS NFR BLD AUTO: 3.16 10*3/MM3 (ref 1.7–7)
NEUTROPHILS NFR BLD AUTO: 58.1 % (ref 42.7–76)
NRBC BLD AUTO-RTO: 0 /100 WBC (ref 0–0.2)
PLATELET # BLD AUTO: 396 10*3/MM3 (ref 140–450)
PMV BLD AUTO: 9.2 FL (ref 6–12)
POTASSIUM SERPL-SCNC: 4.3 MMOL/L (ref 3.5–5.2)
PROT SERPL-MCNC: 7.2 G/DL (ref 6–8.5)
RBC # BLD AUTO: 3.78 10*6/MM3 (ref 3.77–5.28)
SODIUM SERPL-SCNC: 142 MMOL/L (ref 136–145)
TIBC SERPL-MCNC: 262 MCG/DL (ref 298–536)
TRANSFERRIN SERPL-MCNC: 176 MG/DL (ref 200–360)
VIT B12 BLD-MCNC: 646 PG/ML (ref 211–946)
WBC NRBC COR # BLD: 5.44 10*3/MM3 (ref 3.4–10.8)

## 2022-09-13 PROCEDURE — 84466 ASSAY OF TRANSFERRIN: CPT

## 2022-09-13 PROCEDURE — 36415 COLL VENOUS BLD VENIPUNCTURE: CPT

## 2022-09-13 PROCEDURE — 82607 VITAMIN B-12: CPT

## 2022-09-13 PROCEDURE — 82746 ASSAY OF FOLIC ACID SERUM: CPT

## 2022-09-13 PROCEDURE — 85025 COMPLETE CBC W/AUTO DIFF WBC: CPT

## 2022-09-13 PROCEDURE — 83540 ASSAY OF IRON: CPT

## 2022-09-13 PROCEDURE — 82378 CARCINOEMBRYONIC ANTIGEN: CPT | Performed by: INTERNAL MEDICINE

## 2022-09-13 PROCEDURE — 80053 COMPREHEN METABOLIC PANEL: CPT

## 2022-09-13 PROCEDURE — 82728 ASSAY OF FERRITIN: CPT

## 2022-09-14 ENCOUNTER — OFFICE VISIT (OUTPATIENT)
Dept: ONCOLOGY | Facility: CLINIC | Age: 77
End: 2022-09-14

## 2022-09-14 ENCOUNTER — LAB (OUTPATIENT)
Dept: ONCOLOGY | Facility: HOSPITAL | Age: 77
End: 2022-09-14

## 2022-09-14 VITALS
OXYGEN SATURATION: 100 % | SYSTOLIC BLOOD PRESSURE: 117 MMHG | WEIGHT: 191.3 LBS | HEART RATE: 62 BPM | DIASTOLIC BLOOD PRESSURE: 58 MMHG | BODY MASS INDEX: 37.36 KG/M2

## 2022-09-14 DIAGNOSIS — C18.2 MALIGNANT NEOPLASM OF ASCENDING COLON: Primary | ICD-10-CM

## 2022-09-14 DIAGNOSIS — K90.9 IRON MALABSORPTION: Chronic | ICD-10-CM

## 2022-09-14 DIAGNOSIS — Z86.711 HISTORY OF PULMONARY EMBOLISM: Chronic | ICD-10-CM

## 2022-09-14 DIAGNOSIS — D50.8 OTHER IRON DEFICIENCY ANEMIA: Chronic | ICD-10-CM

## 2022-09-14 DIAGNOSIS — N17.9 AKI (ACUTE KIDNEY INJURY): ICD-10-CM

## 2022-09-14 PROBLEM — C18.9 MALIGNANT NEOPLASM OF COLON: Chronic | Status: ACTIVE | Noted: 2021-07-22

## 2022-09-14 LAB — CEA SERPL-MCNC: 3.9 NG/ML

## 2022-09-14 PROCEDURE — G0463 HOSPITAL OUTPT CLINIC VISIT: HCPCS | Performed by: INTERNAL MEDICINE

## 2022-09-14 PROCEDURE — 99214 OFFICE O/P EST MOD 30 MIN: CPT | Performed by: INTERNAL MEDICINE

## 2022-09-14 PROCEDURE — 1123F ACP DISCUSS/DSCN MKR DOCD: CPT | Performed by: INTERNAL MEDICINE

## 2022-09-14 PROCEDURE — 1126F AMNT PAIN NOTED NONE PRSNT: CPT | Performed by: INTERNAL MEDICINE

## 2022-09-14 RX ORDER — PYRAZINAMIDE 500 MG/1
1 TABLET ORAL EVERY 6 HOURS PRN
COMMUNITY
Start: 2022-09-05 | End: 2022-10-17

## 2022-09-14 RX ORDER — CEPHALEXIN 500 MG/1
CAPSULE ORAL
COMMUNITY
Start: 2022-09-05 | End: 2022-10-17

## 2022-09-14 RX ORDER — PYRAZINAMIDE 500 MG/1
1 TABLET ORAL
COMMUNITY
Start: 2022-09-05 | End: 2022-10-17

## 2022-09-14 RX ORDER — ATORVASTATIN CALCIUM 80 MG/1
1 TABLET, FILM COATED ORAL NIGHTLY
COMMUNITY
Start: 2022-07-01 | End: 2022-12-19 | Stop reason: SDUPTHER

## 2022-09-14 RX ORDER — OLMESARTAN MEDOXOMIL 20 MG/1
20 TABLET ORAL DAILY
COMMUNITY
Start: 2022-08-01 | End: 2022-12-19 | Stop reason: SDUPTHER

## 2022-09-14 NOTE — PROGRESS NOTES
DATE OF VISIT: 9/14/2022      REASON FOR VISIT: Adenocarcinoma of ascending colon, anemia, neutropenia, history of pulmonary embolism      HISTORY OF PRESENT ILLNESS:   77-year-old female with medical problem consisting of pulmonary embolism diagnosed in 2016 s/p anticoagulation with Coumadin for 1 year, osteoporosis, depression, history of gastric sleeve surgery done in 2016, neutropenia, anemia, adenocarcinoma of ascending colon diagnosed in June 2021 s/p surgery currently on surveillance is here for follow-up appointment today to discuss recently done blood work.  Complains of fatigue complains of arthralgia.  Denies any new lymph node enlargement.  Denies any new chest pain or shortness of breath.      Past Medical History, Past Surgical History, Social History, Family History have been reviewed and are without significant changes except as mentioned.    Review of Systems   A comprehensive 14 point review of systems was performed and was negative except as mentioned in HPI.    Medications:  The current medication list was reviewed in the EMR    ALLERGIES:    Allergies   Allergen Reactions   • Sulfa Antibiotics Hives and Rash     Sulfa (Sulfonamide Antibiotics)       Objective      Vitals:    09/14/22 0928   BP: 117/58   Pulse: 62   SpO2: 100%   Weight: 86.8 kg (191 lb 4.8 oz)   PainSc: 0-No pain     Current Status 12/10/2021   ECOG score 0       Physical Exam  Pulmonary:      Breath sounds: Normal breath sounds.   Neurological:      Mental Status: She is alert and oriented to person, place, and time.           RECENT LABS:  Glucose   Date Value Ref Range Status   09/13/2022 83 65 - 99 mg/dL Final     Sodium   Date Value Ref Range Status   09/13/2022 142 136 - 145 mmol/L Final   10/12/2018 140 134 - 144 mmol/L Final   10/08/2018 141 136 - 144 mmol/L Final     Potassium   Date Value Ref Range Status   09/13/2022 4.3 3.5 - 5.2 mmol/L Final   10/12/2018 4.6 3.5 - 5.1 mmol/L Final   10/08/2018 4.3 3.3 - 4.8 mmol/L  Final     Comment:     Serum Potassium Reference Range  = 3.5-5.1  mmol/L     CO2   Date Value Ref Range Status   09/13/2022 24.0 22.0 - 29.0 mmol/L Final     Total CO2   Date Value Ref Range Status   10/08/2018 25 23 - 31 mmol/L Final     Chloride   Date Value Ref Range Status   09/13/2022 109 (H) 98 - 107 mmol/L Final   10/12/2018 105 98 - 107 mmol/L Final   10/08/2018 108 (H) 98 - 107 mmol/L Final     Anion Gap   Date Value Ref Range Status   09/13/2022 9.0 5.0 - 15.0 mmol/L Final   10/08/2018 8  Final     Creatinine   Date Value Ref Range Status   09/13/2022 1.08 (H) 0.57 - 1.00 mg/dL Final   10/12/2018 0.9 0.6 - 1.3 mg/dL Final     Comment:     **The MDRD GFR formula is valid only for ages 18-70.    GFR will not be reported for individuals aging <18 or >70.     BUN   Date Value Ref Range Status   09/13/2022 20 8 - 23 mg/dL Final   10/12/2018 13 7 - 18 mg/dL Final     BUN/Creatinine Ratio   Date Value Ref Range Status   09/13/2022 18.5 7.0 - 25.0 Final     Calcium   Date Value Ref Range Status   09/13/2022 9.3 8.6 - 10.5 mg/dL Final   10/12/2018 9.7 8.8 - 10.5 mg/dL Final     eGFR Non  Amer   Date Value Ref Range Status   02/14/2022 57 (L) >60 mL/min/1.73 Final     Alkaline Phosphatase   Date Value Ref Range Status   09/13/2022 137 (H) 39 - 117 U/L Final   10/12/2018 115 46 - 116 U/L Final     Total Protein   Date Value Ref Range Status   09/13/2022 7.2 6.0 - 8.5 g/dL Final   10/12/2018 6.6 6.4 - 8.2 g/dL Final     ALT (SGPT)   Date Value Ref Range Status   09/13/2022 22 1 - 33 U/L Final   10/12/2018 43 30 - 65 U/L Final     AST (SGOT)   Date Value Ref Range Status   09/13/2022 28 1 - 32 U/L Final   10/12/2018 22 15 - 37 U/L Final     Total Bilirubin   Date Value Ref Range Status   09/13/2022 0.4 0.0 - 1.2 mg/dL Final   10/12/2018 0.3 0 - 1.0 mg/dL Final     Albumin   Date Value Ref Range Status   09/13/2022 3.80 3.50 - 5.20 g/dL Final   10/12/2018 3.2 (L) 3.4 - 5.0 g/dL Final     Globulin   Date Value  Ref Range Status   09/13/2022 3.4 gm/dL Final     Lab Results   Component Value Date    WBC 5.44 09/13/2022    HGB 11.9 (L) 09/13/2022    HCT 35.3 09/13/2022    MCV 93.4 09/13/2022     09/13/2022     Lab Results   Component Value Date    NEUTROABS 3.16 09/13/2022    IRON 87 09/13/2022    IRON 96 05/23/2022    IRON 110 02/14/2022    TIBC 262 (L) 09/13/2022    TIBC 323 05/23/2022    TIBC 305 02/14/2022    LABIRON 33 09/13/2022    LABIRON 30 05/23/2022    LABIRON 36 02/14/2022    FERRITIN 438.00 (H) 09/13/2022    FERRITIN 365.50 (H) 05/23/2022    FERRITIN 333.30 (H) 02/14/2022    ZHYFYWDF79 646 09/13/2022    LKJQRMAZ60 443 06/30/2022    ZQRKAKYR40 590 05/23/2022    FOLATE 10.30 09/13/2022    FOLATE 18.50 05/23/2022    FOLATE >20.00 02/14/2022     Lab Results   Component Value Date    AFPTM 142 12/05/2018    CEA 3.90 09/13/2022         PATHOLOGY:  * Cannot find OR log *         RADIOLOGY DATA :  No radiology results for the last 7 days        Assessment & Plan     1.  Adenocarcinoma of ascending colon, stage III, T3 N0 M0 MSI stable:  - Patient had a surgery done in June 2021 and has been on surveillance since then  - CEA level is normal at 3.9  - Had a colonoscopy on September 12, 2022 which was negative for recurrence.  - Recommend continue with clinical surveillance for now  - Patient will return to clinic in 3 months with repeat CBC, CMP, CEA, B12, folate, iron studies and ferritin to be done prior to that  - We will repeat CT of chest abdomen and pelvis around June 2023.    2.  Anemia:  - Due to iron malabsorption from gastric sleeve surgery patient has received intravenous Venofer in December 2021  - Currently on B12 p.o. daily.  Folate level is decreased to 10, recommend starting folic acid p.o. daily  - Anemia work-up: Shows hemoglobin is 11.9.  Iron studies are adequate, no need for any intravenous iron replacement at present    3.  Neutropenia:  - Patient has been having intermittent neutropenia for the  last few years  - Recent CBC shows white blood cell count is 5.44 with absolute neutrophil count of 3.16    4.  History of pulmonary embolism s/p anticoagulation with Coumadin for 1 year in 2016  - We will continue with clinical surveillance    5.  Acute kidney injury:  - Creatinine is elevated at 1.08.  Patient was notified about elevated creatinine and need to increase hydration    6.  Health maintenance: Patient does not smoke    7. Advance Care Planning: For now patient remains full code and is able to make decisions.  Patient has health care surrogate mentioned on chart.                 PHQ-9 Total Score: 2   -Patient is not homicidal or suicidal.  No acute intervention required.    Veronica Wilkinson reports a pain score of 0.  Given her pain assessment as noted, treatment options were discussed and the following options were decided upon as a follow-up plan to address the patient's pain: continuation of current treatment plan for pain.         Mihai Hunt MD  9/14/2022  09:42 CDT        Part of this note may be an electronic transcription/translation of spoken language to printed text using the Dragon Dictation System.          CC:

## 2022-09-16 ENCOUNTER — OFFICE VISIT (OUTPATIENT)
Dept: GASTROENTEROLOGY | Facility: CLINIC | Age: 77
End: 2022-09-16

## 2022-09-16 VITALS
DIASTOLIC BLOOD PRESSURE: 66 MMHG | SYSTOLIC BLOOD PRESSURE: 104 MMHG | HEIGHT: 60 IN | HEART RATE: 89 BPM | WEIGHT: 193.4 LBS | BODY MASS INDEX: 37.97 KG/M2

## 2022-09-16 DIAGNOSIS — Z85.038 HISTORY OF COLON CANCER: Primary | ICD-10-CM

## 2022-09-16 PROCEDURE — 93294 REM INTERROG EVL PM/LDLS PM: CPT | Performed by: INTERNAL MEDICINE

## 2022-09-16 PROCEDURE — 99212 OFFICE O/P EST SF 10 MIN: CPT | Performed by: INTERNAL MEDICINE

## 2022-09-16 PROCEDURE — 93296 REM INTERROG EVL PM/IDS: CPT | Performed by: INTERNAL MEDICINE

## 2022-09-16 RX ORDER — PANTOPRAZOLE SODIUM 40 MG/1
40 TABLET, DELAYED RELEASE ORAL DAILY
Qty: 30 TABLET | Refills: 5 | Status: SHIPPED | OUTPATIENT
Start: 2022-09-16 | End: 2022-12-05

## 2022-09-16 NOTE — PROGRESS NOTES
[facility]      Chief Complaint:   Chief Complaint   Patient presents with   • endo f/u        Subjective     HPI:   Patient with history of adenocarcinoma in the ascending colon.  Patient had this resected no chemotherapy was needed.  Patient for follow-up in 1 year colonoscopy.  1 unit colonoscopy shows no evidence of any further polyps.    Plan; we will have patient follow-up in 3 years for repeat colonoscopy unless otherwise dictated by oncology    Past Medical History:   Past Medical History:   Diagnosis Date   • A-fib (HCC)    • Arthritis    • Asthma    • Bleeding tendency (HCC)    • Chest pain     History of noncardiac chest pain   • Chronic depression    • Colon cancer (HCC)    • COPD (chronic obstructive pulmonary disease) (HCC)    • Depressive disorder    • Diastolic dysfunction    • Dyspnea    • Dyspnea on exertion    • Edema    • Essential hypertension    • Exercise intolerance    • Fatigue    • Gallstones    • GERD (gastroesophageal reflux disease)    • History of blood clots     In Lungs   • History of bone density study 2011    DEXA BONE DENSITY APPENDICULAR 81818 (1) - normal   • History of bone density study 06/24/2015    DXA BONE DENSITY AXIAL 71978 [facility] (1) - Ordered By: TOLU RAMIREZ ([facility])    • History of echocardiogram     Echocardiogram W/ color flow 36395 (2)   • History of mammogram 2013    Mammogram screen (1)   • History of mammogram 2015    MAMMOGRAM SCREENING 95236 - [facility] (1)   • History of screening mammography 06/25/2015    SCREENING MAMMOGRAPHY DIGITAL  (Medicare) (1) - Ordered By: ANDRADE BECERRIL ([facility])    • Hypercalcemia    • Hyperlipidemia    • Influenza vaccine administered 02/25/2016    INFLUENZA IMMUN ADMIN OR PREV RECV'D  (2) - Ordered By: ANDRADE BECERRIL ([facility])    • Long-term (current) use of anticoagulants     coumadin therapy      • Lower extremity edema     Intermittent lower extremity edema   • Obesity, Class III,  "BMI 40-49.9 (morbid obesity) (HCC)     \"Class III obesity\"   • Osteoporosis    • PE (pulmonary embolism) 10/2015    Bilateral PE diagnosed after a car ride to [place]   • Pneumococcal vaccination given 02/25/2016    PNEUMOC VAC/ADMIN/RCVD 4040F (2) - Ordered By: ANDRADE BECERRIL ([facility])    • Right basilic vein fibrosis 2015   • Sedentary lifestyle    • Shortness of breath    • Skin cancer    • Stroke (HCC)    • TIA (transient ischemic attack) 06/30/2022   • Urinary tract infection    • V-tach (HCC)    • Venous thrombosis 2015    right basilic venous thrombosis; This was noted in May 2015.   • Weight gain        Past Surgical History:    Past Surgical History:   Procedure Laterality Date   • CARDIAC ELECTROPHYSIOLOGY PROCEDURE N/A 12/10/2020    Procedure: Pacemaker DC new;  Surgeon: Vesta Bucio MD;  Location: [facility] CATH INVASIVE LOCATION;  Service: Cardiology;  Laterality: N/A;  [facility] [facility]ima.....juanita from [facility]   • CHOLECYSTECTOMY      Cholecystectomy (1)   • COLON SURGERY     • COLONOSCOPY      Approximately 5 years prior as stated per patient   • COLONOSCOPY N/A 06/16/2021    Procedure: COLONOSCOPY;  Surgeon: Rasheed Gibbons MD;  Location: [facility] ENDOSCOPY;  Service: Gastroenterology;  Laterality: N/A;  tattoo at ascneding colon mass   • CRYOABLATION     • ENDOSCOPY  09/15/2011    Colon endoscopy 11788 (2) - Hemorrhoids found.   • ENDOSCOPY N/A 06/16/2021    Procedure: ESOPHAGOGASTRODUODENOSCOPY;  Surgeon: Rasheed Gibbons MD;  Location: [facility] ENDOSCOPY;  Service: Gastroenterology;  Laterality: N/A;   • GASTRIC SLEEVE LAPAROSCOPIC     • GASTRIC SLEEVE LAPAROSCOPIC     • HEMORRHOIDECTOMY     • HYSTEROSCOPY      Hysteroscopy; ablation (1)   • INJECTION OF MEDICATION  09/27/2012    Kenalog (1) - Ordered By: ANDRADE BECERRIL ([facility])    • PACEMAKER IMPLANTATION         Family History:  Family History   Problem Relation Age of Onset   • Ovarian cancer Mother    • Bleeding Disorder Father    • " Other Father         Hematologic disorder   • Lung cancer Father    • Pancreatic cancer Sister    • Cancer Brother    • Lung cancer Brother    • Bleeding Disorder Other    • Hyperlipidemia Other    • Hypertension Other    • Osteoarthritis Other    • Other Other         Gastritis   • Adrenal disorder Daughter    • Seizures Daughter    • Thyroid disease Daughter    • No Known Problems Daughter    • Diabetes Other    • Heart disease Other    • Other Other         Ulcers; Bleeding Tendencies; Allergy   • Cancer Other    • Cholelithiasis Other    • Hypertension Other    • Allergy (severe) Other        Social History:   reports that she has never smoked. She has never used smokeless tobacco. She reports that she does not drink alcohol and does not use drugs.    Medications:   Prior to Admission medications    Medication Sig Start Date End Date Taking? Authorizing Provider   acetaminophen-codeine (TYLENOL with CODEINE #3) 300-30 MG per tablet Take 1 tablet by mouth Every 6 (Six) Hours As Needed. for pain 9/5/22  Yes Annemarie Vega MD   acetaminophen-codeine (TYLENOL with CODEINE #3) 300-30 MG per tablet Take 1 tablet by mouth. 9/5/22  Yes Annemarie Vega MD   apixaban (ELIQUIS) 5 MG tablet tablet Take 1 tablet by mouth Every 12 (Twelve) Hours. Indications: Atrial Fibrillation 8/1/22  Yes Rich Urbina APRN   atorvastatin (LIPITOR) 80 MG tablet Take 1 tablet by mouth Every Night. 7/1/22  Yes Annemarie Vega MD   CALCIUM MAGNESIUM ZINC PO Take 1 tablet by mouth Daily.   Yes ProviderAnnemarie MD   cephalexin (KEFLEX) 500 MG capsule TAKE 1 CAPSULE BY MOUTH 4 TIMES DAILY FOR 7 DAYS. 9/5/22  Yes Annemarie Vega MD   cholecalciferol (VITAMIN D3) 25 MCG (1000 UT) tablet Take 1,000 Units by mouth Daily.   Yes ProviderAnnemarie MD   diphenhydrAMINE-APAP, sleep, (ACETAMINOPHEN PM PO) Take 2 tablets by mouth Every Night.   Yes ProviderAnnemarie MD   olmesartan (BENICAR) 20 MG tablet Take 20 mg  "by mouth Daily. 8/1/22  Yes Annemarie Vega MD   pantoprazole (PROTONIX) 40 MG EC tablet Take 1 tablet by mouth Daily. 9/16/22  Yes Rasheed Gibbons MD   pyridoxine (VITAMIN B-6) 100 MG tablet Take 100 mg by mouth Daily.   Yes Annemarie Vega MD   simethicone (MYLICON,GAS-X) 125 MG capsule capsule Take 1 capsule by mouth Every Night.   Yes Annemarie Vega MD   pantoprazole (PROTONIX) 40 MG EC tablet Take 1 tablet by mouth Daily. 11/29/21 9/16/22 Yes Annemarie Vega MD       Allergies:  Sulfa antibiotics    ROS:    Review of Systems   Constitutional: Negative for activity change, appetite change, chills, diaphoresis, fatigue, fever and unexpected weight change.   HENT: Negative for sore throat and trouble swallowing.    Respiratory: Negative for shortness of breath.    Gastrointestinal: Negative for abdominal distention, abdominal pain, anal bleeding, blood in stool, constipation, diarrhea, nausea, rectal pain and vomiting.   Endocrine: Negative for polydipsia, polyphagia and polyuria.   Genitourinary: Negative for difficulty urinating.   Musculoskeletal: Negative for arthralgias.   Skin: Negative for pallor.   Allergic/Immunologic: Negative for food allergies.   Neurological: Negative for weakness and light-headedness.   Psychiatric/Behavioral: Negative for behavioral problems.     Objective     Blood pressure 104/66, pulse 89, height 152.4 cm (60\"), weight 87.7 kg (193 lb 6.4 oz).    Physical Exam  Constitutional:       General: She is not in acute distress.     Appearance: She is well-developed. She is not diaphoretic.   HENT:      Head: Normocephalic and atraumatic.   Cardiovascular:      Rate and Rhythm: Normal rate and regular rhythm.      Heart sounds: Normal heart sounds. No murmur heard.    No friction rub. No gallop.   Pulmonary:      Effort: No respiratory distress.      Breath sounds: Normal breath sounds. No wheezing or rales.   Chest:      Chest wall: No tenderness.   Abdominal: "      General: Bowel sounds are normal. There is no distension.      Palpations: Abdomen is soft. There is no mass.      Tenderness: There is no abdominal tenderness. There is no guarding or rebound.      Hernia: No hernia is present.   Musculoskeletal:         General: Normal range of motion.   Skin:     General: Skin is warm and dry.      Coloration: Skin is not pale.      Findings: No erythema or rash.   Neurological:      Mental Status: She is alert and oriented to person, place, and time.   Psychiatric:         Behavior: Behavior normal.         Thought Content: Thought content normal.         Judgment: Judgment normal.          Assessment & Plan   Diagnoses and all orders for this visit:    1. History of colon cancer (Primary)    Other orders  -     pantoprazole (PROTONIX) 40 MG EC tablet; Take 1 tablet by mouth Daily.  Dispense: 30 tablet; Refill: 5        * Surgery not found *     Diagnosis Plan   1. History of colon cancer         Anticipated Surgical Procedure:  No orders of the defined types were placed in this encounter.      The risks, benefits, and alternatives of this procedure have been discussed with the patient or the responsible party- the patient understands and agrees to proceed.

## 2022-10-07 ENCOUNTER — TRANSCRIBE ORDERS (OUTPATIENT)
Dept: WOUND CARE | Facility: HOSPITAL | Age: 77
End: 2022-10-07

## 2022-10-07 DIAGNOSIS — S81.812D LACERATION OF LEFT LOWER LEG WITH COMPLICATION, SUBSEQUENT ENCOUNTER: Primary | ICD-10-CM

## 2022-10-17 ENCOUNTER — OFFICE VISIT (OUTPATIENT)
Dept: FAMILY MEDICINE CLINIC | Facility: CLINIC | Age: 77
End: 2022-10-17

## 2022-10-17 VITALS
HEIGHT: 60 IN | SYSTOLIC BLOOD PRESSURE: 100 MMHG | BODY MASS INDEX: 37.76 KG/M2 | DIASTOLIC BLOOD PRESSURE: 60 MMHG | TEMPERATURE: 96.8 F | WEIGHT: 192.3 LBS | RESPIRATION RATE: 18 BRPM | HEART RATE: 73 BPM | OXYGEN SATURATION: 95 %

## 2022-10-17 DIAGNOSIS — M19.90 ARTHRITIS: ICD-10-CM

## 2022-10-17 DIAGNOSIS — F51.01 PRIMARY INSOMNIA: ICD-10-CM

## 2022-10-17 DIAGNOSIS — R09.89 LABILE HYPERTENSION: Primary | ICD-10-CM

## 2022-10-17 DIAGNOSIS — Z23 NEED FOR INFLUENZA VACCINATION: ICD-10-CM

## 2022-10-17 DIAGNOSIS — S81.802D LEG WOUND, LEFT, SUBSEQUENT ENCOUNTER: ICD-10-CM

## 2022-10-17 DIAGNOSIS — D50.8 OTHER IRON DEFICIENCY ANEMIA: Chronic | ICD-10-CM

## 2022-10-17 PROCEDURE — G0008 ADMIN INFLUENZA VIRUS VAC: HCPCS | Performed by: NURSE PRACTITIONER

## 2022-10-17 PROCEDURE — 99214 OFFICE O/P EST MOD 30 MIN: CPT | Performed by: NURSE PRACTITIONER

## 2022-10-17 PROCEDURE — 90662 IIV NO PRSV INCREASED AG IM: CPT | Performed by: NURSE PRACTITIONER

## 2022-10-17 RX ORDER — CYCLOBENZAPRINE HCL 10 MG
TABLET ORAL
Qty: 90 TABLET | Refills: 3 | Status: SHIPPED | OUTPATIENT
Start: 2022-10-17

## 2022-10-17 RX ORDER — MIRTAZAPINE 7.5 MG/1
7.5 TABLET, FILM COATED ORAL NIGHTLY PRN
Qty: 30 TABLET | Refills: 3 | Status: SHIPPED | OUTPATIENT
Start: 2022-10-17 | End: 2022-12-19

## 2022-10-17 NOTE — PROGRESS NOTES
Subjective   Veronica Wilkinson is a 77 y.o. female.     History of Present Illness  CC: Hypertension, iron deficiency anemia, left lower leg wound, arthritis, insomnia  Hypertension  This is a chronic problem. The current episode started more than 1 year ago. The problem is controlled. Associated symptoms include malaise/fatigue. Pertinent negatives include no chest pain, palpitations or shortness of breath. Risk factors for coronary artery disease include family history, dyslipidemia, obesity, post-menopausal state and sedentary lifestyle. Current antihypertension treatment includes angiotensin blockers. The current treatment provides significant improvement. Compliance problems include exercise and diet.  There is no history of angina, kidney disease or CAD/MI.   Anemia  Presents for follow-up visit. Symptoms include malaise/fatigue. There has been no abdominal pain, anorexia, bruising/bleeding easily, confusion, fever, leg swelling, light-headedness, pallor, palpitations, paresthesias, pica or weight loss. Signs of blood loss that are not present include hematemesis, hematochezia, melena, menorrhagia and vaginal bleeding. There are no compliance problems.  Compliance with medications is %.   Wound Check  She was originally treated more than 14 days ago. Previous treatment included laceration repair and oral antibiotics. There has been bloody and clear discharge from the wound. The redness has not changed.   Arthritis  Presents for follow-up visit. She complains of pain and stiffness. The symptoms have been stable. Affected location: generalized. Her pain is at a severity of 4/10. Pertinent negatives include no diarrhea, dysuria, fatigue, fever, rash or weight loss. Compliance with total regimen is %. Compliance with medications is %.   Insomnia  This is a chronic problem. The current episode started more than 1 year ago. The problem occurs daily. Associated symptoms include arthralgias (treated  with PRN flexeril, well controlled with PRN flexeril). Pertinent negatives include no abdominal pain, anorexia, chest pain, chills, congestion, coughing, fatigue, fever, myalgias, nausea, rash, sore throat or vomiting. The symptoms are aggravated by stress. Treatments tried: Melatonin, trazodone, OTC sleep aids. The treatment provided no relief.        The following portions of the patient's history were reviewed and updated as appropriate: allergies, current medications, past family history, past medical history, past social history, past surgical history and problem list.    Review of Systems   Constitutional: Positive for malaise/fatigue. Negative for activity change, appetite change, chills, fatigue, fever, unexpected weight gain and unexpected weight loss.   HENT: Negative for congestion, sore throat, trouble swallowing and voice change.    Eyes: Negative.    Respiratory: Negative for cough, chest tightness, shortness of breath and wheezing.    Cardiovascular: Negative for chest pain, palpitations and leg swelling.   Gastrointestinal: Negative for abdominal pain, anorexia, constipation, diarrhea, hematemesis, hematochezia, melena, nausea and vomiting.   Endocrine: Negative.  Negative for cold intolerance, heat intolerance, polydipsia, polyphagia and polyuria.   Genitourinary: Negative for dysuria, menorrhagia and vaginal bleeding.   Musculoskeletal: Positive for arthralgias (treated with PRN flexeril, well controlled with PRN flexeril), arthritis and stiffness. Negative for myalgias.   Skin: Positive for wound (left lower leg wound, has appt with wound care tomorrow. ). Negative for pallor and rash.   Allergic/Immunologic: Negative.    Neurological: Negative.  Negative for light-headedness, paresthesias and confusion.   Hematological: Negative.  Does not bruise/bleed easily.   Psychiatric/Behavioral: Positive for sleep disturbance. Negative for suicidal ideas and stress. The patient has insomnia.         Objective   Physical Exam  Vitals and nursing note reviewed.   Constitutional:       General: She is not in acute distress.     Appearance: Normal appearance. She is well-developed. She is obese. She is not ill-appearing, toxic-appearing or diaphoretic.   HENT:      Head: Normocephalic and atraumatic.   Eyes:      Conjunctiva/sclera: Conjunctivae normal.   Cardiovascular:      Rate and Rhythm: Normal rate and regular rhythm.      Heart sounds: Normal heart sounds.   Pulmonary:      Effort: Pulmonary effort is normal. No respiratory distress.      Breath sounds: Normal breath sounds. No stridor. No wheezing, rhonchi or rales.   Musculoskeletal:         General: No tenderness. Normal range of motion.      Cervical back: Normal range of motion.   Skin:     General: Skin is warm and dry.      Coloration: Skin is not pale.      Findings: Wound present. No erythema or rash.          Neurological:      Mental Status: She is alert and oriented to person, place, and time.   Psychiatric:         Mood and Affect: Mood normal.         Behavior: Behavior normal.         Thought Content: Thought content normal.         Judgment: Judgment normal.           Assessment & Plan   Diagnoses and all orders for this visit:    1. Labile hypertension (Primary)   -Currently controlled.  Continue to monitor log at home.  Continue Benicar as prescribed.  We will continue to monitor.    2. Other iron deficiency anemia   - Labs reviewed.  We will continue to monitor.    3. Need for influenza vaccination  -     Fluzone High-Dose 65+yrs (4762-4038) tolerated well no adverse reaction.    4. Leg wound, left, subsequent encounter  -     mupirocin (BACTROBAN) 2 % ointment; Apply 1 application topically to the appropriate area as directed 2 (Two) Times a Day for 7 days.  Dispense: 22 g; Refill: 0   - Mupirocin ointment as noted above.  Follow-up with wound care tomorrow as scheduled.  We will continue to monitor.    5. Arthritis  -      Controlled.  Refill, cyclobenzaprine (FLEXERIL) 10 MG tablet; 1/2 to 1 tablet as needed  Dispense: 90 tablet; Refill: 3    6. Primary insomnia  -     mirtazapine (REMERON) 7.5 MG tablet; Take 1 tablet by mouth At Night As Needed (insomnia).  Dispense: 30 tablet; Refill: 3   -Patient has tried trazodone, OTC sleep aids, melatonin with no improvement in symptoms.  We will start a small dose of mirtazapine 7.5 mg daily.  Do not take with other sedating medications or alcohol.  Do not work, drive or operate machinery while taking this medication.  Discontinue for any side effects.  We will continue to monitor.  Patient verbalized understanding of instruction and agrees with plan of care.    7.  Follow-up in 2 months or sooner for any acute needs.            This document has been electronically signed by TABBY Mejia on October 17, 2022 13:46 CDT

## 2022-10-18 ENCOUNTER — OFFICE VISIT (OUTPATIENT)
Dept: WOUND CARE | Facility: HOSPITAL | Age: 77
End: 2022-10-18

## 2022-10-18 PROCEDURE — G0463 HOSPITAL OUTPT CLINIC VISIT: HCPCS

## 2022-10-20 ENCOUNTER — DOCUMENTATION (OUTPATIENT)
Dept: FAMILY MEDICINE CLINIC | Facility: CLINIC | Age: 77
End: 2022-10-20

## 2022-10-20 ENCOUNTER — OFFICE VISIT (OUTPATIENT)
Dept: WOUND CARE | Facility: HOSPITAL | Age: 77
End: 2022-10-20

## 2022-10-20 NOTE — PROGRESS NOTES
Prior authorization for CYCLOBENZAPRINE was approved from 07/20/2022-10/19/2023.  Patient/pharmacy notified.

## 2022-10-24 ENCOUNTER — OFFICE VISIT (OUTPATIENT)
Dept: WOUND CARE | Facility: HOSPITAL | Age: 77
End: 2022-10-24

## 2022-10-24 PROCEDURE — G0463 HOSPITAL OUTPT CLINIC VISIT: HCPCS

## 2022-11-03 DIAGNOSIS — M81.0 SENILE OSTEOPOROSIS: Primary | ICD-10-CM

## 2022-11-29 ENCOUNTER — LAB (OUTPATIENT)
Dept: LAB | Facility: HOSPITAL | Age: 77
End: 2022-11-29

## 2022-11-29 DIAGNOSIS — D50.8 OTHER IRON DEFICIENCY ANEMIA: ICD-10-CM

## 2022-11-29 DIAGNOSIS — Z86.711 HISTORY OF PULMONARY EMBOLISM: ICD-10-CM

## 2022-11-29 DIAGNOSIS — K90.9 IRON MALABSORPTION: ICD-10-CM

## 2022-11-29 DIAGNOSIS — M81.0 SENILE OSTEOPOROSIS: ICD-10-CM

## 2022-11-29 DIAGNOSIS — C18.2 MALIGNANT NEOPLASM OF ASCENDING COLON: ICD-10-CM

## 2022-11-29 DIAGNOSIS — N17.9 AKI (ACUTE KIDNEY INJURY): ICD-10-CM

## 2022-11-29 LAB
ALBUMIN SERPL-MCNC: 3.8 G/DL (ref 3.5–5.2)
ALBUMIN/GLOB SERPL: 1.3 G/DL
ALP SERPL-CCNC: 130 U/L (ref 39–117)
ALT SERPL W P-5'-P-CCNC: 36 U/L (ref 1–33)
ANION GAP SERPL CALCULATED.3IONS-SCNC: 11 MMOL/L (ref 5–15)
AST SERPL-CCNC: 40 U/L (ref 1–32)
BASOPHILS # BLD AUTO: 0.03 10*3/MM3 (ref 0–0.2)
BASOPHILS NFR BLD AUTO: 0.9 % (ref 0–1.5)
BILIRUB SERPL-MCNC: 0.4 MG/DL (ref 0–1.2)
BUN SERPL-MCNC: 15 MG/DL (ref 8–23)
BUN/CREAT SERPL: 15.3 (ref 7–25)
CALCIUM SPEC-SCNC: 9.7 MG/DL (ref 8.6–10.5)
CHLORIDE SERPL-SCNC: 107 MMOL/L (ref 98–107)
CO2 SERPL-SCNC: 23 MMOL/L (ref 22–29)
CREAT SERPL-MCNC: 0.98 MG/DL (ref 0.57–1)
DEPRECATED RDW RBC AUTO: 43.7 FL (ref 37–54)
EGFRCR SERPLBLD CKD-EPI 2021: 60.3 ML/MIN/1.73
EOSINOPHIL # BLD AUTO: 0.08 10*3/MM3 (ref 0–0.4)
EOSINOPHIL NFR BLD AUTO: 2.3 % (ref 0.3–6.2)
ERYTHROCYTE [DISTWIDTH] IN BLOOD BY AUTOMATED COUNT: 13.3 % (ref 12.3–15.4)
FERRITIN SERPL-MCNC: 279.4 NG/ML (ref 13–150)
GLOBULIN UR ELPH-MCNC: 3 GM/DL
GLUCOSE SERPL-MCNC: 83 MG/DL (ref 65–99)
HCT VFR BLD AUTO: 33.8 % (ref 34–46.6)
HGB BLD-MCNC: 11.6 G/DL (ref 12–15.9)
IMM GRANULOCYTES # BLD AUTO: 0.01 10*3/MM3 (ref 0–0.05)
IMM GRANULOCYTES NFR BLD AUTO: 0.3 % (ref 0–0.5)
IRON 24H UR-MRATE: 78 MCG/DL (ref 37–145)
IRON SATN MFR SERPL: 27 % (ref 20–50)
LYMPHOCYTES # BLD AUTO: 1.61 10*3/MM3 (ref 0.7–3.1)
LYMPHOCYTES NFR BLD AUTO: 46.3 % (ref 19.6–45.3)
MCH RBC QN AUTO: 31.1 PG (ref 26.6–33)
MCHC RBC AUTO-ENTMCNC: 34.3 G/DL (ref 31.5–35.7)
MCV RBC AUTO: 90.6 FL (ref 79–97)
MONOCYTES # BLD AUTO: 0.41 10*3/MM3 (ref 0.1–0.9)
MONOCYTES NFR BLD AUTO: 11.8 % (ref 5–12)
NEUTROPHILS NFR BLD AUTO: 1.34 10*3/MM3 (ref 1.7–7)
NEUTROPHILS NFR BLD AUTO: 38.4 % (ref 42.7–76)
NRBC BLD AUTO-RTO: 0 /100 WBC (ref 0–0.2)
PLATELET # BLD AUTO: 273 10*3/MM3 (ref 140–450)
PMV BLD AUTO: 10.1 FL (ref 6–12)
POTASSIUM SERPL-SCNC: 3.6 MMOL/L (ref 3.5–5.2)
PROT SERPL-MCNC: 6.8 G/DL (ref 6–8.5)
RBC # BLD AUTO: 3.73 10*6/MM3 (ref 3.77–5.28)
SODIUM SERPL-SCNC: 141 MMOL/L (ref 136–145)
TIBC SERPL-MCNC: 294 MCG/DL (ref 298–536)
TRANSFERRIN SERPL-MCNC: 197 MG/DL (ref 200–360)
WBC NRBC COR # BLD: 3.48 10*3/MM3 (ref 3.4–10.8)

## 2022-11-29 PROCEDURE — 83540 ASSAY OF IRON: CPT

## 2022-11-29 PROCEDURE — 82607 VITAMIN B-12: CPT

## 2022-11-29 PROCEDURE — 84466 ASSAY OF TRANSFERRIN: CPT

## 2022-11-29 PROCEDURE — 82746 ASSAY OF FOLIC ACID SERUM: CPT

## 2022-11-29 PROCEDURE — 82728 ASSAY OF FERRITIN: CPT

## 2022-11-29 PROCEDURE — 80053 COMPREHEN METABOLIC PANEL: CPT

## 2022-11-29 PROCEDURE — 82378 CARCINOEMBRYONIC ANTIGEN: CPT

## 2022-11-29 PROCEDURE — 85025 COMPLETE CBC W/AUTO DIFF WBC: CPT

## 2022-11-30 ENCOUNTER — INFUSION (OUTPATIENT)
Dept: ONCOLOGY | Facility: HOSPITAL | Age: 77
End: 2022-11-30

## 2022-11-30 VITALS
SYSTOLIC BLOOD PRESSURE: 192 MMHG | RESPIRATION RATE: 18 BRPM | HEART RATE: 65 BPM | TEMPERATURE: 97.8 F | DIASTOLIC BLOOD PRESSURE: 83 MMHG

## 2022-11-30 DIAGNOSIS — M81.0 SENILE OSTEOPOROSIS: Primary | ICD-10-CM

## 2022-11-30 LAB
CEA SERPL-MCNC: 4.33 NG/ML
FOLATE SERPL-MCNC: >20 NG/ML (ref 4.78–24.2)
VIT B12 BLD-MCNC: 487 PG/ML (ref 211–946)

## 2022-11-30 PROCEDURE — 25010000002 DENOSUMAB 60 MG/ML SOLUTION PREFILLED SYRINGE: Performed by: NURSE PRACTITIONER

## 2022-11-30 PROCEDURE — 96372 THER/PROPH/DIAG INJ SC/IM: CPT | Performed by: NURSE PRACTITIONER

## 2022-11-30 RX ADMIN — DENOSUMAB 60 MG: 60 INJECTION SUBCUTANEOUS at 13:54

## 2022-12-05 RX ORDER — PANTOPRAZOLE SODIUM 40 MG/1
TABLET, DELAYED RELEASE ORAL
Qty: 90 TABLET | Refills: 1 | Status: SHIPPED | OUTPATIENT
Start: 2022-12-05 | End: 2022-12-19 | Stop reason: SDUPTHER

## 2022-12-13 ENCOUNTER — TELEPHONE (OUTPATIENT)
Dept: CARDIOLOGY | Facility: CLINIC | Age: 77
End: 2022-12-13

## 2022-12-13 PROCEDURE — 87186 SC STD MICRODIL/AGAR DIL: CPT | Performed by: NURSE PRACTITIONER

## 2022-12-13 PROCEDURE — 87077 CULTURE AEROBIC IDENTIFY: CPT | Performed by: NURSE PRACTITIONER

## 2022-12-13 PROCEDURE — 87086 URINE CULTURE/COLONY COUNT: CPT | Performed by: NURSE PRACTITIONER

## 2022-12-13 NOTE — TELEPHONE ENCOUNTER
Telephoned patient r/t no communication with home monitor. Patient states she had left it at her daughters house but that she would be bringing it home. I asked her to send remote since we haven't had one since 9/6/22. She said she would.

## 2022-12-14 ENCOUNTER — OFFICE VISIT (OUTPATIENT)
Dept: ONCOLOGY | Facility: CLINIC | Age: 77
End: 2022-12-14

## 2022-12-14 VITALS
OXYGEN SATURATION: 98 % | SYSTOLIC BLOOD PRESSURE: 150 MMHG | HEART RATE: 69 BPM | BODY MASS INDEX: 38.45 KG/M2 | TEMPERATURE: 96.6 F | WEIGHT: 196.9 LBS | DIASTOLIC BLOOD PRESSURE: 68 MMHG

## 2022-12-14 DIAGNOSIS — D50.8 OTHER IRON DEFICIENCY ANEMIA: Chronic | ICD-10-CM

## 2022-12-14 DIAGNOSIS — D64.9 ANEMIA, UNSPECIFIED TYPE: ICD-10-CM

## 2022-12-14 DIAGNOSIS — Z85.038 HISTORY OF COLON CANCER: ICD-10-CM

## 2022-12-14 DIAGNOSIS — K90.9 IRON MALABSORPTION: Chronic | ICD-10-CM

## 2022-12-14 DIAGNOSIS — D70.8 OTHER NEUTROPENIA: ICD-10-CM

## 2022-12-14 DIAGNOSIS — C18.9 MALIGNANT NEOPLASM OF COLON, UNSPECIFIED PART OF COLON: Primary | Chronic | ICD-10-CM

## 2022-12-14 DIAGNOSIS — Z86.711 HISTORY OF PULMONARY EMBOLISM: Chronic | ICD-10-CM

## 2022-12-14 PROCEDURE — 99214 OFFICE O/P EST MOD 30 MIN: CPT | Performed by: INTERNAL MEDICINE

## 2022-12-14 PROCEDURE — 1126F AMNT PAIN NOTED NONE PRSNT: CPT | Performed by: INTERNAL MEDICINE

## 2022-12-14 PROCEDURE — G0463 HOSPITAL OUTPT CLINIC VISIT: HCPCS | Performed by: INTERNAL MEDICINE

## 2022-12-14 PROCEDURE — 1123F ACP DISCUSS/DSCN MKR DOCD: CPT | Performed by: INTERNAL MEDICINE

## 2022-12-14 RX ORDER — OLMESARTAN MEDOXOMIL AND HYDROCHLOROTHIAZIDE 20/12.5 20; 12.5 MG/1; MG/1
TABLET ORAL
COMMUNITY
Start: 2022-12-11 | End: 2022-12-14

## 2022-12-14 NOTE — PROGRESS NOTES
DATE OF VISIT: 12/14/2022      REASON FOR VISIT: Adenocarcinoma of ascending colon, anemia, neutropenia, history of pulmonary embolism      HISTORY OF PRESENT ILLNESS:   77-year-old female with medical problem consisting of pulmonary embolism diagnosed in 2016 s/p anticoagulation with Coumadin for 1 year, osteoporosis, depression, history of gastric sleeve surgery done in 2016, neutropenia, anemia, adenocarcinoma of ascending colon diagnosed in June 2021 s/p surgery currently on surveillance is here for follow-up appointment today to discuss recently done blood work.  Complains of fatigue.  Complains of arthralgia.  States she has been diagnosed with urinary tract infection recently and currently taking Macrobid.  States her simvastatin has been increased to 80 mg p.o. daily since last clinic visit.  Denies any new lymph node enlargement.  Denies any new chest pain or shortness of breath.              Past Medical History, Past Surgical History, Social History, Family History have been reviewed and are without significant changes except as mentioned.    Review of Systems   A comprehensive 14 point review of systems was performed and was negative except as mentioned in HPI.    Medications:  The current medication list was reviewed in the EMR    ALLERGIES:    Allergies   Allergen Reactions   • Sulfa Antibiotics Hives and Rash     Sulfa (Sulfonamide Antibiotics)       Objective      Vitals:    12/14/22 1510   BP: 150/68   Pulse: 69   Temp: 96.6 °F (35.9 °C)   TempSrc: Temporal   SpO2: 98%   Weight: 89.3 kg (196 lb 14.4 oz)   PainSc: 0-No pain     Current Status 11/30/2022   ECOG score 0       Physical Exam  Pulmonary:      Breath sounds: Normal breath sounds.   Neurological:      Mental Status: She is alert and oriented to person, place, and time.           RECENT LABS:  Glucose   Date Value Ref Range Status   11/29/2022 83 65 - 99 mg/dL Final     Sodium   Date Value Ref Range Status   11/29/2022 141 136 - 145 mmol/L  Final   10/12/2018 140 134 - 144 mmol/L Final   10/08/2018 141 136 - 144 mmol/L Final     Potassium   Date Value Ref Range Status   11/29/2022 3.6 3.5 - 5.2 mmol/L Final   10/12/2018 4.6 3.5 - 5.1 mmol/L Final   10/08/2018 4.3 3.3 - 4.8 mmol/L Final     Comment:     Serum Potassium Reference Range  = 3.5-5.1  mmol/L     CO2   Date Value Ref Range Status   11/29/2022 23.0 22.0 - 29.0 mmol/L Final     Total CO2   Date Value Ref Range Status   10/08/2018 25 23 - 31 mmol/L Final     Chloride   Date Value Ref Range Status   11/29/2022 107 98 - 107 mmol/L Final   10/12/2018 105 98 - 107 mmol/L Final   10/08/2018 108 (H) 98 - 107 mmol/L Final     Anion Gap   Date Value Ref Range Status   11/29/2022 11.0 5.0 - 15.0 mmol/L Final   10/08/2018 8  Final     Creatinine   Date Value Ref Range Status   11/29/2022 0.98 0.57 - 1.00 mg/dL Final   10/12/2018 0.9 0.6 - 1.3 mg/dL Final     Comment:     **The MDRD GFR formula is valid only for ages 18-70.    GFR will not be reported for individuals aging <18 or >70.     BUN   Date Value Ref Range Status   11/29/2022 15 8 - 23 mg/dL Final   10/12/2018 13 7 - 18 mg/dL Final     BUN/Creatinine Ratio   Date Value Ref Range Status   11/29/2022 15.3 7.0 - 25.0 Final     Calcium   Date Value Ref Range Status   11/29/2022 9.7 8.6 - 10.5 mg/dL Final   10/12/2018 9.7 8.8 - 10.5 mg/dL Final     eGFR Non  Amer   Date Value Ref Range Status   02/14/2022 57 (L) >60 mL/min/1.73 Final     Alkaline Phosphatase   Date Value Ref Range Status   11/29/2022 130 (H) 39 - 117 U/L Final   10/12/2018 115 46 - 116 U/L Final     Total Protein   Date Value Ref Range Status   11/29/2022 6.8 6.0 - 8.5 g/dL Final   10/12/2018 6.6 6.4 - 8.2 g/dL Final     ALT (SGPT)   Date Value Ref Range Status   11/29/2022 36 (H) 1 - 33 U/L Final   10/12/2018 43 30 - 65 U/L Final     AST (SGOT)   Date Value Ref Range Status   11/29/2022 40 (H) 1 - 32 U/L Final   10/12/2018 22 15 - 37 U/L Final     Total Bilirubin   Date Value  Ref Range Status   11/29/2022 0.4 0.0 - 1.2 mg/dL Final   10/12/2018 0.3 0 - 1.0 mg/dL Final     Albumin   Date Value Ref Range Status   11/29/2022 3.80 3.50 - 5.20 g/dL Final   10/12/2018 3.2 (L) 3.4 - 5.0 g/dL Final     Globulin   Date Value Ref Range Status   11/29/2022 3.0 gm/dL Final     Lab Results   Component Value Date    WBC 3.48 11/29/2022    HGB 11.6 (L) 11/29/2022    HCT 33.8 (L) 11/29/2022    MCV 90.6 11/29/2022     11/29/2022     Lab Results   Component Value Date    NEUTROABS 1.34 (L) 11/29/2022    IRON 78 11/29/2022    IRON 87 09/13/2022    IRON 96 05/23/2022    TIBC 294 (L) 11/29/2022    TIBC 262 (L) 09/13/2022    TIBC 323 05/23/2022    LABIRON 27 11/29/2022    LABIRON 33 09/13/2022    LABIRON 30 05/23/2022    FERRITIN 279.40 (H) 11/29/2022    FERRITIN 438.00 (H) 09/13/2022    FERRITIN 365.50 (H) 05/23/2022    [id number] 487 11/29/2022    [id number] 646 09/13/2022    [id number] 443 06/30/2022    FOLATE >20.00 11/29/2022    FOLATE 10.30 09/13/2022    FOLATE 18.50 05/23/2022     Lab Results   Component Value Date    AFPTM 142 12/05/2018    CEA 4.33 11/29/2022         PATHOLOGY:  * Cannot find OR log *         RADIOLOGY DATA :  No radiology results for the last 7 days        Assessment & Plan     1.  Adenocarcinoma of ascending colon, stage III, T3 N0 M0 MSI stable:  - Patient has surgery done in June 2021 and has been on surveillance since then  - Recent CEA level is 4.3.  - Colonoscopy on September 12, 2022 was negative for recurrent  - We will continue with clinical surveillance for now  - We will have patient return to clinic in 3 months with repeat CBC, CMP, CEA, B12, folate, iron studies and ferritin to be done prior to that.  - We will repeat CT of chest, abdomen and pelvis around June 2023    2.  Anemia:  - Due to iron malabsorption from gastric sleeve surgery, patient received Venofer in December 2021  - Currently on B12 and folic acid p.o. daily  - Recent anemia work-up shows  hemoglobin is 11.6.  Iron studies are adequate.  No need for any intravenous iron replacement  -Recommend continuing with B12 and folic acid p.o. daily    3.  Neutropenia:  - Patient has been having intermittent neutropenia for the last few years  - Recent CBC shows white blood cell count is 3.48 with ANC of 1.34.  Will monitor with CBC    4.  History of pulmonary embolism s/p anticoagulation with Coumadin for 1 year in 2016  - We will continue with clinical surveillance    5.  Elevated liver function test:  - Questionable etiology AST and ALT are borderline elevated.  Patient was notified about elevated liver function test and was counseled about avoiding any hepatotoxic medication like excess amount of Tylenol or alcohol.  - Patient is currently on simvastatin 80 mg p.o. daily that might be contributing to elevated liver function test as well  - Recommend following up with primary medical provider with recheck of CMP in about 3 to 4 weeks from now      6.  Health maintenance: Patient does not smoke    7. Advance Care Planning: For now patient remains full code and is able to make decisions.  Patient has health care surrogate mentioned on chart.                 PHQ-9 Total Score: 0   -Patient is not homicidal or suicidal.  No acute intervention required.    Veronica Wilkinson reports a pain score of 0.  Given her pain assessment as noted, treatment options were discussed and the following options were decided upon as a follow-up plan to address the patient's pain: continuation of current treatment plan for pain.         Mihai Hunt MD  12/14/2022  15:15 CST        Part of this note may be an electronic transcription/translation of spoken language to printed text using the Dragon Dictation System.          CC:

## 2022-12-19 ENCOUNTER — OFFICE VISIT (OUTPATIENT)
Dept: FAMILY MEDICINE CLINIC | Facility: CLINIC | Age: 77
End: 2022-12-19

## 2022-12-19 VITALS
WEIGHT: 196.2 LBS | BODY MASS INDEX: 38.52 KG/M2 | TEMPERATURE: 98 F | HEIGHT: 60 IN | HEART RATE: 62 BPM | DIASTOLIC BLOOD PRESSURE: 72 MMHG | OXYGEN SATURATION: 97 % | SYSTOLIC BLOOD PRESSURE: 118 MMHG | RESPIRATION RATE: 18 BRPM

## 2022-12-19 DIAGNOSIS — E78.2 MIXED HYPERLIPIDEMIA: ICD-10-CM

## 2022-12-19 DIAGNOSIS — R09.89 LABILE HYPERTENSION: ICD-10-CM

## 2022-12-19 DIAGNOSIS — M81.0 AGE-RELATED OSTEOPOROSIS WITHOUT CURRENT PATHOLOGICAL FRACTURE: ICD-10-CM

## 2022-12-19 DIAGNOSIS — Z00.00 MEDICARE ANNUAL WELLNESS VISIT, SUBSEQUENT: Primary | ICD-10-CM

## 2022-12-19 DIAGNOSIS — F51.01 PRIMARY INSOMNIA: ICD-10-CM

## 2022-12-19 DIAGNOSIS — Z12.31 SCREENING MAMMOGRAM, ENCOUNTER FOR: ICD-10-CM

## 2022-12-19 DIAGNOSIS — K21.9 GASTROESOPHAGEAL REFLUX DISEASE WITHOUT ESOPHAGITIS: ICD-10-CM

## 2022-12-19 PROCEDURE — 1126F AMNT PAIN NOTED NONE PRSNT: CPT | Performed by: NURSE PRACTITIONER

## 2022-12-19 PROCEDURE — G0439 PPPS, SUBSEQ VISIT: HCPCS | Performed by: NURSE PRACTITIONER

## 2022-12-19 PROCEDURE — 1170F FXNL STATUS ASSESSED: CPT | Performed by: NURSE PRACTITIONER

## 2022-12-19 PROCEDURE — 1159F MED LIST DOCD IN RCRD: CPT | Performed by: NURSE PRACTITIONER

## 2022-12-19 RX ORDER — ATORVASTATIN CALCIUM 80 MG/1
80 TABLET, FILM COATED ORAL NIGHTLY
Qty: 90 TABLET | Refills: 3 | Status: SHIPPED | OUTPATIENT
Start: 2022-12-19

## 2022-12-19 RX ORDER — OLMESARTAN MEDOXOMIL 20 MG/1
20 TABLET ORAL DAILY
Qty: 90 TABLET | Refills: 3 | Status: SHIPPED | OUTPATIENT
Start: 2022-12-19 | End: 2023-03-24 | Stop reason: DRUGHIGH

## 2022-12-19 RX ORDER — PANTOPRAZOLE SODIUM 40 MG/1
40 TABLET, DELAYED RELEASE ORAL DAILY
Qty: 90 TABLET | Refills: 3 | Status: SHIPPED | OUTPATIENT
Start: 2022-12-19

## 2022-12-19 NOTE — PATIENT INSTRUCTIONS
Medicare Wellness  Personal Prevention Plan of Service     Date of Office Visit:    Encounter Provider:  TABBY Mejia  Place of Service:  [facility] - [place]  Patient Name: Veronica Wilkinson  :  1945    As part of the Medicare Wellness portion of your visit today, we are providing you with this personalized preventive plan of services (PPPS). This plan is based upon recommendations of the [place] Preventive Services Task Force (USPSTF) and the Advisory Committee on Immunization Practices (ACIP).    This lists the preventive care services that should be considered, and provides dates of when you are due. Items listed as completed are up-to-date and do not require any further intervention.    Health Maintenance   Topic Date Due    ZOSTER VACCINE (1 of 2) Never done    ANNUAL WELLNESS VISIT  2021    COVID-19 Vaccine (4 - Booster for Moderna series) 2022    DXA SCAN  08/10/2022    MAMMOGRAM  2022    LIPID PANEL  2023    COLONOSCOPY  2023    TDAP/TD VACCINES (3 - Td or Tdap) 2032    HEPATITIS C SCREENING  Completed    INFLUENZA VACCINE  Completed    Pneumococcal Vaccine 65+  Completed    FECAL OCCULT BLOOD TEST  Discontinued       No orders of the defined types were placed in this encounter.      Return in about 6 months (around 2023), or if symptoms worsen or fail to improve, for Recheck.

## 2022-12-19 NOTE — PROGRESS NOTES
The ABCs of the Annual Wellness Visit  Subsequent Medicare Wellness Visit    Subjective      Veronica Wilkinson is a 77 y.o. female who presents for a Subsequent Medicare Wellness Visit.    The following portions of the patient's history were reviewed and   updated as appropriate: allergies, current medications, past family history, past medical history, past social history, past surgical history and problem list.    Compared to one year ago, the patient feels her physical   health is the same.    Compared to one year ago, the patient feels her mental   health is the same.    Recent Hospitalizations:  This patient has had a [facility] admission record on file within the last 365 days.    Current Medical Providers:  Patient Care Team:  Rich Urbina APRN as PCP - General (Nurse Practitioner)  Mihai Hunt MD as Consulting Physician (Hematology and Oncology)  Velia Kessler MA as Medical Assistant  Liza Padilla APRN as Nurse Practitioner (Oncology)    Outpatient Medications Prior to Visit   Medication Sig Dispense Refill   • apixaban (ELIQUIS) 5 MG tablet tablet Take 1 tablet by mouth Every 12 (Twelve) Hours. Indications: Atrial Fibrillation 180 tablet 2   • CALCIUM MAGNESIUM ZINC PO Take 1 tablet by mouth Daily.     • cholecalciferol (VITAMIN D3) 25 MCG (1000 UT) tablet Take 1,000 Units by mouth Daily.     • cyclobenzaprine (FLEXERIL) 10 MG tablet 1/2 to 1 tablet as needed 90 tablet 3   • diphenhydrAMINE-APAP, sleep, (ACETAMINOPHEN PM PO) Take 2 tablets by mouth Every Night.     • pyridoxine (VITAMIN B-6) 100 MG tablet Take 100 mg by mouth Daily.     • simethicone (MYLICON,GAS-X) 125 MG capsule capsule Take 1 capsule by mouth Every Night.     • atorvastatin (LIPITOR) 80 MG tablet Take 1 tablet by mouth Every Night.     • mirtazapine (REMERON) 7.5 MG tablet Take 1 tablet by mouth At Night As Needed (insomnia). 30 tablet 3   • olmesartan (BENICAR) 20 MG tablet Take 20 mg by mouth Daily.     •  "pantoprazole (PROTONIX) 40 MG EC tablet TAKE 1 TABLET DAILY 90 tablet 1   • nitrofurantoin, macrocrystal-monohydrate, (MACROBID) 100 MG capsule Take 1 capsule by mouth 2 (Two) Times a Day for 5 days. 10 capsule 0     No facility-administered medications prior to visit.       No opioid medication identified on active medication list. I have reviewed chart for other potential  high risk medication/s and harmful drug interactions in the elderly.          Aspirin is not on active medication list.  Aspirin use is not indicated based on review of current medical condition/s. Risk of harm outweighs potential benefits.  .    Patient Active Problem List   Diagnosis   • Essential hypertension   • Hyperlipidemia   • History of pulmonary embolism   • Primary hyperparathyroidism (HCC)   • Pulmonary embolism (HCC)   • Long-term (current) use of anticoagulants   • Easy bruising   • Anemia   • Nephrolithiasis   • Osteoporosis without current pathological fracture   • Vitamin D deficiency   • Other neutropenia (HCC)   • History of bariatric surgery   • Postmenopausal   • Symptomatic sinus bradycardia   • Presence of cardiac pacemaker   • 1st degree AV block   • Left lateral abdominal pain   • Personal history of colonic polyps   • Iron deficiency anemia   • Malignant neoplasm of colon (HCC)   • Iron malabsorption   • TIA (transient ischemic attack)   • Paroxysmal atrial fibrillation (HCC)   • History of colon cancer   • Senile osteoporosis     Advance Care Planning  Advance Directive is not on file.  ACP discussion was held with the patient during this visit. Patient does not have an advance directive, declines further assistance.     Objective    Vitals:    12/19/22 1019   BP: 118/72   BP Location: Left arm   Patient Position: Sitting   Cuff Size: Large Adult   Pulse: 62   Resp: 18   Temp: 98 °F (36.7 °C)   TempSrc: Tympanic   SpO2: 97%   Weight: 89 kg (196 lb 3.2 oz)   Height: 152.4 cm (60\")   PainSc: 0-No pain     Estimated body " "mass index is 38.32 kg/m² as calculated from the following:    Height as of this encounter: 152.4 cm (60\").    Weight as of this encounter: 89 kg (196 lb 3.2 oz).    Class 2 Severe Obesity (BMI >=35 and <=39.9). Obesity-related health conditions include the following: hypertension. Obesity is unchanged. BMI is is above average; BMI management plan is completed. We discussed portion control and increasing exercise.      Does the patient have evidence of cognitive impairment?   No            HEALTH RISK ASSESSMENT    Smoking Status:  Social History     Tobacco Use   Smoking Status Never   Smokeless Tobacco Never     Alcohol Consumption:  Social History     Substance and Sexual Activity   Alcohol Use No     Fall Risk Screen:    STEADI Fall Risk Assessment was completed, and patient is at LOW risk for falls.Assessment completed on:12/19/2022    Depression Screening:  PHQ-2/PHQ-9 Depression Screening 12/19/2022   Retired PHQ-9 Total Score -   Retired Total Score -   Little Interest or Pleasure in Doing Things 0-->not at all   Feeling Down, Depressed or Hopeless 0-->not at all   PHQ-9: Brief Depression Severity Measure Score 0       Health Habits and Functional and Cognitive Screening:  Functional & Cognitive Status 12/19/2022   Do you have difficulty preparing food and eating? No   Do you have difficulty bathing yourself, getting dressed or grooming yourself? No   Do you have difficulty using the toilet? No   Do you have difficulty moving around from place to place? No   Do you have trouble with steps or getting out of a bed or a chair? No   Current Diet Unhealthy Diet   Dental Exam Up to date   Eye Exam Up to date   Exercise (times per week) 0 times per week   Current Exercises Include No Regular Exercise   Do you need help using the phone?  No   Are you deaf or do you have serious difficulty hearing?  No   Do you need help with transportation? No   Do you need help shopping? No   Do you need help preparing meals?  No "   Do you need help with housework?  No   Do you need help with laundry? No   Do you need help taking your medications? No   Do you need help managing money? No   Do you ever drive or ride in a car without wearing a seat belt? No   Have you felt unusual stress, anger or loneliness in the last month? No   Who do you live with? Alone   If you need help, do you have trouble finding someone available to you? No   Have you been bothered in the last four weeks by sexual problems? No   Do you have difficulty concentrating, remembering or making decisions? No       Age-appropriate Screening Schedule:  Refer to the list below for future screening recommendations based on patient's age, sex and/or medical conditions. Orders for these recommended tests are listed in the plan section. The patient has been provided with a written plan.    Health Maintenance   Topic Date Due   • ZOSTER VACCINE (1 of 2) Never done   • DXA SCAN  08/10/2022   • MAMMOGRAM  09/25/2022   • LIPID PANEL  07/01/2023   • COLONOSCOPY  09/12/2023   • TDAP/TD VACCINES (3 - Td or Tdap) 09/05/2032   • INFLUENZA VACCINE  Completed   • FECAL OCCULT BLOOD TEST  Discontinued                CMS Preventative Services Quick Reference  Risk Factors Identified During Encounter:    Immunizations Discussed/Encouraged: Shingrix and COVID19  Inactivity/Sedentary: Patient was advised to exercise at least 150 minutes a week per CDC recommendations.  Polypharmacy: Medication List reviewed    The above risks/problems have been discussed with the patient.  Pertinent information has been shared with the patient in the After Visit Summary.    Diagnoses and all orders for this visit:    1. Medicare annual wellness visit, subsequent (Primary)    2. Labile hypertension  -     olmesartan (BENICAR) 20 MG tablet; Take 1 tablet by mouth Daily.  Dispense: 90 tablet; Refill: 3   -Controlled.  Continue Benicar as prescribed.  We will continue to monitor.    3. Primary insomnia   -Stable with  Tylenol PM.  We will continue to monitor.    4. Mixed hyperlipidemia  -    Controlled.  Refill, atorvastatin (LIPITOR) 80 MG tablet; Take 1 tablet by mouth Every Night.  Dispense: 90 tablet; Refill: 3    5. Gastroesophageal reflux disease without esophagitis  -    Controlled.  Refill pantoprazole (PROTONIX) 40 MG EC tablet; Take 1 tablet by mouth Daily.  Dispense: 90 tablet; Refill: 3    6. Age-related osteoporosis without current pathological fracture  -     DEXA Bone Density Axial; Future, will call with results    7. Screening mammogram, encounter for  -     Mammo Screening Digital Tomosynthesis Bilateral With CAD; Future, will call with results    8.  Follow-up in 3 months or sooner for any acute needs.        Follow Up:   Next Medicare Wellness visit to be scheduled in 1 year.      An After Visit Summary and PPPS were made available to the patient.                This document has been electronically signed by TABBY Mejia on December 19, 2022 11:08 CST

## 2022-12-30 ENCOUNTER — OFFICE VISIT (OUTPATIENT)
Dept: CARDIOLOGY | Facility: CLINIC | Age: 77
End: 2022-12-30

## 2022-12-30 VITALS
HEIGHT: 60 IN | BODY MASS INDEX: 39.07 KG/M2 | DIASTOLIC BLOOD PRESSURE: 76 MMHG | OXYGEN SATURATION: 99 % | SYSTOLIC BLOOD PRESSURE: 122 MMHG | HEART RATE: 60 BPM | WEIGHT: 199 LBS

## 2022-12-30 DIAGNOSIS — I10 ESSENTIAL HYPERTENSION: Primary | ICD-10-CM

## 2022-12-30 PROCEDURE — 99214 OFFICE O/P EST MOD 30 MIN: CPT | Performed by: INTERNAL MEDICINE

## 2022-12-30 PROCEDURE — 93000 ELECTROCARDIOGRAM COMPLETE: CPT | Performed by: INTERNAL MEDICINE

## 2022-12-30 NOTE — PROGRESS NOTES
[facility] Cardiology  OFFICE NOTE    Cardiovascular Medicine  Linnette Peterson M.D., RPVI         No referring provider defined for this encounter.    Thank you for asking me to see Veronica Wilkinson for palpitations.    History of Present Illness  This is a 77 y.o. female with:    1.  Attention  2.  Hyperlipidemia  3.  PE   4 hyperlipidemia  5.  Atrial fibrillation  6.  Sick sinus syndrome status post permanent pacemaker    Veronica Wilkinson is a 77 y.o. female who presents for consultation today.  Patient is here for further evaluation for sinus bradycardia and intermittent palpitations.  Patient has vague symptoms of hiccups followed by shortness of breath lasting for a couple of seconds.  She also has occasional palpitations early in the morning.  Was also last for several seconds.  Palpitations are not associated with chest pain, shortness of breath, lightheadedness.  Syncopal episode almost 10 years ago when she was started on her antihypertensives initially however currently she is denying any syncopal episodes.  She has a prior history of pulmonary embolism.  She denies any chest pain on exertion.  Is been previously screened for sleep apnea  Does have history of gastric sleeve and almost 100 pounds weight loss after that.  He sleeps in recliner because of her arthritis.  Denying any orthopnea PND or lower extremity swelling.  Also significant osteoporosis and worried about falls.    Continues to have intermittent lightheadedness and dizziness and palpitations.  No loss of consciousness.  Patient had a Holter monitor which showed symptomatic sinus bradycardia with intermittent junctional rhythm and chronotropic incompetence.  Subsequently underwent dual-chamber pacemaker placement.  Echocardiogram showed preserved LV systolic function.    Patient was lost to follow-up since her pacemaker.  She had a pacemaker interrogation done yesterday which showed an episode of atrial fibrillation lasting  for about 1 hour and 35 minutes.    He has been doing well.  Denies any palpitations.  Did have report of dizziness when she was sitting for a long period of time but did not lose consciousness.  No associated palpitations at that time.    12/30/2022:  After she was seen in office last week, seemed she had symptoms of TIA presented to the ER was admitted to the hospital and neurology was consulted, MRI was without any acute intracranial abnormalities.  Patient did report occasional palpitations when she drinks too much caffeine.  Denying any chest pain.  Has occasional dizziness.  And fatigue.      Review of Systems - ROS  Constitution: Negative for weakness, weight gain and weight loss.   HENT: Negative for congestion.    Eyes: Negative for blurred vision.   Cardiovascular: As mentioned above  Respiratory: Negative for cough and hemoptysis.    Endocrine: Negative for polydipsia and polyuria.   Hematologic/Lymphatic: Negative for bleeding problem. Does not bruise/bleed easily.   Skin: Negative for flushing.   Musculoskeletal: Negative for neck pain and stiffness.   Gastrointestinal: Negative for abdominal pain, diarrhea, jaundice, melena, nausea and vomiting.   Genitourinary: Negative for dysuria and hematuria.   Neurological: Negative for dizziness, focal weakness and numbness.   Psychiatric/Behavioral: Negative for altered mental status and depression.     I reviewed the ROS as documented here and confirmed the accuracy of it with the patient today. 12/30/2022        All other systems were reviewed and were negative.    family history includes Adrenal disorder in her daughter; Allergy (severe) in an other family member; Bleeding Disorder in her father and another family member; Cancer in her brother and another family member; Cholelithiasis in an other family member; Diabetes in an other family member; Heart disease in an other family member; Hyperlipidemia in an other family member; Hypertension in some other  "family members; Lung cancer in her brother and father; No Known Problems in her daughter; Osteoarthritis in an other family member; Other in her father and other family members; Ovarian cancer in her mother; Pancreatic cancer in her sister; Seizures in her daughter; Thyroid disease in her daughter.     reports that she has never smoked. She has never used smokeless tobacco. She reports that she does not drink alcohol and does not use drugs.    Allergies   Allergen Reactions   • Sulfa Antibiotics Hives and Rash     Sulfa (Sulfonamide Antibiotics)         Current Outpatient Medications:   •  apixaban (ELIQUIS) 5 MG tablet tablet, Take 1 tablet by mouth Every 12 (Twelve) Hours. Indications: Atrial Fibrillation, Disp: 180 tablet, Rfl: 2  •  atorvastatin (LIPITOR) 80 MG tablet, Take 1 tablet by mouth Every Night., Disp: 90 tablet, Rfl: 3  •  CALCIUM MAGNESIUM ZINC PO, Take 1 tablet by mouth Daily., Disp: , Rfl:   •  cholecalciferol (VITAMIN D3) 25 MCG (1000 UT) tablet, Take 1,000 Units by mouth Daily., Disp: , Rfl:   •  cyclobenzaprine (FLEXERIL) 10 MG tablet, 1/2 to 1 tablet as needed, Disp: 90 tablet, Rfl: 3  •  diphenhydrAMINE-APAP, sleep, (ACETAMINOPHEN PM PO), Take 2 tablets by mouth Every Night., Disp: , Rfl:   •  olmesartan (BENICAR) 20 MG tablet, Take 1 tablet by mouth Daily., Disp: 90 tablet, Rfl: 3  •  pantoprazole (PROTONIX) 40 MG EC tablet, Take 1 tablet by mouth Daily., Disp: 90 tablet, Rfl: 3  •  pyridoxine (VITAMIN B-6) 100 MG tablet, Take 100 mg by mouth Daily., Disp: , Rfl:   •  simethicone (MYLICON,GAS-X) 125 MG capsule capsule, Take 1 capsule by mouth Every Night., Disp: , Rfl:     Physical Exam:  Vitals:    12/30/22 1011   BP: 122/76   BP Location: Left arm   Patient Position: Sitting   Cuff Size: Adult   Pulse: 60   SpO2: 99%   Weight: 90.3 kg (199 lb)   Height: 152.4 cm (60\")   PainSc: 0-No pain     Current Pain Level: none  Pulse Ox: Normal  on room air  General: alert, appears stated age and " cooperative     Body Habitus: well-nourished    HEENT: Head: Normocephalic, no lesions, without obvious abnormality. No arcus senilis, xanthelasma or xanthomas.    Neuro: alert, oriented x3  Pulses: 2+ and symmetric  JVP: Volume/Pulsation: Normal.  Normal waveforms.   Appropriate inspiratory decrease.  No Kussmaul's. No Daniel's.   Carotid Exam: no bruit normal pulsation bilaterally   Carotid Volume: normal.     Respirations: no increased work of breathing   Chest:  Normal    Pulmonary:Normal   Precordium: Normal impulses. P2 is not palpable.  RV Heave: absent  LV Heave: absent  Nunez:  normal size and placement  Palpable S4: absent.  Heart rate: normal    Heart Rhythm: regular     Heart Sounds: S1: normal  S2: normal  S3: absent   S4: absent  Opening Snap: absent    Pericardial Rub:  Absent: .    Abdomen:   Appearance: normal .  Palpation: Soft, non-tender to palpation, bowel sounds positive in all four quadrants; no guarding or rebound tenderness  Extremity: no edema.   LE Skin: no rashes  LE Hair:  normal  LE Pulses: well perfused with normal pulses in the distal extremities  Pallor on elevation: Absent. Rubor on dependency: None    I have reexamined the patient and the results are consistent with the previously documented exam. Linnette Peterson MD     DATA REVIEWED:     EKG. I personally reviewed and interpreted the EKG.  Sinus bradycardia.  Low voltage.  Possible lateral infarct old.    ECG/EMG Results (all)     Procedure Component Value Units Date/Time    ECG 12 Lead [[id number]] Collected:  09/01/20 0746     Updated:  09/01/20 0847    Narrative:       Test Reason : palpitations  Blood Pressure : **/** mmHG  Vent. Rate : 049 BPM     Atrial Rate : 049 BPM     P-R Int : 214 ms          QRS Dur : 098 ms      QT Int : 430 ms       P-R-T Axes : 009 -15 019 degrees     QTc Int : 388 ms    Sinus bradycardia with 1st degree AV block  Low voltage QRS  Possible Lateral infarct (cited on or before 08-JUN-2015)  Abnormal  ECG  When compared with ECG of 06-SEP-2018 14:19,  Questionable change in initial forces of Anterior leads    Referred By:             Confirmed By:           ----------------------------------------------------      --------------------------------------------------------------------------------------------------  LABS:     The 10-year CVD risk score (Arina et al., 2008) is: 12.2%    Values used to calculate the score:      Age: 75 years      Sex: Female      Diabetic: No      Tobacco smoker: No      Systolic Blood Pressure: 135 mmHg      Is BP treated: Yes      HDL Cholesterol: 82 mg/dL      Total Cholesterol: 201 mg/dL         Lab Results   Component Value Date    GLUCOSE 83 11/29/2022    BUN 15 11/29/2022    CREATININE 0.98 11/29/2022    EGFRIFNONA 57 (L) 02/14/2022    BCR 15.3 11/29/2022    K 3.6 11/29/2022    CO2 23.0 11/29/2022    CALCIUM 9.7 11/29/2022    ALBUMIN 3.80 11/29/2022    AST 40 (H) 11/29/2022    ALT 36 (H) 11/29/2022     Lab Results   Component Value Date    WBC 3.48 11/29/2022    HGB 11.6 (L) 11/29/2022    HCT 33.8 (L) 11/29/2022    MCV 90.6 11/29/2022     11/29/2022     Lab Results   Component Value Date    CHOL 198 07/01/2022    CHLPL 205 (H) 02/25/2016    TRIG 63 07/01/2022    HDL 78 (H) 07/01/2022     (H) 07/01/2022     Lab Results   Component Value Date    TSH 2.410 06/30/2022     Lab Results   Component Value Date    CKTOTAL 43 06/30/2022    CKMB 1.35 09/06/2018    TROPONINI <0.012 09/06/2018    TROPONINT <0.010 06/30/2022     Lab Results   Component Value Date    HGBA1C 5.20 07/01/2022     No results found for: DDIMER  Lab Results   Component Value Date    ALT 36 (H) 11/29/2022     Lab Results   Component Value Date    HGBA1C 5.20 07/01/2022    HGBA1C 4.60 (L) 06/08/2021    HGBA1C 5.03 04/24/2019     Lab Results   Component Value Date    CREATININE 0.98 11/29/2022     Lab Results   Component Value Date    IRON 78 11/29/2022    TIBC 294 (L) 11/29/2022    FERRITIN 279.40  (H) 11/29/2022     Lab Results   Component Value Date    INR 1.11 06/30/2022    INR 1.05 12/10/2020    INR 1.14 09/06/2018    PROTIME 14.1 06/30/2022    PROTIME 14.1 12/10/2020    PROTIME 14.3 09/06/2018       Assessment/Plan     1.  Sick sinus syndrome:  Holter monitor with junctional rhythm, chronotropic incompetence symptomatic bradycardia.  Now status post dual-chamber pacemaker.  TSH was normal  Echocardiogram showed preserved LV systolic function grade 1 diastolic dysfunction.  Device interrogation showed 55% paced rhythm.  Intermittent A. fib noted, infrequent palpitation we will consider adding metoprolol.    2.  Atrial fibrillation:   detected on pacemaker interrogation longest episode was 1 hour 35 minutes.  [id number] score is 3 (age, sex, hypertension)  Currently in sinus rhythm  On Eliquis now.  Did have symptoms of TIA last summer.    2.  Hypertension:  She is on olmesartan/hydrochlorothiazide 20/12.5    3.  Hyperlipidemia: On simvastatin per primary care physician.      Prevention:  Patient's Body mass index is 38.86 kg/m². BMI is above normal parameters. Recommendations include: exercise counseling and nutrition counseling.      Veronica Wilkinson  reports that she has never smoked. She has never used smokeless tobacco..   AAA Screening:             This document has been electronically signed by Linnette Peterson MD on December 30, 2022 11:02 CST

## 2023-01-06 LAB
QT INTERVAL: 376 MS
QTC INTERVAL: 376 MS

## 2023-01-09 ENCOUNTER — OFFICE VISIT (OUTPATIENT)
Dept: ORTHOPEDIC SURGERY | Facility: CLINIC | Age: 78
End: 2023-01-09
Payer: MEDICARE

## 2023-01-09 VITALS — WEIGHT: 193 LBS | HEIGHT: 60 IN | BODY MASS INDEX: 37.89 KG/M2

## 2023-01-09 DIAGNOSIS — M54.2 NECK PAIN: ICD-10-CM

## 2023-01-09 DIAGNOSIS — M81.0 AGE-RELATED OSTEOPOROSIS WITHOUT CURRENT PATHOLOGICAL FRACTURE: Primary | ICD-10-CM

## 2023-01-09 DIAGNOSIS — Z78.0 POSTMENOPAUSAL: ICD-10-CM

## 2023-01-09 DIAGNOSIS — Z98.84 HISTORY OF BARIATRIC SURGERY: ICD-10-CM

## 2023-01-09 DIAGNOSIS — Z86.39 HISTORY OF VITAMIN D DEFICIENCY: ICD-10-CM

## 2023-01-09 PROCEDURE — 99214 OFFICE O/P EST MOD 30 MIN: CPT | Performed by: NURSE PRACTITIONER

## 2023-01-09 PROCEDURE — 96372 THER/PROPH/DIAG INJ SC/IM: CPT | Performed by: NURSE PRACTITIONER

## 2023-01-09 RX ORDER — TRIAMCINOLONE ACETONIDE 40 MG/ML
80 INJECTION, SUSPENSION INTRA-ARTICULAR; INTRAMUSCULAR ONCE
Status: COMPLETED | OUTPATIENT
Start: 2023-01-09 | End: 2023-01-09

## 2023-01-09 RX ADMIN — TRIAMCINOLONE ACETONIDE 80 MG: 40 INJECTION, SUSPENSION INTRA-ARTICULAR; INTRAMUSCULAR at 11:00

## 2023-03-08 ENCOUNTER — CLINICAL SUPPORT (OUTPATIENT)
Dept: CARDIOLOGY | Facility: CLINIC | Age: 78
End: 2023-03-08
Payer: MEDICARE

## 2023-03-08 DIAGNOSIS — Z95.0 PRESENCE OF CARDIAC PACEMAKER: ICD-10-CM

## 2023-03-08 DIAGNOSIS — R00.1 SYMPTOMATIC SINUS BRADYCARDIA: Primary | ICD-10-CM

## 2023-03-08 PROCEDURE — 93288 INTERROG EVL PM/LDLS PM IP: CPT | Performed by: INTERNAL MEDICINE

## 2023-03-08 NOTE — PROGRESS NOTES
Pacemaker Evaluation Report    March 23, 2023    Primary Cardiologist: Dr. Peterson  Implanting MD: Dr. Bucio  :  Active Implants Model: [employer] MRI L131 Serial Number: [id number]  Implant date: 12/11/2020    Reason for evaluation: routine, PPM, Office  Cardiac device indication(s): symptomatic bradycardia    Battery  ZEENAT: 12 years       Interrogation Results  Atrial sensing: P wave: 5.2 mV  Atrial capture: 0.5 V @ 0.4 ms   Atrial lead impedance: 469 ohms  Ventricular sensing: R wave: 10.6 mV  Ventricular capture: 0.6 V @ 0.4 ms  Ventricular lead impedance: right  689 ohms    Parameters  Mode: DDDR  Base Rate: 60 ppm    Diagnostic Data  Atrial paced: 50 %   Ventricular paced: 11 %  Mode switch: 0%  AT/AF [place]: <1%  AHR: 5 < 1 min., 1 >24 hr.  VHR: 2 NS    Intrinsic Rate: 72    Changes made: Changed atrial output to 1.8 V.    Conclusions: Normal device function. Follow up in one year, remote every 3 months.    Assessment:  1. Symptomatic sinus bradycardia    2. Presence of cardiac pacemaker                    This document has been electronically signed by Linnette Peterson MD on March 23, 2023 16:17 CDT

## 2023-03-10 ENCOUNTER — LAB (OUTPATIENT)
Dept: LAB | Facility: HOSPITAL | Age: 78
End: 2023-03-10
Payer: MEDICARE

## 2023-03-10 DIAGNOSIS — D70.8 OTHER NEUTROPENIA: ICD-10-CM

## 2023-03-10 DIAGNOSIS — D50.8 OTHER IRON DEFICIENCY ANEMIA: ICD-10-CM

## 2023-03-10 DIAGNOSIS — C18.9 MALIGNANT NEOPLASM OF COLON, UNSPECIFIED PART OF COLON: ICD-10-CM

## 2023-03-10 DIAGNOSIS — Z85.038 HISTORY OF COLON CANCER: ICD-10-CM

## 2023-03-10 DIAGNOSIS — K90.9 IRON MALABSORPTION: ICD-10-CM

## 2023-03-10 DIAGNOSIS — D64.9 ANEMIA, UNSPECIFIED TYPE: ICD-10-CM

## 2023-03-10 DIAGNOSIS — Z86.711 HISTORY OF PULMONARY EMBOLISM: ICD-10-CM

## 2023-03-10 LAB
ALBUMIN SERPL-MCNC: 3.8 G/DL (ref 3.5–5.2)
ALBUMIN/GLOB SERPL: 1.3 G/DL
ALP SERPL-CCNC: 141 U/L (ref 39–117)
ALT SERPL W P-5'-P-CCNC: 36 U/L (ref 1–33)
ANION GAP SERPL CALCULATED.3IONS-SCNC: 10 MMOL/L (ref 5–15)
AST SERPL-CCNC: 32 U/L (ref 1–32)
BASOPHILS # BLD AUTO: 0.04 10*3/MM3 (ref 0–0.2)
BASOPHILS NFR BLD AUTO: 0.8 % (ref 0–1.5)
BILIRUB SERPL-MCNC: 0.3 MG/DL (ref 0–1.2)
BUN SERPL-MCNC: 21 MG/DL (ref 8–23)
BUN/CREAT SERPL: 21 (ref 7–25)
CALCIUM SPEC-SCNC: 9.6 MG/DL (ref 8.6–10.5)
CEA SERPL-MCNC: 6.06 NG/ML
CHLORIDE SERPL-SCNC: 105 MMOL/L (ref 98–107)
CO2 SERPL-SCNC: 24 MMOL/L (ref 22–29)
CREAT SERPL-MCNC: 1 MG/DL (ref 0.57–1)
DEPRECATED RDW RBC AUTO: 45.6 FL (ref 37–54)
EGFRCR SERPLBLD CKD-EPI 2021: 58.9 ML/MIN/1.73
EOSINOPHIL # BLD AUTO: 0.03 10*3/MM3 (ref 0–0.4)
EOSINOPHIL NFR BLD AUTO: 0.6 % (ref 0.3–6.2)
ERYTHROCYTE [DISTWIDTH] IN BLOOD BY AUTOMATED COUNT: 13.5 % (ref 12.3–15.4)
FERRITIN SERPL-MCNC: 238.3 NG/ML (ref 13–150)
FOLATE SERPL-MCNC: >20 NG/ML (ref 4.78–24.2)
GLOBULIN UR ELPH-MCNC: 3 GM/DL
GLUCOSE SERPL-MCNC: 85 MG/DL (ref 65–99)
HCT VFR BLD AUTO: 35.2 % (ref 34–46.6)
HGB BLD-MCNC: 11.7 G/DL (ref 12–15.9)
IMM GRANULOCYTES # BLD AUTO: 0.02 10*3/MM3 (ref 0–0.05)
IMM GRANULOCYTES NFR BLD AUTO: 0.4 % (ref 0–0.5)
IRON 24H UR-MRATE: 97 MCG/DL (ref 37–145)
IRON SATN MFR SERPL: 33 % (ref 20–50)
LYMPHOCYTES # BLD AUTO: 1.89 10*3/MM3 (ref 0.7–3.1)
LYMPHOCYTES NFR BLD AUTO: 36.6 % (ref 19.6–45.3)
MCH RBC QN AUTO: 30.9 PG (ref 26.6–33)
MCHC RBC AUTO-ENTMCNC: 33.2 G/DL (ref 31.5–35.7)
MCV RBC AUTO: 92.9 FL (ref 79–97)
MONOCYTES # BLD AUTO: 0.42 10*3/MM3 (ref 0.1–0.9)
MONOCYTES NFR BLD AUTO: 8.1 % (ref 5–12)
NEUTROPHILS NFR BLD AUTO: 2.76 10*3/MM3 (ref 1.7–7)
NEUTROPHILS NFR BLD AUTO: 53.5 % (ref 42.7–76)
NRBC BLD AUTO-RTO: 0 /100 WBC (ref 0–0.2)
PLATELET # BLD AUTO: 339 10*3/MM3 (ref 140–450)
PMV BLD AUTO: 9 FL (ref 6–12)
POTASSIUM SERPL-SCNC: 4.3 MMOL/L (ref 3.5–5.2)
PROT SERPL-MCNC: 6.8 G/DL (ref 6–8.5)
RBC # BLD AUTO: 3.79 10*6/MM3 (ref 3.77–5.28)
SODIUM SERPL-SCNC: 139 MMOL/L (ref 136–145)
TIBC SERPL-MCNC: 295 MCG/DL (ref 298–536)
TRANSFERRIN SERPL-MCNC: 198 MG/DL (ref 200–360)
VIT B12 BLD-MCNC: 745 PG/ML (ref 211–946)
WBC NRBC COR # BLD: 5.16 10*3/MM3 (ref 3.4–10.8)

## 2023-03-10 PROCEDURE — 85025 COMPLETE CBC W/AUTO DIFF WBC: CPT

## 2023-03-10 PROCEDURE — 82378 CARCINOEMBRYONIC ANTIGEN: CPT

## 2023-03-10 PROCEDURE — 80053 COMPREHEN METABOLIC PANEL: CPT

## 2023-03-10 PROCEDURE — 82607 VITAMIN B-12: CPT

## 2023-03-10 PROCEDURE — 82728 ASSAY OF FERRITIN: CPT

## 2023-03-10 PROCEDURE — 82746 ASSAY OF FOLIC ACID SERUM: CPT

## 2023-03-10 PROCEDURE — 83540 ASSAY OF IRON: CPT

## 2023-03-10 PROCEDURE — 84466 ASSAY OF TRANSFERRIN: CPT

## 2023-03-15 ENCOUNTER — OFFICE VISIT (OUTPATIENT)
Dept: ONCOLOGY | Facility: CLINIC | Age: 78
End: 2023-03-15
Payer: MEDICARE

## 2023-03-15 VITALS
BODY MASS INDEX: 38.45 KG/M2 | DIASTOLIC BLOOD PRESSURE: 72 MMHG | OXYGEN SATURATION: 99 % | HEART RATE: 62 BPM | WEIGHT: 196.9 LBS | SYSTOLIC BLOOD PRESSURE: 144 MMHG | TEMPERATURE: 96.4 F

## 2023-03-15 DIAGNOSIS — D50.8 OTHER IRON DEFICIENCY ANEMIA: Chronic | ICD-10-CM

## 2023-03-15 DIAGNOSIS — C18.9 MALIGNANT NEOPLASM OF COLON, UNSPECIFIED PART OF COLON: Primary | Chronic | ICD-10-CM

## 2023-03-15 DIAGNOSIS — K90.9 IRON MALABSORPTION: Chronic | ICD-10-CM

## 2023-03-15 DIAGNOSIS — Z86.711 HISTORY OF PULMONARY EMBOLISM: Chronic | ICD-10-CM

## 2023-03-15 PROCEDURE — 3077F SYST BP >= 140 MM HG: CPT | Performed by: INTERNAL MEDICINE

## 2023-03-15 PROCEDURE — G0463 HOSPITAL OUTPT CLINIC VISIT: HCPCS | Performed by: INTERNAL MEDICINE

## 2023-03-15 PROCEDURE — 3078F DIAST BP <80 MM HG: CPT | Performed by: INTERNAL MEDICINE

## 2023-03-15 PROCEDURE — 1123F ACP DISCUSS/DSCN MKR DOCD: CPT | Performed by: INTERNAL MEDICINE

## 2023-03-15 PROCEDURE — 99214 OFFICE O/P EST MOD 30 MIN: CPT | Performed by: INTERNAL MEDICINE

## 2023-03-15 PROCEDURE — 1126F AMNT PAIN NOTED NONE PRSNT: CPT | Performed by: INTERNAL MEDICINE

## 2023-03-15 RX ORDER — TRAZODONE HYDROCHLORIDE 100 MG/1
TABLET ORAL
COMMUNITY
Start: 2023-01-28 | End: 2023-03-24

## 2023-03-15 NOTE — PROGRESS NOTES
DATE OF VISIT: 3/15/2023      REASON FOR VISIT: Adenocarcinoma of ascending colon, anemia, neutropenia, history of pulmonary embolism      HISTORY OF PRESENT ILLNESS:   77-year-old female with medical problem consisting of pulmonary embolism diagnosed in 2016 s/p anticoagulation with Coumadin for 1 year, osteoporosis, depression, history of gastric sleeve surgery done in 2016, neutropenia, anemia, adenocarcinoma of ascending colon diagnosed in June 2021 s/p surgery currently on surveillance is here for follow-up appointment today to discuss recently done blood work.  Complains of fatigue.  Complains of arthralgia.  Denies any new lymph node enlargement.  Denies any new chest pain or shortness of breath.  Denies any change in bowel habits or any blood in stool.              Past Medical History, Past Surgical History, Social History, Family History have been reviewed and are without significant changes except as mentioned.    Review of Systems   A comprehensive 14 point review of systems was performed and was negative except as mentioned in HPI.    Medications:  The current medication list was reviewed in the EMR    ALLERGIES:    Allergies   Allergen Reactions   • Sulfa Antibiotics Hives and Rash     Sulfa (Sulfonamide Antibiotics)       Objective      Vitals:    03/15/23 1015   BP: 144/72   Pulse: 62   Temp: 96.4 °F (35.8 °C)   TempSrc: Temporal   SpO2: 99%   Weight: 89.3 kg (196 lb 14.4 oz)   PainSc: 0-No pain     Current Status 11/30/2022   ECOG score 0       Physical Exam  Pulmonary:      Breath sounds: Normal breath sounds.   Neurological:      Mental Status: She is alert and oriented to person, place, and time.           RECENT LABS:  Glucose   Date Value Ref Range Status   03/10/2023 85 65 - 99 mg/dL Final     Sodium   Date Value Ref Range Status   03/10/2023 139 136 - 145 mmol/L Final   10/12/2018 140 134 - 144 mmol/L Final   10/08/2018 141 136 - 144 mmol/L Final     Potassium   Date Value Ref Range Status    03/10/2023 4.3 3.5 - 5.2 mmol/L Final   10/12/2018 4.6 3.5 - 5.1 mmol/L Final   10/08/2018 4.3 3.3 - 4.8 mmol/L Final     Comment:     Serum Potassium Reference Range  = 3.5-5.1  mmol/L     CO2   Date Value Ref Range Status   03/10/2023 24.0 22.0 - 29.0 mmol/L Final     Total CO2   Date Value Ref Range Status   10/08/2018 25 23 - 31 mmol/L Final     Chloride   Date Value Ref Range Status   03/10/2023 105 98 - 107 mmol/L Final   10/12/2018 105 98 - 107 mmol/L Final   10/08/2018 108 (H) 98 - 107 mmol/L Final     Anion Gap   Date Value Ref Range Status   03/10/2023 10.0 5.0 - 15.0 mmol/L Final   10/08/2018 8  Final     Creatinine   Date Value Ref Range Status   03/10/2023 1.00 0.57 - 1.00 mg/dL Final   10/12/2018 0.9 0.6 - 1.3 mg/dL Final     Comment:     **The MDRD GFR formula is valid only for ages 18-70.    GFR will not be reported for individuals aging <18 or >70.     BUN   Date Value Ref Range Status   03/10/2023 21 8 - 23 mg/dL Final   10/12/2018 13 7 - 18 mg/dL Final     BUN/Creatinine Ratio   Date Value Ref Range Status   03/10/2023 21.0 7.0 - 25.0 Final     Calcium   Date Value Ref Range Status   03/10/2023 9.6 8.6 - 10.5 mg/dL Final   10/12/2018 9.7 8.8 - 10.5 mg/dL Final     eGFR Non  Amer   Date Value Ref Range Status   02/14/2022 57 (L) >60 mL/min/1.73 Final     Alkaline Phosphatase   Date Value Ref Range Status   03/10/2023 141 (H) 39 - 117 U/L Final   10/12/2018 115 46 - 116 U/L Final     Total Protein   Date Value Ref Range Status   03/10/2023 6.8 6.0 - 8.5 g/dL Final   10/12/2018 6.6 6.4 - 8.2 g/dL Final     ALT (SGPT)   Date Value Ref Range Status   03/10/2023 36 (H) 1 - 33 U/L Final   10/12/2018 43 30 - 65 U/L Final     AST (SGOT)   Date Value Ref Range Status   03/10/2023 32 1 - 32 U/L Final   10/12/2018 22 15 - 37 U/L Final     Total Bilirubin   Date Value Ref Range Status   03/10/2023 0.3 0.0 - 1.2 mg/dL Final   10/12/2018 0.3 0 - 1.0 mg/dL Final     Albumin   Date Value Ref Range Status    03/10/2023 3.8 3.5 - 5.2 g/dL Final   10/12/2018 3.2 (L) 3.4 - 5.0 g/dL Final     Globulin   Date Value Ref Range Status   03/10/2023 3.0 gm/dL Final     Lab Results   Component Value Date    WBC 5.16 03/10/2023    HGB 11.7 (L) 03/10/2023    HCT 35.2 03/10/2023    MCV 92.9 03/10/2023     03/10/2023     Lab Results   Component Value Date    NEUTROABS 2.76 03/10/2023    IRON 97 03/10/2023    IRON 78 11/29/2022    IRON 87 09/13/2022    TIBC 295 (L) 03/10/2023    TIBC 294 (L) 11/29/2022    TIBC 262 (L) 09/13/2022    LABIRON 33 03/10/2023    LABIRON 27 11/29/2022    LABIRON 33 09/13/2022    FERRITIN 238.30 (H) 03/10/2023    FERRITIN 279.40 (H) 11/29/2022    FERRITIN 438.00 (H) 09/13/2022    [id number] 745 03/10/2023    HUSHUIQB39 487 11/29/2022    CFSUMZZB00 646 09/13/2022    FOLATE >20.00 03/10/2023    FOLATE >20.00 11/29/2022    FOLATE 10.30 09/13/2022     Lab Results   Component Value Date    AFPTM 142 12/05/2018    CEA 6.06 03/10/2023       Component CEA   Latest Ref Rng & Units ng/mL   6/21/2021 3.32   8/20/2021 3.10   11/8/2021 2.80   2/14/2022 3.59   5/23/2022 3.63   9/13/2022 3.90   11/29/2022 4.33   3/10/2023 6.06         PATHOLOGY:  * Cannot find OR log *         RADIOLOGY DATA :  No radiology results for the last 7 days        Assessment & Plan     1.  Adenocarcinoma of ascending colon, stage II, T3 N0 M0, MSI stable:  - Patient had a surgery done in June 2021 and has been on surveillance since then.  - Colonoscopy in September 12, 2022 was negative for any recurrence  - Recent CEA level is increased to 6.06.  CEA was 4.33 last clinic visit  - Recommend getting CT of chest abdomen and pelvis with contrast in next 1 to 2 weeks for reevaluation to rule out recurrence we will call patient with result of CT scan once available  - For now we will have patient return to clinic in 3 months with repeat CBC, CMP, CEA, iron studies, ferritin, B12 and folate to be done prior to that    2.  Anemia:  - Due to iron  malabsorption from gastric sleeve surgery, patient has received Venofer in December 2021  - Currently on B12 and folic acid p.o. daily  - Recent anemia work-up shows hemoglobin is 11.7.  Iron studies are adequate no need for any intravenous iron replacement  - Recommend continuing with B12 and folic acid p.o. daily    3.  Neutropenia:  - Patient has been having intermittent neutropenia for last few years.  - Recent CBC shows white blood cell count is 5.16 with ANC of 2.76    4.  History of pulmonary embolism s/p anticoagulation with Coumadin for 1 year in 2016  - We will continue with clinical surveillance    5.  Elevated liver function test:  - Liver function test is borderline elevated but significantly improved as compared to last clinic visit.    6.  Health maintenance: Patient does not smoke    7.  Advance care planning:  - CODE STATUS and resuscitation were discussed with patient today.  Patient wants to be DNR               PHQ-9 Total Score: 0   -Patient is not homicidal or suicidal.  No acute intervention required.    Veronica Wilkinson reports a pain score of 0.  Given her pain assessment as noted, treatment options were discussed and the following options were decided upon as a follow-up plan to address the patient's pain: continuation of current treatment plan for pain.         Mihai Hunt MD  3/15/2023  10:24 CDT        Part of this note may be an electronic transcription/translation of spoken language to printed text using the Dragon Dictation System.          CC:

## 2023-03-22 ENCOUNTER — HOSPITAL ENCOUNTER (OUTPATIENT)
Dept: CT IMAGING | Facility: HOSPITAL | Age: 78
Discharge: HOME | End: 2023-03-22
Admitting: INTERNAL MEDICINE
Payer: MEDICARE

## 2023-03-22 DIAGNOSIS — C18.9 MALIGNANT NEOPLASM OF COLON, UNSPECIFIED PART OF COLON: Chronic | ICD-10-CM

## 2023-03-22 DIAGNOSIS — K90.9 IRON MALABSORPTION: Chronic | ICD-10-CM

## 2023-03-22 DIAGNOSIS — D50.8 OTHER IRON DEFICIENCY ANEMIA: Chronic | ICD-10-CM

## 2023-03-22 DIAGNOSIS — Z86.711 HISTORY OF PULMONARY EMBOLISM: Chronic | ICD-10-CM

## 2023-03-22 PROCEDURE — 25510000001 IOPAMIDOL 61 % SOLUTION: Performed by: INTERNAL MEDICINE

## 2023-03-22 PROCEDURE — 71260 CT THORAX DX C+: CPT

## 2023-03-22 PROCEDURE — 74177 CT ABD & PELVIS W/CONTRAST: CPT

## 2023-03-22 RX ADMIN — IOPAMIDOL 90 ML: 612 INJECTION, SOLUTION INTRAVENOUS at 13:46

## 2023-03-24 ENCOUNTER — OFFICE VISIT (OUTPATIENT)
Dept: FAMILY MEDICINE CLINIC | Facility: CLINIC | Age: 78
End: 2023-03-24
Payer: MEDICARE

## 2023-03-24 VITALS
BODY MASS INDEX: 38.18 KG/M2 | DIASTOLIC BLOOD PRESSURE: 78 MMHG | TEMPERATURE: 97.8 F | SYSTOLIC BLOOD PRESSURE: 136 MMHG | HEIGHT: 60 IN | OXYGEN SATURATION: 98 % | HEART RATE: 79 BPM | WEIGHT: 194.5 LBS

## 2023-03-24 DIAGNOSIS — G25.81 RLS (RESTLESS LEGS SYNDROME): ICD-10-CM

## 2023-03-24 DIAGNOSIS — I10 PRIMARY HYPERTENSION: Primary | ICD-10-CM

## 2023-03-24 DIAGNOSIS — J31.0 CHRONIC RHINITIS: ICD-10-CM

## 2023-03-24 PROCEDURE — 1160F RVW MEDS BY RX/DR IN RCRD: CPT | Performed by: NURSE PRACTITIONER

## 2023-03-24 PROCEDURE — 99214 OFFICE O/P EST MOD 30 MIN: CPT | Performed by: NURSE PRACTITIONER

## 2023-03-24 PROCEDURE — 3078F DIAST BP <80 MM HG: CPT | Performed by: NURSE PRACTITIONER

## 2023-03-24 PROCEDURE — 1159F MED LIST DOCD IN RCRD: CPT | Performed by: NURSE PRACTITIONER

## 2023-03-24 PROCEDURE — 3075F SYST BP GE 130 - 139MM HG: CPT | Performed by: NURSE PRACTITIONER

## 2023-03-24 RX ORDER — LEVOCETIRIZINE DIHYDROCHLORIDE 5 MG/1
5 TABLET, FILM COATED ORAL EVERY EVENING
Qty: 90 TABLET | Refills: 3 | Status: SHIPPED | OUTPATIENT
Start: 2023-03-24

## 2023-03-24 RX ORDER — OLMESARTAN MEDOXOMIL AND HYDROCHLOROTHIAZIDE 40/12.5 40; 12.5 MG/1; MG/1
1 TABLET ORAL DAILY
COMMUNITY

## 2023-03-24 NOTE — PROGRESS NOTES
Subjective   Veronica Wilkinson is a 77 y.o. female.     History of Present Illness  CC: Hypertension, restless leg syndrome, chronic rhinitis  Hypertension  This is a chronic problem. The current episode started more than 1 year ago. The problem is controlled. Pertinent negatives include no chest pain, headaches, palpitations or shortness of breath. Risk factors for coronary artery disease include family history, dyslipidemia, obesity, post-menopausal state and sedentary lifestyle. Current antihypertension treatment includes angiotensin blockers and diuretics. The current treatment provides significant improvement. Compliance problems include exercise and diet.  There is no history of angina, kidney disease or CAD/MI.   Leg Pain   The incident occurred more than 1 week ago. There was no injury mechanism. The pain is present in the left leg and right leg. The quality of the pain is described as aching. Pain course: nightly. Treatments tried: Flexeril. The treatment provided significant relief.   Sinus Problem  This is a chronic problem. The current episode started more than 1 year ago. The problem has been waxing and waning since onset. There has been no fever. She is experiencing no pain. Associated symptoms include congestion. Pertinent negatives include no coughing, headaches, shortness of breath or sore throat. Treatments tried: Claritin, feels as though this is no longer effective. The treatment provided mild relief.        The following portions of the patient's history were reviewed and updated as appropriate: allergies, current medications, past family history, past medical history, past social history, past surgical history and problem list.    Review of Systems   Constitutional: Negative for activity change, appetite change, fatigue, fever, unexpected weight gain and unexpected weight loss.   HENT: Positive for congestion and rhinorrhea. Negative for sore throat, trouble swallowing and voice change.    Eyes:  Negative.    Respiratory: Negative for cough, chest tightness, shortness of breath and wheezing.    Cardiovascular: Negative for chest pain, palpitations and leg swelling.   Gastrointestinal: Negative for abdominal pain, constipation, diarrhea, nausea and vomiting.   Endocrine: Negative.  Negative for cold intolerance, heat intolerance, polydipsia, polyphagia and polyuria.   Genitourinary: Negative for dysuria.   Musculoskeletal: Negative for arthralgias and myalgias.   Skin: Negative for rash.   Allergic/Immunologic: Negative.    Neurological: Negative.    Hematological: Negative.    Psychiatric/Behavioral: Negative.        Objective   Physical Exam  Vitals and nursing note reviewed.   Constitutional:       General: She is not in acute distress.     Appearance: Normal appearance. She is well-developed. She is obese. She is not ill-appearing, toxic-appearing or diaphoretic.   HENT:      Head: Normocephalic and atraumatic.      Nose: Congestion and rhinorrhea present. Rhinorrhea is clear.   Eyes:      Conjunctiva/sclera: Conjunctivae normal.   Cardiovascular:      Rate and Rhythm: Normal rate and regular rhythm.      Heart sounds: Normal heart sounds.   Pulmonary:      Effort: Pulmonary effort is normal. No respiratory distress.      Breath sounds: Normal breath sounds. No stridor. No wheezing, rhonchi or rales.   Musculoskeletal:         General: No tenderness. Normal range of motion.      Cervical back: Normal range of motion.   Skin:     General: Skin is warm and dry.      Coloration: Skin is not pale.      Findings: No erythema or rash.   Neurological:      Mental Status: She is alert and oriented to person, place, and time.   Psychiatric:         Mood and Affect: Mood normal.         Behavior: Behavior normal.         Thought Content: Thought content normal.         Judgment: Judgment normal.           Assessment & Plan   Diagnoses and all orders for this visit:    1. Primary hypertension (Primary)   -Currently  well controlled with no labile fluctuations reported by patient.  Continue Benicar HCTZ as prescribed.  We will continue to monitor.    2. RLS (restless legs syndrome)   -Patient reports having nightly leg pains/achiness.  She had Flexeril at home for an unrelated condition and this relieved her nightly leg pain.  Instructed patient she may continue Flexeril as needed but to take with caution.  Maintain adequate hydration.  We will continue to monitor.    3. Chronic rhinitis  -     levocetirizine (XYZAL) 5 MG tablet; Take 1 tablet by mouth Every Evening.  Dispense: 90 tablet; Refill: 3   - Patient feels as though her Claritin is no longer effective.  We will transition to Xyzal.  We will continue to monitor.    4.  Follow-up in 6 months or sooner for any acute needs.            This document has been electronically signed by TABBY Mejia on March 24, 2023 13:44 CDT

## 2023-03-27 ENCOUNTER — TELEPHONE (OUTPATIENT)
Dept: ONCOLOGY | Facility: HOSPITAL | Age: 78
End: 2023-03-27
Payer: MEDICARE

## 2023-03-27 NOTE — TELEPHONE ENCOUNTER
----- Message from Mihai Hunt MD sent at 3/26/2023  9:48 AM CDT -----  Please let patient know, CT of chest, abdomen and pelvis with contrast does not show any evidence of recurrence of cancer or any metastatic disease.  At this point we will continue with clinical monitoring.  We will recheck blood work including tumor marker upon next clinic visit in 3 months.  Thank you

## 2023-03-29 ENCOUNTER — TELEPHONE (OUTPATIENT)
Dept: ONCOLOGY | Facility: HOSPITAL | Age: 78
End: 2023-03-29
Payer: MEDICARE

## 2023-04-24 ENCOUNTER — TELEPHONE (OUTPATIENT)
Dept: FAMILY MEDICINE CLINIC | Facility: CLINIC | Age: 78
End: 2023-04-24
Payer: MEDICARE

## 2023-04-24 DIAGNOSIS — F51.01 PRIMARY INSOMNIA: Primary | ICD-10-CM

## 2023-04-24 NOTE — TELEPHONE ENCOUNTER
Please call and verify if she needs a refill or if this is just her mail order requesting a refill and she is no longer taking.

## 2023-04-25 ENCOUNTER — TELEPHONE (OUTPATIENT)
Dept: FAMILY MEDICINE CLINIC | Facility: CLINIC | Age: 78
End: 2023-04-25
Payer: MEDICARE

## 2023-04-25 DIAGNOSIS — G45.9 TIA (TRANSIENT ISCHEMIC ATTACK): ICD-10-CM

## 2023-04-25 RX ORDER — TRAZODONE HYDROCHLORIDE 100 MG/1
TABLET ORAL
Qty: 90 TABLET | Refills: 3 | OUTPATIENT
Start: 2023-04-25

## 2023-04-25 RX ORDER — OLMESARTAN MEDOXOMIL AND HYDROCHLOROTHIAZIDE 40/12.5 40; 12.5 MG/1; MG/1
1 TABLET ORAL DAILY
Qty: 30 TABLET | Refills: 3 | Status: SHIPPED | OUTPATIENT
Start: 2023-04-25

## 2023-05-12 DIAGNOSIS — I10 PRIMARY HYPERTENSION: Primary | ICD-10-CM

## 2023-05-12 RX ORDER — OLMESARTAN MEDOXOMIL AND HYDROCHLOROTHIAZIDE 20/12.5 20; 12.5 MG/1; MG/1
1 TABLET ORAL DAILY
Qty: 90 TABLET | Refills: 3 | Status: SHIPPED | OUTPATIENT
Start: 2023-05-12 | End: 2024-05-11

## 2023-06-08 ENCOUNTER — LAB (OUTPATIENT)
Dept: LAB | Facility: HOSPITAL | Age: 78
End: 2023-06-08
Payer: MEDICARE

## 2023-06-08 DIAGNOSIS — Z86.711 HISTORY OF PULMONARY EMBOLISM: ICD-10-CM

## 2023-06-08 DIAGNOSIS — C18.9 MALIGNANT NEOPLASM OF COLON, UNSPECIFIED PART OF COLON: ICD-10-CM

## 2023-06-08 DIAGNOSIS — D50.8 OTHER IRON DEFICIENCY ANEMIA: ICD-10-CM

## 2023-06-08 DIAGNOSIS — K90.9 IRON MALABSORPTION: ICD-10-CM

## 2023-06-08 LAB
ALBUMIN SERPL-MCNC: 4.1 G/DL (ref 3.5–5.2)
ALBUMIN/GLOB SERPL: 1.4 G/DL
ALP SERPL-CCNC: 126 U/L (ref 39–117)
ALT SERPL W P-5'-P-CCNC: 29 U/L (ref 1–33)
ANION GAP SERPL CALCULATED.3IONS-SCNC: 10 MMOL/L (ref 5–15)
AST SERPL-CCNC: 30 U/L (ref 1–32)
BASOPHILS # BLD AUTO: 0.04 10*3/MM3 (ref 0–0.2)
BASOPHILS NFR BLD AUTO: 0.8 % (ref 0–1.5)
BILIRUB SERPL-MCNC: 0.2 MG/DL (ref 0–1.2)
BUN SERPL-MCNC: 19 MG/DL (ref 8–23)
BUN/CREAT SERPL: 17.6 (ref 7–25)
CALCIUM SPEC-SCNC: 10.3 MG/DL (ref 8.6–10.5)
CHLORIDE SERPL-SCNC: 106 MMOL/L (ref 98–107)
CO2 SERPL-SCNC: 23 MMOL/L (ref 22–29)
CREAT SERPL-MCNC: 1.08 MG/DL (ref 0.57–1)
DEPRECATED RDW RBC AUTO: 40 FL (ref 37–54)
EGFRCR SERPLBLD CKD-EPI 2021: 53.7 ML/MIN/1.73
EOSINOPHIL # BLD AUTO: 0.09 10*3/MM3 (ref 0–0.4)
EOSINOPHIL NFR BLD AUTO: 1.9 % (ref 0.3–6.2)
ERYTHROCYTE [DISTWIDTH] IN BLOOD BY AUTOMATED COUNT: 11.9 % (ref 12.3–15.4)
FERRITIN SERPL-MCNC: 243.5 NG/ML (ref 13–150)
GLOBULIN UR ELPH-MCNC: 2.9 GM/DL
GLUCOSE SERPL-MCNC: 59 MG/DL (ref 65–99)
HCT VFR BLD AUTO: 35 % (ref 34–46.6)
HGB BLD-MCNC: 11.8 G/DL (ref 12–15.9)
IMM GRANULOCYTES # BLD AUTO: 0 10*3/MM3 (ref 0–0.05)
IMM GRANULOCYTES NFR BLD AUTO: 0 % (ref 0–0.5)
IRON 24H UR-MRATE: 69 MCG/DL (ref 37–145)
IRON SATN MFR SERPL: 20 % (ref 20–50)
LYMPHOCYTES # BLD AUTO: 1.52 10*3/MM3 (ref 0.7–3.1)
LYMPHOCYTES NFR BLD AUTO: 31.6 % (ref 19.6–45.3)
MCH RBC QN AUTO: 31.2 PG (ref 26.6–33)
MCHC RBC AUTO-ENTMCNC: 33.7 G/DL (ref 31.5–35.7)
MCV RBC AUTO: 92.6 FL (ref 79–97)
MONOCYTES # BLD AUTO: 0.48 10*3/MM3 (ref 0.1–0.9)
MONOCYTES NFR BLD AUTO: 10 % (ref 5–12)
NEUTROPHILS NFR BLD AUTO: 2.68 10*3/MM3 (ref 1.7–7)
NEUTROPHILS NFR BLD AUTO: 55.7 % (ref 42.7–76)
NRBC BLD AUTO-RTO: 0 /100 WBC (ref 0–0.2)
PLATELET # BLD AUTO: 307 10*3/MM3 (ref 140–450)
PMV BLD AUTO: 9.1 FL (ref 6–12)
POTASSIUM SERPL-SCNC: 4.1 MMOL/L (ref 3.5–5.2)
PROT SERPL-MCNC: 7 G/DL (ref 6–8.5)
RBC # BLD AUTO: 3.78 10*6/MM3 (ref 3.77–5.28)
SODIUM SERPL-SCNC: 139 MMOL/L (ref 136–145)
TIBC SERPL-MCNC: 343 MCG/DL (ref 298–536)
TRANSFERRIN SERPL-MCNC: 230 MG/DL (ref 200–360)
WBC NRBC COR # BLD: 4.81 10*3/MM3 (ref 3.4–10.8)

## 2023-06-08 PROCEDURE — 83540 ASSAY OF IRON: CPT

## 2023-06-08 PROCEDURE — 82746 ASSAY OF FOLIC ACID SERUM: CPT

## 2023-06-08 PROCEDURE — 80053 COMPREHEN METABOLIC PANEL: CPT

## 2023-06-08 PROCEDURE — 85025 COMPLETE CBC W/AUTO DIFF WBC: CPT

## 2023-06-08 PROCEDURE — 82728 ASSAY OF FERRITIN: CPT

## 2023-06-08 PROCEDURE — 82378 CARCINOEMBRYONIC ANTIGEN: CPT

## 2023-06-08 PROCEDURE — 82607 VITAMIN B-12: CPT

## 2023-06-08 PROCEDURE — 84466 ASSAY OF TRANSFERRIN: CPT

## 2023-06-09 LAB
CEA SERPL-MCNC: 5.63 NG/ML
FOLATE SERPL-MCNC: >20 NG/ML (ref 4.78–24.2)
VIT B12 BLD-MCNC: 1121 PG/ML (ref 211–946)

## 2023-06-12 ENCOUNTER — LAB (OUTPATIENT)
Dept: LAB | Facility: HOSPITAL | Age: 78
End: 2023-06-12
Payer: MEDICARE

## 2023-06-12 DIAGNOSIS — M81.0 SENILE OSTEOPOROSIS: ICD-10-CM

## 2023-06-12 LAB — CALCIUM SPEC-SCNC: 9.7 MG/DL (ref 8.6–10.5)

## 2023-06-12 PROCEDURE — 82310 ASSAY OF CALCIUM: CPT

## 2023-06-15 ENCOUNTER — INFUSION (OUTPATIENT)
Dept: ONCOLOGY | Facility: HOSPITAL | Age: 78
End: 2023-06-15
Payer: MEDICARE

## 2023-06-15 ENCOUNTER — OFFICE VISIT (OUTPATIENT)
Dept: ONCOLOGY | Facility: CLINIC | Age: 78
End: 2023-06-15
Payer: MEDICARE

## 2023-06-15 VITALS
HEIGHT: 60 IN | WEIGHT: 195.4 LBS | OXYGEN SATURATION: 97 % | DIASTOLIC BLOOD PRESSURE: 62 MMHG | BODY MASS INDEX: 38.36 KG/M2 | SYSTOLIC BLOOD PRESSURE: 102 MMHG | HEART RATE: 78 BPM

## 2023-06-15 DIAGNOSIS — D50.8 OTHER IRON DEFICIENCY ANEMIA: Chronic | ICD-10-CM

## 2023-06-15 DIAGNOSIS — C18.9 MALIGNANT NEOPLASM OF COLON, UNSPECIFIED PART OF COLON: Primary | Chronic | ICD-10-CM

## 2023-06-15 DIAGNOSIS — Z86.711 HISTORY OF PULMONARY EMBOLISM: Chronic | ICD-10-CM

## 2023-06-15 DIAGNOSIS — M81.0 SENILE OSTEOPOROSIS: Primary | ICD-10-CM

## 2023-06-15 DIAGNOSIS — R49.9 CHANGE IN VOICE: ICD-10-CM

## 2023-06-15 DIAGNOSIS — R07.0 THROAT PAIN: ICD-10-CM

## 2023-06-15 DIAGNOSIS — K90.9 IRON MALABSORPTION: Chronic | ICD-10-CM

## 2023-06-15 PROCEDURE — 25010000002 DENOSUMAB 60 MG/ML SOLUTION PREFILLED SYRINGE: Performed by: NURSE PRACTITIONER

## 2023-06-15 RX ORDER — FOLIC ACID 1 MG/1
1 TABLET ORAL DAILY
COMMUNITY

## 2023-06-15 RX ADMIN — DENOSUMAB 60 MG: 60 INJECTION SUBCUTANEOUS at 14:52

## 2023-06-15 NOTE — PROGRESS NOTES
"DATE OF VISIT: 6/15/2023      REASON FOR VISIT: Adenocarcinoma of ascending colon, anemia, neutropenia, history of pulmonary embolism      HISTORY OF PRESENT ILLNESS:   77-year-old female with medical problem consisting of pulmonary embolism diagnosed in 2016 s/p anticoagulation with Coumadin for 1 year, osteoporosis, depression, history of gastric sleeve surgery in 2016, neutropenia, anemia, adenocarcinoma of ascending colon diagnosed in June 2021 currently on surveillance is here for follow-up appointment today to discuss recently done blood work.  Complains of fatigue.  Complains of arthralgia.  Complains of intermittent throat pain and voice changes that has been ongoing for last 2 months.  Denies any bleeding.  Denies any new lymph node enlargement.  Denies any new chest pain or shortness of breath.  Denies any recent change in bowel habits or any blood in stool.          Past Medical History, Past Surgical History, Social History, Family History have been reviewed and are without significant changes except as mentioned.    Review of Systems   A comprehensive 14 point review of systems was performed and was negative except as mentioned in HPI.    Medications:  The current medication list was reviewed in the EMR    ALLERGIES:    Allergies   Allergen Reactions    Sulfa Antibiotics Hives and Rash     Sulfa (Sulfonamide Antibiotics)       Objective      Vitals:    06/15/23 1419   BP: 102/62   Pulse: 78   SpO2: 97%   Weight: 88.6 kg (195 lb 6.4 oz)   Height: 152.4 cm (60\")   PainSc: 0-No pain         11/30/2022     1:50 PM   Current Status   ECOG score 0       Physical Exam  Pulmonary:      Breath sounds: Normal breath sounds.   Lymphadenopathy:      Cervical: No cervical adenopathy.   Neurological:      Mental Status: She is alert and oriented to person, place, and time.         RECENT LABS:  Glucose   Date Value Ref Range Status   06/08/2023 59 (L) 65 - 99 mg/dL Final     Sodium   Date Value Ref Range Status "   06/08/2023 139 136 - 145 mmol/L Final   10/12/2018 140 134 - 144 mmol/L Final   10/08/2018 141 136 - 144 mmol/L Final     Potassium   Date Value Ref Range Status   06/08/2023 4.1 3.5 - 5.2 mmol/L Final   10/12/2018 4.6 3.5 - 5.1 mmol/L Final   10/08/2018 4.3 3.3 - 4.8 mmol/L Final     Comment:     Serum Potassium Reference Range  = 3.5-5.1  mmol/L     CO2   Date Value Ref Range Status   06/08/2023 23.0 22.0 - 29.0 mmol/L Final     Total CO2   Date Value Ref Range Status   10/08/2018 25 23 - 31 mmol/L Final     Chloride   Date Value Ref Range Status   06/08/2023 106 98 - 107 mmol/L Final   10/12/2018 105 98 - 107 mmol/L Final   10/08/2018 108 (H) 98 - 107 mmol/L Final     Anion Gap   Date Value Ref Range Status   06/08/2023 10.0 5.0 - 15.0 mmol/L Final   10/08/2018 8  Final     Creatinine   Date Value Ref Range Status   06/08/2023 1.08 (H) 0.57 - 1.00 mg/dL Final   10/12/2018 0.9 0.6 - 1.3 mg/dL Final     Comment:     **The MDRD GFR formula is valid only for ages 18-70.    GFR will not be reported for individuals aging <18 or >70.     BUN   Date Value Ref Range Status   06/08/2023 19 8 - 23 mg/dL Final   10/12/2018 13 7 - 18 mg/dL Final     BUN/Creatinine Ratio   Date Value Ref Range Status   06/08/2023 17.6 7.0 - 25.0 Final     Calcium   Date Value Ref Range Status   06/12/2023 9.7 8.6 - 10.5 mg/dL Final   10/12/2018 9.7 8.8 - 10.5 mg/dL Final     eGFR Non  Amer   Date Value Ref Range Status   02/14/2022 57 (L) >60 mL/min/1.73 Final     Alkaline Phosphatase   Date Value Ref Range Status   06/08/2023 126 (H) 39 - 117 U/L Final   10/12/2018 115 46 - 116 U/L Final     Total Protein   Date Value Ref Range Status   06/08/2023 7.0 6.0 - 8.5 g/dL Final   10/12/2018 6.6 6.4 - 8.2 g/dL Final     ALT (SGPT)   Date Value Ref Range Status   06/08/2023 29 1 - 33 U/L Final   10/12/2018 43 30 - 65 U/L Final     AST (SGOT)   Date Value Ref Range Status   06/08/2023 30 1 - 32 U/L Final   10/12/2018 22 15 - 37 U/L Final      Total Bilirubin   Date Value Ref Range Status   06/08/2023 0.2 0.0 - 1.2 mg/dL Final   10/12/2018 0.3 0 - 1.0 mg/dL Final     Albumin   Date Value Ref Range Status   06/08/2023 4.1 3.5 - 5.2 g/dL Final   10/12/2018 3.2 (L) 3.4 - 5.0 g/dL Final     Globulin   Date Value Ref Range Status   06/08/2023 2.9 gm/dL Final     Lab Results   Component Value Date    WBC 4.81 06/08/2023    HGB 11.8 (L) 06/08/2023    HCT 35.0 06/08/2023    MCV 92.6 06/08/2023     06/08/2023     Lab Results   Component Value Date    NEUTROABS 2.68 06/08/2023    IRON 69 06/08/2023    IRON 97 03/10/2023    IRON 78 11/29/2022    TIBC 343 06/08/2023    TIBC 295 (L) 03/10/2023    TIBC 294 (L) 11/29/2022    LABIRON 20 06/08/2023    LABIRON 33 03/10/2023    LABIRON 27 11/29/2022    FERRITIN 243.50 (H) 06/08/2023    FERRITIN 238.30 (H) 03/10/2023    FERRITIN 279.40 (H) 11/29/2022    [id number] 1,121 (H) 06/08/2023    [id number] 745 03/10/2023    [id number] 487 11/29/2022    FOLATE >20.00 06/08/2023    FOLATE >20.00 03/10/2023    FOLATE >20.00 11/29/2022     Lab Results   Component Value Date    AFPTM 142 12/05/2018    CEA 5.63 06/08/2023         PATHOLOGY:  * Cannot find OR log *         RADIOLOGY DATA :  No radiology results for the last 7 days        Assessment & Plan     1.  Adenocarcinoma of ascending colon, stage II, T3 N0 M0, MSI stable  - Patient had a surgery done in June 2021 and has been on surveillance since then  - Colonoscopy in September 12, 2022 was negative for any recurrence  - CT of chest, abdomen and pelvis with contrast on March 22, 2023 was negative for any recurrence.  - Recent CEA level is improved to 5.63.  We will continue with clinical surveillance  - We will have patient return to clinic in 3 months with repeat CBC, CMP, iron studies, ferritin, B12, folate and CEA level to be done prior to that    2.  Anemia:  - Due to iron malabsorption from gastric sleeve surgery, patient has received Venofer in December 2021  -  Currently on B12 and folic acid p.o. daily  - Recent anemia work-up shows hemoglobin is 11.8.  Iron studies are adequate, no need for any intravenous iron replacement at present  - B12 and folate level is showing improvement.  Recommend decreasing B12 and folic acid to 3 times a week.    3.  Neutropenia:  - Patient has been having intermittent neutropenia for last few years  - CBC done today shows white blood cell count is 4.81 with ANC of 2.68    4.  History of pulmonary embolism s/p anticoagulation with Coumadin for 1 year in 2016  - We will continue with clinical surveillance    5.  Acute kidney injury:  - Creatinine is elevated at 1.08.  Patient will be notified about elevated creatinine and need to increase hydration    6.  Pain in throat and voice change.  - Patient is complaining of ongoing intermittent throat pain and voice change for last 2 months.  Recommend ENT evaluation to rule out any developing laryngeal pathology.  Patient is agreeable.  ENT referral has been placed today      7.  Health maintenance: Patient does not smoke    8.  Advance care planning.  Patient is DNR                 PHQ-9 Total Score: 0   -Patient is not homicidal or suicidal.  No acute intervention required.     Veronica Wilkinson reports a pain score of 0.  Given her pain assessment as noted, treatment options were discussed and the following options were decided upon as a follow-up plan to address the patient's pain: continuation of current treatment plan for pain.         Mihai Hunt MD  6/15/2023  14:32 CDT        Part of this note may be an electronic transcription/translation of spoken language to printed text using the Dragon Dictation System.          CC:

## 2023-08-18 PROCEDURE — 87088 URINE BACTERIA CULTURE: CPT | Performed by: NURSE PRACTITIONER

## 2023-08-18 PROCEDURE — 87186 SC STD MICRODIL/AGAR DIL: CPT | Performed by: NURSE PRACTITIONER

## 2023-08-18 PROCEDURE — 87086 URINE CULTURE/COLONY COUNT: CPT | Performed by: NURSE PRACTITIONER

## 2023-08-23 ENCOUNTER — HOSPITAL ENCOUNTER (OUTPATIENT)
Age: 78
Setting detail: SPECIMEN
Discharge: HOME | End: 2023-08-23
Payer: MEDICARE

## 2023-08-23 PROCEDURE — 88305 TISSUE EXAM BY PATHOLOGIST: CPT

## 2023-09-15 ENCOUNTER — LAB (OUTPATIENT)
Dept: LAB | Facility: HOSPITAL | Age: 78
End: 2023-09-15
Payer: MEDICARE

## 2023-09-15 ENCOUNTER — TELEPHONE (OUTPATIENT)
Dept: CARDIOLOGY | Facility: CLINIC | Age: 78
End: 2023-09-15
Payer: MEDICARE

## 2023-09-15 DIAGNOSIS — C18.9 MALIGNANT NEOPLASM OF COLON, UNSPECIFIED PART OF COLON: ICD-10-CM

## 2023-09-15 DIAGNOSIS — D50.8 OTHER IRON DEFICIENCY ANEMIA: Primary | ICD-10-CM

## 2023-09-15 DIAGNOSIS — D50.8 OTHER IRON DEFICIENCY ANEMIA: ICD-10-CM

## 2023-09-15 LAB
ALBUMIN SERPL-MCNC: 3.7 G/DL (ref 3.5–5.2)
ALBUMIN/GLOB SERPL: 1.3 G/DL
ALP SERPL-CCNC: 122 U/L (ref 39–117)
ALT SERPL W P-5'-P-CCNC: 37 U/L (ref 1–33)
ANION GAP SERPL CALCULATED.3IONS-SCNC: 8 MMOL/L (ref 5–15)
AST SERPL-CCNC: 39 U/L (ref 1–32)
BASOPHILS # BLD AUTO: 0.04 10*3/MM3 (ref 0–0.2)
BASOPHILS NFR BLD AUTO: 1.1 % (ref 0–1.5)
BILIRUB SERPL-MCNC: 0.3 MG/DL (ref 0–1.2)
BUN SERPL-MCNC: 20 MG/DL (ref 8–23)
BUN/CREAT SERPL: 17.1 (ref 7–25)
CALCIUM SPEC-SCNC: 9.3 MG/DL (ref 8.6–10.5)
CHLORIDE SERPL-SCNC: 108 MMOL/L (ref 98–107)
CO2 SERPL-SCNC: 23 MMOL/L (ref 22–29)
CREAT SERPL-MCNC: 1.17 MG/DL (ref 0.57–1)
DEPRECATED RDW RBC AUTO: 50.2 FL (ref 37–54)
EGFRCR SERPLBLD CKD-EPI 2021: 48.5 ML/MIN/1.73
EOSINOPHIL # BLD AUTO: 0.07 10*3/MM3 (ref 0–0.4)
EOSINOPHIL NFR BLD AUTO: 1.9 % (ref 0.3–6.2)
ERYTHROCYTE [DISTWIDTH] IN BLOOD BY AUTOMATED COUNT: 14.2 % (ref 12.3–15.4)
FERRITIN SERPL-MCNC: 275 NG/ML (ref 13–150)
GLOBULIN UR ELPH-MCNC: 2.8 GM/DL
GLUCOSE SERPL-MCNC: 81 MG/DL (ref 65–99)
HCT VFR BLD AUTO: 31.4 % (ref 34–46.6)
HGB BLD-MCNC: 10.3 G/DL (ref 12–15.9)
IMM GRANULOCYTES # BLD AUTO: 0.01 10*3/MM3 (ref 0–0.05)
IMM GRANULOCYTES NFR BLD AUTO: 0.3 % (ref 0–0.5)
IRON 24H UR-MRATE: 84 MCG/DL (ref 37–145)
IRON SATN MFR SERPL: 28 % (ref 20–50)
LYMPHOCYTES # BLD AUTO: 1.78 10*3/MM3 (ref 0.7–3.1)
LYMPHOCYTES NFR BLD AUTO: 48.5 % (ref 19.6–45.3)
MCH RBC QN AUTO: 31.4 PG (ref 26.6–33)
MCHC RBC AUTO-ENTMCNC: 32.8 G/DL (ref 31.5–35.7)
MCV RBC AUTO: 95.7 FL (ref 79–97)
MONOCYTES # BLD AUTO: 0.4 10*3/MM3 (ref 0.1–0.9)
MONOCYTES NFR BLD AUTO: 10.9 % (ref 5–12)
NEUTROPHILS NFR BLD AUTO: 1.37 10*3/MM3 (ref 1.7–7)
NEUTROPHILS NFR BLD AUTO: 37.3 % (ref 42.7–76)
NRBC BLD AUTO-RTO: 0 /100 WBC (ref 0–0.2)
PLATELET # BLD AUTO: 296 10*3/MM3 (ref 140–450)
PMV BLD AUTO: 9.7 FL (ref 6–12)
POTASSIUM SERPL-SCNC: 4.5 MMOL/L (ref 3.5–5.2)
PROT SERPL-MCNC: 6.5 G/DL (ref 6–8.5)
RBC # BLD AUTO: 3.28 10*6/MM3 (ref 3.77–5.28)
SODIUM SERPL-SCNC: 139 MMOL/L (ref 136–145)
TIBC SERPL-MCNC: 295 MCG/DL (ref 298–536)
TRANSFERRIN SERPL-MCNC: 198 MG/DL (ref 200–360)
WBC NRBC COR # BLD: 3.67 10*3/MM3 (ref 3.4–10.8)

## 2023-09-15 PROCEDURE — 82378 CARCINOEMBRYONIC ANTIGEN: CPT

## 2023-09-15 PROCEDURE — 84466 ASSAY OF TRANSFERRIN: CPT

## 2023-09-15 PROCEDURE — 85025 COMPLETE CBC W/AUTO DIFF WBC: CPT

## 2023-09-15 PROCEDURE — 80053 COMPREHEN METABOLIC PANEL: CPT

## 2023-09-15 PROCEDURE — 83540 ASSAY OF IRON: CPT

## 2023-09-15 PROCEDURE — 36415 COLL VENOUS BLD VENIPUNCTURE: CPT

## 2023-09-15 PROCEDURE — 82728 ASSAY OF FERRITIN: CPT

## 2023-09-15 PROCEDURE — 82607 VITAMIN B-12: CPT

## 2023-09-15 PROCEDURE — 82746 ASSAY OF FOLIC ACID SERUM: CPT

## 2023-09-16 LAB
CEA SERPL-MCNC: 4.8 NG/ML
FOLATE SERPL-MCNC: >20 NG/ML (ref 4.78–24.2)
VIT B12 BLD-MCNC: 521 PG/ML (ref 211–946)

## 2023-09-18 ENCOUNTER — OFFICE VISIT (OUTPATIENT)
Dept: ONCOLOGY | Facility: CLINIC | Age: 78
End: 2023-09-18
Payer: MEDICARE

## 2023-09-18 VITALS
WEIGHT: 195.4 LBS | BODY MASS INDEX: 38.36 KG/M2 | DIASTOLIC BLOOD PRESSURE: 66 MMHG | HEIGHT: 60 IN | OXYGEN SATURATION: 100 % | SYSTOLIC BLOOD PRESSURE: 137 MMHG | HEART RATE: 66 BPM

## 2023-09-18 DIAGNOSIS — K90.9 IRON MALABSORPTION: Chronic | ICD-10-CM

## 2023-09-18 DIAGNOSIS — D50.8 OTHER IRON DEFICIENCY ANEMIA: Chronic | ICD-10-CM

## 2023-09-18 DIAGNOSIS — D70.8 OTHER NEUTROPENIA: Chronic | ICD-10-CM

## 2023-09-18 DIAGNOSIS — Z86.711 HISTORY OF PULMONARY EMBOLISM: Chronic | ICD-10-CM

## 2023-09-18 DIAGNOSIS — C18.9 MALIGNANT NEOPLASM OF COLON, UNSPECIFIED PART OF COLON: Primary | Chronic | ICD-10-CM

## 2023-09-18 PROCEDURE — 1125F AMNT PAIN NOTED PAIN PRSNT: CPT | Performed by: INTERNAL MEDICINE

## 2023-09-18 PROCEDURE — G0463 HOSPITAL OUTPT CLINIC VISIT: HCPCS | Performed by: INTERNAL MEDICINE

## 2023-09-18 PROCEDURE — 1123F ACP DISCUSS/DSCN MKR DOCD: CPT | Performed by: INTERNAL MEDICINE

## 2023-09-18 PROCEDURE — 3075F SYST BP GE 130 - 139MM HG: CPT | Performed by: INTERNAL MEDICINE

## 2023-09-18 PROCEDURE — 3078F DIAST BP <80 MM HG: CPT | Performed by: INTERNAL MEDICINE

## 2023-09-18 PROCEDURE — 99214 OFFICE O/P EST MOD 30 MIN: CPT | Performed by: INTERNAL MEDICINE

## 2023-09-18 NOTE — PROGRESS NOTES
"DATE OF VISIT: 9/18/2023      REASON FOR VISIT: Adenocarcinoma of ascending colon, anemia, neutropenia, history of pulmonary embolism      HISTORY OF PRESENT ILLNESS:   78-year-old female with medical problem consisting of pulmonary embolism diagnosed in 2016 s/p anticoagulation with Coumadin for 1 year, osteoporosis, depression, history of gastric sleeve surgery in 2016, neutropenia, anemia, adenocarcinoma of ascending colon diagnosed in June 2021 currently on surveillance is here for follow-up appointment today to discuss recently done blood work.  States she had a urinary tract infection to 3 weeks ago for which she took antibiotic with Macrobid.  Complains of fatigue.  Complains of arthralgia.  Denies any bleeding.  Denies any new lymph node enlargement.              Past Medical History, Past Surgical History, Social History, Family History have been reviewed and are without significant changes except as mentioned.    Review of Systems   A comprehensive 14 point review of systems was performed and was negative except as mentioned in HPI.    Medications:  The current medication list was reviewed in the EMR    ALLERGIES:    Allergies   Allergen Reactions    Sulfa Antibiotics Hives and Rash     Sulfa (Sulfonamide Antibiotics)       Objective      Vitals:    09/18/23 1405   BP: 137/66   Pulse: 66   SpO2: 100%   Weight: 88.6 kg (195 lb 6.4 oz)   Height: 152.4 cm (60\")   PainSc:   4   PainLoc: Hip  Comment: left hip         11/30/2022     1:50 PM   Current Status   ECOG score 0       Physical Exam  Pulmonary:      Breath sounds: Normal breath sounds.   Neurological:      Mental Status: She is alert and oriented to person, place, and time.         RECENT LABS:  Glucose   Date Value Ref Range Status   09/15/2023 81 65 - 99 mg/dL Final     Sodium   Date Value Ref Range Status   09/15/2023 139 136 - 145 mmol/L Final   10/12/2018 140 134 - 144 mmol/L Final   10/08/2018 141 136 - 144 mmol/L Final     Potassium   Date Value " Ref Range Status   09/15/2023 4.5 3.5 - 5.2 mmol/L Final   10/12/2018 4.6 3.5 - 5.1 mmol/L Final   10/08/2018 4.3 3.3 - 4.8 mmol/L Final     Comment:     Serum Potassium Reference Range  = 3.5-5.1  mmol/L     CO2   Date Value Ref Range Status   09/15/2023 23.0 22.0 - 29.0 mmol/L Final     Total CO2   Date Value Ref Range Status   10/08/2018 25 23 - 31 mmol/L Final     Chloride   Date Value Ref Range Status   09/15/2023 108 (H) 98 - 107 mmol/L Final   10/12/2018 105 98 - 107 mmol/L Final   10/08/2018 108 (H) 98 - 107 mmol/L Final     Anion Gap   Date Value Ref Range Status   09/15/2023 8.0 5.0 - 15.0 mmol/L Final   10/08/2018 8  Final     Creatinine   Date Value Ref Range Status   09/15/2023 1.17 (H) 0.57 - 1.00 mg/dL Final   10/12/2018 0.9 0.6 - 1.3 mg/dL Final     Comment:     **The MDRD GFR formula is valid only for ages 18-70.    GFR will not be reported for individuals aging <18 or >70.     BUN   Date Value Ref Range Status   09/15/2023 20 8 - 23 mg/dL Final   10/12/2018 13 7 - 18 mg/dL Final     BUN/Creatinine Ratio   Date Value Ref Range Status   09/15/2023 17.1 7.0 - 25.0 Final     Calcium   Date Value Ref Range Status   09/15/2023 9.3 8.6 - 10.5 mg/dL Final   10/12/2018 9.7 8.8 - 10.5 mg/dL Final     eGFR Non  Amer   Date Value Ref Range Status   02/14/2022 57 (L) >60 mL/min/1.73 Final     Alkaline Phosphatase   Date Value Ref Range Status   09/15/2023 122 (H) 39 - 117 U/L Final   10/12/2018 115 46 - 116 U/L Final     Total Protein   Date Value Ref Range Status   09/15/2023 6.5 6.0 - 8.5 g/dL Final   10/12/2018 6.6 6.4 - 8.2 g/dL Final     ALT (SGPT)   Date Value Ref Range Status   09/15/2023 37 (H) 1 - 33 U/L Final   10/12/2018 43 30 - 65 U/L Final     AST (SGOT)   Date Value Ref Range Status   09/15/2023 39 (H) 1 - 32 U/L Final   10/12/2018 22 15 - 37 U/L Final     Total Bilirubin   Date Value Ref Range Status   09/15/2023 0.3 0.0 - 1.2 mg/dL Final   10/12/2018 0.3 0 - 1.0 mg/dL Final     Albumin    Date Value Ref Range Status   09/15/2023 3.7 3.5 - 5.2 g/dL Final   10/12/2018 3.2 (L) 3.4 - 5.0 g/dL Final     Globulin   Date Value Ref Range Status   09/15/2023 2.8 gm/dL Final     Lab Results   Component Value Date    WBC 3.67 09/15/2023    HGB 10.3 (L) 09/15/2023    HCT 31.4 (L) 09/15/2023    MCV 95.7 09/15/2023     09/15/2023     Lab Results   Component Value Date    NEUTROABS 1.37 (L) 09/15/2023    IRON 84 09/15/2023    IRON 69 06/08/2023    IRON 97 03/10/2023    TIBC 295 (L) 09/15/2023    TIBC 343 06/08/2023    TIBC 295 (L) 03/10/2023    LABIRON 28 09/15/2023    LABIRON 20 06/08/2023    LABIRON 33 03/10/2023    FERRITIN 275.00 (H) 09/15/2023    FERRITIN 243.50 (H) 06/08/2023    FERRITIN 238.30 (H) 03/10/2023    [id number] 521 09/15/2023    [id number] 1,121 (H) 06/08/2023    [id number] 745 03/10/2023    FOLATE >20.00 09/15/2023    FOLATE >20.00 06/08/2023    FOLATE >20.00 03/10/2023     Lab Results   Component Value Date    AFPTM 142 12/05/2018    CEA 4.80 09/15/2023         PATHOLOGY:  * Cannot find OR log *         RADIOLOGY DATA :  No radiology results for the last 7 days        Assessment & Plan     1.  Adenocarcinoma of ascending colon, stage II, T3 N0 M0 MSI stable:  - Patient had a surgery done in June 2021 and has been on surveillance since then.  - Colonoscopy on September 12, 2022 was negative for any recurrence  - CT of chest, abdomen and pelvis with contrast on March 22, 2023 was negative for any recurrence.  - CEA level is 4.8.  We will continue with clinical surveillance  - We will have patient return to clinic in 2 months with repeat CBC, CMP, iron studies, ferritin, B12, folate, CEA level to be done prior to that    2.  Anemia:  - Due to iron malabsorption and gastric sleeve surgery patient has received Venofer in December 2021.  - Currently on B12 and folic acid 3 times a week  - Recent anemia work-up shows hemoglobin is 10.3 without any significant nutritional deficiency:  -  Discussed with patient if she continues to have significant anemia without any significant nutritional deficiency she will benefit from bone marrow biopsy to rule out any underlying bone marrow pathology.    3.  Neutropenia:  - Patient has been having intermittent neutropenia for last few weeks  - CBC recently shows white blood cell count is 3.67 with ANC of 1.37.  Patient did have recent urinary tract infection and required antibiotic that might have contributed to neutropenia.    4.  History of pulmonary embolism s/p anticoagulation with Coumadin for 1 year in 2016  - We will continue with clinical surveillance    5.  Acute kidney injury:  - Creatinine is elevated at 1.37.  Patient was notified about elevated creatinine and need to increase hydration    6.  Health maintenance: Patient does not smoke    7.  Advance care planning.  - Patient is DNR.    8.  Elevated liver function test:  - Both AST and ALT are borderline elevated.  Patient was notified about elevated liver function test.  We will recheck CMP upon next clinic visit in 2 months.                     PHQ-9 Total Score: 0   -Patient is not homicidal or suicidal.  No acute intervention required.     Veronica Wilkinson reports a pain score of 4.  Given her pain assessment as noted, treatment options were discussed and the following options were decided upon as a follow-up plan to address the patient's pain: continuation of current treatment plan for pain.         Mihai Hunt MD  9/18/2023  14:13 CDT        Part of this note may be an electronic transcription/translation of spoken language to printed text using the Dragon Dictation System.          CC:

## 2023-10-11 ENCOUNTER — HOSPITAL ENCOUNTER (OUTPATIENT)
Age: 78
Setting detail: SPECIMEN
Discharge: HOME | End: 2023-10-11

## 2023-12-09 NOTE — TELEPHONE ENCOUNTER
Pt would like a call back concerning her BP says yesterday it was 65/49 she got it up to 102/60. Says it is now 93/70  
English

## 2024-01-01 NOTE — PLAN OF CARE
Patient will continue to improve and work towards all goals specific to current healthcare situation. Staff will encourage goals per POC and document with all updates.  Goal Outcome Evaluation:                  PRINCIPAL DISCHARGE DIAGNOSIS  Diagnosis: Single liveborn, born in hospital, delivered by  section  Assessment and Plan of Treatment: - Follow-up with your pediatrician within 48 hours of discharge.   Routine Home Care Instructions:  - Please call us for help if you feel sad, blue or overwhelmed for more than a few days after discharge  - Umbilical cord care:        - Please keep your baby's cord clean and dry (do not apply alcohol)        - Please keep your baby's diaper below the umbilical cord until it has fallen off (~10-14 days)        - Please do not submerge your baby in a bath until the cord has fallen off (sponge bath instead)  - Continue feeding child at least every 3 hours, wake baby to feed if needed.   Please contact your pediatrician and return to the hospital if you notice any of the following:   - Fever  (T > 100.4)  - Reduced amount of wet diapers (< 5-6 per day) or no wet diaper in 12 hours  - Increased fussiness, irritability, or crying inconsolably  - Lethargy (excessively sleepy, difficult to arouse)  - Breathing difficulties (noisy breathing, breathing fast, using belly and neck muscles to breath)  - Changes in the baby’s color (yellow, blue, pale, gray)  - Seizure or loss of consciousness

## 2024-09-03 NOTE — PROGRESS NOTES
Cholesterol levels elevated. I recommend low-fat diet and routine exercise otherwise we will need to increase her cholesterol medicine to every day. Creatinine slightly elevated. Maintain adequate hydration. Avoid NSAIDs. We will continue to monitor. Hemoglobin A1c slightly low. Make sure she is eating a balanced diet on a routine basis to avoid hypoglycemia. Follow-up as scheduled.
No

## 2025-05-09 NOTE — PROGRESS NOTES
Chief Complaint   Patient presents with   • Anemia       Subjective    Veronica Wilkinson is a 75 y.o. female. she is here today for follow-up.    History of Present Illness  75-year-old female presents to discuss EGD and colonoscopy results.  She reports after procedure she felt very weak and dehydrated was seen in emergency department given some IV fluids and still had some nausea weakness but her blood pressure has improved with IV rehydration.  States she has not been able to eat very much only had a few crackers today. lab work noted anemia with hemoglobin of 9.6 but labs were otherwise stable creatinine was slightly elevated.  Full results listed below.  She has history of bariatric surgery per Dr. Finney in [place] and reports she had normal colonoscopy prior to that.  She is unsure on diet but she thinks it was about [id number].  Colonoscopy here was last completed 2008 and there was an adenomatous colonic polyp at sigmoid which was removed.  EGD noted gastritis in the stomach normal duodenum and normal esophagus.  Antrum biopsy noted reactive gastropathy negative for intestinal metaplasia or dysplasia, no H. pylori-like organisms seen.  Duodenal biopsy noted duodenal type mucosa with no significant histopathologic abnormalities.    Colonoscopy noted adequate prep normal perianal digital rectal exam and ulcerated partially obstructing median mass was found in the ascending colon mass was noncircumferential measured about 1 cm in length oozing was present biopsy was taken and area was tattooed with 3 mls of Radha ink.  Biopsy noted invasive moderately differentiated adenocarcinoma     The following portions of the patient's history were reviewed and updated as appropriate:   Past Medical History:   Diagnosis Date   • Arthritis    • Asthma    • Bleeding tendency (CMS/HCC)    • Chest pain     History of noncardiac chest pain   • Chronic depression    • COPD (chronic obstructive pulmonary disease) (CMS/HCC)   "  • Depressive disorder    • Diastolic dysfunction    • Dyspnea    • Dyspnea on exertion    • Edema    • Essential hypertension    • Exercise intolerance    • Fatigue    • Gallstones    • GERD (gastroesophageal reflux disease)    • History of bone density study 2011    DEXA BONE DENSITY APPENDICULAR 70919 (1) - normal   • History of bone density study 06/24/2015    DXA BONE DENSITY AXIAL 23642 [facility] (1) - Ordered By: TOLU RAMIREZ ([facility])    • History of echocardiogram     Echocardiogram W/ color flow 60678 (2)   • History of mammogram 2013    Mammogram screen (1)   • History of mammogram 2015    MAMMOGRAM SCREENING 62684 - [facility] (1)   • History of screening mammography 06/25/2015    SCREENING MAMMOGRAPHY DIGITAL  (Medicare) (1) - Ordered By: ANDRADE BECERRIL ([facility])    • Hypercalcemia    • Hyperlipidemia    • Influenza vaccine administered 02/25/2016    INFLUENZA IMMUN ADMIN OR PREV RECV'D  (2) - Ordered By: ANDRADE BECERRLI ([facility])    • Long-term (current) use of anticoagulants     coumadin therapy      • Lower extremity edema     Intermittent lower extremity edema   • Obesity, Class III, BMI 40-49.9 (morbid obesity) (CMS/[facility])     \"Class III obesity\"   • Osteoporosis    • PE (pulmonary embolism) 10/2015    Bilateral PE diagnosed after a car ride to [place]   • Pneumococcal vaccination given 02/25/2016    PNEUMOC VAC/ADMIN/RCVD 4040F (2) - Ordered By: ANDRADE BECERRIL ([facility])    • Right basilic vein fibrosis 2015   • Sedentary lifestyle    • Shortness of breath    • Skin cancer    • Urinary tract infection    • Venous thrombosis 2015    right basilic venous thrombosis; This was noted in May 2015.   • Weight gain      Past Surgical History:   Procedure Laterality Date   • CARDIAC ELECTROPHYSIOLOGY PROCEDURE N/A 12/10/2020    Procedure: Pacemaker DC new;  Surgeon: Vesta Bucio MD;  Location: [facility] INVASIVE LOCATION;  Service: Cardiology;  Laterality: N/A;  [facility] per " elif..johanna from [place] [facility] aware   • CHOLECYSTECTOMY      Cholecystectomy (1)   • COLONOSCOPY      Approximately 5 years prior as stated per patient   • COLONOSCOPY N/A 2021    Procedure: COLONOSCOPY;  Surgeon: Rasheed Gibbons MD;  Location: [facility] ENDOSCOPY;  Service: Gastroenterology;  Laterality: N/A;  tattoo at ascneding colon mass   • CRYOABLATION     • ENDOSCOPY  09/15/2011    Colon endoscopy 04881 (2) - Hemorrhoids found.   • ENDOSCOPY N/A 2021    Procedure: ESOPHAGOGASTRODUODENOSCOPY;  Surgeon: Rasheed Gibbons MD;  Location: [facility] ENDOSCOPY;  Service: Gastroenterology;  Laterality: N/A;   • GASTRIC SLEEVE LAPAROSCOPIC     • GASTRIC SLEEVE LAPAROSCOPIC     • HEMORRHOIDECTOMY     • HYSTEROSCOPY      Hysteroscopy; ablation (1)   • INJECTION OF MEDICATION  2012    Kenalog (1) - Ordered By: ANDRADE BECERRIL ([facility])    • PACEMAKER IMPLANTATION       Family History   Problem Relation Age of Onset   • Ovarian cancer Mother    • Bleeding Disorder Father    • Other Father         Hematologic disorder   • Lung cancer Father    • Pancreatic cancer Sister    • Cancer Brother    • Lung cancer Brother    • Bleeding Disorder Other    • Hyperlipidemia Other    • Hypertension Other    • Osteoarthritis Other    • Other Other         Gastritis   • Adrenal disorder Daughter    • Seizures Daughter    • Thyroid disease Daughter    • No Known Problems Daughter    • Diabetes Other    • Heart disease Other    • Other Other         Ulcers; Bleeding Tendencies; Allergy   • Cancer Other    • Cholelithiasis Other    • Hypertension Other    • Allergy (severe) Other      OB History        3    Para   3    Term   3            AB        Living           SAB        TAB        Ectopic        Molar        Multiple        Live Births                  Prior to Admission medications    Medication Sig Start Date End Date Taking? Authorizing Provider   acetaminophen (TYLENOL) 325 MG tablet Take 650 mg by mouth  Every Night.   Yes Annemarie Vega MD   Bisacodyl (GENTLE LAXATIVE PO) Take 100 mg by mouth Every Night.   Yes Annemarie Vega MD   calcium carbonate (OS-DARON) 600 MG tablet Take 600 mg by mouth 2 (Two) Times a Day With Meals.   Yes Annemarie Vega MD   Cholecalciferol (VITAMIN D3) 2000 units tablet Take 1 tablet by mouth Daily.   Yes Annemarie Vega MD   Cyanocobalamin (B-12 PO) Take 1 tablet by mouth Daily.   Yes Emergency, Nurse Epic, RN   diphenhydrAMINE (BENADRYL) 25 MG tablet Take 50 mg by mouth Every Night. Take two tablets by mouth at bedtime    Yes Annemarie Vega MD   docusate sodium (COLACE) 100 MG capsule Take 100 mg by mouth Every Night. Take two tablets by mouth nightly    Yes Annemarie Vega MD   fluticasone (Flonase) 50 MCG/ACT nasal spray 2 sprays into the nostril(s) as directed by provider Daily As Needed for Rhinitis.   Yes Annemarie Vega MD   Garlic 1000 MG capsule Take 1,000 mg by mouth Daily.   Yes Annemarie Vega MD   Iron, Ferrous Sulfate, 325 (65 Fe) MG tablet Take 325 mg by mouth Daily.   Yes Annemarie Vega MD   LORATADINE ALLERGY RELIEF PO Take 10 mg by mouth Daily.   Yes Annemarie Vega MD   magnesium oxide (MAG-OX) 400 MG tablet Take 400 mg by mouth Daily.   Yes Annemarie Vega MD   MILK THISTLE PO Take  by mouth.   Yes Annemarie Vega MD   Multiple Vitamins-Minerals (MULTIVITAMIN ADULT PO) Take 1 tablet by mouth Daily.   Yes Annemarie Vega MD   olmesartan-hydrochlorothiazide (BENICAR HCT) 20-12.5 MG per tablet Take 1 tablet by mouth Daily. 1/13/21 1/13/22 Yes Rich Urbina APRN   pantoprazole (PROTONIX) 40 MG EC tablet Take 1 tablet by mouth Daily. 1/13/21  Yes Rich Urbina APRN   simethicone (MYLICON,GAS-X) 125 MG capsule capsule Take 1 capsule by mouth Every Night.   Yes Annemarie Vega MD   simvastatin (ZOCOR) 20 MG tablet Take 1 tablet by mouth Every Other Day. 3/26/21  Yes Ciara  "Rich CAMPOS TABBY   tiZANidine (ZANAFLEX) 4 MG tablet Take 1 tablet by mouth At Night As Needed for Muscle Spasms. 12/8/20  Yes Ciara Rich CAMPOS TABBY   traZODone (DESYREL) 100 MG tablet Take 1 tablet by mouth Every Night. 1/11/21  Yes [place] Rich CAMPOS APRMANDEEP   Turmeric (QC Tumeric Complex) 500 MG capsule Take 500 mg by mouth Daily.   Yes Provider, Historical, MD     Allergies   Allergen Reactions   • Sulfa Antibiotics Hives and Rash     Sulfa (Sulfonamide Antibiotics)     Social History     Socioeconomic History   • Marital status: Single     Spouse name: Not on file   • Number of children: 3   • Years of education: Not on file   • Highest education level: Not on file   Tobacco Use   • Smoking status: Never Smoker   • Smokeless tobacco: Never Used   Vaping Use   • Vaping Use: Never used   Substance and Sexual Activity   • Alcohol use: No   • Drug use: No   • Sexual activity: Defer       Review of Systems  Review of Systems   Constitutional: Negative for activity change, appetite change, chills, diaphoresis, fatigue, fever and unexpected weight change.   HENT: Negative for sore throat and trouble swallowing.    Respiratory: Negative for shortness of breath.    Gastrointestinal: Positive for abdominal pain and nausea. Negative for abdominal distention, anal bleeding, blood in stool, constipation, diarrhea, rectal pain and vomiting.   Musculoskeletal: Negative for arthralgias.   Skin: Negative for pallor.   Neurological: Negative for light-headedness.        /74 (BP Location: Left arm)   Pulse 68   Ht 154.9 cm (61\")   Wt 86 kg (189 lb 9.6 oz)   BMI 35.82 kg/m²     Objective    Physical Exam  Constitutional:       Appearance: Normal appearance. She is normal weight.   Pulmonary:      Effort: Pulmonary effort is normal.   Abdominal:      General: Bowel sounds are normal. There is no distension.      Palpations: Abdomen is soft.      Tenderness: There is abdominal tenderness in the left upper quadrant. "   Neurological:      Mental Status: She is alert.       Admission on 06/18/2021, Discharged on 06/18/2021   Component Date Value Ref Range Status   • QT Interval 06/18/2021 384  ms Final   • QTC Interval 06/18/2021 414  ms Final   • Glucose 06/18/2021 116* 65 - 99 mg/dL Final   • BUN 06/18/2021 20  8 - 23 mg/dL Final   • Creatinine 06/18/2021 1.09* 0.57 - 1.00 mg/dL Final   • Sodium 06/18/2021 137  136 - 145 mmol/L Final   • Potassium 06/18/2021 3.8  3.5 - 5.2 mmol/L Final   • Chloride 06/18/2021 105  98 - 107 mmol/L Final   • CO2 06/18/2021 26.0  22.0 - 29.0 mmol/L Final   • Calcium 06/18/2021 9.1  8.6 - 10.5 mg/dL Final   • Total Protein 06/18/2021 6.3  6.0 - 8.5 g/dL Final   • Albumin 06/18/2021 3.70  3.50 - 5.20 g/dL Final   • ALT (SGPT) 06/18/2021 16  1 - 33 U/L Final   • AST (SGOT) 06/18/2021 23  1 - 32 U/L Final   • Alkaline Phosphatase 06/18/2021 88  39 - 117 U/L Final   • Total Bilirubin 06/18/2021 0.2  0.0 - 1.2 mg/dL Final   • eGFR Non African Amer 06/18/2021 49* >60 mL/min/1.73 Final   • Globulin 06/18/2021 2.6  gm/dL Final   • A/G Ratio 06/18/2021 1.4  g/dL Final   • BUN/Creatinine Ratio 06/18/2021 18.3  7.0 - 25.0 Final   • Anion Gap 06/18/2021 6.0  5.0 - 15.0 mmol/L Final   • Troponin T 06/18/2021 <0.010  0.000 - 0.030 ng/mL Final   • Magnesium 06/18/2021 2.0  1.6 - 2.4 mg/dL Final   • Color, UA 06/18/2021 Yellow  Yellow, Straw, Dark Yellow, Karen Final   • Appearance, UA 06/18/2021 Clear  Clear Final   • pH, UA 06/18/2021 <=5.0  5.0 - 9.0 Final   • Specific Gravity, UA 06/18/2021 1.018  1.003 - 1.030 Final   • Glucose, UA 06/18/2021 Negative  Negative Final   • Ketones, UA 06/18/2021 Negative  Negative Final   • Bilirubin, UA 06/18/2021 Small (1+)* Negative Final   • Blood, UA 06/18/2021 Negative  Negative Final   • Protein, UA 06/18/2021 Negative  Negative Final   • Leuk Esterase, UA 06/18/2021 Small (1+)* Negative Final   • Nitrite, UA 06/18/2021 Negative  Negative Final   • Urobilinogen, UA  06/18/2021 0.2 E.U./dL  0.2 - 1.0 E.U./dL Final   • Extra Tube 06/18/2021 Hold for add-ons.   Final    Auto resulted.   • Extra Tube 06/18/2021 hold for add-on   Final    Auto resulted   • Extra Tube 06/18/2021 Hold for add-ons.   Final    Auto resulted.   • WBC 06/18/2021 4.46  3.40 - 10.80 10*3/mm3 Final   • RBC 06/18/2021 3.24* 3.77 - 5.28 10*6/mm3 Final   • Hemoglobin 06/18/2021 9.6* 12.0 - 15.9 g/dL Final   • Hematocrit 06/18/2021 29.1* 34.0 - 46.6 % Final   • MCV 06/18/2021 89.8  79.0 - 97.0 fL Final   • MCH 06/18/2021 29.6  26.6 - 33.0 pg Final   • MCHC 06/18/2021 33.0  31.5 - 35.7 g/dL Final   • RDW 06/18/2021 12.7  12.3 - 15.4 % Final   • RDW-SD 06/18/2021 41.6  37.0 - 54.0 fl Final   • MPV 06/18/2021 9.0  6.0 - 12.0 fL Final   • Platelets 06/18/2021 251  140 - 450 10*3/mm3 Final   • Neutrophil % 06/18/2021 55.2  42.7 - 76.0 % Final   • Lymphocyte % 06/18/2021 34.3  19.6 - 45.3 % Final   • Monocyte % 06/18/2021 8.5  5.0 - 12.0 % Final   • Eosinophil % 06/18/2021 1.3  0.3 - 6.2 % Final   • Basophil % 06/18/2021 0.7  0.0 - 1.5 % Final   • Immature Grans % 06/18/2021 0.0  0.0 - 0.5 % Final   • Neutrophils, Absolute 06/18/2021 2.46  1.70 - 7.00 10*3/mm3 Final   • Lymphocytes, Absolute 06/18/2021 1.53  0.70 - 3.10 10*3/mm3 Final   • Monocytes, Absolute 06/18/2021 0.38  0.10 - 0.90 10*3/mm3 Final   • Eosinophils, Absolute 06/18/2021 0.06  0.00 - 0.40 10*3/mm3 Final   • Basophils, Absolute 06/18/2021 0.03  0.00 - 0.20 10*3/mm3 Final   • Immature Grans, Absolute 06/18/2021 0.00  0.00 - 0.05 10*3/mm3 Final   • nRBC 06/18/2021 0.0  0.0 - 0.2 /100 WBC Final   • proBNP 06/18/2021 45.2  0.0 - 900.0 pg/mL Final   • RBC, UA 06/18/2021 0-2* None Seen /HPF Final   • WBC, UA 06/18/2021 3-5  None Seen, 0-2, 3-5 /HPF Final   • Bacteria, UA 06/18/2021 None Seen  None Seen /HPF Final   • Squamous Epithelial Cells, UA 06/18/2021 None Seen  None Seen, 0-2 /HPF Final   • Hyaline Casts, UA 06/18/2021 3-6  None Seen /LPF Final   •  Methodology 06/18/2021 Automated Microscopy   Final     Assessment/Plan      1. Malignant neoplasm of ascending colon (CMS/HCC)    2. Gastroesophageal reflux disease without esophagitis    3. Chronic gastritis without bleeding, unspecified gastritis type    .   CT today refer to surgeon Dr. Finney per patient request for consideration of colon resection consultation appreciated.   Continue Protonix daily for esophagitis and gastritis.  Patient currently follows with Dr. Hunt so we will send note with new diagnosis and have her follow-up sooner with him.    Orders placed during this encounter include:  Orders Placed This Encounter   Procedures   • CT Abdomen Pelvis With Contrast     Standing Status:   Future     Standing Expiration Date:   6/21/2022     Order Specific Question:   Will Oral Contrast be needed for this procedure?     Answer:   Yes   • Comprehensive Metabolic Panel     Order Specific Question:   Release to patient     Answer:   Immediate   • CBC (No Diff)     Order Specific Question:   Release to patient     Answer:   Immediate   • CEA     Order Specific Question:   Release to patient     Answer:   Immediate   • Ambulatory Referral to General Surgery     Referral Priority:   Urgent     Referral Type:   Consultation     Referral Reason:   Specialty Services Required     Referred to Provider:   Rob Finney MD     Requested Specialty:   General Surgery     Number of Visits Requested:   1       * Surgery not found *    Review and/or summary of lab tests, radiology, procedures, medications. Review and summary of old records and obtaining of history. The risks and benefits of my recommendations, as well as other treatment options were discussed with the patient today. Questions were answered.    No orders of the defined types were placed in this encounter.      Follow-up: Return in about 4 weeks (around 7/19/2021) for Recheck, After test.          This document has been electronically signed by  Deidre Martinez, APRN on June 21, 2021 15:26 CDT           I spent 32 minutes caring for Veronica on this date of service. This time includes time spent by me in the following activities:preparing for the visit, reviewing tests, obtaining and/or reviewing a separately obtained history, performing a medically appropriate examination and/or evaluation , counseling and educating the patient/family/caregiver, ordering medications, tests, or procedures, referring and communicating with other health care professionals , documenting information in the medical record and care coordination    Results for orders placed or performed during the hospital encounter of 06/18/21   Gold Top - SST   Result Value Ref Range    Extra Tube Hold for add-ons.    Green Top (Gel)   Result Value Ref Range    Extra Tube Hold for add-ons.    Urinalysis, Microscopic Only - Urine, Clean Catch    Specimen: Urine, Clean Catch   Result Value Ref Range    RBC, UA 0-2 (A) None Seen /HPF    WBC, UA 3-5 None Seen, 0-2, 3-5 /HPF    Bacteria, UA None Seen None Seen /HPF    Squamous Epithelial Cells, UA None Seen None Seen, 0-2 /HPF    Hyaline Casts, UA 3-6 None Seen /LPF    Methodology Automated Microscopy    Urinalysis With Microscopic If Indicated (No Culture) - Urine, Clean Catch    Specimen: Urine, Clean Catch   Result Value Ref Range    Color, UA Yellow Yellow, Straw, Dark Yellow, Karen    Appearance, UA Clear Clear    pH, UA <=5.0 5.0 - 9.0    Specific Gravity, UA 1.018 1.003 - 1.030    Glucose, UA Negative Negative    Ketones, UA Negative Negative    Bilirubin, UA Small (1+) (A) Negative    Blood, UA Negative Negative    Protein, UA Negative Negative    Leuk Esterase, UA Small (1+) (A) Negative    Nitrite, UA Negative Negative    Urobilinogen, UA 0.2 E.U./dL 0.2 - 1.0 E.U./dL   CBC Auto Differential    Specimen: Blood   Result Value Ref Range    WBC 4.46 3.40 - 10.80 10*3/mm3    RBC 3.24 (L) 3.77 - 5.28 10*6/mm3    Hemoglobin 9.6 (L) 12.0 - 15.9 g/dL     Hematocrit 29.1 (L) 34.0 - 46.6 %    MCV 89.8 79.0 - 97.0 fL    MCH 29.6 26.6 - 33.0 pg    MCHC 33.0 31.5 - 35.7 g/dL    RDW 12.7 12.3 - 15.4 %    RDW-SD 41.6 37.0 - 54.0 fl    MPV 9.0 6.0 - 12.0 fL    Platelets 251 140 - 450 10*3/mm3    Neutrophil % 55.2 42.7 - 76.0 %    Lymphocyte % 34.3 19.6 - 45.3 %    Monocyte % 8.5 5.0 - 12.0 %    Eosinophil % 1.3 0.3 - 6.2 %    Basophil % 0.7 0.0 - 1.5 %    Immature Grans % 0.0 0.0 - 0.5 %    Neutrophils, Absolute 2.46 1.70 - 7.00 10*3/mm3    Lymphocytes, Absolute 1.53 0.70 - 3.10 10*3/mm3    Monocytes, Absolute 0.38 0.10 - 0.90 10*3/mm3    Eosinophils, Absolute 0.06 0.00 - 0.40 10*3/mm3    Basophils, Absolute 0.03 0.00 - 0.20 10*3/mm3    Immature Grans, Absolute 0.00 0.00 - 0.05 10*3/mm3    nRBC 0.0 0.0 - 0.2 /100 WBC   Lavender Top   Result Value Ref Range    Extra Tube hold for add-on    Troponin    Specimen: Blood   Result Value Ref Range    Troponin T <0.010 0.000 - 0.030 ng/mL   BNP    Specimen: Blood   Result Value Ref Range    proBNP 45.2 0.0 - 900.0 pg/mL   Magnesium    Specimen: Blood   Result Value Ref Range    Magnesium 2.0 1.6 - 2.4 mg/dL   Comprehensive Metabolic Panel    Specimen: Blood   Result Value Ref Range    Glucose 116 (H) 65 - 99 mg/dL    BUN 20 8 - 23 mg/dL    Creatinine 1.09 (H) 0.57 - 1.00 mg/dL    Sodium 137 136 - 145 mmol/L    Potassium 3.8 3.5 - 5.2 mmol/L    Chloride 105 98 - 107 mmol/L    CO2 26.0 22.0 - 29.0 mmol/L    Calcium 9.1 8.6 - 10.5 mg/dL    Total Protein 6.3 6.0 - 8.5 g/dL    Albumin 3.70 3.50 - 5.20 g/dL    ALT (SGPT) 16 1 - 33 U/L    AST (SGOT) 23 1 - 32 U/L    Alkaline Phosphatase 88 39 - 117 U/L    Total Bilirubin 0.2 0.0 - 1.2 mg/dL    eGFR Non African Amer 49 (L) >60 mL/min/1.73    Globulin 2.6 gm/dL    A/G Ratio 1.4 g/dL    BUN/Creatinine Ratio 18.3 7.0 - 25.0    Anion Gap 6.0 5.0 - 15.0 mmol/L   ECG 12 Lead   Result Value Ref Range    QT Interval 384 ms    QTC Interval 414 ms   Results for orders placed or performed during  the hospital encounter of 06/16/21   Tissue Pathology Exam    Specimen: A: Small Intestine, Duodenum; Tissue    B: Gastric, Antrum; Tissue    C: Large Intestine, Right / Ascending Colon; Tissue   Result Value Ref Range    Case Report       Surgical Pathology Report                         Case: [id number]                                  Authorizing Provider:  Rasheed Gibbons MD        Collected:           06/16/2021 01:45 PM          Ordering Location:     [facility]             Received:            06/16/2021 02:22 PM                                 [facility]                                                     Pathologist:           Abhinav Appiah MD                                                             Specimens:   1) - Small Intestine, Duodenum, per CJ                                                              2) - Gastric, Antrum, per CJ                                                                        3) - Large Intestine, Right / Ascending Colon, MASS bx per CJ---[place]                       Final Diagnosis       SEE SCANNED REPORT       Results for orders placed or performed in visit on 06/13/21   COVID-19, [facility] IN-HOUSE, NP SWAB IN TRANSPORT MEDIA 8-10 HR TAT - Swab, Nasopharynx    Specimen: Nasopharynx; Swab   Result Value Ref Range    COVID19 Not Detected Not Detected - Ref. Range   Results for orders placed or performed in visit on 06/08/21   Hemoglobin A1c    Specimen: Blood   Result Value Ref Range    Hemoglobin A1C 4.60 (L) 4.80 - 5.60 %   Lipid Panel    Specimen: Blood   Result Value Ref Range    Total Cholesterol 236 (H) 0 - 200 mg/dL    Triglycerides 81 0 - 150 mg/dL    HDL Cholesterol 90 (H) 40 - 60 mg/dL    LDL Cholesterol  132 (H) 0 - 100 mg/dL    VLDL Cholesterol 14 5 - 40 mg/dL    LDL/HDL Ratio 1.44    Basic Metabolic Panel    Specimen: Blood   Result Value Ref Range    Glucose 71 65 - 99 mg/dL    BUN 20 8 - 23 mg/dL    Creatinine 1.01 (H) 0.57 - 1.00 mg/dL     Sodium 138 136 - 145 mmol/L    Potassium 4.4 3.5 - 5.2 mmol/L    Chloride 103 98 - 107 mmol/L    CO2 24.3 22.0 - 29.0 mmol/L    Calcium 9.5 8.6 - 10.5 mg/dL    eGFR Non African Amer 54 (L) >60 mL/min/1.73    BUN/Creatinine Ratio 19.8 7.0 - 25.0    Anion Gap 10.7 5.0 - 15.0 mmol/L   Results for orders placed or performed in visit on 06/08/21   POCT urinalysis dipstick, manual    Specimen: Urine   Result Value Ref Range    Color Straw Yellow, Straw, Dark Yellow, Karen    Clarity, UA Clear Clear    Glucose, UA Negative Negative, 1000 mg/dL (3+) mg/dL    Bilirubin Negative Negative    Ketones, UA Negative Negative    Specific Gravity  1.010 1.005 - 1.030    Blood, UA Negative Negative    pH, Urine 5.0 5.0 - 8.0    Protein, POC Negative Negative mg/dL    Urobilinogen, UA Normal Normal    Leukocytes Negative Negative    Nitrite, UA Negative Negative     *Note: Due to a large number of results and/or encounters for the requested time period, some results have not been displayed. A complete set of results can be found in Results Review.        Elective Induction
